# Patient Record
Sex: MALE | Race: WHITE | Employment: OTHER | URBAN - METROPOLITAN AREA
[De-identification: names, ages, dates, MRNs, and addresses within clinical notes are randomized per-mention and may not be internally consistent; named-entity substitution may affect disease eponyms.]

---

## 2017-01-21 NOTE — BRIEF OP NOTE
BRIEF OPERATIVE NOTE    Date of Procedure: 1/26/2017     Preoperative Diagnosis:  PARTIAL THICKNESS ROTATOR CUFF TEAR RIGHT SHOULDER      BICEPS TENDINITIS RIGHT SHOULDER        Postoperative Diagnosis:  SAME      SLAP TEAR RIGHT SHOULDER      LABRAL TEAR RIGHT SHOULDER      CHONDROMALACIA RIGHT SHOULDER    Procedure(s): ARTHROSCOPY RIGHT SHOULDER ARTHROSCOPIC SUBACROMIAL DECOMPRESSION, EXTENSIVE DEBRIDEMENT SLAP TEAR, LABRAL TEAR, GLENOHUMERAL JOINT, SUBACROMIAL SPACE, MINI OPEN ROTATOR CUFF REPAIR, BICEPS TENODESIS    Surgeon(s) and Role:     * Blayne Stephens MD - Primary              Anesthesia: General WITH INTERSCALENE BLOCK    Complications: NONE    Implants:     Implant Name Type Inv.  Item Serial No.  Lot No. LRB No. Used Action   ANCHOR SUT 5.5MM W/NDL PEEK ZP -- - [id number]   ANCHOR SUT 5.5MM W/NDL PEEK ZP -- [id number] [employer] [id number] Right 2 Implanted        Brie Fraser MD

## 2017-01-21 NOTE — H&P
[facility] HISTORY AND PHYSICAL    Subjective:     Patient is a 81 y.o. RHD MALE WITH RIGHT SHOULDER PAIN. SEE OFFICE NOTE.     Patient Active Problem List    Diagnosis Date Noted    AR (allergic rhinitis) 07/27/2016    Chronic sinusitis 07/27/2016    Nasal polyp 07/27/2016    Hypertrophy of nasal turbinates 07/27/2016    Chronic pansinusitis 07/27/2016    Hereditary deficiency of other clotting factors ([address] 03/21/2016    Chronic deep vein thrombosis of lower extremity ([address] 03/21/2016    Saddle embolism of pulmonary artery ([address] 03/21/2016    Bradycardia 02/02/2016    Anaphylactic reaction 02/01/2016    Pulmonary embolism ([address] 12/23/2014    Hypertension 12/23/2014    Factor V Leiden ([address] 12/23/2014    Left leg DVT ([address] 12/23/2014    Leukocytosis 12/23/2014    Hyperkalemia 12/23/2014    Elevated serum creatinine 12/23/2014    Chronic back pain 12/23/2014    ARCE (dyspnea on exertion) 12/23/2014    Spinal stenosis, lumbar region, with neurogenic claudication 03/24/2014    Glaucoma     Hypercholesteremia     Unspecified adverse effect of anesthesia     Osteoarthritis of left hip 12/12/2012   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 12/12/2012    Supraspinatus (muscle) (tendon) sprain 02/17/2012    Superior glenoid labrum lesion 02/17/2012    Bicipital tenosynovitis 02/17/2012    Primary localized osteoarthrosis, shoulder region 02/17/2012    Carpal tunnel syndrome 02/17/2012    Essential hypertension, benign 08/16/2011    Thromboembolus ([address] 09/01/1997     Past Medical History   Diagnosis Date    Acute sinusitis     Anaphylactic reaction 2/1/2016    AR (allergic rhinitis) 7/27/2016    Bicipital tenosynovitis 2/17/2012    Bradycardia 2/2/2016    Carpal tunnel syndrome 2/17/2012    Chronic back pain 12/23/2014    Chronic deep vein thrombosis of lower extremity (HCC) 3/21/2016    Chronic pansinusitis 7/27/2016    Chronic sinusitis 7/27/2016    DNS (deviated nasal septum)     ARCE (dyspnea on exertion) 12/23/2014    DVT (deep venous thrombosis) ([address] 12/2014 1997, 2014, 8/2015    Elevated serum creatinine 12/23/2014    Epistaxis     Essential hypertension, benign 8/16/2011    Factor V Leiden ([address]      managed with medication     Glaucoma      right eye    Hereditary deficiency of other clotting factors ([address] 3/21/2016    Hypercholesteremia      managed with medication     Hyperkalemia 12/23/2014    Hypertension      well controlled - no meds at this time per pt.     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    Spinal stenosis, lumbar region, with neurogenic claudication 3/24/2014    Superior glenoid labrum lesion 2/17/2012    Supraspinatus (muscle) (tendon) sprain 2/17/2012    Thromboembolus ([address] 9/1/1997     left thigh DVT     Unspecified adverse effect of anesthesia      very difficult IV stick per patient d/t Lovenox, has needed anesthesiologist to start several times/comes in a day ahead for PICC line       Past Surgical History   Procedure Laterality Date    Hx colonoscopy  00,05,10    Hx carpal tunnel release  2012     left    Hx carpal tunnel release  1980     right    Hx blepharoplasty Bilateral 2009    Hx vascular access  2008     PICC line right arm placed & then removed     Hx vascular access  2012     PICC- removed    Hx lap cholecystectomy  2001    Hx appendectomy  1980    Hx hernia repair  2010     abdominal hernia repair    Hx hernia repair Left 2007     inguinal hernia repair    Hx other surgical  2000     [place] filter placed    Hx lumbar laminectomy      Hx hip replacement Left     Hx colonoscopy      Hx vitrectomy Left 6/15/15    Hx cholecystectomy      Hx orthopaedic Bilateral      open shoulder AC reconstruction    Hx orthopaedic Right 2006     neuroma fromfoot    Hx orthopaedic Bilateral      right elbow 1979 & left elbow 2007    Hx knee arthroscopy Right 2006      knee meniscus repair    Hx shoulder arthroscopy Left 2/17/2012    Hx orthopaedic Right      index finger    Hx back surgery       spinal stenosis    Hx heent Right 11/08      glaucoma surgery    Hx tonsillectomy  1950    Hx cataract removal Bilateral      left eye 1994 & right eye 2002    Hx corneal transplant Right 2004    Pr sinus surgery proc unlisted       numerous    Hx heent       nasal reconstruction    Hx heent       nasal polypectomy      Prior to Admission medications    Medication Sig Start Date End Date Taking? Authorizing Provider   HYDROcodone-acetaminophen (NORCO) 7.5-325 mg per tablet Take 1 Tab by mouth every six (6) hours as needed for Pain. Max Daily Amount: 4 Tabs. 11/15/16   Agnes Saleh NP   rivaroxaban (XARELTO) 20 mg tab tablet Take 1 Tab by mouth daily (with breakfast). 9/12/16   Anselmo Wallace MD   carboxymethylcellulose sodium (REFRESH LIQUIGEL) 1 % dlgl Apply 1 Drop to eye every four (4) hours. Historical Provider   omega-3 fatty acids-vitamin e (FISH OIL) 1,000 mg cap Take 2 Caps by mouth every morning. Historical Provider   Brimonidine-Timolol (COMBIGAN) 0.2-0.5 % Drop Administer 1 drop to both eyes two (2) times a day. Indications: OPEN ANGLE GLAUCOMA 3/16/10   Historical Provider   PRED FORTE 1 % DrpS Administer 1 drop to right eye two (2) times a day.  Indications: pt states prevents infection 3/16/10   Salomón Suarez MD Allergies   Allergen Reactions    Aspirin Other (comments)     Pt states can't take aspirin with his blood thinning medication.  Epinephrine Unknown (comments)      Social History   Substance Use Topics    Smoking status: Never Smoker    Smokeless tobacco: Never Used    Alcohol use No      Family History   Problem Relation Age of Onset    Cancer Mother      lymphoma    Diabetes Mother      type II    Heart Disease Father     Diabetes Father      type II    Cancer Father      pancreatic ca    Other Father      Coronary Disease    Malignant Hyperthermia Neg Hx     Pseudocholinesterase Deficiency Neg Hx     Delayed Awakening Neg Hx     Post-op Nausea/Vomiting Neg Hx     Emergence Delirium Neg Hx     Post-op Cognitive Dysfunction Neg Hx       Review of Systems  A comprehensive review of systems was negative except for that written in the HPI. Objective:     No data found. There were no vitals taken for this visit. General:  Alert, cooperative, no distress, appears stated age. Head:  Normocephalic, without obvious abnormality, atraumatic. Back:   Symmetric, no curvature. ROM normal. No CVA tenderness. Lungs:   Clear to auscultation bilaterally. Chest wall:  No tenderness or deformity. Heart:  Regular rate and rhythm, S1, S2 normal, no murmur, click, rub or gallop. Extremities: Extremities normal, atraumatic, no cyanosis or edema. Pulses: 2+ and symmetric all extremities. Skin: Skin color, texture, turgor normal. No rashes or lesions   Lymph nodes: Cervical, supraclavicular, and axillary nodes normal.   Neurologic: CNII-XII intact. Normal strength, sensation and reflexes throughout. Assessment:     ROTATOR CUFF TEAR RIGHT SHOULDER  BICEPS TENDINITIS RIGHT SHOULDER    Plan:     The various methods of treatment have been discussed with the patient and family. PATIENT HAS EXHAUSTED NON-OPERATIVE MODALITIES.   After consideration of risks, benefits and other options for treatment, the patient has consented to surgical intervention. SEE OFFICE NOTE.

## 2017-01-24 ENCOUNTER — HOSPITAL ENCOUNTER (OUTPATIENT)
Dept: SURGERY | Age: 72
Discharge: HOME | End: 2017-01-24
Attending: ORTHOPAEDIC SURGERY

## 2017-01-24 VITALS
WEIGHT: 182 LBS | SYSTOLIC BLOOD PRESSURE: 158 MMHG | BODY MASS INDEX: 26.96 KG/M2 | OXYGEN SATURATION: 98 % | HEIGHT: 69 IN | DIASTOLIC BLOOD PRESSURE: 74 MMHG | HEART RATE: 59 BPM | RESPIRATION RATE: 18 BRPM | TEMPERATURE: 98 F

## 2017-01-24 RX ORDER — GLUCOSAMINE/CHONDR SU A SOD 750-600 MG
TABLET ORAL DAILY
COMMUNITY
End: 2018-05-23

## 2017-01-24 RX ORDER — LANOLIN ALCOHOL/MO/W.PET/CERES
1000 CREAM (GRAM) TOPICAL DAILY
COMMUNITY
End: 2019-03-26 | Stop reason: ALTCHOICE

## 2017-01-24 RX ORDER — ATORVASTATIN CALCIUM 80 MG/1
40 TABLET, FILM COATED ORAL DAILY
COMMUNITY
End: 2018-05-23

## 2017-01-24 RX ORDER — MOXIFLOXACIN 5 MG/ML
1 SOLUTION/ DROPS OPHTHALMIC 3 TIMES DAILY
COMMUNITY
End: 2017-03-20 | Stop reason: CLARIF

## 2017-01-24 NOTE — PERIOP NOTES
Patient verified name, , and surgery as listed in [facility]. Type 1B surgery, PAT walk-in assessment complete. Labs per surgeon: Hemoglobin A1c and FBS DOS; orders signed and held in [address] [place] [address]. Labs per anesthesia protocol: None per anesthesia protocol. EKG: None per anesthesia protocol. Hibiclens and instructions given per hospital policy. Patient provided with handouts including Guide to Surgery, Pain Management, Hand Hygiene, Blood Transfusion Education, and [facility] Brochure. Patient answered medical/surgical history questions at their best of ability. All prior to admission medications documented in [facility]. Original medication prescription bottle NOT visualized during patient appointment. Patient instructed to hold all vitamins 7 days prior to surgery and NSAIDS 5 days prior to surgery, patient verbalized understanding. Medications to be held: Probiotic, Fish Oil, Biotin, and B12. Patient does not have to stop Xarelto prior to surgery per Dr. Carlos Newell. Patient states will take Xarelto 17 but NOT the morning of surgery (17). Note from Dr. Genevieve Thorpe states patient \"should hold Xarelto 24 prior,\" placed on chart for anesthesia reference. Patient instructed to continue previous medications as prescribed prior to surgery and to take the following medications the day of surgery according to anesthesia guidelines with a small sip of water: All eye drops, Norco if needed, and Atorvastatin. Patient taught back and verbalized understanding.

## 2017-01-26 ENCOUNTER — SURGERY (OUTPATIENT)
Age: 72
End: 2017-01-26

## 2017-01-26 ENCOUNTER — HOSPITAL ENCOUNTER (OUTPATIENT)
Age: 72
Setting detail: OBSERVATION
Discharge: HOME | End: 2017-01-27
Attending: ORTHOPAEDIC SURGERY | Admitting: ORTHOPAEDIC SURGERY
Payer: MEDICARE

## 2017-01-26 ENCOUNTER — ANESTHESIA (OUTPATIENT)
Dept: SURGERY | Age: 72
End: 2017-01-26
Payer: MEDICARE

## 2017-01-26 ENCOUNTER — APPOINTMENT (OUTPATIENT)
Dept: GENERAL RADIOLOGY | Age: 72
End: 2017-01-26
Attending: ORTHOPAEDIC SURGERY
Payer: MEDICARE

## 2017-01-26 ENCOUNTER — ANESTHESIA EVENT (OUTPATIENT)
Dept: SURGERY | Age: 72
End: 2017-01-26
Payer: MEDICARE

## 2017-01-26 PROBLEM — S46.211A STRAIN OF RIGHT BICEPS: Status: ACTIVE | Noted: 2017-01-26

## 2017-01-26 PROBLEM — S46.011A TRAUMATIC TEAR OF RIGHT ROTATOR CUFF: Status: ACTIVE | Noted: 2017-01-26

## 2017-01-26 PROBLEM — S43.491S: Status: ACTIVE | Noted: 2017-01-26

## 2017-01-26 PROBLEM — M94.211 CHONDROMALACIA, RIGHT SHOULDER: Status: ACTIVE | Noted: 2017-01-26

## 2017-01-26 PROBLEM — S43.431A TYPE 1 SUPERIOR LABRUM EXTENDING FROM ANTERIOR TO POSTERIOR (SLAP) LESION OF RIGHT SHOULDER: Status: ACTIVE | Noted: 2017-01-26

## 2017-01-26 LAB
CREAT SERPL-MCNC: 1.01 MG/DL (ref 0.8–1.5)
EST. AVERAGE GLUCOSE BLD GHB EST-MCNC: 126 MG/DL
GLUCOSE BLD STRIP.AUTO-MCNC: 109 MG/DL (ref 65–100)
HBA1C MFR BLD: 6 % (ref 4.8–6)

## 2017-01-26 PROCEDURE — 77030008477 HC STYL SATN SLP COVD -A: Performed by: ANESTHESIOLOGY

## 2017-01-26 PROCEDURE — 77030004453 HC BUR SHV STRY -B: Performed by: ORTHOPAEDIC SURGERY

## 2017-01-26 PROCEDURE — 36415 COLL VENOUS BLD VENIPUNCTURE: CPT | Performed by: ORTHOPAEDIC SURGERY

## 2017-01-26 PROCEDURE — 74011250636 HC RX REV CODE- 250/636

## 2017-01-26 PROCEDURE — 82962 GLUCOSE BLOOD TEST: CPT

## 2017-01-26 PROCEDURE — 77030006891 HC BLD SHV RESECT STRY -B: Performed by: ORTHOPAEDIC SURGERY

## 2017-01-26 PROCEDURE — 76010010054 HC POST OP PAIN BLOCK: Performed by: ORTHOPAEDIC SURGERY

## 2017-01-26 PROCEDURE — 74011250637 HC RX REV CODE- 250/637: Performed by: ANESTHESIOLOGY

## 2017-01-26 PROCEDURE — 77030020782 HC GWN BAIR PAWS FLX 3M -B: Performed by: ANESTHESIOLOGY

## 2017-01-26 PROCEDURE — 74011250636 HC RX REV CODE- 250/636: Performed by: ORTHOPAEDIC SURGERY

## 2017-01-26 PROCEDURE — 73030 X-RAY EXAM OF SHOULDER: CPT

## 2017-01-26 PROCEDURE — 77030018986 HC SUT ETHBND4 J&J -B: Performed by: ORTHOPAEDIC SURGERY

## 2017-01-26 PROCEDURE — 76210000017 HC OR PH I REC 1.5 TO 2 HR: Performed by: ORTHOPAEDIC SURGERY

## 2017-01-26 PROCEDURE — 74011000250 HC RX REV CODE- 250: Performed by: ORTHOPAEDIC SURGERY

## 2017-01-26 PROCEDURE — 77030002986 HC SUT PROL J&J -A: Performed by: ORTHOPAEDIC SURGERY

## 2017-01-26 PROCEDURE — 74011250636 HC RX REV CODE- 250/636: Performed by: ANESTHESIOLOGY

## 2017-01-26 PROCEDURE — 77030003666 HC NDL SPINAL BD -A: Performed by: ORTHOPAEDIC SURGERY

## 2017-01-26 PROCEDURE — 82565 ASSAY OF CREATININE: CPT | Performed by: ORTHOPAEDIC SURGERY

## 2017-01-26 PROCEDURE — 74011000250 HC RX REV CODE- 250

## 2017-01-26 PROCEDURE — 77030033005 HC TBNG ARTHSC PMP STRY -B: Performed by: ORTHOPAEDIC SURGERY

## 2017-01-26 PROCEDURE — 99218 HC RM OBSERVATION: CPT

## 2017-01-26 PROCEDURE — 77030006668 HC BLD SHV MENSCS STRY -B: Performed by: ORTHOPAEDIC SURGERY

## 2017-01-26 PROCEDURE — 76010000161 HC OR TIME 1 TO 1.5 HR INTENSV-TIER 1: Performed by: ORTHOPAEDIC SURGERY

## 2017-01-26 PROCEDURE — 76060000033 HC ANESTHESIA 1 TO 1.5 HR: Performed by: ORTHOPAEDIC SURGERY

## 2017-01-26 PROCEDURE — C1713 ANCHOR/SCREW BN/BN,TIS/BN: HCPCS | Performed by: ORTHOPAEDIC SURGERY

## 2017-01-26 PROCEDURE — 83036 HEMOGLOBIN GLYCOSYLATED A1C: CPT | Performed by: ORTHOPAEDIC SURGERY

## 2017-01-26 PROCEDURE — 77030002916 HC SUT ETHLN J&J -A: Performed by: ORTHOPAEDIC SURGERY

## 2017-01-26 PROCEDURE — 74011000258 HC RX REV CODE- 258: Performed by: ORTHOPAEDIC SURGERY

## 2017-01-26 PROCEDURE — 77030033073 HC TBNG ARTHSC PMP OUTFLO STRY -B: Performed by: ORTHOPAEDIC SURGERY

## 2017-01-26 PROCEDURE — 77030008703 HC TU ET UNCUF COVD -A: Performed by: ANESTHESIOLOGY

## 2017-01-26 PROCEDURE — 77030032490 HC SLV COMPR SCD KNE COVD -B: Performed by: ORTHOPAEDIC SURGERY

## 2017-01-26 PROCEDURE — 77030003602 HC NDL NRV BLK BBMI -B: Performed by: ANESTHESIOLOGY

## 2017-01-26 PROCEDURE — 77030031139 HC SUT VCRL2 J&J -A: Performed by: ORTHOPAEDIC SURGERY

## 2017-01-26 PROCEDURE — 77030018836 HC SOL IRR NACL ICUM -A: Performed by: ORTHOPAEDIC SURGERY

## 2017-01-26 PROCEDURE — 76942 ECHO GUIDE FOR BIOPSY: CPT | Performed by: ORTHOPAEDIC SURGERY

## 2017-01-26 PROCEDURE — 74011250637 HC RX REV CODE- 250/637: Performed by: ORTHOPAEDIC SURGERY

## 2017-01-26 DEVICE — 5.5MM PEEK ZIP SUTURE ANCHOR WITH ¿ CIRCLE TAPER NEEDLES, #2 FORCE FIBER
Type: IMPLANTABLE DEVICE | Site: SHOULDER | Status: FUNCTIONAL
Brand: PEEK ZIP

## 2017-01-26 RX ORDER — FACIAL-BODY WIPES
10 EACH TOPICAL DAILY PRN
Status: DISCONTINUED | OUTPATIENT
Start: 2017-01-26 | End: 2017-01-27 | Stop reason: HOSPADM

## 2017-01-26 RX ORDER — ROCURONIUM BROMIDE 10 MG/ML
INJECTION, SOLUTION INTRAVENOUS AS NEEDED
Status: DISCONTINUED | OUTPATIENT
Start: 2017-01-26 | End: 2017-01-26 | Stop reason: HOSPADM

## 2017-01-26 RX ORDER — HYDROMORPHONE HYDROCHLORIDE 1 MG/ML
1 INJECTION, SOLUTION INTRAMUSCULAR; INTRAVENOUS; SUBCUTANEOUS
Status: DISCONTINUED | OUTPATIENT
Start: 2017-01-26 | End: 2017-01-27 | Stop reason: HOSPADM

## 2017-01-26 RX ORDER — SODIUM CHLORIDE, SODIUM LACTATE, POTASSIUM CHLORIDE, CALCIUM CHLORIDE 600; 310; 30; 20 MG/100ML; MG/100ML; MG/100ML; MG/100ML
125 INJECTION, SOLUTION INTRAVENOUS CONTINUOUS
Status: DISCONTINUED | OUTPATIENT
Start: 2017-01-26 | End: 2017-01-26 | Stop reason: HOSPADM

## 2017-01-26 RX ORDER — FENTANYL CITRATE 50 UG/ML
INJECTION, SOLUTION INTRAMUSCULAR; INTRAVENOUS AS NEEDED
Status: DISCONTINUED | OUTPATIENT
Start: 2017-01-26 | End: 2017-01-26 | Stop reason: HOSPADM

## 2017-01-26 RX ORDER — LIDOCAINE HYDROCHLORIDE AND EPINEPHRINE 10; 10 MG/ML; UG/ML
INJECTION, SOLUTION INFILTRATION; PERINEURAL AS NEEDED
Status: DISCONTINUED | OUTPATIENT
Start: 2017-01-26 | End: 2017-01-26 | Stop reason: HOSPADM

## 2017-01-26 RX ORDER — TEMAZEPAM 15 MG/1
15 CAPSULE ORAL
Status: DISCONTINUED | OUTPATIENT
Start: 2017-01-26 | End: 2017-01-27 | Stop reason: HOSPADM

## 2017-01-26 RX ORDER — SUCCINYLCHOLINE CHLORIDE 20 MG/ML
INJECTION INTRAMUSCULAR; INTRAVENOUS AS NEEDED
Status: DISCONTINUED | OUTPATIENT
Start: 2017-01-26 | End: 2017-01-26 | Stop reason: HOSPADM

## 2017-01-26 RX ORDER — NALOXONE HYDROCHLORIDE 0.4 MG/ML
0.1 INJECTION, SOLUTION INTRAMUSCULAR; INTRAVENOUS; SUBCUTANEOUS AS NEEDED
Status: DISCONTINUED | OUTPATIENT
Start: 2017-01-26 | End: 2017-01-26 | Stop reason: HOSPADM

## 2017-01-26 RX ORDER — DEXAMETHASONE SODIUM PHOSPHATE 100 MG/10ML
INJECTION INTRAMUSCULAR; INTRAVENOUS AS NEEDED
Status: DISCONTINUED | OUTPATIENT
Start: 2017-01-26 | End: 2017-01-26 | Stop reason: HOSPADM

## 2017-01-26 RX ORDER — FENTANYL CITRATE 50 UG/ML
100 INJECTION, SOLUTION INTRAMUSCULAR; INTRAVENOUS ONCE
Status: COMPLETED | OUTPATIENT
Start: 2017-01-26 | End: 2017-01-26

## 2017-01-26 RX ORDER — BUPIVACAINE HYDROCHLORIDE AND EPINEPHRINE 2.5; 5 MG/ML; UG/ML
INJECTION, SOLUTION EPIDURAL; INFILTRATION; INTRACAUDAL; PERINEURAL AS NEEDED
Status: DISCONTINUED | OUTPATIENT
Start: 2017-01-26 | End: 2017-01-26 | Stop reason: HOSPADM

## 2017-01-26 RX ORDER — OXYCODONE HYDROCHLORIDE 5 MG/1
10 TABLET ORAL
Status: DISCONTINUED | OUTPATIENT
Start: 2017-01-26 | End: 2017-01-26 | Stop reason: HOSPADM

## 2017-01-26 RX ORDER — HYDROMORPHONE HYDROCHLORIDE 4 MG/1
4 TABLET ORAL
Status: DISCONTINUED | OUTPATIENT
Start: 2017-01-26 | End: 2017-01-27 | Stop reason: HOSPADM

## 2017-01-26 RX ORDER — BUPIVACAINE HYDROCHLORIDE AND EPINEPHRINE 5; 5 MG/ML; UG/ML
INJECTION, SOLUTION EPIDURAL; INTRACAUDAL; PERINEURAL AS NEEDED
Status: DISCONTINUED | OUTPATIENT
Start: 2017-01-26 | End: 2017-01-26 | Stop reason: HOSPADM

## 2017-01-26 RX ORDER — DOCUSATE SODIUM 100 MG/1
100 CAPSULE, LIQUID FILLED ORAL 2 TIMES DAILY
Status: DISCONTINUED | OUTPATIENT
Start: 2017-01-27 | End: 2017-01-27 | Stop reason: HOSPADM

## 2017-01-26 RX ORDER — SODIUM CHLORIDE 0.9 % (FLUSH) 0.9 %
5-10 SYRINGE (ML) INJECTION EVERY 8 HOURS
Status: DISCONTINUED | OUTPATIENT
Start: 2017-01-26 | End: 2017-01-26 | Stop reason: HOSPADM

## 2017-01-26 RX ORDER — LIDOCAINE HYDROCHLORIDE 20 MG/ML
INJECTION, SOLUTION EPIDURAL; INFILTRATION; INTRACAUDAL; PERINEURAL AS NEEDED
Status: DISCONTINUED | OUTPATIENT
Start: 2017-01-26 | End: 2017-01-26 | Stop reason: HOSPADM

## 2017-01-26 RX ORDER — SODIUM CHLORIDE 0.9 % (FLUSH) 0.9 %
5-10 SYRINGE (ML) INJECTION AS NEEDED
Status: DISCONTINUED | OUTPATIENT
Start: 2017-01-26 | End: 2017-01-26 | Stop reason: HOSPADM

## 2017-01-26 RX ORDER — GLYCOPYRROLATE 0.2 MG/ML
INJECTION INTRAMUSCULAR; INTRAVENOUS AS NEEDED
Status: DISCONTINUED | OUTPATIENT
Start: 2017-01-26 | End: 2017-01-26 | Stop reason: HOSPADM

## 2017-01-26 RX ORDER — PROPOFOL 10 MG/ML
INJECTION, EMULSION INTRAVENOUS AS NEEDED
Status: DISCONTINUED | OUTPATIENT
Start: 2017-01-26 | End: 2017-01-26 | Stop reason: HOSPADM

## 2017-01-26 RX ORDER — MIDAZOLAM HYDROCHLORIDE 1 MG/ML
2 INJECTION, SOLUTION INTRAMUSCULAR; INTRAVENOUS ONCE
Status: COMPLETED | OUTPATIENT
Start: 2017-01-26 | End: 2017-01-26

## 2017-01-26 RX ORDER — SODIUM CHLORIDE 9 MG/ML
75 INJECTION, SOLUTION INTRAVENOUS CONTINUOUS
Status: DISPENSED | OUTPATIENT
Start: 2017-01-26 | End: 2017-01-27

## 2017-01-26 RX ORDER — SODIUM CHLORIDE 0.9 % (FLUSH) 0.9 %
5-10 SYRINGE (ML) INJECTION EVERY 8 HOURS
Status: DISCONTINUED | OUTPATIENT
Start: 2017-01-26 | End: 2017-01-27 | Stop reason: HOSPADM

## 2017-01-26 RX ORDER — SODIUM CHLORIDE 0.9 % (FLUSH) 0.9 %
5-10 SYRINGE (ML) INJECTION AS NEEDED
Status: DISCONTINUED | OUTPATIENT
Start: 2017-01-26 | End: 2017-01-27 | Stop reason: HOSPADM

## 2017-01-26 RX ORDER — ONDANSETRON 2 MG/ML
INJECTION INTRAMUSCULAR; INTRAVENOUS AS NEEDED
Status: DISCONTINUED | OUTPATIENT
Start: 2017-01-26 | End: 2017-01-26 | Stop reason: HOSPADM

## 2017-01-26 RX ORDER — PROMETHAZINE HYDROCHLORIDE 25 MG/1
25 TABLET ORAL
Status: DISCONTINUED | OUTPATIENT
Start: 2017-01-26 | End: 2017-01-27 | Stop reason: HOSPADM

## 2017-01-26 RX ORDER — HYDROMORPHONE HYDROCHLORIDE 2 MG/1
2 TABLET ORAL
Status: DISCONTINUED | OUTPATIENT
Start: 2017-01-26 | End: 2017-01-27 | Stop reason: HOSPADM

## 2017-01-26 RX ORDER — LIDOCAINE HYDROCHLORIDE 10 MG/ML
0.1 INJECTION INFILTRATION; PERINEURAL AS NEEDED
Status: DISCONTINUED | OUTPATIENT
Start: 2017-01-26 | End: 2017-01-26 | Stop reason: HOSPADM

## 2017-01-26 RX ORDER — GUAIFENESIN 100 MG/5ML
81 LIQUID (ML) ORAL DAILY
Status: DISCONTINUED | OUTPATIENT
Start: 2017-01-26 | End: 2017-01-27 | Stop reason: HOSPADM

## 2017-01-26 RX ORDER — CEFAZOLIN SODIUM IN 0.9 % NACL 2 G/50 ML
2 INTRAVENOUS SOLUTION, PIGGYBACK (ML) INTRAVENOUS ONCE
Status: COMPLETED | OUTPATIENT
Start: 2017-01-26 | End: 2017-01-26

## 2017-01-26 RX ORDER — HYDROMORPHONE HYDROCHLORIDE 2 MG/ML
0.5 INJECTION, SOLUTION INTRAMUSCULAR; INTRAVENOUS; SUBCUTANEOUS
Status: DISCONTINUED | OUTPATIENT
Start: 2017-01-26 | End: 2017-01-26 | Stop reason: HOSPADM

## 2017-01-26 RX ADMIN — LIDOCAINE HYDROCHLORIDE 80 MG: 20 INJECTION, SOLUTION EPIDURAL; INFILTRATION; INTRACAUDAL; PERINEURAL at 11:39

## 2017-01-26 RX ADMIN — ROCURONIUM BROMIDE 5 MG: 10 INJECTION, SOLUTION INTRAVENOUS at 11:39

## 2017-01-26 RX ADMIN — HYDROMORPHONE HYDROCHLORIDE 1 MG: 1 INJECTION, SOLUTION INTRAMUSCULAR; INTRAVENOUS; SUBCUTANEOUS at 23:44

## 2017-01-26 RX ADMIN — CEFAZOLIN 2 G: 1 INJECTION, POWDER, FOR SOLUTION INTRAMUSCULAR; INTRAVENOUS; PARENTERAL at 11:29

## 2017-01-26 RX ADMIN — CEFAZOLIN SODIUM 1 G: 1 INJECTION, POWDER, FOR SOLUTION INTRAMUSCULAR; INTRAVENOUS at 18:14

## 2017-01-26 RX ADMIN — Medication 10 ML: at 23:44

## 2017-01-26 RX ADMIN — BUPIVACAINE HYDROCHLORIDE AND EPINEPHRINE 11 ML: 2.5; 5 INJECTION, SOLUTION EPIDURAL; INFILTRATION; INTRACAUDAL; PERINEURAL at 10:41

## 2017-01-26 RX ADMIN — SODIUM CHLORIDE 75 ML/HR: 900 INJECTION, SOLUTION INTRAVENOUS at 13:00

## 2017-01-26 RX ADMIN — BUPIVACAINE HYDROCHLORIDE AND EPINEPHRINE 11 ML: 5; 5 INJECTION, SOLUTION EPIDURAL; INTRACAUDAL; PERINEURAL at 10:41

## 2017-01-26 RX ADMIN — MIDAZOLAM HYDROCHLORIDE 2 MG: 1 INJECTION, SOLUTION INTRAMUSCULAR; INTRAVENOUS at 10:32

## 2017-01-26 RX ADMIN — FENTANYL CITRATE 100 MCG: 50 INJECTION, SOLUTION INTRAMUSCULAR; INTRAVENOUS at 11:39

## 2017-01-26 RX ADMIN — Medication 10 ML: at 16:54

## 2017-01-26 RX ADMIN — DEXAMETHASONE SODIUM PHOSPHATE 10 MG: 100 INJECTION INTRAMUSCULAR; INTRAVENOUS at 11:57

## 2017-01-26 RX ADMIN — SODIUM CHLORIDE, SODIUM LACTATE, POTASSIUM CHLORIDE, AND CALCIUM CHLORIDE 125 ML/HR: 600; 310; 30; 20 INJECTION, SOLUTION INTRAVENOUS at 08:33

## 2017-01-26 RX ADMIN — FENTANYL CITRATE 50 MCG: 50 INJECTION, SOLUTION INTRAMUSCULAR; INTRAVENOUS at 10:32

## 2017-01-26 RX ADMIN — RIVAROXABAN 20 MG: 20 TABLET, FILM COATED ORAL at 18:14

## 2017-01-26 RX ADMIN — LIDOCAINE HYDROCHLORIDE AND EPINEPHRINE 20 ML: 10; 10 INJECTION, SOLUTION INFILTRATION; PERINEURAL at 12:07

## 2017-01-26 RX ADMIN — PROPOFOL 200 MG: 10 INJECTION, EMULSION INTRAVENOUS at 11:39

## 2017-01-26 RX ADMIN — SUCCINYLCHOLINE CHLORIDE 140 MG: 20 INJECTION INTRAMUSCULAR; INTRAVENOUS at 11:39

## 2017-01-26 RX ADMIN — ASPIRIN 81 MG CHEWABLE TABLET 81 MG: 81 TABLET CHEWABLE at 10:45

## 2017-01-26 RX ADMIN — SODIUM CHLORIDE, SODIUM LACTATE, POTASSIUM CHLORIDE, AND CALCIUM CHLORIDE: 600; 310; 30; 20 INJECTION, SOLUTION INTRAVENOUS at 11:50

## 2017-01-26 RX ADMIN — ONDANSETRON 4 MG: 2 INJECTION INTRAMUSCULAR; INTRAVENOUS at 12:24

## 2017-01-26 RX ADMIN — GLYCOPYRROLATE 0.2 MG: 0.2 INJECTION INTRAMUSCULAR; INTRAVENOUS at 12:05

## 2017-01-26 NOTE — PERIOP NOTES
TRANSFER - OUT REPORT:    Verbal report given to [address] [place] [address]  on Nelida Delaney  being transferred to [facility]  for routine progression of care       Report consisted of patients Situation, Background, Assessment and   Recommendations(SBAR). Information from the following report(s) SBAR, Kardex, OR Summary, Intake/Output and MAR was reviewed with the receiving nurse. Lines:   Peripheral IV 01/26/17 Left Antecubital (Active)   Site Assessment Clean, dry, & intact 1/26/2017  1:55 PM   Phlebitis Assessment 0 1/26/2017  1:55 PM   Infiltration Assessment 0 1/26/2017  1:55 PM   Dressing Status Clean, dry, & intact 1/26/2017  1:55 PM   Dressing Type Tape;Transparent 1/26/2017  1:55 PM   Hub Color/Line Status Infusing 1/26/2017  1:55 PM        Opportunity for questions and clarification was provided.       Patient transported with:   O2 @ 2 liters

## 2017-01-26 NOTE — ANESTHESIA PROCEDURE NOTES
Peripheral Block    Start time: 1/26/2017 10:33 AM  End time: 1/26/2017 10:41 AM  Performed by: Preet Mckeon  Authorized by: Preet Mckeon       Pre-procedure: Indications: at surgeon's request and post-op pain management    Preanesthetic Checklist: patient identified, risks and benefits discussed, site marked, timeout performed, anesthesia consent given and patient being monitored    Timeout Time: 10:32          Block Type:   Block Type:   Interscalene  Laterality:  Right  Monitoring:  Standard ASA monitoring, responsive to questions, oxygen, continuous pulse ox, frequent vital sign checks and heart rate  Injection Technique:  Single shot  Procedures: ultrasound guided and nerve stimulator    Patient Position: supine  Prep: chlorhexidine    Location:  Interscalene  Needle Type:  Stimuplex  Needle Gauge:  22 G  Needle Localization:  Nerve stimulator and ultrasound guidance  Motor Response: minimal motor response >0.4 mA    Medication Injected:  0.375%  bupivacaine  Adds:  Epi 1:200K  Volume (mL):  22    Assessment:  Number of attempts:  1  Injection Assessment:  Incremental injection every 5 mL, negative aspiration for CSF, ultrasound image on chart, local visualized surrounding nerve on ultrasound, negative aspiration for blood, no intravascular symptoms and no paresthesia  Patient tolerance:  Patient tolerated the procedure well with no immediate complications

## 2017-01-26 NOTE — DISCHARGE SUMMARY
[address] [facility] Discharge Summary      Patient ID:  Tam Chase  [id number]  81 y.o.  1945    Admit date: 1/26/2017    Discharge date and time: 1/27/2016    Admitting Physician: Pasha Rodriguez MD     Discharge Physician: Pasha Rodriguez MD      Admission Diagnoses: Rotator cuff strain, right, sequela [S46.011S]  Biceps muscle strain, right, initial encounter [R34.920H]    Discharge Diagnoses: Principal Problem:    Traumatic tear of right rotator cuff (1/26/2017)    Active Problems:    Strain of right biceps (1/26/2017)      Type 1 superior labrum extending from anterior to posterior (SLAP) lesion of right shoulder (1/26/2017)      Other sprain of right shoulder joint, sequela (1/26/2017)      Chondromalacia, right shoulder (1/26/2017)        Surgeon: Pasha Rodriguez MD      Preoperative Medical Clearance: YES                          Perioperative Antibiotics: Ancef  _X__                                                Vancomycin  ___          DVT Prophylaxis:  Sumeet Fisher Op complications: none        Discharged to: Home    Discharge instructions:  -Resume pre hospital diet             -Resume home medications per medical continuation form     CONTINUE PHYSICAL THERAPY  SLING RIGHT SHOULDER  -Follow up in office as scheduled       Signed:  Pasha Rodriguez MD  1/27/2017  9:00 AM

## 2017-01-26 NOTE — IP AVS SNAPSHOT
[address] [place] [address] 
[phone] Patient: Aurelio Caba MRN: [id number] YPJ:[birth year] You are allergic to the following Allergen Reactions Aspirin Other (comments) Pt states can't take aspirin with his blood thinning medication. Epinephrine Unknown (comments) Blindness in right eye Recent Documentation Weight BMI Smoking Status 82.6 kg 26.88 kg/m2 Never Smoker Emergency Contacts Name Discharge Info Relation Home Work Mobile Stack,Bethanie DISCHARGE CAREGIVER [3] Spouse [3] [phone] [id number] [phone] About your hospitalization You were admitted on:  January 26, 2017 You last received care in the:  [facility] You were discharged on:  January 27, 2017 Unit phone number:  [phone] Why you were hospitalized Your primary diagnosis was:  Traumatic Tear Of Right Rotator Cuff Your diagnoses also included:  Strain Of Right Biceps, Type 1 Superior Labrum Extending From Anterior To Posterior (Slap) Lesion Of Right Shoulder, Other Sprain Of Right Shoulder Joint, Sequela, Chondromalacia, Right Shoulder Providers Seen During Your Hospitalizations Provider Role Specialty Primary office phone Concepcion Keita MD Attending Provider Orthopedic Surgery [phone] Your Primary Care Physician (PCP) Primary Care Physician Office Phone Office Fax Chloe Patient [address] Follow-up Information Follow up With Details Comments Contact Info Santiago Fam MD  As needed [facility] [address] [address] 
[phone] Concepcion Keita MD In 2 weeks Call office if not already scheduled [facility] 10 [facility] [address] [place] [address] Simone 16 Your Appointments Thursday March 09, 2017  8:00 AM EST LAB with [address]  
1808 [facility] OUTREACH INSURANCE ([address]) Shaina Godinez 0 [address] [place] [address]  
[phone] Thursday March 09, 2017  8:30 AM EST Follow Up with MD Jeffrey Lai Hematology and Oncology [facility]) RASHMI/ Neftaly Dhillon 33 [facility] [address]  
[phone] Current Discharge Medication List  
  
ASK your doctor about these medications Dose & Instructions Dispensing Information Comments Morning Noon Evening Bedtime  
 atorvastatin 80 mg tablet Commonly known as:  LIPITOR Dose:  80 mg Take 80 mg by mouth daily. 1/2 tablet daily  Take DOS per anesthesia protocol. Refills:  0 Biotin 2,500 mcg Cap Take  by mouth daily. Refills:  0  
     
   
   
   
  
 COMBIGAN 0.2-0.5 % Drop ophthalmic solution Generic drug:  brimonidine-timolol Dose:  1 Drop Administer 1 drop to both eyes two (2) times a day. Indications: OPEN ANGLE GLAUCOMA Refills:  0  
     
   
   
   
  
 FISH OIL 1,000 mg Cap Generic drug:  omega-3 fatty acids-vitamin e Dose:  2 Cap Take 2 Caps by mouth every morning. Refills:  0 HYDROcodone-acetaminophen 7.5-325 mg per tablet Commonly known as:  Maryam Singh Notes to Patient:  DO NOT TAKE WITH DILAUDID! Dose:  1 Tab Take 1 Tab by mouth every six (6) hours as needed for Pain. Max Daily Amount: 4 Tabs. Quantity:  20 Tab Refills:  0 PRED FORTE 1 % ophthalmic suspension Generic drug:  prednisoLONE acetate Dose:  1 Drop Administer 1 drop to right eye two (2) times a day. Indications: pt states prevents infection Refills:  0 PROBIOTIC 4X 10-15 mg Tbec Generic drug:  B.infantis-B.ani-B.long-B.bifi Take  by mouth daily. Refills:  0 REFRESH LIQUIGEL 1 % Dlgl Generic drug:  carboxymethylcellulose sodium Dose:  1 Drop Apply 1 Drop to eye every four (4) hours. Refills:  0  
     
   
   
   
  
 rivaroxaban 20 mg Tab tablet Commonly known as:  Jordan Zhu Your next dose is:  Tomorrow Dose:  20 mg Take 1 Tab by mouth daily (with breakfast). Quantity:  30 Tab Refills:  11 VIGAMOX 0.5 % ophthalmic solution Generic drug:  moxifloxacin Dose:  1 Drop Administer 1 Drop to right eye three (3) times daily. Refills:  0  
     
   
   
   
  
 VITAMIN B-12 1,000 mcg tablet Generic drug:  cyanocobalamin Dose:  1000 mcg Take 1,000 mcg by mouth daily. Refills:  0 Discharge Instructions DISCHARGE SUMMARY from Nurse The following personal items collected during your admission are returned to you:  
Dental Appliance: Dental Appliances: Lowers; Other (comment) (bridge ) Vision: Visual Aid: Glasses Hearing Aid:   na 
Jewelry: Jewelry: None Clothing: Clothing: At bedside Other Valuables: Other Valuables: None Valuables sent to safe:   na 
 
 
 
 
PATIENT INSTRUCTIONS: 
 
New Medications: 
 
Dilaudid 2 mg tabs Take 1-2 tabs every 4-6 hrs as needed for pain. Toradol 10 mg tabs Take 1 tab every 6 hrs x 5 days. Phenergan 25 mg tabs Take 1 tab every 8 hrs as needed for nausea. Restoril 15 mg tabs Take 1 tab at bedtime as needed for sleep. After general anesthesia or intravenous sedation, for 24 hours or while taking prescription Narcotics: · Limit your activities · Do not drive and operate hazardous machinery · Do not make important personal or business decisions · Do  not drink alcoholic beverages · If you have not urinated within 8 hours after discharge, please contact your surgeon on call. Report the following to your surgeon: 
· Excessive pain, swelling, redness or odor of or around the surgical area · Temperature over 101 · Nausea and vomiting lasting longer than 4 hours or if unable to take medications · Any signs of decreased circulation or nerve impairment to extremity: change in color, persistent  numbness, tingling, coldness or increase pain · Any questions, call office @ [phone]. What to do at Home: 
Recommended activity: activity as tolerated, as instructed per Dr. Kumar Payment. Continue with exercises taught by Physical Therapy. Resume per hospital diet. Wear sling to right arm. Use ice and elevate arm to decrease pain and swelling. If you experience any of the following symptoms temp>101, pain unrelieved by meds, or persistent nausea or vomitting, please follow up with Dr. Kumar Payment @ [phone]. *  Please give a list of your current medications to your Primary Care Provider. *  Please update this list whenever your medications are discontinued, doses are 
    changed, or new medications (including over-the-counter products) are added. *  Please carry medication information at all times in case of emergency situations. Shoulder Arthroscopy: What to Expect at Home Your Recovery Your arm may be in a sling. You will feel tired for several days. Your shoulder will be swollen, and you may notice that your skin is a different color near the cuts the doctor made (incisions). Your hand and arm may also be swollen. This is normal and will go away in a few days. Depending on the medicine you had during the surgery, your entire arm may feel numb or like you cannot move it. This goes away in 12 to 24 hours. When you can return to work or your usual routine will depend on your shoulder problem. Most people need 6 weeks or longer to recover. How much time you need depends on the surgery that was done. You may have to limit your activity until your shoulder strength and range of motion return to normal. You may also be in a rehabilitation program (rehab). If you have a desk job, you may be able to return to work a few days after the surgery. If you lift things at work, it may take months before you return to work. This care sheet gives you a general idea about how long it will take for you to recover. But each person recovers at a different pace. Follow the steps below to get better as quickly as possible. How can you care for yourself at home? Activity · Rest when you feel tired. Getting enough sleep will help you recover. You may be more comfortable if you sleep in a reclining chair. To make your arm and shoulder feel better, keep a thin pillow under the back of your arm while you are lying down. · Try to walk each day. Start by walking a little more than you did the day before. Bit by bit, increase the amount you walk. Walking boosts blood flow and helps prevent pneumonia and constipation. · For 2 to 3 weeks, avoid lifting anything heavier than a plate or a glass. You need to give your shoulder time to heal. 
· Your arm may be in a sling. You may need to use the sling for a few days to a few weeks. Your doctor will advise you on how long to wear the sling. · You may take the sling off when you dress or wash. · Do not use your arm for repeated movements. These include painting, vacuuming, or using a computer. Diet · You can eat your normal diet. If your stomach is upset, try bland, low-fat foods like plain rice, broiled chicken, toast, and yogurt. · Drink plenty of fluids, unless your doctor tells you not to. · You may notice that your bowel movements are not regular right after your surgery. This is common. Try to avoid constipation and straining with bowel movements. You may want to take a fiber supplement every day. If you have not had a bowel movement after a couple of days, ask your doctor about taking a mild laxative. Medicines · Take pain medicines exactly as directed.  
¨ If the doctor gave you a prescription medicine for pain, take it as prescribed. ¨ If you are not taking a prescription pain medicine, ask your doctor if you can take an over-the-counter medicine. · If you think your pain medicine is making you sick to your stomach: 
¨ Take your medicine after meals (unless your doctor has told you not to). ¨ Ask your doctor for a different pain medicine. · If your doctor prescribed antibiotics, take them as directed. Do not stop taking them just because you feel better. You need to take the full course of antibiotics. Incision care · If you have a dressing over your incision, keep it clean and dry. You may remove it 2 to 3 days after the surgery. · If your incision is open to the air, keep the area clean and dry. · If you have strips of tape on the incision, leave the tape on for a week or until it falls off. Exercise · You may need rehabilitation. This is a series of exercises you do after your surgery. Rehab helps you get back your shoulder's range of motion and strength. You will work with your doctor and physical therapist to plan this exercise program. To get the best results, you need to do the exercises correctly and as often and as long as your doctor tells you. Ice · To reduce swelling and pain, put ice or a cold pack on your shoulder for 10 to 20 minutes at a time. Do this every 1 to 2 hours. Put a thin cloth between the ice and your skin. Follow-up care is a key part of your treatment and safety. Be sure to make and go to all appointments, and call your doctor if you are having problems. It's also a good idea to know your test results and keep a list of the medicines you take. When should you call for help? Call 911 anytime you think you may need emergency care. For example, call if: 
· You passed out (lost consciousness). · You have severe trouble breathing. · You have sudden chest pain and shortness of breath, or you cough up blood. Call your doctor now or seek immediate medical care if: · Your hand is cool, pale, or numb, or it changes color. · You are unable to move your fingers, wrist, or elbow. · You are sick to your stomach or cannot keep fluids down. · You have pain that does not get better after you take pain medicine. · You have signs of infection, such as: 
¨ Increased pain, swelling, warmth, or redness. ¨ Red streaks leading from the incision. ¨ Pus draining from the incision. ¨ A fever. · You have loose stitches, or your incision comes open. · Your incision bleeds through your first dressing or is still bleeding 3 days after your surgery. Watch closely for changes in your health, and be sure to contact your doctor if: 
· Your sling feels too tight, and you cannot loosen it. · You have new or increased swelling in your arm. · You have new pain that develops in another area of the affected limb. For example, you have pain in your hand or elbow. · You do not have a bowel movement after taking a laxative. · You do not get better as expected. Where can you learn more? Go to [employer] Enter I271 in the search box to learn more about \"Shoulder Arthroscopy: What to Expect at Home. \"  
© 3553-9870 Healthwise, Incorporated. Care instructions adapted under license by [employer]ph [employer] (which disclaims liability or warranty for this information). This care instruction is for use with your licensed healthcare professional. If you have questions about a medical condition or this instruction, always ask your healthcare professional. [place] [address] any warranty or liability for your use of this information. Content Version: 43.5.823497; Current as of: June 4, 2014 These are general instructions for a healthy lifestyle: No smoking/ No tobacco products/ Avoid exposure to second hand smoke Surgeon General's Warning:  Quitting smoking now greatly reduces serious risk to your health. Obesity, smoking, and sedentary lifestyle greatly increases your risk for illness A healthy diet, regular physical exercise & weight monitoring are important for maintaining a healthy lifestyle You may be retaining fluid if you have a history of heart failure or if you experience any of the following symptoms:  Weight gain of 3 pounds or more overnight or 5 pounds in a week, increased swelling in our hands or feet or shortness of breath while lying flat in bed. Please call your doctor as soon as you notice any of these symptoms; do not wait until your next office visit. Recognize signs and symptoms of STROKE: 
 
F-face looks uneven A-arms unable to move or move unevenly S-speech slurred or non-existent T-time-call 911 as soon as signs and symptoms begin-DO NOT go Back to bed or wait to see if you get better-TIME IS BRAIN. The discharge information has been reviewed with the patient. The patient verbalized understanding. Discharge Orders None DocSea Announcement We are excited to announce that we are making your provider's discharge notes available to you in DocSea. You will see these notes when they are completed and signed by the physician that discharged you from your recent hospital stay. If you have any questions or concerns about any information you see in DocSea, please call the Health Information Department where you were seen or reach out to your Primary Care Provider for more information about your plan of care. Introducing [facility]! [employer] introduces DocSea patient portal. Now you can access parts of your medical record, email your doctor's office, and request medication refills online. 1. In your internet browser, go to [email or web address] 2. Click on the First Time User? Click Here link in the Sign In box. You will see the New Member Sign Up page. 3. Enter your drumbi Access Code exactly as it appears below. You will not need to use this code after youve completed the sign-up process. If you do not sign up before the expiration date, you must request a new code. · drumbi Access Code: [email or web address] Expires: 3/14/2017  3:17 PM 
 
4. Enter the last four digits of your Social Security Number (xxxx) and Date of Birth (mm/dd/yyyy) as indicated and click Submit. You will be taken to the next sign-up page. 5. Create a drumbi ID. This will be your drumbi login ID and cannot be changed, so think of one that is secure and easy to remember. 6. Create a drumbi password. You can change your password at any time. 7. Enter your Password Reset Question and Answer. This can be used at a later time if you forget your password. 8. Enter your e-mail address. You will receive e-mail notification when new information is available in [address]. 9. Click Sign Up. You can now view and download portions of your medical record. 10. Click the Download Summary menu link to download a portable copy of your medical information. If you have questions, please visit the Frequently Asked Questions section of the drumbi website. Remember, drumbi is NOT to be used for urgent needs. For medical emergencies, dial 911. Now available from your iPhone and Android! General Information Please provide this summary of care documentation to your next provider. Patient Signature:  ____________________________________________________________ Date:  ____________________________________________________________  
  
Jordy Mark Provider Signature:  ____________________________________________________________ Date:  ____________________________________________________________

## 2017-01-26 NOTE — H&P
Date of Surgery Update:  Dolph Eye was seen and examined. History and physical has been reviewed. The patient has been examined.  There have been no significant clinical changes since the completion of the originally dated History and Physical.    Signed By: Melani Mccrary MD     January 26, 2017 7:48 AM

## 2017-01-26 NOTE — OP NOTES
[address]   OPERATIVE REPORT       Name:  Paola Skiff   MR#:  [id number]   :  1945   Account #:  [de-identified]   Date of Adm:  2017       DATE OF SURGERY: 2017    PREOPERATIVE DIAGNOSES:   1. Partial thickness rotator cuff tear, right shoulder. 2. Bicipital strain, right shoulder. POSTOPERATIVE DIAGNOSES:   1. Partial thickness rotator cuff tear, right shoulder. 2. Bicipital strain, right shoulder. 3. SLAP tear, right shoulder. 4. Labral tear, right shoulder. 5. Chondromalacia, right shoulder. PROCEDURE PERFORMED:   1. Arthroscopy, right shoulder. 2. Arthroscopic subacromial decompression. 3. Extensive debridement of SLAP tear, labral tear, glenohumeral   joint and subacromial space. 4. Mini open rotator cuff repair. 5. Biceps tenodesis. SURGEON: Iain Crook. Salazar Enciso MD    PATHOLOGY:   1. Type 2 acromion. 2. Type 1 SLAP tear. 3. Degenerative labral tear. 4. Bicipital strain. 5. High-grade partial thickness supraspinatus rotator cuff tear. 6. Grade 2 chondromalacia of the glenoid. CPT CODES: 58025, [id number], [id number], [id number]. ICD-10 CODES: A12.298, N92.134, J9288797, S43.491, M94.211. HARDWARE UTILIZED: 2 Linda 5.5 anchors. INDICATIONS: The patient is a 45-year-old gentleman who injured   his right shoulder. Preoperative physical examination and MR   demonstrate high-grade partial thickness cuff tear, bicipital   strain. The patient has exhausted nonoperative modalities and is   electively admitted for operative intervention. The patient has   had a previous operative procedure to the right shoulder   consisting of at least a distal clavicle resection. DESCRIPTION OF PROCEDURE: Following identification of the   patient, patient was taken to the operative suite.  Following   administration of general anesthesia, interscalene block for   postop pain control, 2 g of IV Ancef, measurement of hemoglobin   A1c is 6.0, fasting blood glucose of 109 and preoperative   medical clearance from his hematologist due to his   hypercoagulable state, currently on Xarelto, the patient   positioned on the operating table in the supine fashion. Right   shoulder examined under anesthesia and noted be stable through a   full range of motion. At this point, the patient was carefully   positioned in the lateral decubitus position, left side down. Axillary roll was placed. Bean bag was inflated. Care was taken   to pad both dependent lower extremities. The right arm was then   placed in the NexPlanars traction device in 15 pounds of traction. Right shoulder was then prepped and draped in a sterile fashion. Again, care was taken to protect his left total hip   arthroplasty. Right shoulder was then prepped and draped in   sterile fashion. Subacromial space was then injected with 10 mL   of 1% Xylocaine with epinephrine. Scope was introduced into the   shoulder. Diagnostic arthroscopy was then commenced. Articular   surfaces of the humeral and glenoid were visualized. There was a   small area of grade 2 chondromalacia of glenoid which was left   alone. Anterior, posterior, superior and inferior labrum were   visualized. There was a degenerative labral tear inferiorly and   a type 1 SLAP tear superiorly in conjunction with significant   biceps tendinitis and a high-grade partial thickness cuff tear   with disruption of anatomic footprint. The scope was then flip-  flopped from the posterior to anterior portal. Posterior cuff   and labrum were visualized and intact. The subscapularis was   intact. The scope was introduced into the subacromial space. Lateral portal was then established. Hypertrophic hemorrhagic   bursal tissue was then resected. Bursal side of the cuff was   visualized and intact. At this point, using Oratec wand and   acromionizing bur, an arthroscopic subacromial decompression was   then performed.  This was taken down to the level of the deltoid   fascia anteriorly, AC joint posteriorly, contoured from medial   to lateral. Once this was then complete, our attention was then   turned to the biceps. It was elected to perform a mini open   approach. The lateral portal was extended to 3 cm. Deltoid split   was carried up to acromion. Biceps tendon was identified, it was   dissected free. It was noted to be markedly tendinopathic. It   was tagged, transected and tenodesed utilizing one 5.5 anchor. At this point, an additional 5.5 anchor was placed in the   greater tuberosity. All limbs of all sutures were passed through   the rotator cuff, this showed an excellent cuff repair which was   stable. Scope was then placed back in the glenohumeral joint. Stump of the biceps and SLAP tear were debrided. The scope was   placed back in the glenohumeral joint. Stump of the biceps and   SLAP tear were debrided. Labral tear was debrided as well. Glenoid was debrided as well. Undersurface of the rotator cuff   was visualized. Anatomic footprint was reestablished. The scope   was introduced back in the bursal side. Bursal side of the cuff   repair were stable. At this point, with the procedure complete,   arthroscopic equipment was removed from the shoulder. Portals   closed using 2-0 nylon horizontal mattress sutures. Lateral   wound was closed with 0 Vicryl figure-of-eight sutures and a 2-0   Prolene subcuticular stitch. A sterile dressing was applied,   sling and swathe was applied. The patient was very carefully   placed supine on the operative table and transferred to the   recovery room in stable condition. Care was taken to protect his   left total hip arthroplasty.         MD OMAR Sullivan / Rubén Canseco   D:  01/26/2017   13:10   T:  01/26/2017   14:23   Job #:  [id number]

## 2017-01-26 NOTE — ANESTHESIA POSTPROCEDURE EVALUATION
Post-Anesthesia Evaluation and Assessment    Patient: Aurelio Caba MRN: [id number]  SSN: [id number]    YOB: 1945  Age: 81 y.o. Sex: male       Cardiovascular Function/Vital Signs  Visit Vitals    /82 (BP 1 Location: Right arm, BP Patient Position: At rest)    Pulse (!) 58    Temp 36.7 °C (98 °F)    Resp 16    Wt 82.6 kg (182 lb)    SpO2 100%    BMI 26.88 kg/m2       Patient is status post general anesthesia for Procedure(s):  ARTHROSCOPY RIGHT SHOULDER ARTHROSCOPIC SUBACROMIAL DECOMPRESSION, EXTENSIVE DEBRIDEMENT SLAP TEAR, LABRAL TEAR, GLENOHUMERAL JOINT, SUBACROMIAL SPACE, MINI OPEN ROTATOR CUFF REPAIR, BICEPS TENODESIS  .    Nausea/Vomiting: None    Postoperative hydration reviewed and adequate. Pain:  Pain Scale 1: Visual (01/26/17 1347)  Pain Intensity 1: 0 (01/26/17 1347)   Managed    Neurological Status:   Neuro (WDL): Exceptions to WDL (01/26/17 1347)  Neuro  Neurologic State: Alert (01/26/17 1347)  Orientation Level: Oriented X4 (01/26/17 1347)  LUE Motor Response: Purposeful (01/26/17 1347)  LLE Motor Response: Purposeful (01/26/17 1347)  RUE Motor Response: Numbness; Pharmocologically paralyzed (01/26/17 1347)  RLE Motor Response: Purposeful (01/26/17 1347)   At baseline    Mental Status and Level of Consciousness: Arousable    Pulmonary Status:   O2 Device: Nasal cannula (01/26/17 1402)   Adequate oxygenation and airway patent    Complications related to anesthesia: None    Post-anesthesia assessment completed.  No concerns    Signed By: Jelani Guevara MD     January 26, 2017

## 2017-01-26 NOTE — ANESTHESIA PREPROCEDURE EVALUATION
Anesthetic History               Review of Systems / Medical History  Patient summary reviewed, nursing notes reviewed and pertinent labs reviewed    Pulmonary                   Neuro/Psych              Cardiovascular    Hypertension: well controlled              Exercise tolerance: >4 METS  Comments: Factor V Leiden clotting abnormality. Hx DVT, PE. Leila filter in place.    GI/Hepatic/Renal                Endo/Other        Arthritis     Other Findings   Comments: Chronic lumbalgia secondary to spinal stenosis         Physical Exam    Airway  Mallampati: II  TM Distance: > 6 cm  Neck ROM: decreased range of motion   Mouth opening: Normal     Cardiovascular  Regular rate and rhythm,  S1 and S2 normal,  no murmur, click, rub, or gallop             Dental      Comments: Capped upper incisor   Pulmonary  Breath sounds clear to auscultation               Abdominal  GI exam deferred       Other Findings            Anesthetic Plan    ASA: 3  Anesthesia type: general      Post-op pain plan if not by surgeon: peripheral nerve block single      Anesthetic plan and risks discussed with: Patient

## 2017-01-26 NOTE — CONSULTS
Chief Complaint: right shoulder pain     We are asked to see this patient regarding post op care of medical problems    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    Chronic deep vein thrombosis of lower extremity ([address] 03/21/2016    Saddle embolism of pulmonary artery ([address] 03/21/2016    Bradycardia 02/02/2016    Anaphylactic reaction 02/01/2016    Pulmonary embolism ([address] 12/23/2014    Hypertension 12/23/2014    Factor V Leiden ([address] 12/23/2014    Left leg DVT ([address] 12/23/2014    Leukocytosis 12/23/2014    Hyperkalemia 12/23/2014    Elevated serum creatinine 12/23/2014    Chronic back pain 12/23/2014    ARCE (dyspnea on exertion) 12/23/2014    Spinal stenosis, lumbar region, with neurogenic claudication 03/24/2014    Glaucoma     Hypercholesteremia     Unspecified adverse effect of anesthesia     Osteoarthritis of left hip 12/12/2012   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 12/12/2012    Supraspinatus (muscle) (tendon) sprain 02/17/2012    Superior glenoid labrum lesion 02/17/2012    Bicipital tenosynovitis 02/17/2012    Primary localized osteoarthrosis, shoulder region 02/17/2012    Carpal tunnel syndrome 02/17/2012    Essential hypertension, benign 08/16/2011    Thromboembolus ([address] 09/01/1997       History of Present Illness:    Patient is a 81 y.o.   male who underwent an arthroscopy of the right shoulder with arthroscopic subacromial decompression, extensive debridement of slap tear, labral tear glenohumeral joint, subacromial space, mini open rotator cuff repair and biceps tenodesis per Dr Carlos Newell under general and ISB anesthesia for a partial thickness rotator cuff tear, biceps tendinitis  SLAP tear right shoulder, labral tear right shoulder and chondromalacia right shoulder. He has done well both intraoperatively and in the immediate post op period with stable vitals, good pain control, and no nausea. He is eating and drinking, voiding. He is taking xaralto for chronic DVT and factor V leiden. It was held the am of surgery only, and was re-instated today. He has extensive history as noted below. He has had multiple shoulder injuries with subsequent surgeries. PAST MEDICAL HISTORY:  Past Medical History   Diagnosis Date    Acute sinusitis     Anaphylactic reaction 2/1/2016    AR (allergic rhinitis) 7/27/2016    Bicipital tenosynovitis 2/17/2012    Bradycardia 02/02/2016     Seen by [facility]-no follow-up required.  Carpal tunnel syndrome 2/17/2012    Chronic back pain 12/23/2014    Chronic deep vein thrombosis of lower extremity (HCC) 3/21/2016    Chronic pansinusitis 7/27/2016    Chronic sinusitis 7/27/2016    DNS (deviated nasal septum)     ARCE (dyspnea on exertion) 12/23/2014    Elevated serum creatinine 12/23/2014    Epistaxis     Essential hypertension, benign 8/16/2011    Factor V Leiden ([address]      managed with medication     Glaucoma      right eye    H/O echocardiogram 02/02/2016     EF 70-75%    Hereditary deficiency of other clotting factors ([address] 3/21/2016    Hypercholesteremia      managed with medication     Hyperkalemia 12/23/2014    Hypertension      well controlled-no medication at this time     Hypertrophy of nasal turbinates 7/27/2016    Left leg DVT ([address] 12/23/2014 1997, 2014, 8/2015-Followed by Dr. Genevieve Thorpe. Takes Xarelto daily.      Leukocytosis 12/23/2014    Lumbar spinal stenosis     Nasal obstruction     Nasal polyp 7/27/2016    Osteoarthritis      right knee    Osteoarthritis of left hip 12/12/2012    Primary localized osteoarthrosis, shoulder region 2/17/2012    Pulmonary embolism ([address] 1981     right lung 1981, 1994,right lung 1997, right lung 2000 - Leila filter placed 2000. Followed by Dr. Clarisse Terry.     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    Spinal stenosis, lumbar region, with neurogenic claudication 3/24/2014    Superior glenoid labrum lesion 2/17/2012    Supraspinatus (muscle) (tendon) sprain 2/17/2012    Thromboembolus ([address] 9/1/1997     left thigh DVT     Unspecified adverse effect of anesthesia      very difficult IV stick per patient d/t Lovenox, has needed anesthesiologist to start several times/comes in a day ahead for PICC line       PAST SURGICAL HISTORY:  Past Surgical History   Procedure Laterality Date    Hx colonoscopy  00,05,10    Hx carpal tunnel release  2012     left    Hx carpal tunnel release  1980     right    Hx blepharoplasty Bilateral 2009    Hx vascular access  2008     PICC line right arm placed & then removed     Hx vascular access  2012     PICC- removed    Hx lap cholecystectomy  2001    Hx appendectomy  1980    Hx hernia repair  2010     abdominal hernia repair    Hx hernia repair Left 2007     inguinal hernia repair    Hx other surgical  2000     leila filter placed    Hx lumbar laminectomy      Hx hip replacement Left 12/12/2012    Hx vitrectomy Left 6/15/15    Hx orthopaedic Bilateral      open shoulder AC reconstruction    Hx orthopaedic Right 2006     neuroma fromfoot    Hx orthopaedic Bilateral      right elbow 1979 & left elbow 2007    Hx knee arthroscopy Right 2006      knee meniscus repair    Hx shoulder arthroscopy Left 2/17/2012    Hx orthopaedic Right      index finger    Hx back surgery       spinal stenosis    Hx heent Right 11/08      glaucoma surgery    Hx tonsillectomy  1950    Hx cataract removal Bilateral      left eye 1994 & right eye 2002    Hx corneal transplant Right 2004    Pr sinus surgery proc unlisted       numerous    Hx heent       nasal reconstruction    Hx heent       nasal polypectomy    Hx corneal transplant Right 06/14/2016     sutures still present         PTA MEDICATIONS:  Prior to Admission medications    Medication Sig Start Date End Date Taking? Authorizing Provider   moxifloxacin (VIGAMOX) 0.5 % ophthalmic solution Administer 1 Drop to right eye three (3) times daily. Yes Historical Provider   cyanocobalamin (VITAMIN B-12) 1,000 mcg tablet Take 1,000 mcg by mouth daily. Yes Historical Provider   Biotin 2,500 mcg cap Take  by mouth daily. Yes Historical Provider   B.infantis-B.ani-B.long-B.bifi (PROBIOTIC 4X) 10-15 mg TbEC Take  by mouth daily. Yes Historical Provider   atorvastatin (LIPITOR) 80 mg tablet Take 80 mg by mouth daily. 1/2 tablet daily    Take DOS per anesthesia protocol. Yes Historical Provider   HYDROcodone-acetaminophen (NORCO) 7.5-325 mg per tablet Take 1 Tab by mouth every six (6) hours as needed for Pain. Max Daily Amount: 4 Tabs. 11/15/16  Yes Josue Palomino NP   rivaroxaban (XARELTO) 20 mg tab tablet Take 1 Tab by mouth daily (with breakfast). Patient taking differently: Take 20 mg by mouth daily (with breakfast). Last dose 1/25/17-patient states \"I am not going to take it the DOS. \" 9/12/16  Yes Gurwinder Marsh MD   carboxymethylcellulose sodium (REFRESH LIQUIGEL) 1 % dlgl Apply 1 Drop to eye every four (4) hours. Yes Historical Provider   omega-3 fatty acids-vitamin e (FISH OIL) 1,000 mg cap Take 2 Caps by mouth every morning. Yes Historical Provider   Brimonidine-Timolol (COMBIGAN) 0.2-0.5 % Drop Administer 1 drop to both eyes two (2) times a day.  Indications: OPEN ANGLE GLAUCOMA 3/16/10  Yes Historical Provider PRED FORTE 1 % DrpS Administer 1 drop to right eye two (2) times a day. Indications: pt states prevents infection 3/16/10  Yes Phys Other, MD         CURRENT INPATIENT MEDICATIONS:  Current Facility-Administered Medications   Medication Dose Route Frequency    0.9% sodium chloride infusion  75 mL/hr IntraVENous CONTINUOUS    sodium chloride (NS) flush 5-10 mL  5-10 mL IntraVENous Q8H    sodium chloride (NS) flush 5-10 mL  5-10 mL IntraVENous PRN    bisacodyl (DULCOLAX) suppository 10 mg  10 mg Rectal DAILY PRN    sodium phosphate (FLEET'S) enema 118 mL  1 Enema Rectal PRN    [START ON 1/27/2017] docusate sodium (COLACE) capsule 100 mg  100 mg Oral BID    promethazine (PHENERGAN) tablet 25 mg  25 mg Oral Q4H PRN    HYDROmorphone (PF) (DILAUDID) injection 1 mg  1 mg IntraVENous Q1H PRN    HYDROmorphone (DILAUDID) tablet 4 mg  4 mg Oral Q3H PRN    HYDROmorphone (DILAUDID) tablet 2 mg  2 mg Oral Q3H PRN    temazepam (RESTORIL) capsule 15 mg  15 mg Oral QHS PRN    aspirin chewable tablet 81 mg  81 mg Oral DAILY    ceFAZolin (ANCEF) 1 g in 0.9% sodium chloride (MBP/ADV) 50 mL  1 g IntraVENous Q8H    phenol throat spray (CHLORASEPTIC) 1 Spray  1 Spray Oral PRN       ALLERGIES:  Allergies   Allergen Reactions    Aspirin Other (comments)     Pt states can't take aspirin with his blood thinning medication.       Epinephrine Unknown (comments)     Blindness in right eye       FAMILY HISTORY:   Family History   Problem Relation Age of Onset    Cancer Mother      lymphoma    Diabetes Mother      type II    Heart Disease Father     Diabetes Father      type II    Cancer Father      pancreatic ca    Other Father      Coronary Disease    Malignant Hyperthermia Neg Hx     Pseudocholinesterase Deficiency Neg Hx     Delayed Awakening Neg Hx     Post-op Nausea/Vomiting Neg Hx     Emergence Delirium Neg Hx     Post-op Cognitive Dysfunction Neg Hx      SOCIAL HISTORY:  Social History   Substance Use Topics  Smoking status: Never Smoker    Smokeless tobacco: Never Used    Alcohol use No      History   Drug Use No        SOCIAL HISTORY:  Patient is , still works part time. Review of Systems  ROS were reviewed, and all are negative outside the HPI. Physical Examination:   Patient Vitals for the past 24 hrs:   BP Temp Pulse Resp SpO2 Weight   01/26/17 1458 140/68 96.2 °F (35.7 °C) (!) 53 16 97 % -   01/26/17 1417 167/75 - 65 18 98 % -   01/26/17 1402 145/82 - (!) 58 16 100 % -   01/26/17 1347 145/70 - (!) 57 16 100 % -   01/26/17 1332 132/65 - 63 16 100 % -   01/26/17 1317 130/65 - (!) 59 16 97 % -   01/26/17 1302 143/67 - 64 16 98 % -   01/26/17 1257 154/69 - 61 16 99 % -   01/26/17 1252 - - 60 16 99 % -   01/26/17 1247 147/76 98 °F (36.7 °C) 60 12 97 % -   01/26/17 1111 172/74 - (!) 55 14 99 % -   01/26/17 1056 172/76 - (!) 53 14 99 % -   01/26/17 1051 173/70 - (!) 56 14 99 % -   01/26/17 1046 181/64 - 62 16 100 % -   01/26/17 1041 (!) 203/81 - 68 16 100 % -   01/26/17 1032 166/73 - (!) 54 16 100 % -   01/26/17 0955 181/80 - (!) 52 16 97 % -   01/26/17 0800 156/87 98.7 °F (37.1 °C) 60 18 97 % 82.6 kg (182 lb)        Weight: 82.6 kg (182 lb)     01/26 0701 - 01/26 1900  In: 1300 [I.V.:1300]  Out: 30      General appearance: oriented and alert, cooperative, pain remains well controlled with block. Distal CMS intact. Head: Normocephalic, without obvious abnormality, atraumatic  Neck: supple, symmetrical, trachea midline, no JVD  Lungs: clear to auscultation bilaterally  Heart: regular rate and rhythm, S1, S2 normal, no murmur, click, rub or gallop  Abdomen: soft, non-tender. Bowel sounds normal. No masses,  no organomegaly  Extremities: Right shoulder bulky dressing dry and intact. Cooling device intact. Sling and swath intact. All distal CMS intact. Still numb from midforearm to neck.   All other extremities normal, atraumatic, no cyanosis or edema  Skin: Skin color, texture, turgor normal. No rashes or lesions  Neurologic: Grossly normal    Labs:  Recent Results (from the past 24 hour(s))   HEMOGLOBIN A1C WITH EAG    Collection Time: 01/26/17  8:45 AM   Result Value Ref Range    Hemoglobin A1c 6.0 4.8 - 6.0 %    Est. 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

## 2017-01-27 VITALS
RESPIRATION RATE: 18 BRPM | WEIGHT: 182 LBS | DIASTOLIC BLOOD PRESSURE: 71 MMHG | BODY MASS INDEX: 26.88 KG/M2 | HEART RATE: 54 BPM | OXYGEN SATURATION: 93 % | TEMPERATURE: 97.4 F | SYSTOLIC BLOOD PRESSURE: 143 MMHG

## 2017-01-27 PROCEDURE — 74011250637 HC RX REV CODE- 250/637: Performed by: NURSE PRACTITIONER

## 2017-01-27 PROCEDURE — G8979 MOBILITY GOAL STATUS: HCPCS

## 2017-01-27 PROCEDURE — 97161 PT EVAL LOW COMPLEX 20 MIN: CPT

## 2017-01-27 PROCEDURE — G8978 MOBILITY CURRENT STATUS: HCPCS

## 2017-01-27 PROCEDURE — 74011250637 HC RX REV CODE- 250/637: Performed by: ANESTHESIOLOGY

## 2017-01-27 PROCEDURE — 74011250636 HC RX REV CODE- 250/636: Performed by: ORTHOPAEDIC SURGERY

## 2017-01-27 PROCEDURE — 97116 GAIT TRAINING THERAPY: CPT

## 2017-01-27 PROCEDURE — G8980 MOBILITY D/C STATUS: HCPCS

## 2017-01-27 PROCEDURE — 99218 HC RM OBSERVATION: CPT

## 2017-01-27 PROCEDURE — 97110 THERAPEUTIC EXERCISES: CPT

## 2017-01-27 PROCEDURE — 74011000250 HC RX REV CODE- 250: Performed by: NURSE PRACTITIONER

## 2017-01-27 PROCEDURE — 74011250637 HC RX REV CODE- 250/637: Performed by: ORTHOPAEDIC SURGERY

## 2017-01-27 PROCEDURE — 74011000258 HC RX REV CODE- 258: Performed by: ORTHOPAEDIC SURGERY

## 2017-01-27 RX ORDER — BRIMONIDINE TARTRATE, TIMOLOL MALEATE 2; 5 MG/ML; MG/ML
1 SOLUTION/ DROPS OPHTHALMIC 2 TIMES DAILY
Status: DISCONTINUED | OUTPATIENT
Start: 2017-01-27 | End: 2017-01-27 | Stop reason: HOSPADM

## 2017-01-27 RX ORDER — LANOLIN ALCOHOL/MO/W.PET/CERES
1000 CREAM (GRAM) TOPICAL DAILY
Status: DISCONTINUED | OUTPATIENT
Start: 2017-01-27 | End: 2017-01-27 | Stop reason: HOSPADM

## 2017-01-27 RX ORDER — MOXIFLOXACIN 5 MG/ML
1 SOLUTION/ DROPS OPHTHALMIC 3 TIMES DAILY
Status: DISCONTINUED | OUTPATIENT
Start: 2017-01-27 | End: 2017-01-27

## 2017-01-27 RX ORDER — UREA 10 %
1 LOTION (ML) TOPICAL DAILY
Status: DISCONTINUED | OUTPATIENT
Start: 2017-01-27 | End: 2017-01-27 | Stop reason: HOSPADM

## 2017-01-27 RX ORDER — PREDNISOLONE ACETATE 10 MG/ML
1 SUSPENSION/ DROPS OPHTHALMIC 2 TIMES DAILY
Status: DISCONTINUED | OUTPATIENT
Start: 2017-01-27 | End: 2017-01-27 | Stop reason: HOSPADM

## 2017-01-27 RX ADMIN — CEFAZOLIN SODIUM 1 G: 1 INJECTION, POWDER, FOR SOLUTION INTRAMUSCULAR; INTRAVENOUS at 03:51

## 2017-01-27 RX ADMIN — CYANOCOBALAMIN TAB 1000 MCG 1000 MCG: 1000 TAB at 08:44

## 2017-01-27 RX ADMIN — HYDROMORPHONE HYDROCHLORIDE 4 MG: 4 TABLET ORAL at 05:33

## 2017-01-27 RX ADMIN — CEFAZOLIN SODIUM 1 G: 1 INJECTION, POWDER, FOR SOLUTION INTRAMUSCULAR; INTRAVENOUS at 10:14

## 2017-01-27 RX ADMIN — RIVAROXABAN 20 MG: 20 TABLET, FILM COATED ORAL at 16:40

## 2017-01-27 RX ADMIN — HYDROMORPHONE HYDROCHLORIDE 4 MG: 4 TABLET ORAL at 16:40

## 2017-01-27 RX ADMIN — PREDNISOLONE ACETATE 1 DROP: 10 SUSPENSION/ DROPS OPHTHALMIC at 09:00

## 2017-01-27 RX ADMIN — BRIMONIDINE TARTRATE, TIMOLOL MALEATE 1 DROP: 2; 5 SOLUTION/ DROPS OPHTHALMIC at 09:00

## 2017-01-27 RX ADMIN — DOCUSATE SODIUM 100 MG: 100 CAPSULE, LIQUID FILLED ORAL at 16:40

## 2017-01-27 RX ADMIN — DOCUSATE SODIUM 100 MG: 100 CAPSULE, LIQUID FILLED ORAL at 08:44

## 2017-01-27 RX ADMIN — HYDROMORPHONE HYDROCHLORIDE 1 MG: 1 INJECTION, SOLUTION INTRAMUSCULAR; INTRAVENOUS; SUBCUTANEOUS at 03:56

## 2017-01-27 RX ADMIN — HYDROMORPHONE HYDROCHLORIDE 4 MG: 4 TABLET ORAL at 08:44

## 2017-01-27 RX ADMIN — Medication 10 ML: at 05:33

## 2017-01-27 RX ADMIN — HYDROMORPHONE HYDROCHLORIDE 1 MG: 1 INJECTION, SOLUTION INTRAMUSCULAR; INTRAVENOUS; SUBCUTANEOUS at 14:08

## 2017-01-27 RX ADMIN — ASPIRIN 81 MG CHEWABLE TABLET 81 MG: 81 TABLET CHEWABLE at 08:44

## 2017-01-27 RX ADMIN — LACTOBACILLUS TAB 1 TABLET: TAB at 08:43

## 2017-01-27 NOTE — PROGRESS NOTES
Hospitalist Progress Note    2017  Admit Date: 2017  7:34 AM   NAME: Bon Andrews   :  1945   MRN:  [id number]   Attending: Iván Bravo MD  PCP:  Gigi Montesinos MD  Treatment Team: Attending Provider: Iván Bravo MD; Consulting Provider: Haydee Archer MD    Full Code   SUBJECTIVE:   Mr. Samantha Carmen is a 82 yo male who underwent a right shoulder surgery POD #1. Hospitalist consulted for medical management. Pt denies CP, SOB. Home meds resumed. Past Medical History   Diagnosis Date    Acute sinusitis     Anaphylactic reaction 2016    AR (allergic rhinitis) 2016    Bicipital tenosynovitis 2012    Bradycardia 2016     Se[facility]-no follow-up required.  Carpal tunnel syndrome 2012    Chronic back pain 2014    Chronic deep vein thrombosis of lower extremity (HCC) 3/21/2016    Chronic pansinusitis 2016    Chronic sinusitis 2016    DNS (deviated nasal septum)     ACRE (dyspnea on exertion) 2014    Elevated serum creatinine 2014    Epistaxis     Essential hypertension, benign 2011    Factor V Leiden ([address]      managed with medication     Glaucoma      right eye    H/O echocardiogram 2016     EF 70-75%    Hereditary deficiency of other clotting factors ([address] 3/21/2016    Hypercholesteremia      managed with medication     Hyperkalemia 2014    Hypertension      well controlled-no medication at this time     Hypertrophy of nasal turbinates 2016    Left leg DVT ([address] 2014, , 2015-Followed by Dr. Vika Negron. Takes Xarelto daily.      Leukocytosis 2014    Lumbar spinal stenosis     Nasal obstruction     Nasal polyp 2016    Osteoarthritis      right knee    Osteoarthritis of left hip 2012    Primary localized osteoarthrosis, shoulder region 2012    Pulmonary embolism ([address]      right lung , ,right lung , right lung  - [place] filter placed 2000. Followed by Dr. April Wilson. 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    Spinal stenosis, lumbar region, with neurogenic claudication 3/24/2014    Superior glenoid labrum lesion 2/17/2012    Supraspinatus (muscle) (tendon) sprain 2/17/2012    Thromboembolus ([address] 9/1/1997     left thigh DVT     Unspecified adverse effect of anesthesia      very difficult IV stick per patient d/t Lovenox, has needed anesthesiologist to start several times/comes in a day ahead for PICC line      Recent Results (from the past 24 hour(s))   CREATININE    Collection Time: 01/26/17  4:47 PM   Result Value Ref Range    Creatinine 1.01 0.8 - 1.5 MG/DL     Allergies   Allergen Reactions    Aspirin Other (comments)     Pt states can't take aspirin with his blood thinning medication.       Epinephrine Unknown (comments)     Blindness in right eye      Current Facility-Administered Medications   Medication Dose Route Frequency Provider Last Rate Last Dose    Lactobacillus Acidoph & Bulgar [facility]) tablet 1 Tab  1 Tab Oral DAILY Vega Bahena NP   1 Tab at 01/27/17 0843    prednisoLONE acetate (PRED FORTE) 1 % ophthalmic suspension 1 Drop  1 Drop Right Eye BID Vega Bahena NP   1 Drop at 01/27/17 0900    brimonidine-timolol (COMBIGAN) 0.2-0.5 % ophthalmic solution 1 Drop  1 Drop Both Eyes BID Vega Bahena NP   1 Drop at 01/27/17 0900    cyanocobalamin tablet 1,000 mcg  1,000 mcg Oral DAILY Mounika Johnson NP   1,000 mcg at 01/27/17 0844    rivaroxaban (XARELTO) tablet 20 mg  20 mg Oral DAILY WITH DINNER Mounika Johnson NP        sodium chloride (NS) flush 5-10 mL  5-10 mL IntraVENous Q8H Jair Tiwari MD   10 mL at 01/27/17 0533    sodium chloride (NS) flush 5-10 mL  5-10 mL IntraVENous PRN Jair Tiwari MD        bisacodyl (DULCOLAX) suppository 10 mg  10 mg Rectal DAILY PRN Amanda Hernandez Alondra Tuttel MD        sodium phosphate (FLEET'S) enema 118 mL  1 Enema Rectal PRN Mariano Wheatley MD        docusate sodium (COLACE) capsule 100 mg  100 mg Oral BID Mariano Wheatley MD   100 mg at 17 0844    promethazine (PHENERGAN) tablet 25 mg  25 mg Oral Q4H PRYAMIL Wheatley MD        HYDROmorphone (PF) (DILAUDID) injection 1 mg  1 mg IntraVENous Q1H PRN Mariano Wheatley MD   1 mg at 17 1408    HYDROmorphone (DILAUDID) tablet 4 mg  4 mg Oral Q3H PRYAMIL Wheatley MD   4 mg at 17 0844    HYDROmorphone (DILAUDID) tablet 2 mg  2 mg Oral Q3H PRYAMIL Wheatley MD        temazepam (RESTORIL) capsule 15 mg  15 mg Oral QHS PRN Mariano Wheatley MD        aspirin chewable tablet 81 mg  81 mg Oral DAILY Prachi Birmingham MD   81 mg at 17 0844    phenol throat spray (CHLORASEPTIC) 1 Spray  1 Spray Oral PRN Mariano Wheatley MD           Review of Systems negative with exception of pertinent positives noted above  PHYSICAL EXAM     Visit Vitals    /55    Pulse (!) 48    Temp 96.8 °F (36 °C)    Resp 18    Wt 82.6 kg (182 lb)    SpO2 94%    BMI 26.88 kg/m2      Temp (24hrs), Av.2 °F (36.2 °C), Min:96.8 °F (36 °C), Max:98.3 °F (36.8 °C)    Oxygen Therapy  O2 Sat (%): 94 % (17 1145)  Pulse via Oximetry: 63 beats per minute (17)  O2 Device: Room air (17)  O2 Flow Rate (L/min): 2 l/min (17 1417)    Intake/Output Summary (Last 24 hours) at 17 1458  Last data filed at 17 2048   Gross per 24 hour   Intake              480 ml   Output                0 ml   Net              480 ml      General: No acute distress    Lungs: CTA bilaterally. Resp even and nonlabored  Heart:  S1S2 present without murmurs rubs gallops. RRR. No edema  Abdomen: Soft, non tender, non distended.  BS present  Extremities: Limited ROM RUE  Neurologic:  No focal deficits  Psych: A/O X3    Results summary of Diagnostic Studies/Procedures copied from within [facility]:         ASSESSMENT      Active Hospital Problems    Diagnosis Date Noted    Traumatic tear of right rotator cuff 01/26/2017    Strain of right biceps 01/26/2017    Type 1 superior labrum extending from anterior to posterior (SLAP) lesion of right shoulder 01/26/2017    Other sprain of right shoulder joint, sequela 01/26/2017    Chondromalacia, right shoulder 01/26/2017     Plan:    History of factor V Leiden chronic DVT and prior saddle PE: continue Eliquis      Continue current plan of care  We will sign off, call with questions.    Time spent with pt 20 min  Labs, diagnostic testing, VS, notes reviewed    DVT Prophylaxis: eliquis  Plan of Care Discussed with: Supervising MD Bethanie Gonzalez, NP

## 2017-01-27 NOTE — PROGRESS NOTES
Pt doing well. Ambulating halls without difficulty. RUE +2 pulses,  strength normal. Numbness in thumb and index finger.

## 2017-01-27 NOTE — PROGRESS NOTES
Patient given copy of Observation letter and signature obtained and copy placed on chart.   Radha Mckinnon

## 2017-01-27 NOTE — PROGRESS NOTES
Problem: Mobility Impaired (Adult and Pediatric)  Goal: *Acute Goals and Plan of Care (Insert Text)  GOALS (1-4 days):  (1.) Patient will be independent with shoulder HEP to increase range of motion per MD orders. ________________________________________________________________________________________________        Comments:       PHYSICAL THERAPY: Daily Note, Treatment Day: Day of Assessment and PM 1/27/2017  OBSERVATION: Hospital Day: 2  [place] / [place] / Product Type: Medicare /      NAME/AGE/GENDER: Flor Vicente is a 81 y.o. male      PRIMARY DIAGNOSIS: Rotator cuff strain, right, sequela [S46.011S]  Biceps muscle strain, right, initial encounter [S46.111A] Traumatic tear of right rotator cuff Traumatic tear of right rotator cuff  Procedure(s) (LRB):  ARTHROSCOPY RIGHT SHOULDER ARTHROSCOPIC SUBACROMIAL DECOMPRESSION, EXTENSIVE DEBRIDEMENT SLAP TEAR, LABRAL TEAR, GLENOHUMERAL JOINT, SUBACROMIAL SPACE, MINI OPEN ROTATOR CUFF REPAIR, BICEPS TENODESIS   (Right)  1 Day Post-Op  ICD-10: Treatment Diagnosis:       · Pain in Right Shoulder (M25.511)  · Stiffness of Right Shoulder, Not elsewhere classified (M25.611)  · Other lack of cordination (R27.8)  · Other abnormalities of gait and mobility (R26.89)   Precaution/Allergies:  Aspirin and Epinephrine       ASSESSMENT:      Mr. Kushal Velazquez is in chair on contact with spouse present who was getting ready to leave to go back to work. Worked on his exercises per MD order and issued and reviewed HEP with pt who verbalizes understanding. Nice progress with exercise repetitions. Cryocuff refilled and needs in reach. Pt asking for pain medicine, RN notified. This section established at most recent assessment   PROBLEM LIST (Impairments causing functional limitations):  1. Decreased [place] with Bed Mobility  2. Decreased [place] with Transfers  3. Decreased [place] with Ambulation  4.  Decreased [place] with shoulder HEP    INTERVENTIONS PLANNED: (Benefits and precautions of physical therapy have been discussed with the patient.)  1. Bed Mobility Training  2. Transfer Training  3. Gait Training  4. Therapeutic Exercises per MD orders  5. Modalities for Pain      TREATMENT PLAN: Frequency/Duration: daily for duration of hospital stay  Rehabilitation Potential For Stated Goals: GOOD      RECOMMENDED REHABILITATION/EQUIPMENT: (at time of discharge pending progress): Continue Skilled Therapy and Home Health: Physical Therapy. HISTORY:   History of Present Injury/Illness (Reason for Referral):  S/p R TSA  Past Medical History/Comorbidities:   Mr. Arturo Recinos  has a past medical history of Acute sinusitis; Anaphylactic reaction (2/1/2016); AR (allergic rhinitis) (7/27/2016); Bicipital tenosynovitis (2/17/2012); Bradycardia (02/02/2016); Carpal tunnel syndrome (2/17/2012); Chronic back pain (12/23/2014); Chronic deep vein thrombosis of lower extremity ([address] (3/21/2016); Chronic pansinusitis (7/27/2016); Chronic sinusitis (7/27/2016); DNS (deviated nasal septum); ARCE (dyspnea on exertion) (12/23/2014); Elevated serum creatinine (12/23/2014); Epistaxis; Essential hypertension, benign (8/16/2011); Factor V Leiden ([address] Glaucoma; H/O echocardiogram (02/02/2016); 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); Hypercholesteremia; Hyperkalemia (12/23/2014); Hypertension; Hypertrophy of nasal turbinates (7/27/2016); Left leg DVT ([address] (12/23/2014); Leukocytosis (12/23/2014); Lumbar spinal stenosis; Nasal obstruction; Nasal polyp (7/27/2016); Osteoarthritis; Osteoarthritis of left hip (12/12/2012); Primary localized osteoarthrosis, shoulder region (2/17/2012); Pulmonary embolism ([address] (1981); 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 He also has no past medical history of Difficult intubation; Malignant hyperthermia due to anesthesia; Nausea & vomiting; or Pseudocholinesterase deficiency. Mr. Alisson Schwab  has a past surgical history that includes colonoscopy (00,05,10); carpal tunnel release (2012); carpal tunnel release (1980); blepharoplasty (Bilateral, 2009); vascular access (2008); vascular access (2012); lap cholecystectomy (2001); appendectomy (1980); hernia repair (2010); hernia repair (Left, 2007); other surgical (2000); lumbar laminectomy; hip replacement (Left, 12/12/2012); vitrectomy (Left, 6/15/15); orthopaedic (Bilateral); orthopaedic (Right, 2006); orthopaedic (Bilateral); knee arthroscopy (Right, 2006); shoulder arthroscopy (Left, 2/17/2012); orthopaedic (Right); back surgery; heent (Right, 11/08); tonsillectomy (1950); cataract removal (Bilateral); corneal transplant (Right, 2004); sinus surgery proc unlisted; heent; heent; and corneal transplant (Right, 06/14/2016).   Social History/Living Environment:   Home Environment: Private residence  # Steps to Enter: 0  One/Two Story Residence: One story  Living Alone: No  Support Systems: Spouse/Significant Other/Partner  Patient Expects to be Discharged to[de-identified] Private residence  Current DME Used/Available at Home: None  Prior Level of Function/Work/Activity:  Independent with ambulation and ADLs   Number of Personal Factors/Comorbidities that affect the Plan of Care: 0: LOW COMPLEXITY   EXAMINATION:   Most Recent Physical Functioning:   Gross Assessment:                  Posture:     Balance:  Sitting: Intact  Standing: Intact Bed Mobility:     Wheelchair Mobility:     Transfers:  Sit to Stand: Supervision  Stand to Sit: Supervision  Gait:     Speed/Malina: Delayed  Distance (ft):  (ad janice in room)  Ambulation - Level of Assistance: Independent Body Structures Involved:  1. Bones  2. Joints  3. Muscles Body Functions Affected:  1. Sensory/Pain  2. Movement Related Activities and Participation Affected:  1. General Tasks and Demands  2. Mobility  3. Self Care   Number of elements that affect the Plan of Care: 4+: HIGH COMPLEXITY   CLINICAL PRESENTATION:   Presentation: Stable and uncomplicated: LOW COMPLEXITY   CLINICAL DECISION MAKIN[address] [place] [address] AM-PAC 6 Clicks   Basic Mobility Inpatient Short Form  How much difficulty does the patient currently have. .. Unable A Lot A Little None   1. Turning over in bed (including adjusting bedclothes, sheets and blankets)? [ ] 1   [ ] 2   [X] 3   [ ] 4   2. Sitting down on and standing up from a chair with arms ( e.g., wheelchair, bedside commode, etc.)   [ ] 1   [ ] 2   [ ] 3   [X] 4   3. Moving from lying on back to sitting on the side of the bed? [ ] 1   [ ] 2   [X] 3   [ ] 4   How much help from another person does the patient currently need. .. Total A Lot A Little None   4. Moving to and from a bed to a chair (including a wheelchair)? [ ] 1   [ ] 2   [ ] 3   [X] 4   5. Need to walk in hospital room? [ ] 1   [ ] 2   [ ] 3   [X] 4   6. Climbing 3-5 steps with a railing? [ ] 1   [ ] 2   [ ] 3   [X] 4   © , Trustees of [address] [place] [address], under license to [employer]. All rights reserved    Score:  Initial: 22 Most Recent: 22 (Date: -- )     Interpretation of Tool:  Represents activities that are increasingly more difficult (i.e. Bed mobility, Transfers, Gait).        Score 24 23 22-20 19-15 14-10 9-7 6       Modifier CH CI CJ CK CL CM CN         · Mobility - Walking and Moving Around:               - CURRENT STATUS:    CJ - 20%-39% impaired, limited or restricted               - GOAL STATUS:           CI - 1%-19% impaired, limited or restricted               - D/C STATUS:                       CJ - 20%-39% impaired, limited or restricted  [place] / [place] / Product Type: Medicare /       Medical Necessity:     · Skilled intervention continues to be required due to decreased functional mobility. Reason for Services/Other Comments:  · Patient continues to require skilled intervention due to decreased functional mobility. Use of outcome tool(s) and clinical judgement create a POC that gives a: Clear prediction of patient's progress: LOW COMPLEXITY                 TREATMENT:   (In addition to Assessment/Re-Assessment sessions the following treatments were rendered)   Pre-treatment Symptoms/Complaints:  Reports the numbness he had in his thumb this morning is returning  Pain: Initial:   Pain Intensity 1: 5  Pain Intervention(s) 1: Nurse notified  Post Session:  5/10      Therapeutic Exercise: (15 Minutes):  Exercises per grid below to improve mobility and strength. Date:  1/27/17 Date:    Date:      ACTIVITY/EXERCISE AM PM AM PM AM PM   Gripping 10  15           Wrist Flexion/Extension 10  15           Wrist Ulnar/Radial Deviation 10  15           Pronation/Supination 10  15           Elbow Flexion/Extension 10 aa  15 aa           Shoulder Flexion/Extension               Shoulder AB/ADduction               Shoulder IR/ER               Pulleys               Pendulums 10  15           Shrugs               Isometric:                 Flexion               Extension               ABduction               ADduction               Biceps/Triceps                               B = bilateral; AA = active assistive; A = active; P = passive     Treatment/Session Assessment:    · Response to Treatment:  Pt tolerated treatment well  · Interdisciplinary Collaboration:  · Physical Therapist  · Registered Nurse  · PT student  · After treatment position/precautions:  · Up in chair  · Bed/Chair-wheels locked  · Call light within reach  · RN notified  · Compliance with Program/Exercises: compliant most of the time.   · Recommendations/Intent for next treatment session: \"Next visit will focus on advancements to more challenging activities and reduction in assistance provided\".   Total Treatment Duration:  PT Patient Time In/Time Out  Time In: 1335  Time Out: [address] [address] [place]

## 2017-01-27 NOTE — PROGRESS NOTES
Orthopedic Joint Progress Note    2017  Admit Date: 2017  Admit Diagnosis: Rotator cuff strain, right, sequela [S46.011S]  Biceps muscle strain, right, initial encounter [S46.111A]    1 Day Post-Op    Subjective: some pain wants to stay until tomorrow no rid     Maria Fernanda Ruff     Review of Systems: Pertinent items are noted in HPI. Objective:     PT/OT:     PATIENT MOBILITY                           Vital Signs:    Blood pressure 148/70, pulse (!) 50, temperature 98.3 °F (36.8 °C), resp. rate 18, weight 82.6 kg (182 lb), SpO2 95 %.   Temp (24hrs), Av.5 °F (36.4 °C), Min:96.2 °F (35.7 °C), Max:98.7 °F (37.1 °C)      Pain Control:   Pain Assessment  Pain Scale 1: Numeric (0 - 10)  Pain Intensity 1: 6  Pain Location 1: Shoulder  Pain Orientation 1: Right  Pain Description 1: Aching, Constant  Pain Intervention(s) 1: Medication (see MAR), Cold pack    Meds:  Current Facility-Administered Medications   Medication Dose Route Frequency    Lactobacillus Acidoph & Bulgar (FLORANEX) tablet 1 Tab  1 Tab Oral DAILY    prednisoLONE acetate (PRED FORTE) 1 % ophthalmic suspension 1 Drop  1 Drop Right Eye BID    brimonidine-timolol (COMBIGAN) 0.2-0.5 % ophthalmic solution 1 Drop  1 Drop Both Eyes BID    moxifloxacin (VIGAMOX) 0.5 % ophthalmic solution 1 Drop  1 Drop Right Eye TID    cyanocobalamin tablet 1,000 mcg  1,000 mcg Oral DAILY    hydroxypropyl methylcellulose (GONAK) 2.5 % ophthalmic solution 1 Drop  1 Drop Ophthalmic PRN    rivaroxaban (XARELTO) tablet 20 mg  20 mg Oral DAILY WITH DINNER    0.9% sodium chloride infusion  75 mL/hr IntraVENous CONTINUOUS    sodium chloride (NS) flush 5-10 mL  5-10 mL IntraVENous Q8H    sodium chloride (NS) flush 5-10 mL  5-10 mL IntraVENous PRN    bisacodyl (DULCOLAX) suppository 10 mg  10 mg Rectal DAILY PRN    sodium phosphate (FLEET'S) enema 118 mL  1 Enema Rectal PRN    docusate sodium (COLACE) capsule 100 mg  100 mg Oral BID    promethazine (PHENERGAN) tablet 25 mg  25 mg Oral Q4H PRN    HYDROmorphone (PF) (DILAUDID) injection 1 mg  1 mg IntraVENous Q1H PRN    HYDROmorphone (DILAUDID) tablet 4 mg  4 mg Oral Q3H PRN    HYDROmorphone (DILAUDID) tablet 2 mg  2 mg Oral Q3H PRN    temazepam (RESTORIL) capsule 15 mg  15 mg Oral QHS PRN    aspirin chewable tablet 81 mg  81 mg Oral DAILY    ceFAZolin (ANCEF) 1 g in 0.9% sodium chloride (MBP/ADV) 50 mL  1 g IntraVENous Q8H    phenol throat spray (CHLORASEPTIC) 1 Spray  1 Spray Oral PRN        LAB:    Lab Results   Component Value Date/Time    INR 1.3 03/16/2015 07:40 AM    INR 1.3 12/24/2014 04:35 AM    INR 1.0 12/23/2014 11:25 AM     Lab Results   Component Value Date/Time    HGB 14.5 09/12/2016 10:27 AM    HGB 16.1 02/11/2016 11:17 AM    HGB 15.7 02/01/2016 10:30 AM       Wound Incision Hand Left (Active)   Number of days:1806       Wound Incision Shoulder Left (Active)   Number of days:1806       Wound Incision Arm Left (Active)   Number of days:1806       Wound Incision Shoulder Left (Active)   Number of days:1806       Wound Hip Left (Active)   Number of days:1507       Wound Back Posterior; Lower (Active)   Number of days:1040       Wound Finger Right (Active)   Number of days:815       Wound Eye Left (Active)   Number of days:592       Wound Shoulder Right (Active)   DRESSING STATUS Clean, dry, and intact 1/26/2017  8:10 PM   DRESSING TYPE Dry dressing 1/26/2017  8:10 PM   SPLINT TYPE/MATERIAL Shoulder Immobilizer 1/26/2017  8:10 PM   Number of days:1             Physical Exam:  No significant changes    Assessment:      Principal Problem:    Traumatic tear of right rotator cuff (1/26/2017)    Active Problems:    Strain of right biceps (1/26/2017)      Type 1 superior labrum extending from anterior to posterior (SLAP) lesion of right shoulder (1/26/2017)      Other sprain of right shoulder joint, sequela (1/26/2017)      Chondromalacia, right shoulder (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

## 2017-01-27 NOTE — PROGRESS NOTES
Discharge instructions given to patient along with prescriptions. Patient verbalized understanding. Discharged in stable condition to car via wheelchair.

## 2017-01-27 NOTE — PROGRESS NOTES
Patient is alert and oriented x4. Able to verbalize needs. Resting quietly with no distress noted. Dry dressing to right shoulder is dry and intact. Sling in place. Neurovascular and peripheral vascular checks WNL. Voiding clear yellow urine. Bed low and locked. Call light within reach. Instructed to call for assistance. Patient verbalizes understanding. Will monitor.

## 2017-01-27 NOTE — DISCHARGE INSTRUCTIONS
DISCHARGE SUMMARY from Nurse    The following personal items collected during your admission are returned to you:   Dental Appliance: Dental Appliances: Lowers; Other (comment) (bridge )  Vision: Visual Aid: Glasses  Hearing Aid:   na  Jewelry: Jewelry: None  Clothing: Clothing: At bedside  Other Valuables: Other Valuables: None  Valuables sent to safe:   na          PATIENT INSTRUCTIONS:    New Medications:    Dilaudid 2 mg tabs Take 1-2 tabs every 4-6 hrs as needed for pain. Toradol 10 mg tabs Take 1 tab every 6 hrs x 5 days. Phenergan 25 mg tabs Take 1 tab every 8 hrs as needed for nausea. Restoril 15 mg tabs Take 1 tab at bedtime as needed for sleep. After general anesthesia or intravenous sedation, for 24 hours or while taking prescription Narcotics:  · Limit your activities  · Do not drive and operate hazardous machinery  · Do not make important personal or business decisions  · Do  not drink alcoholic beverages  · If you have not urinated within 8 hours after discharge, please contact your surgeon on call. Report the following to your surgeon:  · Excessive pain, swelling, redness or odor of or around the surgical area  · Temperature over 101  · Nausea and vomiting lasting longer than 4 hours or if unable to take medications  · Any signs of decreased circulation or nerve impairment to extremity: change in color, persistent  numbness, tingling, coldness or increase pain  · Any questions, call office @ [phone]. What to do at Home:  Recommended activity: activity as tolerated, as instructed per Dr. Mitzie Dandy. Continue with exercises taught by Physical Therapy. Resume per hospital diet. Wear sling to right arm. Use ice and elevate arm to decrease pain and swelling. If you experience any of the following symptoms temp>101, pain unrelieved by meds, or persistent nausea or vomitting, please follow up with Dr. Mitzie Dandy @ [phone].       *  Please give a list of your current medications to your Primary Care Provider. *  Please update this list whenever your medications are discontinued, doses are      changed, or new medications (including over-the-counter products) are added. *  Please carry medication information at all times in case of emergency situations. Shoulder Arthroscopy: What to Expect at Home  Your Recovery     Your arm may be in a sling. You will feel tired for several days. Your shoulder will be swollen, and you may notice that your skin is a different color near the cuts the doctor made (incisions). Your hand and arm may also be swollen. This is normal and will go away in a few days. Depending on the medicine you had during the surgery, your entire arm may feel numb or like you cannot move it. This goes away in 12 to 24 hours. When you can return to work or your usual routine will depend on your shoulder problem. Most people need 6 weeks or longer to recover. How much time you need depends on the surgery that was done. You may have to limit your activity until your shoulder strength and range of motion return to normal. You may also be in a rehabilitation program (rehab). If you have a desk job, you may be able to return to work a few days after the surgery. If you lift things at work, it may take months before you return to work. This care sheet gives you a general idea about how long it will take for you to recover. But each person recovers at a different pace. Follow the steps below to get better as quickly as possible. How can you care for yourself at home? Activity  · Rest when you feel tired. Getting enough sleep will help you recover. You may be more comfortable if you sleep in a reclining chair. To make your arm and shoulder feel better, keep a thin pillow under the back of your arm while you are lying down. · Try to walk each day. Start by walking a little more than you did the day before. Bit by bit, increase the amount you walk.  Walking boosts blood flow and helps prevent pneumonia and constipation. · For 2 to 3 weeks, avoid lifting anything heavier than a plate or a glass. You need to give your shoulder time to heal.  · Your arm may be in a sling. You may need to use the sling for a few days to a few weeks. Your doctor will advise you on how long to wear the sling. · You may take the sling off when you dress or wash. · Do not use your arm for repeated movements. These include painting, vacuuming, or using a computer. Diet  · You can eat your normal diet. If your stomach is upset, try bland, low-fat foods like plain rice, broiled chicken, toast, and yogurt. · Drink plenty of fluids, unless your doctor tells you not to. · You may notice that your bowel movements are not regular right after your surgery. This is common. Try to avoid constipation and straining with bowel movements. You may want to take a fiber supplement every day. If you have not had a bowel movement after a couple of days, ask your doctor about taking a mild laxative. Medicines  · Take pain medicines exactly as directed. ¨ If the doctor gave you a prescription medicine for pain, take it as prescribed. ¨ If you are not taking a prescription pain medicine, ask your doctor if you can take an over-the-counter medicine. · If you think your pain medicine is making you sick to your stomach:  ¨ Take your medicine after meals (unless your doctor has told you not to). ¨ Ask your doctor for a different pain medicine. · If your doctor prescribed antibiotics, take them as directed. Do not stop taking them just because you feel better. You need to take the full course of antibiotics. Incision care  · If you have a dressing over your incision, keep it clean and dry. You may remove it 2 to 3 days after the surgery. · If your incision is open to the air, keep the area clean and dry. · If you have strips of tape on the incision, leave the tape on for a week or until it falls off. Exercise  · You may need rehabilitation. This is a series of exercises you do after your surgery. Rehab helps you get back your shoulder's range of motion and strength. You will work with your doctor and physical therapist to plan this exercise program. To get the best results, you need to do the exercises correctly and as often and as long as your doctor tells you. Ice  · To reduce swelling and pain, put ice or a cold pack on your shoulder for 10 to 20 minutes at a time. Do this every 1 to 2 hours. Put a thin cloth between the ice and your skin. Follow-up care is a key part of your treatment and safety. Be sure to make and go to all appointments, and call your doctor if you are having problems. It's also a good idea to know your test results and keep a list of the medicines you take. When should you call for help? Call 911 anytime you think you may need emergency care. For example, call if:  · You passed out (lost consciousness). · You have severe trouble breathing. · You have sudden chest pain and shortness of breath, or you cough up blood. Call your doctor now or seek immediate medical care if:  · Your hand is cool, pale, or numb, or it changes color. · You are unable to move your fingers, wrist, or elbow. · You are sick to your stomach or cannot keep fluids down. · You have pain that does not get better after you take pain medicine. · You have signs of infection, such as:  ¨ Increased pain, swelling, warmth, or redness. ¨ Red streaks leading from the incision. ¨ Pus draining from the incision. ¨ A fever. · You have loose stitches, or your incision comes open. · Your incision bleeds through your first dressing or is still bleeding 3 days after your surgery. Watch closely for changes in your health, and be sure to contact your doctor if:  · Your sling feels too tight, and you cannot loosen it. · You have new or increased swelling in your arm. · You have new pain that develops in another area of the affected limb.  For example, you have pain in your hand or elbow. · You do not have a bowel movement after taking a laxative. · You do not get better as expected. Where can you learn more? Go to "[employer]be  Enter I924 in the search box to learn more about \"Shoulder Arthroscopy: What to Expect at Home. \"   © 6918-7902 [employer]. Care instructions adapted under license by Dorothea Hernandez (which disclaims liability or warranty for this information). This care instruction is for use with your licensed healthcare professional. If you have questions about a medical condition or this instruction, always ask your healthcare professional. [place] [address] any warranty or liability for your use of this information. Content Version: 56.7.913504; Current as of: June 4, 2014                      These are general instructions for a healthy lifestyle:    No smoking/ No tobacco products/ Avoid exposure to second hand smoke    Surgeon General's Warning:  Quitting smoking now greatly reduces serious risk to your health. Obesity, smoking, and sedentary lifestyle greatly increases your risk for illness    A healthy diet, regular physical exercise & weight monitoring are important for maintaining a healthy lifestyle    You may be retaining fluid if you have a history of heart failure or if you experience any of the following symptoms:  Weight gain of 3 pounds or more overnight or 5 pounds in a week, increased swelling in our hands or feet or shortness of breath while lying flat in bed. Please call your doctor as soon as you notice any of these symptoms; do not wait until your next office visit. Recognize signs and symptoms of STROKE:    F-face looks uneven    A-arms unable to move or move unevenly    S-speech slurred or non-existent    T-time-call 911 as soon as signs and symptoms begin-DO NOT go       Back to bed or wait to see if you get better-TIME IS BRAIN.         The discharge information has been reviewed with the patient. The patient verbalized understanding.

## 2017-01-27 NOTE — PROGRESS NOTES
Problem: Mobility Impaired (Adult and Pediatric)  Goal: *Acute Goals and Plan of Care (Insert Text)  GOALS (1-4 days):  (1.) Patient will be independent with shoulder HEP to increase range of motion per MD orders. ________________________________________________________________________________________________        Comments:       PHYSICAL THERAPY: INITIAL ASSESSMENT, TREATMENT DAY: DAY OF ASSESSMENT, AM 1/27/2017  OBSERVATION: Hospital Day: 2  [place] / [place] / Product Type: Medicare /      NAME/AGE/GENDER: Jordan Little is a 81 y.o. male      PRIMARY DIAGNOSIS: Rotator cuff strain, right, sequela [S46.011S]  Biceps muscle strain, right, initial encounter [S46.111A] Traumatic tear of right rotator cuff Traumatic tear of right rotator cuff  Procedure(s) (LRB):  ARTHROSCOPY RIGHT SHOULDER ARTHROSCOPIC SUBACROMIAL DECOMPRESSION, EXTENSIVE DEBRIDEMENT SLAP TEAR, LABRAL TEAR, GLENOHUMERAL JOINT, SUBACROMIAL SPACE, MINI OPEN ROTATOR CUFF REPAIR, BICEPS TENODESIS   (Right)  1 Day Post-Op  ICD-10: Treatment Diagnosis:       · Pain in Right Shoulder (M25.511)  · Stiffness of Right Shoulder, Not elsewhere classified (M25.611)  · Other lack of cordination (R27.8)  · Other abnormalities of gait and mobility (R26.89)   Precaution/Allergies:  Aspirin and Epinephrine       ASSESSMENT:      Mr. Primitivo Paget is in chair with cryocuff applied and agreeable to PT. Prior to session pt is seen safely ambulating in the parker independently. PT readjusts his cryocuff for cold water. He performs all therapeutic exercises and is enthusiastic to go home. PT helps to don his ultra sling. He is left with all needs within reach. This section established at most recent assessment   PROBLEM LIST (Impairments causing functional limitations):  1. Decreased Forest with Bed Mobility  2. Decreased Forest with Transfers  3. Decreased Forest with Ambulation  4.  Decreased Forest with shoulder HEP    INTERVENTIONS PLANNED: (Benefits and precautions of physical therapy have been discussed with the patient.)  1. Bed Mobility Training  2. Transfer Training  3. Gait Training  4. Therapeutic Exercises per MD orders  5. Modalities for Pain      TREATMENT PLAN: Frequency/Duration: daily for duration of hospital stay  Rehabilitation Potential For Stated Goals: GOOD      RECOMMENDED REHABILITATION/EQUIPMENT: (at time of discharge pending progress): Continue Skilled Therapy and Home Health: Physical Therapy. HISTORY:   History of Present Injury/Illness (Reason for Referral):  S/p R TSA  Past Medical History/Comorbidities:   Mr. Alyssa Hodges  has a past medical history of Acute sinusitis; Anaphylactic reaction (2/1/2016); AR (allergic rhinitis) (7/27/2016); Bicipital tenosynovitis (2/17/2012); Bradycardia (02/02/2016); Carpal tunnel syndrome (2/17/2012); Chronic back pain (12/23/2014); Chronic deep vein thrombosis of lower extremity ([address] (3/21/2016); Chronic pansinusitis (7/27/2016); Chronic sinusitis (7/27/2016); DNS (deviated nasal septum); ARCE (dyspnea on exertion) (12/23/2014); Elevated serum creatinine (12/23/2014); Epistaxis; Essential hypertension, benign (8/16/2011); Factor V Leiden ([address] Glaucoma; H/O echocardiogram (02/02/2016); 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); Hypercholesteremia; Hyperkalemia (12/23/2014); Hypertension; Hypertrophy of nasal turbinates (7/27/2016); Left leg DVT ([address] (12/23/2014); Leukocytosis (12/23/2014); Lumbar spinal stenosis; Nasal obstruction; Nasal polyp (7/27/2016); Osteoarthritis; Osteoarthritis of left hip (12/12/2012); Primary localized osteoarthrosis, shoulder region (2/17/2012); Pulmonary embolism ([address] (1981); 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 He also has no past medical history of Difficult intubation; Malignant hyperthermia due to anesthesia; Nausea & vomiting; or Pseudocholinesterase deficiency. Mr. Dave Loyd  has a past surgical history that includes colonoscopy (00,05,10); carpal tunnel release (2012); carpal tunnel release (1980); blepharoplasty (Bilateral, 2009); vascular access (2008); vascular access (2012); lap cholecystectomy (2001); appendectomy (1980); hernia repair (2010); hernia repair (Left, 2007); other surgical (2000); lumbar laminectomy; hip replacement (Left, 12/12/2012); vitrectomy (Left, 6/15/15); orthopaedic (Bilateral); orthopaedic (Right, 2006); orthopaedic (Bilateral); knee arthroscopy (Right, 2006); shoulder arthroscopy (Left, 2/17/2012); orthopaedic (Right); back surgery; heent (Right, 11/08); tonsillectomy (1950); cataract removal (Bilateral); corneal transplant (Right, 2004); sinus surgery proc unlisted; heent; heent; and corneal transplant (Right, 06/14/2016). Social History/Living Environment:   Home Environment: Private residence  # Steps to Enter: 0  One/Two Story Residence: One story  Living Alone: No  Support Systems: Spouse/Significant Other/Partner  Patient Expects to be Discharged to[de-identified] Private residence  Current DME Used/Available at Home: None  Prior Level of Function/Work/Activity:  Independent with ambulation and ADLs   Number of Personal Factors/Comorbidities that affect the Plan of Care: 0: LOW COMPLEXITY   EXAMINATION:   Most Recent Physical Functioning:   Gross Assessment:                  Posture:     Balance:  Sitting: Intact  Standing: Intact Bed Mobility:     Wheelchair Mobility:     Transfers:  Sit to Stand: Supervision  Stand to Sit: Supervision  Gait:             Body Structures Involved:  1. Bones  2. Joints  3.  Muscles Body Functions Affected:  1. Sensory/Pain  2. Movement Related Activities and Participation Affected:  1. General Tasks and Demands  2. Mobility  3. Self Care   Number of elements that affect the Plan of Care: 4+: HIGH COMPLEXITY   CLINICAL PRESENTATION:   Presentation: Stable and uncomplicated: LOW COMPLEXITY   CLINICAL DECISION MAKIN[address] [place] [id number] AM-PAC 6 Clicks   Basic Mobility Inpatient Short Form  How much difficulty does the patient currently have. .. Unable A Lot A Little None   1. Turning over in bed (including adjusting bedclothes, sheets and blankets)? [ ] 1   [ ] 2   [X] 3   [ ] 4   2. Sitting down on and standing up from a chair with arms ( e.g., wheelchair, bedside commode, etc.)   [ ] 1   [ ] 2   [ ] 3   [X] 4   3. Moving from lying on back to sitting on the side of the bed? [ ] 1   [ ] 2   [X] 3   [ ] 4   How much help from another person does the patient currently need. .. Total A Lot A Little None   4. Moving to and from a bed to a chair (including a wheelchair)? [ ] 1   [ ] 2   [ ] 3   [X] 4   5. Need to walk in hospital room? [ ] 1   [ ] 2   [ ] 3   [X] 4   6. Climbing 3-5 steps with a railing? [ ] 1   [ ] 2   [ ] 3   [X] 4   © , Trustees of [address] [place] [id number], under license to [employer]. All rights reserved    Score:  Initial: 22 Most Recent: 22 (Date: -- )     Interpretation of Tool:  Represents activities that are increasingly more difficult (i.e. Bed mobility, Transfers, Gait).        Score 24 23 22-20 19-15 14-10 9-7 6       Modifier CH CI CJ CK CL CM CN         · Mobility - Walking and Moving Around:               - CURRENT STATUS:    CJ - 20%-39% impaired, limited or restricted               - GOAL STATUS:           CI - 1%-19% impaired, limited or restricted               - D/C STATUS:                       ---------------To be determined---------------  [place] / [place] / Product Type: Medicare /       Medical Necessity: · Skilled intervention continues to be required due to decreased functional mobility. Reason for Services/Other Comments:  · Patient continues to require skilled intervention due to decreased functional mobility. Use of outcome tool(s) and clinical judgement create a POC that gives a: Clear prediction of patient's progress: LOW COMPLEXITY                 TREATMENT:   (In addition to Assessment/Re-Assessment sessions the following treatments were rendered)   Pre-treatment Symptoms/Complaints:  none  Pain: Initial:   Pain Intensity 1: 4  Post Session:  4/10      Therapeutic Exercise: (10 Minutes):  Exercises per grid below to improve mobility and strength. Date:  1/27/17 Date:    Date:      ACTIVITY/EXERCISE AM PM AM PM AM PM   Gripping 10             Wrist Flexion/Extension 10             Wrist Ulnar/Radial Deviation 10             Pronation/Supination 10             Elbow Flexion/Extension 10 aa             Shoulder Flexion/Extension               Shoulder AB/ADduction               Shoulder IR/ER               Pulleys               Pendulums 10             Shrugs               Isometric:                 Flexion               Extension               ABduction               ADduction               Biceps/Triceps                               B = bilateral; AA = active assistive; A = active; P = passive     Treatment/Session Assessment:    · Response to Treatment:  Pt tolerated treatment well  · Interdisciplinary Collaboration:  · Physical Therapist  · Registered Nurse  · PT student  · After treatment position/precautions:  · Up in chair  · Bed/Chair-wheels locked  · Call light within reach  · RN notified  · Compliance with Program/Exercises: compliant most of the time. · Recommendations/Intent for next treatment session: \"Next visit will focus on advancements to more challenging activities and reduction in assistance provided\".   Total Treatment Duration:  PT Patient Time In/Time Out  Time In: 0942  Time Out: [address] [facility] Daniel Valdez

## 2017-01-28 NOTE — DISCHARGE SUMMARY
[address] [facility]       Name:  Luzmaria Rehman   MR#:  [id number]   :  1945   Account #:  [de-identified]   Date of Adm:  2017       ADMISSION DIAGNOSES   1. Partial thickness rotator cuff tear, right shoulder. 2. Biceps tendonitis, right shoulder. 3. Superior labral anterior-posterior (SLAP) tear, right   shoulder. 4. Labral tear, right shoulder. 5. Chondromalacia, right shoulder. 6. Hypercoagulable state. HOSPITAL COURSE: Please see HPI, operative notes and consults   for details. The patient is a 70-year-old gentleman who was   admitted on 2017, underwent an uncomplicated arthroscopy   right shoulder, ASD, extensive debridement, SLAP tear, labral   tear, glenohumeral joint, subacromial space, mini open rotator   cuff repair and biceps tenodesis. The patient has history of   hypercoagulable state and was admitted overnight for   observation. The patient resumed his Xarelto. Hospitalist consult   was obtained to manage medical issues including his   anticoagulation. On postop day #1, he was afebrile, vital signs   were stable. Discharged home on postoperative day #1. He will   continue therapy on the outside and follow up in my office in 2   weeks.          MD OMAR Chou   D:  2017   17:28   T:  2017   00:45   Job #:  [id number]

## 2017-01-30 ENCOUNTER — HOSPITAL ENCOUNTER (OUTPATIENT)
Dept: PHYSICAL THERAPY | Age: 72
End: 2017-01-30

## 2017-02-14 ENCOUNTER — HOSPITAL ENCOUNTER (OUTPATIENT)
Dept: PHYSICAL THERAPY | Age: 72
Discharge: HOME | End: 2017-02-14
Payer: MEDICARE

## 2017-02-14 PROCEDURE — G8985 CARRY GOAL STATUS: HCPCS

## 2017-02-14 PROCEDURE — G8984 CARRY CURRENT STATUS: HCPCS

## 2017-02-14 PROCEDURE — 97140 MANUAL THERAPY 1/> REGIONS: CPT

## 2017-02-14 PROCEDURE — 97162 PT EVAL MOD COMPLEX 30 MIN: CPT

## 2017-02-14 NOTE — PROGRESS NOTES
Eduardo Leblanc  : 1945 [facility] FARRU[facility] LUNA  [address] [facility], [address] [place] [address],[address], [facility] [address].  [phone]   [phone]       OUTPATIENT PHYSICAL THERAPY:Initial Assessment 2/15/2017    ICD-10: Treatment Diagnosis: Pain in right shoulder (M25.511)  Precautions/Allergies: post-op PROM / Epinephrine   Fall Risk Score: 1  MD Orders: PT- evaluate and treat, HEP, ROM/PROM MEDICAL/REFERRING DIAGNOSIS:  s/p right shoulder scope asd rcr bt - 17  DATE OF ONSET: 16  REFERRING PHYSICIAN: Britney Franco MD  RETURN PHYSICIAN APPOINTMENT: 17     INITIAL ASSESSMENT:  Mr. Liv Kirby is s/p R shoulder scope with ASD, RCR and BT. Pt states that he tripped while working on his bathroom and that it was a freak accident. He presents with pain, weakness, stiffness normal for post-operative stage. He is now only wearing the sling when out in public for protection and states being compliant with non use. The past history of right shoulder dislocation and surgery will likely slow progress. He needs to be able to assist with home care, perform home repairs and yard work. He will benefit from skilled therapy to address his deficits, guide rehab progression and assist in the return to functional, independent ADL's. PROBLEM LIST (Impacting functional limitations):  1. Decreased Strength  2. Decreased ADL/Functional Activities  3. Increased Pain  4. Decreased Activity Tolerance  5. Decreased Flexibility/Joint Mobility INTERVENTIONS PLANNED: as cleared by MD  1. Cold  2. Cryotherapy  3. Electrical Stimulation  4. Home Exercise Program (HEP)  5. Manual Therapy  6. Neuromuscular Re-education/Strengthening  7. Range of Motion (ROM)  8. Therapeutic Exercise/Strengthening   TREATMENT PLAN:  Effective Dates: 17 TO 17.   Frequency/Duration: 2-3 times a week for 12 weeks  GOALS: (Goals have been discussed and agreed upon with patient.)  Short-Term Functional Goals: Time Frame: 4 weeks 1. Independent with HEP    2. R shoulder AROM FE to at least 110 deg against gravity, ER to at least 75 deg and IR to L4 behind the back to allow independent ADL's     3. R shoulder Strength improved by at least 1/3 muscle grade to allow safe reaching  Discharge Goals: Time Frame: 12 weeks     1. Tolerate 45 minutes of exercise for home care     2. Strength to [facility] to allow return to home maintenance     3. ADL's with pain < 2  Rehabilitation Potential For Stated Goals: Good  Regarding Ashlyn Parisi's therapy, I certify that the treatment plan above will be carried out by a therapist or under their direction. Thank you for this referral,  Eduardo Bell PT     Referring Physician Signature: Hudson Overton MD              Date                    The information in this section was collected on 2/14/17 (except where otherwise noted). HISTORY:   History of Present Injury/Illness (Reason for Referral):  Pt states that he was remodeling his home when he tripped and fell. Felt immediate pain when he reached out with the right hand to catch himself. Past Medical History/Comorbidities:   Mr. 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 Aric Alvarez  has a past surgical history that includes carpal tunnel release (2012); carpal tunnel release (1980); lumbar laminectomy; hip replacement (Left, 12/12/2012); knee arthroscopy (Right, 2006); shoulder arthroscopy (Left, 2/17/2012); corneal transplant (Right, 2004); corneal transplant (Right, 06/14/2016). Social History/Living Environment:   Retired. 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 , Disp: 30 Tab, Rfl: 11    cyanocobalamin (VITAMIN B-12) 1,000 mcg tablet, Take 1,000 mcg by mouth daily. , Disp: , Rfl:     Biotin 2,500 mcg cap, Take  by mouth daily. , Disp: , Rfl:     B.infantis-B.ani-B.long-B.bifi (PROBIOTIC 4X) 10-15 mg TbEC, Take  by mouth daily. , Disp: , Rfl:     atorvastatin (LIPITOR) 80 mg tablet, Take 80 mg by mouth daily. 1/2 tablet daily  Take DOS per anesthesia protocol., Disp: , Rfl:     HYDROcodone-acetaminophen (NORCO) 7.5-325 mg per tablet, Take 1 Tab by mouth every six (6) hours as needed for Pain. Max Daily Amount: 4 Tabs., Disp: 20 Tab, Rfl: 0    carboxymethylcellulose sodium (REFRESH LIQUIGEL) 1 % dlgl, Apply 1 Drop to eye every four (4) hours. , Disp: , Rfl:     omega-3 fatty acids-vitamin e (FISH OIL) 1,000 mg cap, Take 2 Caps by mouth every morning., Disp: , Rfl:     timolol    PREDnisolone  *  Brimonidine tartrate   *  alergy shots   Date Last Reviewed:  2/14/17   Number of Personal Factors/Comorbidities that affect the Plan of Care: 1-2: MODERATE COMPLEXITY   EXAMINATION:   Observation/Orthostatic Postural Assessment:          Pt arrived without the sling. Shoulder held in protraction and elevation.    Palpation:          Tender over the right biceps tendon region, minimal tenderness over the portal sites  PROM:  2/14/17  R shoulder flex: 96 deg  R shoulder Abd: 84 deg  R shoulder horizontal Abd: n/t  R shoulder ER: 43 at 10 deg abd, 41 at 45 deg abd  R shoulder IR: 48 deg scapula stabilized  R elbow extn: -15 deg from full extn  R supination: 78 deg   Strength: 2/14/17   R shoulder flex:n/t  R shoulder abd:n/t  R shoulder horizontal abd:n/t  R shoulder ER:n/t  R shoulder IR:n/t  R elbow flex: at least 3+  R elbow extn: at least 3+   Body Structures Involved:  1. Joints  2. Muscles  3. Ligaments Body Functions Affected:  1. Neuromusculoskeletal  2. Movement Related Activities and Participation Affected:  1. Self Care  2. Domestic Life   Number of elements (examined above) that affect the Plan of Care: 1-2: LOW COMPLEXITY   CLINICAL PRESENTATION:   Presentation: Stable and uncomplicated: LOW COMPLEXITY   CLINICAL DECISION MAKING:   Outcome Measure: Tool Used: Disabilities of the Arm, Shoulder and Hand (DASH) Questionnaire - Quick Version  Score:  Initial: 51/55  Most Recent: X/55 (Date: -- )   Interpretation of Score: The DASH is designed to measure the activities of daily living in person's with upper extremity dysfunction or pain. Each section is scored on a 1-5 scale, 5 representing the greatest disability. The scores of each section are added together for a total score of 55. Score 11 12-19 20-28 29-37 38-45 46-54 55   Modifier CH CI CJ CK CL CM CN     ? Carrying, Moving, and Handling Objects:     - CURRENT STATUS: CM - 80%-99% impaired, limited or restricted    - GOAL STATUS: CJ - 20%-39% impaired, limited or restricted    - D/C STATUS:  ---------------To be determined---------------      Medical Necessity:   · Skilled intervention continues to be required due to need for manual treatment and guided progression. Reason for Services/Other Comments:  · Patient continues to require skilled intervention due to need for functional return of dominant arm.    Use of outcome tool(s) and clinical judgement create a POC that gives a: Questionable prediction of patient's progress: MODERATE COMPLEXITY            TREATMENT:   (In addition to Assessment/Re-Assessment sessions the following treatments were rendered)  Pre-treatment Symptoms/Complaints:   Pt reports having a constant dull ache that becomes sharp with movement. Reports having numbness in the right hand  Pain: Initial:   Pain Intensity 1: 3  Post Session:  2-3 after     THERAPEUTIC EXERCISE: (5 minutes):  Exercises for strengthening and posture. B scapular retraction/depression, ball squeeze, finger extn with rubber band, wrist flex/extn. MANUAL THERAPY: (15 minutes): PROM to grade 2- physiological mobilizations R shoulder ER at neutral and again at 45 deg abd, IR with scapula stabilized, FE and abd. Treatment/Session Assessment:    · Response to Treatment:  Good return demonstration of exercises. Assessment and treatment without pain. · Compliance with Program/Exercises: Will assess as treatment progresses. · Recommendations/Intent for next treatment session: \"Next visit will focus on PROM and posture correction\".   Total Treatment Duration: 60 min  PT Patient Time In/Time Out  Time In: 1300  Time Out: 288 Edwin Jaime, PT

## 2017-02-14 NOTE — PROGRESS NOTES
Ambulatory/Rehab Services H2 Model Falls Risk Assessment    Risk Factor Pts. ·   Confusion/Disorientation/Impulsivity  []    4 ·   Symptomatic Depression  []   2 ·   Altered Elimination  []   1 ·   Dizziness/Vertigo  []   1 ·   Gender (Male)  [x]   1 ·   Any administered antiepileptics (anticonvulsants):  []   2 ·   Any administered benzodiazepines:  []   1 ·   Visual Impairment (specify):  []   1 ·   Portable Oxygen Use  []   1 ·   Orthostatic ? BP  []   1 ·   History of Recent Falls (within 3 mos.)  [x]   5     Ability to Rise from Chair (choose one) Pts. ·   Ability to rise in a single movement  []   0 ·   Pushes up, successful in one attempt  []   1 ·   Multiple attempts, but successful  []   3 ·   Unable to rise without assistance  []   4   Total: (5 or greater = High Risk) 6     Falls Prevention Plan:   []                Physical Limitations to Exercise (specify):   []                Mobility Assistance Device (type):   []                Exercise/Equipment Adaptation (specify):    ©2010 [facility] [employer] [address] [place] [address] Patent #[id number].  Federal Law prohibits the replication, distribution or use without written permission from [facility] [employer]

## 2017-02-17 ENCOUNTER — HOSPITAL ENCOUNTER (OUTPATIENT)
Dept: PHYSICAL THERAPY | Age: 72
Discharge: HOME | End: 2017-02-17
Payer: MEDICARE

## 2017-02-17 PROCEDURE — 97140 MANUAL THERAPY 1/> REGIONS: CPT

## 2017-02-17 NOTE — PROGRESS NOTES
Nelida Delaney  : 1945 [facility] FARRU[facility] LUNA  [address] [facility], [address] [place] [address],[address], [address] [facility]  [phone]   [phone]       OUTPATIENT PHYSICAL THERAPY:Daily Note 2017    ICD-10: Treatment Diagnosis: Pain in right shoulder (M25.511)  Precautions/Allergies: post-op PROM / Epinephrine   Fall Risk Score: 1  MD Orders: PT- evaluate and treat, HEP, ROM/PROM MEDICAL/REFERRING DIAGNOSIS:  s/p right shoulder scope asd rcr bt - 17  DATE OF ONSET: 16  REFERRING PHYSICIAN: Trav Erickson MD  RETURN PHYSICIAN APPOINTMENT: 17     INITIAL ASSESSMENT:  Mr. Coral Murillo is s/p R shoulder scope with ASD, RCR and BT. Pt states that he tripped while working on his bathroom and that it was a freak accident. He presents with pain, weakness, stiffness normal for post-operative stage. He is now only wearing the sling when out in public for protection and states being compliant with non use. The past history of right shoulder dislocation and surgery will likely slow progress. He needs to be able to assist with home care, perform home repairs and yard work. He will benefit from skilled therapy to address his deficits, guide rehab progression and assist in the return to functional, independent ADL's. PROBLEM LIST (Impacting functional limitations):  1. Decreased Strength  2. Decreased ADL/Functional Activities  3. Increased Pain  4. Decreased Activity Tolerance  5. Decreased Flexibility/Joint Mobility INTERVENTIONS PLANNED: as cleared by MD  1. Cold  2. Cryotherapy  3. Electrical Stimulation  4. Home Exercise Program (HEP)  5. Manual Therapy  6. Neuromuscular Re-education/Strengthening  7. Range of Motion (ROM)  8. Therapeutic Exercise/Strengthening   TREATMENT PLAN:  Effective Dates: 17 TO 17. Frequency/Duration: 2-3 times a week for 12 weeks  GOALS: (Goals have been discussed and agreed upon with patient.)  Short-Term Functional Goals: Time Frame: 4 weeks    1. Independent with HEP    2. R shoulder AROM FE to at least 110 deg against gravity, ER to at least 75 deg and IR to L4 behind the back to allow independent ADL's     3. R shoulder Strength improved by at least 1/3 muscle grade to allow safe reaching  Discharge Goals: Time Frame: 12 weeks     1. Tolerate 45 minutes of exercise for home care     2. Strength to [facility] to allow return to home maintenance     3. ADL's with pain < 2  Rehabilitation Potential For Stated Goals: Good  Regarding Evelyne Parisi's therapy, I certify that the treatment plan above will be carried out by a therapist or under their direction. Thank you for this referral,  Jin Brink PT     Referring Physician Signature: Rocio Cr MD              Date                    The information in this section was collected on 2/14/17 (except where otherwise noted). HISTORY:   History of Present Injury/Illness (Reason for Referral):  Pt states that he was remodeling his home when he tripped and fell. Felt immediate pain when he reached out with the right hand to catch himself. Past Medical History/Comorbidities:   Mr. 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 Nai Huber  has a past surgical history that includes carpal tunnel release (2012); carpal tunnel release (1980); lumbar laminectomy; hip replacement (Left, 12/12/2012); knee arthroscopy (Right, 2006); shoulder arthroscopy (Left, 2/17/2012); corneal transplant (Right, 2004); corneal transplant (Right, 06/14/2016). Social History/Living Environment:   Retired. 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 , Disp: 30 Tab, Rfl: 11    cyanocobalamin (VITAMIN B-12) 1,000 mcg tablet, Take 1,000 mcg by mouth daily. , Disp: , Rfl:     Biotin 2,500 mcg cap, Take  by mouth daily. , Disp: , Rfl:     B.infantis-B.ani-B.long-B.bifi (PROBIOTIC 4X) 10-15 mg TbEC, Take  by mouth daily. , Disp: , Rfl:     atorvastatin (LIPITOR) 80 mg tablet, Take 80 mg by mouth daily. 1/2 tablet daily  Take DOS per anesthesia protocol., Disp: , Rfl:     HYDROcodone-acetaminophen (NORCO) 7.5-325 mg per tablet, Take 1 Tab by mouth every six (6) hours as needed for Pain. Max Daily Amount: 4 Tabs., Disp: 20 Tab, Rfl: 0    carboxymethylcellulose sodium (REFRESH LIQUIGEL) 1 % dlgl, Apply 1 Drop to eye every four (4) hours. , Disp: , Rfl:     omega-3 fatty acids-vitamin e (FISH OIL) 1,000 mg cap, Take 2 Caps by mouth every morning., Disp: , Rfl:     timolol    PREDnisolone  *  Brimonidine tartrate   *  alergy shots   Date Last Reviewed:  2/14/17   Number of Personal Factors/Comorbidities that affect the Plan of Care: 1-2: MODERATE COMPLEXITY   EXAMINATION:   Observation/Orthostatic Postural Assessment:          Pt arrived without the sling. Shoulder held in protraction and elevation.    Palpation:          Tender over the right biceps tendon region, minimal tenderness over the portal sites  PROM:    R shoulder flex: 121 deg 2/17/17  R shoulder Abd: 88 deg 2/17/17  R shoulder horizontal Abd: n/t  R shoulder ER: 43 at 10 deg abd, 41 at 45 deg abd  2/15/17  R shoulder IR: 48 deg scapula stabilized 2/15/17  R elbow extn: -15 deg from full extn 2/15/17  R supination: 83 deg 2/17/17   Strength: 2/14/17   R shoulder flex:n/t  R shoulder abd:n/t  R shoulder horizontal abd:n/t  R shoulder ER:n/t  R shoulder IR:n/t  R elbow flex: at least 3+  R elbow extn: at least 3+   Body Structures Involved:  1. Joints  2. Muscles  3. Ligaments Body Functions Affected:  1. Neuromusculoskeletal  2. Movement Related Activities and Participation Affected:  1. Self Care  2. Domestic Life   Number of elements (examined above) that affect the Plan of Care: 1-2: LOW COMPLEXITY   CLINICAL PRESENTATION:   Presentation: Stable and uncomplicated: LOW COMPLEXITY   CLINICAL DECISION MAKING:   Outcome Measure: Tool Used: Disabilities of the Arm, Shoulder and Hand (DASH) Questionnaire - Quick Version  Score:  Initial: 51/55  Most Recent: X/55 (Date: -- )   Interpretation of Score: The DASH is designed to measure the activities of daily living in person's with upper extremity dysfunction or pain. Each section is scored on a 1-5 scale, 5 representing the greatest disability. The scores of each section are added together for a total score of 55. Score 11 12-19 20-28 29-37 38-45 46-54 55   Modifier CH CI CJ CK CL CM CN     ? Carrying, Moving, and Handling Objects:     - CURRENT STATUS: CM - 80%-99% impaired, limited or restricted    - GOAL STATUS: CJ - 20%-39% impaired, limited or restricted    - D/C STATUS:  ---------------To be determined---------------      Medical Necessity:   · Skilled intervention continues to be required due to need for manual treatment and guided progression. Reason for Services/Other Comments:  · Patient continues to require skilled intervention due to need for functional return of dominant arm.    Use of outcome tool(s) and clinical judgement create a POC that gives a: Questionable prediction of patient's progress: MODERATE COMPLEXITY TREATMENT:   (In addition to Assessment/Re-Assessment sessions the following treatments were rendered)  Pre-treatment Symptoms/Complaints:   Pt reports having neck soreness from the arm hanging down. Pain with movement. Reports not having numbness in the right hand at the moment  Pain: Initial:   Pain Intensity 1:  (0 after)  Post Session:  0  after     THERAPEUTIC EXERCISE: ( minutes): MANUAL THERAPY: (      39         minutes): PROM to grade 2- physiological mobilizations R shoulder ER at neutral and again at 45 deg abd, IR with scapula stabilized, FE and abd. Treatment/Session Assessment:    · Response to Treatment:  Good progress with FE motion. Tolerated treatment with no pain. · Compliance with Program/Exercises: yes per pt. .  · Recommendations/Intent for next treatment session: \"Next visit will focus on PROM and posture correction\".   Total Treatment Duration:   39          min  PT Patient Time In/Time Out  Time In: 0900  Time Out: 201 [address] [place] [address]

## 2017-02-21 ENCOUNTER — HOSPITAL ENCOUNTER (OUTPATIENT)
Dept: PHYSICAL THERAPY | Age: 72
Discharge: HOME | End: 2017-02-21
Payer: MEDICARE

## 2017-02-24 ENCOUNTER — HOSPITAL ENCOUNTER (OUTPATIENT)
Dept: PHYSICAL THERAPY | Age: 72
Discharge: HOME | End: 2017-02-24
Payer: MEDICARE

## 2017-02-24 NOTE — PROGRESS NOTES
Desiree Bell  : 1945 [facility] FARRU[facility] LUNA  [address] [facility], [address] [place] [address],[address], [place] [address].  [phone]   [phone]       OUTPATIENT PHYSICAL THERAPY:Daily Note 2017    ICD-10: Treatment Diagnosis: Pain in right shoulder (M25.511)  Precautions/Allergies: post-op PROM / Epinephrine   Fall Risk Score: 1  MD Orders: PT- evaluate and treat, HEP, ROM/PROM MEDICAL/REFERRING DIAGNOSIS:  s/p right shoulder scope asd rcr bt - 17. Biceps rupture 17  DATE OF ONSET: 16  REFERRING PHYSICIAN: Rosalina Fraser MD  RETURN PHYSICIAN APPOINTMENT: 17     INITIAL ASSESSMENT:  Mr. Marina Duncan is s/p R shoulder scope with ASD, RCR and BT. Pt states that his biceps ruptured on 17. Dr Caleb Joseph held therapy until today's visit. He presented today with biceps region and [facility] joint pain, new RUE weakness, new elbow extension stiffness and new complaints of numbness and tingling in the right arm. He is not wearing the sling per MD. There is noted to be elevation of the distal clavicle during attempts to move the right arm. He has a past history of right shoulder dislocation, surgery and right AC joint injury requiring surgical stabilization. These will likely slow progress. He needs to be able to assist with home care, perform home repairs and yard work. He will benefit from skilled therapy to address his deficits, guide rehab progression and assist in the return to functional, independent ADL's. PROBLEM LIST (Impacting functional limitations):  1. Decreased Strength  2. Decreased ADL/Functional Activities  3. Increased Pain  4. Decreased Activity Tolerance  5. Decreased Flexibility/Joint Mobility INTERVENTIONS PLANNED: as cleared by MD  1. Cold  2. Cryotherapy  3. Electrical Stimulation  4. Home Exercise Program (HEP)  5. Manual Therapy  6. Neuromuscular Re-education/Strengthening  7. Range of Motion (ROM)  8.  Therapeutic Exercise/Strengthening   TREATMENT PLAN: will hold therapy today until cleared by Dr Elaine Youngblood. Effective Dates: 2/14/17 TO 5/9/17. Frequency/Duration: 2-3 times a week for 12 weeks  GOALS: (Goals have been discussed and agreed upon with patient.)  Short-Term Functional Goals: Time Frame: 4 weeks    1. Independent with HEP    2. R shoulder AROM FE to at least 110 deg against gravity, ER to at least 75 deg and IR to L4 behind the back to allow independent ADL's     3. R shoulder Strength improved by at least 1/3 muscle grade to allow safe reaching  Discharge Goals: Time Frame: 12 weeks     1. Tolerate 45 minutes of exercise for home care     2. Strength to [facility] to allow return to home maintenance     3. ADL's with pain < 2  Rehabilitation Potential For Stated Goals: Good  Regarding Verna Parisi's therapy, I certify that the treatment plan above will be carried out by a therapist or under their direction. Thank you for this referral,  John Rodriguez PT     Referring Physician Signature: Kwabena Sylvester MD              Date                    The information in this section was collected on 2/14/17 (except where otherwise noted). HISTORY:   History of Present Injury/Illness (Reason for Referral):  Pt states that he was remodeling his home when he tripped and fell. Felt immediate pain when he reached out with the right hand to catch himself. Past Medical History/Comorbidities:   Mr. 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 Amelia Luke  has a past surgical history that includes carpal tunnel release (2012); carpal tunnel release (1980); lumbar laminectomy; hip replacement (Left, 12/12/2012); knee arthroscopy (Right, 2006); shoulder arthroscopy (Left, 2/17/2012); corneal transplant (Right, 2004); corneal transplant (Right, 06/14/2016). ADD(2/21/17):- right AC joint injury with surgical stabilization > 5 year ago. Social History/Living Environment:   Retired. 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 , Disp: 30 Tab, Rfl: 11    cyanocobalamin (VITAMIN B-12) 1,000 mcg tablet, Take 1,000 mcg by mouth daily. , Disp: , Rfl:     Biotin 2,500 mcg cap, Take  by mouth daily. , Disp: , Rfl:     B.infantis-B.ani-B.long-B.bifi (PROBIOTIC 4X) 10-15 mg TbEC, Take  by mouth daily. , Disp: , Rfl:     atorvastatin (LIPITOR) 80 mg tablet, Take 80 mg by mouth daily. 1/2 tablet daily  Take DOS per anesthesia protocol., Disp: , Rfl:     HYDROcodone-acetaminophen (NORCO) 7.5-325 mg per tablet, Take 1 Tab by mouth every six (6) hours as needed for Pain. Max Daily Amount: 4 Tabs., Disp: 20 Tab, Rfl: 0    carboxymethylcellulose sodium (REFRESH LIQUIGEL) 1 % dlgl, Apply 1 Drop to eye every four (4) hours. , Disp: , Rfl:     omega-3 fatty acids-vitamin e (FISH OIL) 1,000 mg cap, Take 2 Caps by mouth every morning., Disp: , Rfl:     timolol    PREDnisolone  *  Brimonidine tartrate   *  alergy shots   Date Last Reviewed:  2/21/17   Number of Personal Factors/Comorbidities that affect the Plan of Care: 1-2: MODERATE COMPLEXITY   EXAMINATION: 2/24/17   Observation/Orthostatic Postural Assessment: Pt arrived without the sling. Shoulder held in protraction and elbow bent. Pt observed to be able to lift the right arm up to approximately 45 deg FE. Palpation:          Tender over the right biceps tendon region and right AC joint  PROM:  n/t  R shoulder flex:  N/t deg   R shoulder Abd: n/t deg   R shoulder horizontal Abd: n/t  R shoulder ER: n/t  R shoulder IR: n/t  R elbow extn:n/t  R supination: n/t   Strength: 2/14/17   R shoulder flex:n/t  R shoulder abd:n/t  R shoulder horizontal abd:n/t  R shoulder ER:n/t  R shoulder IR:n/t  R elbow flex: n/t  R elbow extn: n/t   Body Structures Involved:  1. Joints  2. Muscles  3. Ligaments Body Functions Affected:  1. Neuromusculoskeletal  2. Movement Related Activities and Participation Affected:  1. Self Care  2. Domestic Life   Number of elements (examined above) that affect the Plan of Care: 1-2: LOW COMPLEXITY   CLINICAL PRESENTATION:   Presentation: Evolving clinical presentation with changing clinical characteristics: MODERATE COMPLEXITY   CLINICAL DECISION MAKING:   Outcome Measure: Tool Used: Disabilities of the Arm, Shoulder and Hand (DASH) Questionnaire - Quick Version  Score:  Initial: 51/55  Most Recent: X/55 (Date: -- )   Interpretation of Score: The DASH is designed to measure the activities of daily living in person's with upper extremity dysfunction or pain. Each section is scored on a 1-5 scale, 5 representing the greatest disability. The scores of each section are added together for a total score of 55. Score 11 12-19 20-28 29-37 38-45 46-54 55   Modifier CH CI CJ CK CL CM CN     ?  Carrying, Moving, and Handling Objects:     - CURRENT STATUS: CM - 80%-99% impaired, limited or restricted    - GOAL STATUS: CJ - 20%-39% impaired, limited or restricted    - D/C STATUS:  ---------------To be determined---------------      Medical Necessity:   · Skilled intervention continues to be required due to need for manual treatment and guided progression. Reason for Services/Other Comments:  · Patient continues to require skilled intervention due to need for functional return of dominant arm. Use of outcome tool(s) and clinical judgement create a POC that gives a: Questionable prediction of patient's progress: MODERATE COMPLEXITY            TREATMENT:   (In addition to Assessment/Re-Assessment sessions the following treatments were rendered)  Pre-treatment Symptoms/Complaints:   Pt reports having the biceps muscle pop on 2/21/17 so he went up to see Dr Floyd Pabon. Pt was told that the biceps likely ruptured and was advised to hold therapy for the day and not to wear the sling. Order was given to resume therapy at the next visit with full motion/full strength. He arrived today saying that the biceps popped again and that the joint on top of the shoulder looked funny (pt pointing to the [facility] joint). He now complains of right arm pain and tingling to the fingers as well as not being able to lift the arm at all. Pain: Initial:   Pain Intensity 1: 8  Post Session:  3 in supine with ice. 7 once he put the arm in a dependent position again   Tender to palpate over the right AC joint, biceps tendon and coracoclavicular ligament region. Bruising noted over the biceps region with the muscle retracted into a ball mid upper arm. Pt unable to full extend the elbow. Right distal clavical noted to be elevated at the [facility] joint with deep indention over the coracoclavicular ligament region.(as compared to the left). Pt unable to lift his arm more than 45 deg FE  Spoke with Dr Vera Mcclain nurse who advised to hold therapy until  is notified. Ice to right shoulder and AC joint region in supine x 15 minutes. THERAPEUTIC EXERCISE: (    minutes):  none  MANUAL THERAPY: (   minutes): none     Treatment/Session Assessment:    · Response to Treatment:  Tolerated ice in supine with no pain. · Compliance with Program/Exercises: yes per pt.   · Recommendations/Intent for next treatment session: \"Next visit will focus on treatment as cleared by MD\". Advised pt to keep the arm supported if numbness and tingling return.  Continue with ice  Total Treatment Duration:      15       min  PT Patient Time In/Time Out  Time In: 1230  Time Out: R Fran Shepard 97, PT

## 2017-02-28 ENCOUNTER — HOSPITAL ENCOUNTER (OUTPATIENT)
Dept: PHYSICAL THERAPY | Age: 72
Discharge: HOME | End: 2017-02-28
Payer: MEDICARE

## 2017-02-28 PROCEDURE — 97110 THERAPEUTIC EXERCISES: CPT

## 2017-02-28 NOTE — PROGRESS NOTES
Maria Fernanda Ruff  : 1945 [facility] at [facility] LUNA  [address] [facility], [address] [place] [address],[address] [facility], [place] [address].  [phone]   [phone]       OUTPATIENT PHYSICAL THERAPY:Daily Note and Re-evaluation 2017    ICD-10: Treatment Diagnosis: Pain in right shoulder (M25.511)  Precautions/Allergies: post-op biceps tear and ? AC joint instability / Epinephrine   Fall Risk Score: 1  MD Orders: PT- evaluate and treat, HEP, ROM, strengthening MEDICAL/REFERRING DIAGNOSIS:  s/p right shoulder scope asd rcr bt - 17. Biceps rupture 17  DATE OF ONSET: 16  REFERRING PHYSICIAN: Lin Monsivais MD  RETURN PHYSICIAN APPOINTMENT: 17     INITIAL ASSESSMENT:  Mr. Nyasia Zhang is s/p R shoulder scope with ASD, RCR and BT. Pt states that his biceps ruptured on 17. Dr Sami Pulido was made aware of the [facility] joint and cleared shoulder therapy for today's visit with respect for the right [facility] joint protection. Pt presents today with biceps region and AC joint pain, RUE weakness per observation, complaints of Right hand numbness and tingling. He has wearing the sling and supporting the arm when seated. There is noted to be elevation of the distal clavicle during attempts to move the right arm. He has a past history of right shoulder dislocation, surgery and right AC joint injury requiring surgical stabilization. These will likely slow progress. He needs to be able to assist with home care, perform home repairs and yard work. He will benefit from skilled therapy to address his deficits, guide rehab progression and assist in the return to functional, independent ADL's. PROBLEM LIST (Impacting functional limitations):  1. Decreased Strength  2. Decreased ADL/Functional Activities  3. Increased Pain  4. Decreased Activity Tolerance  5. Decreased Flexibility/Joint Mobility INTERVENTIONS PLANNED: as cleared by MD  1. Cold  2. Cryotherapy  3. Electrical Stimulation  4. Home Exercise Program (HEP)  5.  Manual Therapy  6. Neuromuscular Re-education/Strengthening  7. Range of Motion (ROM)  8. Therapeutic Exercise/Strengthening   TREATMENT PLAN: continue per Dr Radhames Valle  Effective Dates: 2/14/17 TO 5/9/17. Frequency/Duration: 2-3 times a week for 12 weeks  GOALS: (Goals have been discussed and agreed upon with patient.)  Short-Term Functional Goals: Time Frame: 4 weeks    1. Independent with HEP    2. R shoulder AROM FE to at least 110 deg against gravity, ER to at least 75 deg and IR to L4 behind the back to allow independent ADL's     3. R shoulder Strength improved by at least 1/3 muscle grade to allow safe reaching  Discharge Goals: Time Frame: 12 weeks     1. Tolerate 45 minutes of exercise for home care     2. Strength to [facility] to allow return to home maintenance     3. ADL's with pain < 2  Rehabilitation Potential For Stated Goals: Good  Regarding Kim Simon Ailin's therapy, I certify that the treatment plan above will be carried out by a therapist or under their direction. Thank you for this referral,  Nena Ibarra, PT     Referring Physician Signature: Britney Franco MD              Date                    The information in this section was collected on 2/14/17 (except where otherwise noted). HISTORY:   History of Present Injury/Illness (Reason for Referral):  Pt states that he was remodeling his home when he tripped and fell. Felt immediate pain when he reached out with the right hand to catch himself. Past Medical History/Comorbidities:   Mr. 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 Jade Bergman  has a past surgical history that includes carpal tunnel release (2012); carpal tunnel release (1980); lumbar laminectomy; hip replacement (Left, 12/12/2012); knee arthroscopy (Right, 2006); shoulder arthroscopy (Left, 2/17/2012); corneal transplant (Right, 2004); corneal transplant (Right, 06/14/2016). ADD(2/21/17):- right AC joint injury with surgical stabilization > 5 year ago. Social History/Living Environment:   Retired. 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 , Disp: 30 Tab, Rfl: 11    cyanocobalamin (VITAMIN B-12) 1,000 mcg tablet, Take 1,000 mcg by mouth daily. , Disp: , Rfl:     Biotin 2,500 mcg cap, Take  by mouth daily. , Disp: , Rfl:     B.infantis-B.ani-B.long-B.bifi (PROBIOTIC 4X) 10-15 mg TbEC, Take  by mouth daily. , Disp: , Rfl:     atorvastatin (LIPITOR) 80 mg tablet, Take 80 mg by mouth daily. 1/2 tablet daily  Take DOS per anesthesia protocol., Disp: , Rfl:     HYDROcodone-acetaminophen (NORCO) 7.5-325 mg per tablet, Take 1 Tab by mouth every six (6) hours as needed for Pain. Max Daily Amount: 4 Tabs., Disp: 20 Tab, Rfl: 0    carboxymethylcellulose sodium (REFRESH LIQUIGEL) 1 % dlgl, Apply 1 Drop to eye every four (4) hours. , Disp: , Rfl:     omega-3 fatty acids-vitamin e (FISH OIL) 1,000 mg cap, Take 2 Caps by mouth every morning., Disp: , Rfl:     timolol    PREDnisolone  *  Brimonidine tartrate   *  alergy shots   Date Last Reviewed:  2/28/17   Number of Personal Factors/Comorbidities that affect the Plan of Care: 1-2: MODERATE COMPLEXITY   EXAMINATION: 2/28/17   Observation/Orthostatic Postural Assessment:          Pt arrived without the sling. Shoulder held in slight protected position. Pt observed to be able to lift the right arm up to approximately 50 deg FE. Bruising noted over the biceps region with the muscle retracted into a ball mid upper arm. Pt unable to full extend the elbow. Right distal clavical noted to be elevated at the [facility] [place] joint with deep indention over the coracoclavicular ligament region.(as compared to the left). Palpation:          Tender over the right biceps tendon region, coricoclaviular region and right AC joint  PROM:  2/28/17  R shoulder flex:  90 deg   R shoulder Abd: 80 deg   R shoulder horizontal Abd: to neutral  R shoulder ER: 60 deg at 45 deg abd  R shoulder IR: to rib cage in supine  R elbow extn: - 15 from full extn  R supination: 90 deg   Strength: 2/14/17   R shoulder flex:n/t  R shoulder abd:n/t  R shoulder horizontal abd:n/t  R shoulder ER:n/t  R shoulder IR:n/t  R elbow flex: n/t  R elbow extn: n/t   Body Structures Involved:  1. Joints  2. Muscles  3. Ligaments Body Functions Affected:  1. Neuromusculoskeletal  2. Movement Related Activities and Participation Affected:  1. Self Care  2. Domestic Life   Number of elements (examined above) that affect the Plan of Care: 1-2: LOW COMPLEXITY   CLINICAL PRESENTATION:   Presentation: Evolving clinical presentation with changing clinical characteristics: MODERATE COMPLEXITY   CLINICAL DECISION MAKING:   Outcome Measure: Tool Used: Disabilities of the Arm, Shoulder and Hand (DASH) Questionnaire - Quick Version  Score:  Initial: 51/55  Most Recent: 80/55 (Date: 2/28/17-- )   Interpretation of Score: The DASH is designed to measure the activities of daily living in person's with upper extremity dysfunction or pain. Each section is scored on a 1-5 scale, 5 representing the greatest disability.   The scores of each section are added together for a total score of 55. Score 11 12-19 20-28 29-37 38-45 46-54 55   Modifier CH CI CJ CK CL CM CN     ? Carrying, Moving, and Handling Objects:     - CURRENT STATUS: CM - 80%-99% impaired, limited or restricted    - GOAL STATUS: CJ - 20%-39% impaired, limited or restricted    - D/C STATUS:  ---------------To be determined---------------      Medical Necessity:   · Skilled intervention continues to be required due to need for manual treatment and guided progression. Reason for Services/Other Comments:  · Patient continues to require skilled intervention due to need for functional return of dominant arm. Use of outcome tool(s) and clinical judgement create a POC that gives a: Questionable prediction of patient's progress: MODERATE COMPLEXITY            TREATMENT:   (In addition to Assessment/Re-Assessment sessions the following treatments were rendered)  Pre-treatment Symptoms/Complaints:   Pt reports having the biceps muscle rupture on 2/21/17.has been wearing the sling but the arm is still tingling with numbness in the hand. pt says that the biceps popped again and that the [facility] joint is still \"not looking right\". He complains of right arm pain and tingling to the fingers as well as not being able to lift the arm. Pain: Initial:   Pain Intensity 1: 5 (up to 9. 3 after treatment)  Post Session:  3 at rest, with ice. Tender to palpate over the right [facility] joint, biceps tendon and coracoclavicular ligament region. Spoke with Dr Joey Velazquez who cleared therapy with respect for the right [facility] joint  Gave pt Ice to take after treatment for pain control. THERAPEUTIC EXERCISE: ( 29   minutes): supine rhythmic stabilization at 90, 100 and 110 deg flexion with emphasis on holding scapular retraction and depression. Reviewed and issued shoulder isometrics for flexion, extension, abduction, ER and IR with mild pressure and posture corrected.   MANUAL THERAPY: (   minutes): none     Treatment/Session Assessment:    · Response to Treatment:  Tolerated ice in supine with no pain. · Compliance with Program/Exercises: yes per pt. · Recommendations/Intent for next treatment session: \"Next visit will focus on treatment as cleared by MD\". Advised pt to keep the arm supported if numbness and tingling return. Continue with ice.  [facility]  Total Treatment Duration:      29       min  PT Patient Time In/Time Out  Time In: 1100  Time Out: 299 [facility], PT

## 2017-03-07 ENCOUNTER — HOSPITAL ENCOUNTER (OUTPATIENT)
Dept: PHYSICAL THERAPY | Age: 72
Discharge: HOME | End: 2017-03-07
Payer: MEDICARE

## 2017-03-07 PROCEDURE — 97140 MANUAL THERAPY 1/> REGIONS: CPT

## 2017-03-07 PROCEDURE — 97110 THERAPEUTIC EXERCISES: CPT

## 2017-03-07 NOTE — PROGRESS NOTES
Flor Vicente  : 1945 [facility] at [facility] FARRUKH LUNA  [address] [facility], [address] [place] [address],[address], [address] [facility]  [phone]   [phone]       OUTPATIENT PHYSICAL THERAPY:Daily Note 3/7/2017    ICD-10: Treatment Diagnosis: Pain in right shoulder (M25.511)  Precautions/Allergies: post-op biceps tear, ? Post-op AC joint instability / Epinephrine   Fall Risk Score: 1  MD Orders: PT- evaluate and treat, HEP, ROM, strengthening MEDICAL/REFERRING DIAGNOSIS:  s/p right shoulder scope asd rcr bt - 17. Biceps rupture 17  DATE OF ONSET: 16  REFERRING PHYSICIAN: James Márquez MD  RETURN PHYSICIAN APPOINTMENT: 17     INITIAL ASSESSMENT:  Mr. Kushal Velazquez is s/p R shoulder scope with ASD, RCR and BT. Pt states that his biceps ruptured on 17. Dr Clayton Avila was made aware of the [facility] joint and cleared shoulder therapy for today's visit with respect for the right [facility] joint protection. Pt presents today with biceps region and AC joint pain, RUE weakness per observation, complaints of Right hand numbness and tingling. He has wearing the sling and supporting the arm when seated. There is noted to be elevation of the distal clavicle during attempts to move the right arm. He has a past history of right shoulder dislocation, surgery and right AC joint injury requiring surgical stabilization. These will likely slow progress. He needs to be able to assist with home care, perform home repairs and yard work. He will benefit from skilled therapy to address his deficits, guide rehab progression and assist in the return to functional, independent ADL's. PROBLEM LIST (Impacting functional limitations):  1. Decreased Strength  2. Decreased ADL/Functional Activities  3. Increased Pain  4. Decreased Activity Tolerance  5. Decreased Flexibility/Joint Mobility INTERVENTIONS PLANNED: as cleared by MD  1. Cold  2. Cryotherapy  3. Electrical Stimulation  4. Home Exercise Program (HEP)  5.  Manual Therapy  6. Neuromuscular Re-education/Strengthening  7. Range of Motion (ROM)  8. Therapeutic Exercise/Strengthening   TREATMENT PLAN:  Effective Dates: 2/14/17 TO 5/9/17. Frequency/Duration: 2-3 times a week for 12 weeks  GOALS: (Goals have been discussed and agreed upon with patient.)  Short-Term Functional Goals: Time Frame: 4 weeks    1. Independent with HEP    2. R shoulder AROM FE to at least 110 deg against gravity, ER to at least 75 deg and IR to L4 behind the back to allow independent ADL's     3. R shoulder Strength improved by at least 1/3 muscle grade to allow safe reaching  Discharge Goals: Time Frame: 12 weeks     1. Tolerate 45 minutes of exercise for home care     2. Strength to [facility] to allow return to home maintenance     3. ADL's with pain < 2  Rehabilitation Potential For Stated Goals: Good  Regarding Jeanie Parisi's therapy, I certify that the treatment plan above will be carried out by a therapist or under their direction. Thank you for this referral,  Kristal Collier PT     Referring Physician Signature: Ora Bell MD              Date                    The information in this section was collected on 2/14/17 (except where otherwise noted). HISTORY:   History of Present Injury/Illness (Reason for Referral):  Pt states that he was remodeling his home when he tripped and fell. Felt immediate pain when he reached out with the right hand to catch himself. Past Medical History/Comorbidities:   Mr. 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 Dudley Royal  has a past surgical history that includes carpal tunnel release (2012); carpal tunnel release (1980); lumbar laminectomy; hip replacement (Left, 12/12/2012); knee arthroscopy (Right, 2006); shoulder arthroscopy (Left, 2/17/2012); corneal transplant (Right, 2004); corneal transplant (Right, 06/14/2016). ADD(2/21/17):- right AC joint injury with surgical stabilization > 5 year ago. Social History/Living Environment:   Retired. 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 , Disp: 30 Tab, Rfl: 11    cyanocobalamin (VITAMIN B-12) 1,000 mcg tablet, Take 1,000 mcg by mouth daily. , Disp: , Rfl:     Biotin 2,500 mcg cap, Take  by mouth daily. , Disp: , Rfl:     B.infantis-B.ani-B.long-B.bifi (PROBIOTIC 4X) 10-15 mg TbEC, Take  by mouth daily. , Disp: , Rfl:     atorvastatin (LIPITOR) 80 mg tablet, Take 80 mg by mouth daily. 1/2 tablet daily  Take DOS per anesthesia protocol., Disp: , Rfl:     HYDROcodone-acetaminophen (NORCO) 7.5-325 mg per tablet, Take 1 Tab by mouth every six (6) hours as needed for Pain. Max Daily Amount: 4 Tabs., Disp: 20 Tab, Rfl: 0    carboxymethylcellulose sodium (REFRESH LIQUIGEL) 1 % dlgl, Apply 1 Drop to eye every four (4) hours. , Disp: , Rfl:     omega-3 fatty acids-vitamin e (FISH OIL) 1,000 mg cap, Take 2 Caps by mouth every morning., Disp: , Rfl:     timolol    PREDnisolone  *  Brimonidine tartrate   *  alergy shots   Date Last Reviewed:  2/28/17   Number of Personal Factors/Comorbidities that affect the Plan of Care: 1-2: MODERATE COMPLEXITY   EXAMINATION: 2/28/17   Observation/Orthostatic Postural Assessment:          Pt arrived without the sling. Shoulder held in slight protected position. Pt observed to be able to lift the right arm up to approximately 50 deg FE. Bruising noted over the biceps region with the muscle retracted into a ball mid upper arm. Pt unable to full extend the elbow. Right distal clavical noted to be elevated at the [facility] joint with deep indention over the coracoclavicular ligament region.(as compared to the left). Palpation:          Tender over the right biceps tendon region, coricoclaviular region and right AC joint  PROM:  2/28/17  R shoulder flex:  90 deg   R shoulder Abd: 80 deg   R shoulder horizontal Abd: to neutral  R shoulder ER: 60 deg at 45 deg abd  R shoulder IR: to rib cage in supine  R elbow extn: - 15 from full extn  R supination: 90 deg  AROM 3/7/17  R shoulder flex: 87 deg FE in standing after treatment   R shoulder ext:n/t  R shoulder abd:80 deg  R shoulder horizontal abd:n/t  R shoulder ER: 70 deg after treatment,at 50 deg abd  R shoulder IR:n/t     Strength: 2/14/17   R shoulder flex:n/t  R shoulder abd:n/t  R shoulder horizontal abd:n/t  R shoulder ER:n/t  R shoulder IR:n/t  R elbow flex: n/t  R elbow extn: n/t   Body Structures Involved:  1. Joints  2. Muscles  3. Ligaments Body Functions Affected:  1. Neuromusculoskeletal  2. Movement Related Activities and Participation Affected:  1. Self Care  2. Domestic Life   Number of elements (examined above) that affect the Plan of Care: 1-2: LOW COMPLEXITY   CLINICAL PRESENTATION:   Presentation: Evolving clinical presentation with changing clinical characteristics: MODERATE COMPLEXITY   CLINICAL DECISION MAKING:   Outcome Measure: Tool Used: Disabilities of the Arm, Shoulder and Hand (DASH) Questionnaire - Quick Version  Score:  Initial: 51/55  Most Recent: 80/55 (Date: 2/28/17-- )   Interpretation of Score:  The DASH is designed to measure the activities of daily living in person's with upper extremity dysfunction or pain. Each section is scored on a 1-5 scale, 5 representing the greatest disability. The scores of each section are added together for a total score of 55. Score 11 12-19 20-28 29-37 38-45 46-54 55   Modifier CH CI CJ CK CL CM CN     ? Carrying, Moving, and Handling Objects:     - CURRENT STATUS: CM - 80%-99% impaired, limited or restricted    - GOAL STATUS: CJ - 20%-39% impaired, limited or restricted    - D/C STATUS:  ---------------To be determined---------------      Medical Necessity:   · Skilled intervention continues to be required due to need for manual treatment and guided progression. Reason for Services/Other Comments:  · Patient continues to require skilled intervention due to need for functional return of dominant arm. Use of outcome tool(s) and clinical judgement create a POC that gives a: Questionable prediction of patient's progress: MODERATE COMPLEXITY            TREATMENT:   (In addition to Assessment/Re-Assessment sessions the following treatments were rendered)  Pre-treatment Symptoms/Complaints:   Pt reports less pain. did the HEP without any problems. Pain: Initial:   Pain Intensity 1: 7 (2-3 after)  Post Session:  0-1 after    Tender to palpate over the right AC joint, biceps tendon region. THERAPEUTIC EXERCISE: ( 33   minutes): supine rhythmic stabilization at 90, 100 and 115 deg flexion; ER/IR at neutral progressing to 80 deg abd - with emphasis on holding scapular retraction and depression. Reviewed pulley FE with emphasis on elbow extension and scapular depression. Pt hold at end ROM FE, ER and IR after overpressure stretch.    Date:  3/7/17 Date:   Date:     Activity/Exercise Parameters Parameters Parameters   tricep pulls 3# cable x10     Shoulder abd Sidelying x10     ER Sidelying x10     midtrap Sidelying x10     Scapular retraction/protraction Sidelying x10                 Gave pt ice to take after treatment    MANUAL THERAPY: ( 10  minutes): for ROM. Grade 3 to 4-- right shoulder ER at neutral, 50 deg abd and again at 90 colby FE; IR at 60 deg abd and scapula stabilized; FE with scapula stabilized. Treatment/Session Assessment:  Better AROM quality and distance after treatment. · Response to Treatment:  Tolerated treatment with improving pain. · Compliance with Program/Exercises: yes per pt. Recommendations/Intent for next treatment session: \"Next visit will focus on right shoulder ROM and strengthening with respect to biceps tear and AC joint pain\".    Total Treatment Duration:    43   min  PT Patient Time In/Time Out  Time In: 0900  Time Out: [address] [facility],

## 2017-03-09 ENCOUNTER — HOSPITAL ENCOUNTER (OUTPATIENT)
Dept: LAB | Age: 72
Discharge: HOME | End: 2017-03-09
Payer: MEDICARE

## 2017-03-09 DIAGNOSIS — I82.4Y2 ACUTE DEEP VEIN THROMBOSIS (DVT) OF PROXIMAL VEIN OF LEFT LOWER EXTREMITY (HCC): ICD-10-CM

## 2017-03-09 LAB
ALBUMIN SERPL BCP-MCNC: 3.8 G/DL (ref 3.2–4.6)
ALBUMIN/GLOB SERPL: 1.1 {RATIO} (ref 1.2–3.5)
ALP SERPL-CCNC: 66 U/L (ref 50–136)
ALT SERPL-CCNC: 36 U/L (ref 12–65)
ANION GAP BLD CALC-SCNC: 5 MMOL/L (ref 7–16)
AST SERPL W P-5'-P-CCNC: 19 U/L (ref 15–37)
BASOPHILS # BLD AUTO: 0 K/UL (ref 0–0.2)
BASOPHILS # BLD: 1 % (ref 0–2)
BILIRUB SERPL-MCNC: 0.7 MG/DL (ref 0.2–1.1)
BUN SERPL-MCNC: 16 MG/DL (ref 8–23)
CALCIUM SERPL-MCNC: 9.3 MG/DL (ref 8.3–10.4)
CHLORIDE SERPL-SCNC: 106 MMOL/L (ref 98–107)
CO2 SERPL-SCNC: 31 MMOL/L (ref 23–32)
CREAT SERPL-MCNC: 1.07 MG/DL (ref 0.8–1.5)
D DIMER PPP FEU-MCNC: 0.31 UG/ML(FEU)
DIFFERENTIAL METHOD BLD: ABNORMAL
EOSINOPHIL # BLD: 0.2 K/UL (ref 0–0.8)
EOSINOPHIL NFR BLD: 3 % (ref 0.5–7.8)
ERYTHROCYTE [DISTWIDTH] IN BLOOD BY AUTOMATED COUNT: 12.5 % (ref 11.9–14.6)
GLOBULIN SER CALC-MCNC: 3.4 G/DL (ref 2.3–3.5)
GLUCOSE SERPL-MCNC: 151 MG/DL (ref 65–100)
HCT VFR BLD AUTO: 46.5 % (ref 41.1–50.3)
HGB BLD-MCNC: 15.1 G/DL (ref 13.6–17.2)
LYMPHOCYTES # BLD AUTO: 28 % (ref 13–44)
LYMPHOCYTES # BLD: 1.5 K/UL (ref 0.5–4.6)
MCH RBC QN AUTO: 31.3 PG (ref 26.1–32.9)
MCHC RBC AUTO-ENTMCNC: 32.5 G/DL (ref 31.4–35)
MCV RBC AUTO: 96.5 FL (ref 79.6–97.8)
MONOCYTES # BLD: 0.3 K/UL (ref 0.1–1.3)
MONOCYTES NFR BLD AUTO: 5 % (ref 4–12)
NEUTS SEG # BLD: 3.5 K/UL (ref 1.7–8.2)
NEUTS SEG NFR BLD AUTO: 63 % (ref 43–78)
NRBC # BLD: 0 K/UL (ref 0–0.2)
PLATELET # BLD AUTO: 154 K/UL (ref 150–450)
PMV BLD AUTO: 9.5 FL (ref 10.8–14.1)
POTASSIUM SERPL-SCNC: 4 MMOL/L (ref 3.5–5.1)
PROT SERPL-MCNC: 7.2 G/DL (ref 6.3–8.2)
RBC # BLD AUTO: 4.82 M/UL (ref 4.23–5.67)
SODIUM SERPL-SCNC: 142 MMOL/L (ref 136–145)
WBC # BLD AUTO: 5.5 K/UL (ref 4.3–11.1)

## 2017-03-09 PROCEDURE — 80053 COMPREHEN METABOLIC PANEL: CPT | Performed by: NURSE PRACTITIONER

## 2017-03-09 PROCEDURE — 85379 FIBRIN DEGRADATION QUANT: CPT | Performed by: NURSE PRACTITIONER

## 2017-03-09 PROCEDURE — 85025 COMPLETE CBC W/AUTO DIFF WBC: CPT | Performed by: NURSE PRACTITIONER

## 2017-03-09 PROCEDURE — 36415 COLL VENOUS BLD VENIPUNCTURE: CPT | Performed by: NURSE PRACTITIONER

## 2017-03-10 ENCOUNTER — APPOINTMENT (OUTPATIENT)
Dept: PHYSICAL THERAPY | Age: 72
End: 2017-03-10
Payer: MEDICARE

## 2017-03-10 ENCOUNTER — HOSPITAL ENCOUNTER (OUTPATIENT)
Dept: PHYSICAL THERAPY | Age: 72
Discharge: HOME | End: 2017-03-10
Payer: MEDICARE

## 2017-03-10 PROCEDURE — 97140 MANUAL THERAPY 1/> REGIONS: CPT

## 2017-03-10 PROCEDURE — 97110 THERAPEUTIC EXERCISES: CPT

## 2017-03-10 NOTE — PROGRESS NOTES
Ed Lb  : 1945 [facility] at [facility] FARRUKH LUNA  [address] [facility], [address] [place] [address],[address], [place] [address].  [phone]   [phone]       OUTPATIENT PHYSICAL THERAPY:Daily Note 3/10/2017    ICD-10: Treatment Diagnosis: Pain in right shoulder (M25.511)  Precautions/Allergies: post-op biceps tear, ? Post-op AC joint instability / Epinephrine   Fall Risk Score: 1  MD Orders: PT- evaluate and treat, HEP, ROM, strengthening MEDICAL/REFERRING DIAGNOSIS:  s/p right shoulder scope asd rcr bt - 17. Biceps rupture 17  DATE OF ONSET: 16  REFERRING PHYSICIAN: Derek Garcia MD  RETURN PHYSICIAN APPOINTMENT: 17     INITIAL ASSESSMENT:  Mr. Camden Lopez is s/p R shoulder scope with ASD, RCR and BT. Pt states that his biceps ruptured on 17. Dr Odilon Stack was made aware of the [facility] joint and cleared shoulder therapy for today's visit with respect for the right [facility] joint protection. Pt presents today with biceps region and AC joint pain, RUE weakness per observation, complaints of Right hand numbness and tingling. He has wearing the sling and supporting the arm when seated. There is noted to be elevation of the distal clavicle during attempts to move the right arm. He has a past history of right shoulder dislocation, surgery and right AC joint injury requiring surgical stabilization. These will likely slow progress. He needs to be able to assist with home care, perform home repairs and yard work. He will benefit from skilled therapy to address his deficits, guide rehab progression and assist in the return to functional, independent ADL's. PROBLEM LIST (Impacting functional limitations):  1. Decreased Strength  2. Decreased ADL/Functional Activities  3. Increased Pain  4. Decreased Activity Tolerance  5. Decreased Flexibility/Joint Mobility INTERVENTIONS PLANNED: as cleared by MD  1. Cold  2. Cryotherapy  3. Electrical Stimulation  4. Home Exercise Program (HEP)  5.  Manual Therapy  6. Neuromuscular Re-education/Strengthening  7. Range of Motion (ROM)  8. Therapeutic Exercise/Strengthening   TREATMENT PLAN:  Effective Dates: 2/14/17 TO 5/9/17. Frequency/Duration: 2-3 times a week for 12 weeks  GOALS: (Goals have been discussed and agreed upon with patient.)  Short-Term Functional Goals: Time Frame: 4 weeks    1. Independent with HEP    2. R shoulder AROM FE to at least 110 deg against gravity, ER to at least 75 deg and IR to L4 behind the back to allow independent ADL's     3. R shoulder Strength improved by at least 1/3 muscle grade to allow safe reaching  Discharge Goals: Time Frame: 12 weeks     1. Tolerate 45 minutes of exercise for home care     2. Strength to [facility] to allow return to home maintenance     3. ADL's with pain < 2  Rehabilitation Potential For Stated Goals: Good  Regarding Asia Parisi's therapy, I certify that the treatment plan above will be carried out by a therapist or under their direction. Thank you for this referral,  Mckenna Ness PT     Referring Physician Signature: Benedicto Dobbs., MD              Date                    The information in this section was collected on 2/14/17 (except where otherwise noted). HISTORY:   History of Present Injury/Illness (Reason for Referral):  Pt states that he was remodeling his home when he tripped and fell. Felt immediate pain when he reached out with the right hand to catch himself. Past Medical History/Comorbidities:   Mr. 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 Charlie Zarate  has a past surgical history that includes carpal tunnel release (2012); carpal tunnel release (1980); lumbar laminectomy; hip replacement (Left, 12/12/2012); knee arthroscopy (Right, 2006); shoulder arthroscopy (Left, 2/17/2012); corneal transplant (Right, 2004); corneal transplant (Right, 06/14/2016). ADD(2/21/17):- right AC joint injury with surgical stabilization > 5 year ago. Social History/Living Environment:   Retired. 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 , Disp: 30 Tab, Rfl: 11    cyanocobalamin (VITAMIN B-12) 1,000 mcg tablet, Take 1,000 mcg by mouth daily. , Disp: , Rfl:     Biotin 2,500 mcg cap, Take  by mouth daily. , Disp: , Rfl:     B.infantis-B.ani-B.long-B.bifi (PROBIOTIC 4X) 10-15 mg TbEC, Take  by mouth daily. , Disp: , Rfl:     atorvastatin (LIPITOR) 80 mg tablet, Take 80 mg by mouth daily. 1/2 tablet daily  Take DOS per anesthesia protocol., Disp: , Rfl:     HYDROcodone-acetaminophen (NORCO) 7.5-325 mg per tablet, Take 1 Tab by mouth every six (6) hours as needed for Pain. Max Daily Amount: 4 Tabs., Disp: 20 Tab, Rfl: 0    carboxymethylcellulose sodium (REFRESH LIQUIGEL) 1 % dlgl, Apply 1 Drop to eye every four (4) hours. , Disp: , Rfl:     omega-3 fatty acids-vitamin e (FISH OIL) 1,000 mg cap, Take 2 Caps by mouth every morning., Disp: , Rfl:     timolol    PREDnisolone  *  Brimonidine tartrate   *  alergy shots   Date Last Reviewed:  3/10/17   Number of Personal Factors/Comorbidities that affect the Plan of Care: 1-2: MODERATE COMPLEXITY   EXAMINATION:    Observation/Orthostatic Postural Assessment:          Shoulder held in slight protected position. Bruising noted over the biceps region with the muscle retracted into a ball mid upper arm. Right distal clavical noted to be elevated at the [facility] joint  Palpation:          Tender over the right biceps tendon region, coricoclaviular region and right AC joint  PROM:  2/28/17  R shoulder flex:  90 deg   R shoulder Abd: 80 deg   R shoulder horizontal Abd: to neutral  R shoulder ER: 60 deg at 45 deg abd  R shoulder IR: to rib cage in supine  R elbow extn: - 15 from full extn  R supination: 90 deg  AROM 3/10/17  R shoulder flex: 90 deg FE in standing after treatment   R shoulder ext:n/t  R shoulder abd:n/t  R shoulder horizontal abd:n/t  R shoulder ER: 75 deg at neutral and 50 deg abd  R shoulder IR: 53 deg with scapula stabilized     Strength: 2/14/17   R shoulder flex:n/t  R shoulder abd:n/t  R shoulder horizontal abd:n/t  R shoulder ER:n/t  R shoulder IR:n/t  R elbow flex: n/t  R elbow extn: n/t   Body Structures Involved:  1. Joints  2. Muscles  3. Ligaments Body Functions Affected:  1. Neuromusculoskeletal  2. Movement Related Activities and Participation Affected:  1. Self Care  2. Domestic Life   Number of elements (examined above) that affect the Plan of Care: 1-2: LOW COMPLEXITY   CLINICAL PRESENTATION:   Presentation: Evolving clinical presentation with changing clinical characteristics: MODERATE COMPLEXITY   CLINICAL DECISION MAKING:   Outcome Measure: Tool Used: Disabilities of the Arm, Shoulder and Hand (DASH) Questionnaire - Quick Version  Score:  Initial: 51/55  Most Recent: 80/55 (Date: 2/28/17-- )   Interpretation of Score: The DASH is designed to measure the activities of daily living in person's with upper extremity dysfunction or pain. Each section is scored on a 1-5 scale, 5 representing the greatest disability.   The scores of each section are added together for a total score of 55. Score 11 12-19 20-28 29-37 38-45 46-54 55   Modifier CH CI CJ CK CL CM CN     ? Carrying, Moving, and Handling Objects:     - CURRENT STATUS: CM - 80%-99% impaired, limited or restricted    - GOAL STATUS: CJ - 20%-39% impaired, limited or restricted    - D/C STATUS:  ---------------To be determined---------------      Medical Necessity:   · Skilled intervention continues to be required due to need for manual treatment and guided progression. Reason for Services/Other Comments:  · Patient continues to require skilled intervention due to need for functional return of dominant arm. Use of outcome tool(s) and clinical judgement create a POC that gives a: Questionable prediction of patient's progress: MODERATE COMPLEXITY            TREATMENT:   (In addition to Assessment/Re-Assessment sessions the following treatments were rendered)  Pre-treatment Symptoms/Complaints:   Pt reports less pain but feels the [facility] joint sits higher today. did the HEP three time each day. Pain: Initial:   Pain Intensity 1: 5 (1-2 after)  Post Session:   after    Tender to palpate over the right AC joint, biceps tendon region. THERAPEUTIC EXERCISE: ( 30   minutes): supine rhythmic stabilization at 100 deg flex and 115 deg flexion; ER/IR at neutral progressing to 80 deg abd, supine active FE while keeping abduction pressure x 10 - with emphasis on holding scapular retraction and depression. Pt hold at end ROM FE, ER and IR after overpressure stretch. Date:  3/7/17 Date:  3/10/17 Date:     Activity/Exercise Parameters Parameters Parameters   tricep pulls 3# cable x10 3# 2x10    Shoulder abd Sidelying x10 Sidelying 2x10    ER Sidelying x10 Sidelying 2x10    midtrap Sidelying x10 Sidelying 2x10    Scapular retraction/protraction Sidelying x10 Sidelying 2x10                Gave pt ice to take after treatment    MANUAL THERAPY: ( 12  minutes): for ROM.  Grade 3 to 4-- right shoulder ER at neutral, 50 deg abd and again at 90 deg FE; IR at 60 deg abd and scapula stabilized; FE with scapula stabilized. Treatment/Session Assessment: less pain after treatment. · Response to Treatment:  Tolerated treatment with improving AROM  · Compliance with Program/Exercises: yes per pt. Recommendations/Intent for next treatment session: \"Next visit will focus on right shoulder ROM and strengthening with respect to biceps tear and AC joint pain\".    Total Treatment Duration:     42       min  PT Patient Time In/Time Out  Time In: 0945  Time Out: [address], PT

## 2017-03-10 NOTE — PROGRESS NOTES
Tam Chase  : 1945 Therapy Center at [facility] FARRUKH LUNA  [address] [facility], [address] [place] [address],[address], [place] [address].  [phone]   [phone]    Outpatient PHYSICAL THERAPY: PHYSICIAN COMMUNICATION    REFERRING PHYSICIAN: Will Harrison MD  Return Physician Appointment: 3/14/17  MEDICAL/REFERRING DIAGNOSIS:  · s/p right shoulder scope asd rcr bt  ATTENDANCE: Tam Chase has attended 7/ visits with 2 cancellations    ASSESSMENT:  DATE: 3/10/2017    PROGRESS: Tam Chase came to therapy with reports of the right [facility] joint \"popping out\" on 17 visit. There was noted to be excessive motion at the [facility] joint during attempts to reach up and out. He has been able to participate in the beginning phase of AROM/muscle facilitation without increasing pain. He has been performing the home programs but has needed encouragement to comply with recommended frequency and progression.   AROM:  R shoulder Flex: 90 deg forward elevation  R shoulder ER: 75 deg (at 10 deg and 50 deg abd)   R shoulder IR: 53 deg with scapula stabilized    Thank you for this referral,  Goble Mohs, PT

## 2017-03-14 ENCOUNTER — HOSPITAL ENCOUNTER (OUTPATIENT)
Dept: PHYSICAL THERAPY | Age: 72
Discharge: HOME | End: 2017-03-14
Payer: MEDICARE

## 2017-03-14 NOTE — PROGRESS NOTES
Keren Espinoza  : 1945 [facility] FARRU[facility] LUNA  [address] [facility], [place], [address] [place] [address],[address], [phone]  [phone]   [phone]       OUTPATIENT PHYSICAL THERAPY:Daily Note 3/14/2017    ICD-10: Treatment Diagnosis: Pain in right shoulder (M25.511)  Precautions/Allergies: post-op biceps tear, ? Post-op AC joint instability / Epinephrine   Fall Risk Score: 1  MD Orders: PT- evaluate and treat, HEP, ROM, strengthening MEDICAL/REFERRING DIAGNOSIS:  s/p right shoulder scope asd rcr bt - 17. Biceps rupture 17, R AC joint injury 17  DATE OF ONSET: 16  REFERRING PHYSICIAN: Marc Newton MD  RETURN PHYSICIAN APPOINTMENT:      INITIAL ASSESSMENT:  Mr. Joshua Singh is s/p R shoulder scope with ASD, RCR and BT. s/p  R biceps ruptured on 17 and R AC joint injury on 17 with PT cleared by Dr Georgina Tony with respect for the right [facility] joint protection. Pt presents with biceps region and AC joint pain, RUE weakness, complaints of Right hand numbness and tingling. He is no longer wearing the sling. Therapy focus has progressed from PROM to include AROM, posture correction and muscle facilitation in preparation for strengthening. He has a past history of right shoulder dislocation, surgery and right AC joint injury requiring surgical stabilization. These will likely slow progress. He needs to be able to assist with home care, perform home repairs and yard work. He will benefit from skilled therapy to address his deficits, guide rehab progression and assist in the return to functional, independent ADL's. PROBLEM LIST (Impacting functional limitations):  1. Decreased Strength  2. Decreased ADL/Functional Activities  3. Increased Pain  4. Decreased Activity Tolerance  5. Decreased Flexibility/Joint Mobility INTERVENTIONS PLANNED: as cleared by MD  1. Cold  2. Cryotherapy  3. Electrical Stimulation  4. Home Exercise Program (HEP)  5. Manual Therapy  6.  Neuromuscular Re-education/Strengthening  7. Range of Motion (ROM)  8. Therapeutic Exercise/Strengthening   TREATMENT PLAN:  Effective Dates: 2/14/17 TO 5/9/17. Frequency/Duration: 2-3 times a week for 12 weeks  GOALS: (Goals have been discussed and agreed upon with patient.)  Short-Term Functional Goals: Time Frame: 4 weeks    1. Independent with HEP    2. R shoulder AROM FE to at least 110 deg against gravity, ER to at least 75 deg and IR to L4 behind the back to allow independent ADL's     3. R shoulder Strength improved by at least 1/3 muscle grade to allow safe reaching  Discharge Goals: Time Frame: 12 weeks     1. Tolerate 45 minutes of exercise for home care     2. Strength to [facility] to allow return to home maintenance     3. ADL's with pain < 2  Rehabilitation Potential For Stated Goals: Good  Regarding Samantha Parisi's therapy, I certify that the treatment plan above will be carried out by a therapist or under their direction. Thank you for this referral,  Pineda Rodriguez PT                 The information in this section was collected on 2/14/17 (except where otherwise noted). HISTORY:   History of Present Injury/Illness (Reason for Referral):  Pt states that he was remodeling his home when he tripped and fell. Felt immediate pain when he reached out with the right hand to catch himself. Past Medical History/Comorbidities:   Mr. 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 Andreina Osorio  has a past surgical history that includes carpal tunnel release (2012); carpal tunnel release (1980); lumbar laminectomy; hip replacement (Left, 12/12/2012); knee arthroscopy (Right, 2006); shoulder arthroscopy (Left, 2/17/2012); corneal transplant (Right, 2004); corneal transplant (Right, 06/14/2016). ADD(2/21/17):- right AC joint injury with surgical stabilization > 5 year ago. Social History/Living Environment:   Retired. 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 , Disp: 30 Tab, Rfl: 11    cyanocobalamin (VITAMIN B-12) 1,000 mcg tablet, Take 1,000 mcg by mouth daily. , Disp: , Rfl:     Biotin 2,500 mcg cap, Take  by mouth daily. , Disp: , Rfl:     B.infantis-B.ani-B.long-B.bifi (PROBIOTIC 4X) 10-15 mg TbEC, Take  by mouth daily. , Disp: , Rfl:     atorvastatin (LIPITOR) 80 mg tablet, Take 80 mg by mouth daily. 1/2 tablet daily  Take DOS per anesthesia protocol., Disp: , Rfl:     HYDROcodone-acetaminophen (NORCO) 7.5-325 mg per tablet, Take 1 Tab by mouth every six (6) hours as needed for Pain. Max Daily Amount: 4 Tabs., Disp: 20 Tab, Rfl: 0    carboxymethylcellulose sodium (REFRESH LIQUIGEL) 1 % dlgl, Apply 1 Drop to eye every four (4) hours. , Disp: , Rfl:     omega-3 fatty acids-vitamin e (FISH OIL) 1,000 mg cap, Take 2 Caps by mouth every morning., Disp: , Rfl:     timolol    PREDnisolone  *  Brimonidine tartrate   *  alergy shots   Date Last Reviewed:  3/14/17   Number of Personal Factors/Comorbidities that affect the Plan of Care: 1-2: MODERATE COMPLEXITY   EXAMINATION:    Observation/Orthostatic Postural Assessment:          Shoulder held in slight protected position. Bruising noted over the biceps region with the muscle retracted into a ball mid upper arm. Right distal clavical noted to be elevated at the [facility] joint  Palpation:          Tender over the right biceps tendon region, coricoclaviular region and right AC joint  PROM:  2/28/17  R shoulder flex:  90 deg   R shoulder Abd: 80 deg   R shoulder horizontal Abd: to neutral  R shoulder ER: 60 deg at 45 deg abd  R shoulder IR: to rib cage in supine  R elbow extn: - 15 from full extn  R supination: 90 deg  AROM 3/10/17  R shoulder flex: 90 deg FE in standing after treatment   R shoulder ext:n/t  R shoulder abd:n/t  R shoulder horizontal abd:n/t  R shoulder ER: 75 deg at neutral and 50 deg abd  R shoulder IR: 53 deg with scapula stabilized     Strength: 2/14/17   R shoulder flex:n/t  R shoulder abd:n/t  R shoulder horizontal abd:n/t  R shoulder ER:n/t  R shoulder IR:n/t  R elbow flex: n/t  R elbow extn: n/t   Body Structures Involved:  1. Joints  2. Muscles  3. Ligaments Body Functions Affected:  1. Neuromusculoskeletal  2. Movement Related Activities and Participation Affected:  1. Self Care  2. Domestic Life   Number of elements (examined above) that affect the Plan of Care: 1-2: LOW COMPLEXITY   CLINICAL PRESENTATION:   Presentation: Evolving clinical presentation with changing clinical characteristics: MODERATE COMPLEXITY   CLINICAL DECISION MAKING:   Outcome Measure: Tool Used: Disabilities of the Arm, Shoulder and Hand (DASH) Questionnaire - Quick Version  Score:  Initial: 51/55  Most Recent: 80/55 (Date: 2/28/17-- )   Interpretation of Score: The DASH is designed to measure the activities of daily living in person's with upper extremity dysfunction or pain. Each section is scored on a 1-5 scale, 5 representing the greatest disability. The scores of each section are added together for a total score of 55.     Score 11 12-19 20-28 29-37 38-45 46-54 55   Modifier CH CI CJ CK CL CM CN ? Carrying, Moving, and Handling Objects:     - CURRENT STATUS: CM - 80%-99% impaired, limited or restricted    - GOAL STATUS: CJ - 20%-39% impaired, limited or restricted    - D/C STATUS:  ---------------To be determined---------------      Medical Necessity:   · Skilled intervention continues to be required due to need for manual treatment and guided progression. Reason for Services/Other Comments:  · Patient continues to require skilled intervention due to need for functional return of dominant arm. Use of outcome tool(s) and clinical judgement create a POC that gives a: Questionable prediction of patient's progress: MODERATE COMPLEXITY            TREATMENT:   (In addition to Assessment/Re-Assessment sessions the following treatments were rendered)  Pre-treatment Symptoms/Complaints:   Pt reports he did the HEP. Pain: Initial:      Post Session:   after    Tender to palpate over the right AC joint, biceps tendon region. THERAPEUTIC EXERCISE: ( 30   Minutes):for strengthening. Reviewed and issued upgraded HEP. Copy given. supine active FE while keeping abduction pressure x 10 - with emphasis on holding scapular retraction and depression. Pt hold at end ROM FE, ER and IR after overpressure stretch. Date:  3/7/17 Date:  3/10/17 Date:  3/14/17   Activity/Exercise Parameters Parameters Parameters   tricep pulls 3# cable x10 3# 2x10 Green tband   Shoulder abd Sidelying x10 Sidelying 2x10 1# 2x10   ER Sidelying x10 Sidelying 2x10 1# 2x10   midtrap Sidelying x10 Sidelying 2x10 1# 2x10   Scapular retraction/protraction Sidelying x10 Sidelying 2x10 scapular retraction Yellow tband 2x10   Serratus punch   Supine 1# B 2x10         Gave pt ice to take after treatment    MANUAL THERAPY: ( 12  minutes): for ROM. Grade 3 to 4-- right shoulder ER at neutral, 50 deg abd and again at 90 deg FE; IR at 60 deg abd and scapula stabilized; FE with scapula stabilized.  Grade 3- to 4-- humeral inferior glijeanna at 75 deg abd    Treatment/Session Assessment:                     after treatment. · Response to Treatment:  Tolerated treatment with good return demonstration of exercises. improving AROM  · Compliance with Program/Exercises: yes per pt. Recommendations/Intent for next treatment session: \"Next visit will focus on right shoulder ROM and strengthening with respect to biceps tear and AC joint pain\".    Total Treatment Duration:            min       Lex Ortega, PT

## 2017-03-19 NOTE — BRIEF OP NOTE
BRIEF OPERATIVE NOTE    Date of Procedure: 3/23/2017     Preoperative Diagnosis:  TYPE IV AC SEPARATION RIGHT SHOULDER      Posterior dislocation of right acromioclavicular joint, initial encounter [S43.151A]        TORN LONG HEAD BICEPS TENDON RIGHT SHOULDER      Biceps muscle tear, right, sequela [S46.111S]    Postoperative Diagnosis:  SAME     Procedure(s): 1. OPEN REDUCTION TYPE IV AC JOINT SEPARATION RIGHT SHOULDER     2.  REVISION OPEN BICEPS TENODESIS RIGHT SHOULDER    Surgeon(s) and Role:     * Colten Lu MD - Primary              Anesthesia: General WITH INTERSCALENE BLOCK    Complications: NONE    Implants:     Implant Name Type Inv.  Item Serial No.  Lot No. LRB No. Used Action   PLATE CLAV HK 7H 3.5 [id number] L -- STRL - [id number]  PLATE CLAV HK 7H 3.5 15X86 L -- STRL [id number] [employer] [place] [id number] Right 1 Implanted   ANCHOR SUT 5.5MM W/NDL PEEK ZP --  - [id number]  ANCHOR SUT 5.5MM W/NDL PEEK ZP --  [id number] [employer] [id number] Right 1 Implanted   SCR BNE CRTX ST HEX 3.5X20MM -- SS - [id number]  SCR BNE CRTX ST HEX 3.5X20MM -- SS [id number] [employer] [place] [id number] Right 1 Implanted   SCR BNE LCK ST T25 3.5X24MM SS --  - [id number]  SCR BNE LCK ST T25 3.5X24MM SS --  [id number] [employer] [place] [id number] Right 2 Implanted   SCR BNE LCK ST T25 3.5X22MM SS --  - [id number]  SCR BNE LCK ST T25 3.5X22MM SS --  [id number] [employer] [place] [id number] Right 2 Implanted   ANCHOR SUT 5.5MM W/NDL PEEK ZP --  - [id number]   ANCHOR SUT 5.5MM W/NDL PEEK ZP --  [id number] [employer] [id number] Right 1 Implanted        Estefany Mahan MD

## 2017-03-19 NOTE — H&P
[facility] HISTORY AND PHYSICAL    Subjective:     Patient is a 81 y.o. RHD MALE WITH RIGHT SHOULDER PAIN. SEE OFFICE NOTE.     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    Chronic deep vein thrombosis of lower extremity ([address] 03/21/2016    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    Bradycardia 02/02/2016    Anaphylactic reaction 02/01/2016    Pulmonary embolism ([address] 12/23/2014    Hypertension 12/23/2014    Factor V Leiden ([address] 12/23/2014    Left leg DVT ([address] 12/23/2014    Leukocytosis 12/23/2014    Hyperkalemia 12/23/2014    Elevated serum creatinine 12/23/2014    Chronic back pain 12/23/2014    ARCE (dyspnea on exertion) 12/23/2014    Spinal stenosis, lumbar region, with neurogenic claudication 03/24/2014    Glaucoma     Hypercholesteremia     Unspecified adverse effect of anesthesia     Osteoarthritis of left hip 12/12/2012   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-no follow-up required.  Carpal tunnel syndrome 2/17/2012    Chronic back pain 12/23/2014    Chronic deep vein thrombosis of lower extremity (HCC) 3/21/2016    Chronic pansinusitis 7/27/2016    Chronic sinusitis 7/27/2016    DNS (deviated nasal septum)     ARCE (dyspnea on exertion) 12/23/2014    Elevated serum creatinine 12/23/2014    Epistaxis     Essential hypertension, benign 8/16/2011    Factor V Leiden ([address]     managed with medication     Glaucoma     right eye    H/O echocardiogram 02/02/2016    EF 70-75%    Hereditary deficiency of other clotting factors ([address] 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 Preet Murray. Takes Xarelto daily.  Leukocytosis 12/23/2014    Lumbar spinal stenosis     Nasal obstruction     Nasal polyp 7/27/2016    Osteoarthritis     right knee    Osteoarthritis of left hip 12/12/2012    Primary localized osteoarthrosis, shoulder region 2/17/2012    Pulmonary embolism ([address] 1981    right lung 1981, 1994,right lung 1997, right lung 2000 - [place] filter placed 2000. Followed by Dr. Preet Murray.     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    Spinal stenosis, lumbar region, with neurogenic claudication 3/24/2014    Superior glenoid labrum lesion 2/17/2012    Supraspinatus (muscle) (tendon) sprain 2/17/2012    Thromboembolus ([address] 9/1/1997    left thigh DVT     Unspecified adverse effect of anesthesia     very difficult IV stick per patient d/t Lovenox, has needed anesthesiologist to start several times/comes in a day ahead for PICC line Past Surgical History:   Procedure Laterality Date    HX APPENDECTOMY  1980    HX BACK SURGERY      spinal stenosis    HX BLEPHAROPLASTY Bilateral 2009    HX CARPAL TUNNEL RELEASE  2012    left    HX CARPAL TUNNEL RELEASE  1980    right    HX CATARACT REMOVAL Bilateral     left eye 1994 & right eye 2002    HX COLONOSCOPY  00,05,10    HX CORNEAL TRANSPLANT Right 2004    HX CORNEAL TRANSPLANT Right 06/14/2016    sutures still present     HX HEENT Right 11/08     glaucoma surgery    HX HEENT      nasal reconstruction    HX HEENT      nasal polypectomy    HX HERNIA REPAIR  2010    abdominal hernia repair    HX HERNIA REPAIR Left 2007    inguinal hernia repair    HX HIP REPLACEMENT Left 12/12/2012    HX KNEE ARTHROSCOPY Right 2006     knee meniscus repair    HX LAP CHOLECYSTECTOMY  2001    HX LUMBAR LAMINECTOMY      HX ORTHOPAEDIC Bilateral     open shoulder AC reconstruction    HX ORTHOPAEDIC Right 2006    neuroma fromfoot    HX ORTHOPAEDIC Bilateral     right elbow 1979 & left elbow 2007    HX ORTHOPAEDIC Right     index finger    HX OTHER SURGICAL  2000    [place] filter placed    HX SHOULDER ARTHROSCOPY Left 2/17/2012    HX TONSILLECTOMY  1950    HX VASCULAR ACCESS  2008    PICC line right arm placed & then removed     HX VASCULAR ACCESS  2012    PICC- removed    HX VITRECTOMY Left 6/15/15    SINUS SURGERY PROC UNLISTED      numerous      Prior to Admission medications    Medication Sig Start Date End Date Taking? Authorizing Provider   HYDROcodone-acetaminophen (NORCO)  mg tablet Take 1 Tab by mouth every six (6) hours as needed for Pain. Max Daily Amount: 4 Tabs. 3/9/17   Imani Reich MD   rivaroxaban (XARELTO) 20 mg tab tablet Take 1 Tab by mouth daily (with breakfast). 1/30/17   Elisa Izaguirre MD   moxifloxacin (VIGAMOX) 0.5 % ophthalmic solution Administer 1 Drop to right eye three (3) times daily.     Historical Provider   cyanocobalamin (VITAMIN B-12) 1,000 mcg tablet Take 1,000 mcg by mouth daily. Historical Provider   Biotin 2,500 mcg cap Take  by mouth daily. Historical Provider   B.infantis-B.ani-B.long-B.bifi (PROBIOTIC 4X) 10-15 mg TbEC Take  by mouth daily. Historical Provider   atorvastatin (LIPITOR) 80 mg tablet Take 80 mg by mouth daily. 1/2 tablet daily    Take DOS per anesthesia protocol. Historical Provider   HYDROcodone-acetaminophen (NORCO) 7.5-325 mg per tablet Take 1 Tab by mouth every six (6) hours as needed for Pain. Max Daily Amount: 4 Tabs. 11/15/16   Sotero Garcia NP   carboxymethylcellulose sodium (REFRESH LIQUIGEL) 1 % dlgl Apply 1 Drop to eye every four (4) hours. Historical Provider   omega-3 fatty acids-vitamin e (FISH OIL) 1,000 mg cap Take 2 Caps by mouth every morning. Historical Provider   Brimonidine-Timolol (COMBIGAN) 0.2-0.5 % Drop Administer 1 drop to both eyes two (2) times a day. Indications: OPEN ANGLE GLAUCOMA 3/16/10   Historical Provider   PRED FORTE 1 % DrpS Administer 1 drop to right eye two (2) times a day. Indications: pt states prevents infection 3/16/10   Phys Other, MD     Allergies   Allergen Reactions    Aspirin Other (comments)     Pt states can't take aspirin with his blood thinning medication.       Epinephrine Unknown (comments)     Blindness in right eye       Social History   Substance Use Topics    Smoking status: Never Smoker    Smokeless tobacco: Never Used    Alcohol use No      Family History   Problem Relation Age of Onset    Cancer Mother      lymphoma    Diabetes Mother      type II    Heart Disease Father     Diabetes Father      type II    Cancer Father      pancreatic ca    Other Father      Coronary Disease    Malignant Hyperthermia Neg Hx     Pseudocholinesterase Deficiency Neg Hx     Delayed Awakening Neg Hx     Post-op Nausea/Vomiting Neg Hx     Emergence Delirium Neg Hx     Post-op Cognitive Dysfunction Neg Hx       Review of Systems  A comprehensive review of systems was negative except for that written in the HPI. Objective:     No data found. There were no vitals taken for this visit. General:  Alert, cooperative, no distress, appears stated age. Head:  Normocephalic, without obvious abnormality, atraumatic. Back:   Symmetric, no curvature. ROM normal. No CVA tenderness. Lungs:   Clear to auscultation bilaterally. Chest wall:  No tenderness or deformity. Heart:  Regular rate and rhythm, S1, S2 normal, no murmur, click, rub or gallop. Extremities: Extremities normal, atraumatic, no cyanosis or edema. Pulses: 2+ and symmetric all extremities. Skin: Skin color, texture, turgor normal. No rashes or lesions   Lymph nodes: Cervical, supraclavicular, and axillary nodes normal.   Neurologic: CNII-XII intact. Normal strength, sensation and reflexes throughout. Assessment:     TYPE IV AC SEPARATION RIGHT SHOULDER  TORN LONG HEAD BICEPS TENDON RIGHT SHOULDER    Plan:     The various methods of treatment have been discussed with the patient and family. PATIENT HAS EXHAUSTED NON-OPERATIVE MODALITIES. After consideration of risks, benefits and other options for treatment, the patient has consented to surgical intervention. SEE OFFICE NOTE.

## 2017-03-20 ENCOUNTER — HOSPITAL ENCOUNTER (OUTPATIENT)
Dept: SURGERY | Age: 72
Discharge: HOME | End: 2017-03-20
Attending: ORTHOPAEDIC SURGERY
Payer: MEDICARE

## 2017-03-20 VITALS
BODY MASS INDEX: 26.36 KG/M2 | HEIGHT: 69 IN | TEMPERATURE: 97.9 F | DIASTOLIC BLOOD PRESSURE: 73 MMHG | HEART RATE: 53 BPM | WEIGHT: 178 LBS | SYSTOLIC BLOOD PRESSURE: 152 MMHG | RESPIRATION RATE: 18 BRPM | OXYGEN SATURATION: 97 %

## 2017-03-20 LAB — GLUCOSE BLD STRIP.AUTO-MCNC: 123 MG/DL (ref 65–100)

## 2017-03-20 PROCEDURE — 82962 GLUCOSE BLOOD TEST: CPT

## 2017-03-20 PROCEDURE — 93005 ELECTROCARDIOGRAM TRACING: CPT | Performed by: ANESTHESIOLOGY

## 2017-03-20 RX ORDER — DIAZEPAM 10 MG/1
10 TABLET ORAL
COMMUNITY
End: 2018-05-23

## 2017-03-20 NOTE — PERIOP NOTES
Patient verified name, , and surgery as listed in [facility]. Type 2 surgery, PAT assessment complete. Labs per surgeon: none. Labs per anesthesia protocol:   SQBS - 123;poc glucose. Hgb- WNL as noted in EMR dated 3-9-17. EKG: today- within limits. Echo in EMR dated 16-within limits. Hibiclens and instructions given per hospital policy. Patient provided with handouts including Guide to Surgery, Pain Management, Hand Hygiene, Blood Transfusion Education, and [facility] Brochure. Patient answered medical/surgical history questions at their best of ability. All prior to admission medications documented in [facility]. Original medication prescription bottle not visualized during patient appointment. Patient instructed to hold all vitamins 7 days prior to surgery and NSAIDS 5 days prior to surgery, patient verbalized understanding. Medications to be held : none. Patient instructed to continue previous medications as prescribed prior to surgery and to take the following medications the day of surgery according to anesthesia guidelines with a small sip of water: atorvastatin, norco if needed; eye drops. .     Patient taught back and verbalized understanding.

## 2017-03-21 LAB
ATRIAL RATE: 52 BPM
CALCULATED P AXIS, ECG09: 57 DEGREES
CALCULATED R AXIS, ECG10: 61 DEGREES
CALCULATED T AXIS, ECG11: 58 DEGREES
DIAGNOSIS, 93000: NORMAL
DIASTOLIC BP, ECG02: NORMAL MMHG
P-R INTERVAL, ECG05: 166 MS
Q-T INTERVAL, ECG07: 462 MS
QRS DURATION, ECG06: 102 MS
QTC CALCULATION (BEZET), ECG08: 429 MS
SYSTOLIC BP, ECG01: NORMAL MMHG
VENTRICULAR RATE, ECG03: 52 BPM

## 2017-03-22 ENCOUNTER — ANESTHESIA EVENT (OUTPATIENT)
Dept: SURGERY | Age: 72
End: 2017-03-22
Payer: MEDICARE

## 2017-03-22 RX ORDER — MIDAZOLAM HYDROCHLORIDE 1 MG/ML
2 INJECTION, SOLUTION INTRAMUSCULAR; INTRAVENOUS ONCE
Status: CANCELLED | OUTPATIENT
Start: 2017-03-22 | End: 2017-03-22

## 2017-03-23 ENCOUNTER — HOSPITAL ENCOUNTER (OUTPATIENT)
Age: 72
Setting detail: OBSERVATION
Discharge: HOME | End: 2017-03-24
Attending: ORTHOPAEDIC SURGERY | Admitting: ORTHOPAEDIC SURGERY
Payer: MEDICARE

## 2017-03-23 ENCOUNTER — SURGERY (OUTPATIENT)
Age: 72
End: 2017-03-23

## 2017-03-23 ENCOUNTER — APPOINTMENT (OUTPATIENT)
Dept: GENERAL RADIOLOGY | Age: 72
End: 2017-03-23
Attending: ORTHOPAEDIC SURGERY
Payer: MEDICARE

## 2017-03-23 ENCOUNTER — ANESTHESIA (OUTPATIENT)
Dept: SURGERY | Age: 72
End: 2017-03-23
Payer: MEDICARE

## 2017-03-23 PROBLEM — S43.151A: Status: ACTIVE | Noted: 2017-03-23

## 2017-03-23 PROBLEM — S46.211A RUPTURE OF RIGHT BICEPS TENDON: Status: ACTIVE | Noted: 2017-03-23

## 2017-03-23 PROCEDURE — 74011250636 HC RX REV CODE- 250/636: Performed by: ORTHOPAEDIC SURGERY

## 2017-03-23 PROCEDURE — 74011000250 HC RX REV CODE- 250

## 2017-03-23 PROCEDURE — 74011250636 HC RX REV CODE- 250/636

## 2017-03-23 PROCEDURE — 77030013727 HC IRR FAN PULSVC ZIMM -B: Performed by: ORTHOPAEDIC SURGERY

## 2017-03-23 PROCEDURE — 76942 ECHO GUIDE FOR BIOPSY: CPT | Performed by: ORTHOPAEDIC SURGERY

## 2017-03-23 PROCEDURE — 74011250637 HC RX REV CODE- 250/637: Performed by: ORTHOPAEDIC SURGERY

## 2017-03-23 PROCEDURE — 77030004434 HC BUR RND STRY -B: Performed by: ORTHOPAEDIC SURGERY

## 2017-03-23 PROCEDURE — 74011000258 HC RX REV CODE- 258: Performed by: ORTHOPAEDIC SURGERY

## 2017-03-23 PROCEDURE — L3650 SO 8 ABD RESTRAINT PRE OTS: HCPCS | Performed by: ORTHOPAEDIC SURGERY

## 2017-03-23 PROCEDURE — 77030002986 HC SUT PROL J&J -A: Performed by: ORTHOPAEDIC SURGERY

## 2017-03-23 PROCEDURE — 77030006788 HC BLD SAW OSC STRY -B: Performed by: ORTHOPAEDIC SURGERY

## 2017-03-23 PROCEDURE — 74011250636 HC RX REV CODE- 250/636: Performed by: ANESTHESIOLOGY

## 2017-03-23 PROCEDURE — 73000 X-RAY EXAM OF COLLAR BONE: CPT

## 2017-03-23 PROCEDURE — 76010010054 HC POST OP PAIN BLOCK: Performed by: ORTHOPAEDIC SURGERY

## 2017-03-23 PROCEDURE — 99218 HC RM OBSERVATION: CPT

## 2017-03-23 PROCEDURE — 77030008703 HC TU ET UNCUF COVD -A: Performed by: ANESTHESIOLOGY

## 2017-03-23 PROCEDURE — 77030020782 HC GWN BAIR PAWS FLX 3M -B: Performed by: ANESTHESIOLOGY

## 2017-03-23 PROCEDURE — 77030002913 HC SUT ETHBND J&J -B: Performed by: ORTHOPAEDIC SURGERY

## 2017-03-23 PROCEDURE — C1713 ANCHOR/SCREW BN/BN,TIS/BN: HCPCS | Performed by: ORTHOPAEDIC SURGERY

## 2017-03-23 PROCEDURE — 77030011283 HC ELECTRD NDL COVD -A: Performed by: ORTHOPAEDIC SURGERY

## 2017-03-23 PROCEDURE — 76060000035 HC ANESTHESIA 2 TO 2.5 HR: Performed by: ORTHOPAEDIC SURGERY

## 2017-03-23 PROCEDURE — 76010000171 HC OR TIME 2 TO 2.5 HR INTENSV-TIER 1: Performed by: ORTHOPAEDIC SURGERY

## 2017-03-23 PROCEDURE — 77030003602 HC NDL NRV BLK BBMI -B: Performed by: ANESTHESIOLOGY

## 2017-03-23 PROCEDURE — 77030016470 HC BIT DRL QC1 SYNT -C: Performed by: ORTHOPAEDIC SURGERY

## 2017-03-23 PROCEDURE — 77030018836 HC SOL IRR NACL ICUM -A: Performed by: ORTHOPAEDIC SURGERY

## 2017-03-23 PROCEDURE — 76210000017 HC OR PH I REC 1.5 TO 2 HR: Performed by: ORTHOPAEDIC SURGERY

## 2017-03-23 PROCEDURE — 74011000250 HC RX REV CODE- 250: Performed by: ANESTHESIOLOGY

## 2017-03-23 PROCEDURE — 77030008477 HC STYL SATN SLP COVD -A: Performed by: ANESTHESIOLOGY

## 2017-03-23 PROCEDURE — 77030004077: Performed by: ORTHOPAEDIC SURGERY

## 2017-03-23 DEVICE — 5.5MM PEEK ZIP SUTURE ANCHOR WITH ¿ CIRCLE TAPER NEEDLES, #2 FORCE FIBER
Type: IMPLANTABLE DEVICE | Site: SHOULDER | Status: FUNCTIONAL
Brand: PEEK ZIP

## 2017-03-23 DEVICE — SCREW BNE L24MM DIA3.5MM CORT S STL ST LOK FULL THRD: Type: IMPLANTABLE DEVICE | Site: SHOULDER | Status: FUNCTIONAL

## 2017-03-23 DEVICE — SCREW BNE L20MM DIA3.5MM CORT S STL ST NONCANNULATED LOK: Type: IMPLANTABLE DEVICE | Site: SHOULDER | Status: FUNCTIONAL

## 2017-03-23 DEVICE — SCREW BNE L22MM DIA3.5MM CORT S STL ST LOK FULL THRD: Type: IMPLANTABLE DEVICE | Site: SHOULDER | Status: FUNCTIONAL

## 2017-03-23 DEVICE — IMPLANTABLE DEVICE: Type: IMPLANTABLE DEVICE | Site: SHOULDER | Status: FUNCTIONAL

## 2017-03-23 RX ORDER — SODIUM CHLORIDE, SODIUM LACTATE, POTASSIUM CHLORIDE, CALCIUM CHLORIDE 600; 310; 30; 20 MG/100ML; MG/100ML; MG/100ML; MG/100ML
75 INJECTION, SOLUTION INTRAVENOUS CONTINUOUS
Status: DISCONTINUED | OUTPATIENT
Start: 2017-03-23 | End: 2017-03-23 | Stop reason: HOSPADM

## 2017-03-23 RX ORDER — LIDOCAINE HYDROCHLORIDE 20 MG/ML
INJECTION, SOLUTION EPIDURAL; INFILTRATION; INTRACAUDAL; PERINEURAL AS NEEDED
Status: DISCONTINUED | OUTPATIENT
Start: 2017-03-23 | End: 2017-03-23 | Stop reason: HOSPADM

## 2017-03-23 RX ORDER — SODIUM CHLORIDE 9 MG/ML
75 INJECTION, SOLUTION INTRAVENOUS CONTINUOUS
Status: DISCONTINUED | OUTPATIENT
Start: 2017-03-23 | End: 2017-03-24 | Stop reason: HOSPADM

## 2017-03-23 RX ORDER — ROCURONIUM BROMIDE 10 MG/ML
INJECTION, SOLUTION INTRAVENOUS AS NEEDED
Status: DISCONTINUED | OUTPATIENT
Start: 2017-03-23 | End: 2017-03-23 | Stop reason: HOSPADM

## 2017-03-23 RX ORDER — MIDAZOLAM HYDROCHLORIDE 1 MG/ML
2 INJECTION, SOLUTION INTRAMUSCULAR; INTRAVENOUS
Status: DISCONTINUED | OUTPATIENT
Start: 2017-03-23 | End: 2017-03-23 | Stop reason: HOSPADM

## 2017-03-23 RX ORDER — SODIUM CHLORIDE 0.9 % (FLUSH) 0.9 %
5-10 SYRINGE (ML) INJECTION EVERY 8 HOURS
Status: DISCONTINUED | OUTPATIENT
Start: 2017-03-23 | End: 2017-03-24 | Stop reason: HOSPADM

## 2017-03-23 RX ORDER — NEOSTIGMINE METHYLSULFATE 1 MG/ML
INJECTION INTRAVENOUS AS NEEDED
Status: DISCONTINUED | OUTPATIENT
Start: 2017-03-23 | End: 2017-03-23 | Stop reason: HOSPADM

## 2017-03-23 RX ORDER — BUPIVACAINE HYDROCHLORIDE 5 MG/ML
INJECTION, SOLUTION EPIDURAL; INTRACAUDAL AS NEEDED
Status: DISCONTINUED | OUTPATIENT
Start: 2017-03-23 | End: 2017-03-23 | Stop reason: HOSPADM

## 2017-03-23 RX ORDER — TEMAZEPAM 15 MG/1
15 CAPSULE ORAL
Status: DISCONTINUED | OUTPATIENT
Start: 2017-03-23 | End: 2017-03-24 | Stop reason: HOSPADM

## 2017-03-23 RX ORDER — SODIUM CHLORIDE 0.9 % (FLUSH) 0.9 %
5-10 SYRINGE (ML) INJECTION AS NEEDED
Status: DISCONTINUED | OUTPATIENT
Start: 2017-03-23 | End: 2017-03-24 | Stop reason: HOSPADM

## 2017-03-23 RX ORDER — FACIAL-BODY WIPES
10 EACH TOPICAL DAILY PRN
Status: DISCONTINUED | OUTPATIENT
Start: 2017-03-23 | End: 2017-03-24 | Stop reason: HOSPADM

## 2017-03-23 RX ORDER — HYDROMORPHONE HYDROCHLORIDE 2 MG/1
2 TABLET ORAL
Status: DISCONTINUED | OUTPATIENT
Start: 2017-03-23 | End: 2017-03-24 | Stop reason: HOSPADM

## 2017-03-23 RX ORDER — NALOXONE HYDROCHLORIDE 0.4 MG/ML
0.2 INJECTION, SOLUTION INTRAMUSCULAR; INTRAVENOUS; SUBCUTANEOUS AS NEEDED
Status: DISCONTINUED | OUTPATIENT
Start: 2017-03-23 | End: 2017-03-23 | Stop reason: HOSPADM

## 2017-03-23 RX ORDER — SUCCINYLCHOLINE CHLORIDE 20 MG/ML
INJECTION INTRAMUSCULAR; INTRAVENOUS AS NEEDED
Status: DISCONTINUED | OUTPATIENT
Start: 2017-03-23 | End: 2017-03-23 | Stop reason: HOSPADM

## 2017-03-23 RX ORDER — ONDANSETRON 2 MG/ML
INJECTION INTRAMUSCULAR; INTRAVENOUS AS NEEDED
Status: DISCONTINUED | OUTPATIENT
Start: 2017-03-23 | End: 2017-03-23 | Stop reason: HOSPADM

## 2017-03-23 RX ORDER — OXYCODONE HYDROCHLORIDE 5 MG/1
5 TABLET ORAL
Status: DISCONTINUED | OUTPATIENT
Start: 2017-03-23 | End: 2017-03-23 | Stop reason: HOSPADM

## 2017-03-23 RX ORDER — HYDROMORPHONE HYDROCHLORIDE 2 MG/ML
0.5 INJECTION, SOLUTION INTRAMUSCULAR; INTRAVENOUS; SUBCUTANEOUS
Status: COMPLETED | OUTPATIENT
Start: 2017-03-23 | End: 2017-03-23

## 2017-03-23 RX ORDER — DEXAMETHASONE SODIUM PHOSPHATE 4 MG/ML
INJECTION, SOLUTION INTRA-ARTICULAR; INTRALESIONAL; INTRAMUSCULAR; INTRAVENOUS; SOFT TISSUE AS NEEDED
Status: DISCONTINUED | OUTPATIENT
Start: 2017-03-23 | End: 2017-03-23 | Stop reason: HOSPADM

## 2017-03-23 RX ORDER — LIDOCAINE HYDROCHLORIDE 20 MG/ML
INJECTION, SOLUTION EPIDURAL; INFILTRATION; INTRACAUDAL; PERINEURAL AS NEEDED
Status: DISCONTINUED | OUTPATIENT
Start: 2017-03-23 | End: 2017-03-23

## 2017-03-23 RX ORDER — PROMETHAZINE HYDROCHLORIDE 25 MG/1
25 TABLET ORAL
Status: DISCONTINUED | OUTPATIENT
Start: 2017-03-23 | End: 2017-03-24 | Stop reason: HOSPADM

## 2017-03-23 RX ORDER — FENTANYL CITRATE 50 UG/ML
100 INJECTION, SOLUTION INTRAMUSCULAR; INTRAVENOUS ONCE
Status: COMPLETED | OUTPATIENT
Start: 2017-03-23 | End: 2017-03-23

## 2017-03-23 RX ORDER — GLYCOPYRROLATE 0.2 MG/ML
INJECTION INTRAMUSCULAR; INTRAVENOUS AS NEEDED
Status: DISCONTINUED | OUTPATIENT
Start: 2017-03-23 | End: 2017-03-23 | Stop reason: HOSPADM

## 2017-03-23 RX ORDER — DOCUSATE SODIUM 100 MG/1
100 CAPSULE, LIQUID FILLED ORAL 2 TIMES DAILY
Status: DISCONTINUED | OUTPATIENT
Start: 2017-03-24 | End: 2017-03-24 | Stop reason: HOSPADM

## 2017-03-23 RX ORDER — PROPOFOL 10 MG/ML
INJECTION, EMULSION INTRAVENOUS AS NEEDED
Status: DISCONTINUED | OUTPATIENT
Start: 2017-03-23 | End: 2017-03-23 | Stop reason: HOSPADM

## 2017-03-23 RX ORDER — LIDOCAINE HYDROCHLORIDE 10 MG/ML
0.1 INJECTION INFILTRATION; PERINEURAL AS NEEDED
Status: DISCONTINUED | OUTPATIENT
Start: 2017-03-23 | End: 2017-03-23 | Stop reason: HOSPADM

## 2017-03-23 RX ORDER — CEFAZOLIN SODIUM IN 0.9 % NACL 2 G/50 ML
2 INTRAVENOUS SOLUTION, PIGGYBACK (ML) INTRAVENOUS ONCE
Status: COMPLETED | OUTPATIENT
Start: 2017-03-23 | End: 2017-03-23

## 2017-03-23 RX ORDER — HYDROMORPHONE HYDROCHLORIDE 1 MG/ML
1 INJECTION, SOLUTION INTRAMUSCULAR; INTRAVENOUS; SUBCUTANEOUS
Status: DISCONTINUED | OUTPATIENT
Start: 2017-03-23 | End: 2017-03-24 | Stop reason: HOSPADM

## 2017-03-23 RX ORDER — BUPIVACAINE HYDROCHLORIDE AND EPINEPHRINE 2.5; 5 MG/ML; UG/ML
INJECTION, SOLUTION EPIDURAL; INFILTRATION; INTRACAUDAL; PERINEURAL AS NEEDED
Status: DISCONTINUED | OUTPATIENT
Start: 2017-03-23 | End: 2017-03-23 | Stop reason: HOSPADM

## 2017-03-23 RX ORDER — LABETALOL HYDROCHLORIDE 5 MG/ML
10 INJECTION, SOLUTION INTRAVENOUS ONCE
Status: COMPLETED | OUTPATIENT
Start: 2017-03-23 | End: 2017-03-23

## 2017-03-23 RX ORDER — HYDROMORPHONE HYDROCHLORIDE 4 MG/1
4 TABLET ORAL
Status: DISCONTINUED | OUTPATIENT
Start: 2017-03-23 | End: 2017-03-24 | Stop reason: HOSPADM

## 2017-03-23 RX ADMIN — HYDROMORPHONE HYDROCHLORIDE 0.5 MG: 2 INJECTION, SOLUTION INTRAMUSCULAR; INTRAVENOUS; SUBCUTANEOUS at 19:21

## 2017-03-23 RX ADMIN — SODIUM CHLORIDE 75 ML/HR: 900 INJECTION, SOLUTION INTRAVENOUS at 23:56

## 2017-03-23 RX ADMIN — GLYCOPYRROLATE 0.4 MG: 0.2 INJECTION INTRAMUSCULAR; INTRAVENOUS at 18:37

## 2017-03-23 RX ADMIN — DEXAMETHASONE SODIUM PHOSPHATE 4 MG: 4 INJECTION, SOLUTION INTRA-ARTICULAR; INTRALESIONAL; INTRAMUSCULAR; INTRAVENOUS; SOFT TISSUE at 17:26

## 2017-03-23 RX ADMIN — FENTANYL CITRATE 100 MCG: 50 INJECTION, SOLUTION INTRAMUSCULAR; INTRAVENOUS at 15:40

## 2017-03-23 RX ADMIN — PROPOFOL 180 MG: 10 INJECTION, EMULSION INTRAVENOUS at 17:08

## 2017-03-23 RX ADMIN — LIDOCAINE HYDROCHLORIDE 100 MG: 20 INJECTION, SOLUTION EPIDURAL; INFILTRATION; INTRACAUDAL; PERINEURAL at 17:08

## 2017-03-23 RX ADMIN — ROCURONIUM BROMIDE 5 MG: 10 INJECTION, SOLUTION INTRAVENOUS at 17:05

## 2017-03-23 RX ADMIN — BUPIVACAINE HYDROCHLORIDE AND EPINEPHRINE 12 ML: 2.5; 5 INJECTION, SOLUTION EPIDURAL; INFILTRATION; INTRACAUDAL; PERINEURAL at 15:57

## 2017-03-23 RX ADMIN — MIDAZOLAM HYDROCHLORIDE 2 MG: 1 INJECTION, SOLUTION INTRAMUSCULAR; INTRAVENOUS at 15:40

## 2017-03-23 RX ADMIN — CEFAZOLIN SODIUM 1 G: 1 INJECTION, POWDER, FOR SOLUTION INTRAMUSCULAR; INTRAVENOUS at 23:54

## 2017-03-23 RX ADMIN — SODIUM CHLORIDE, SODIUM LACTATE, POTASSIUM CHLORIDE, AND CALCIUM CHLORIDE 75 ML/HR: 600; 310; 30; 20 INJECTION, SOLUTION INTRAVENOUS at 13:10

## 2017-03-23 RX ADMIN — CHLORASEPTIC 1 SPRAY: 1.5 LIQUID ORAL at 23:55

## 2017-03-23 RX ADMIN — ROCURONIUM BROMIDE 15 MG: 10 INJECTION, SOLUTION INTRAVENOUS at 17:26

## 2017-03-23 RX ADMIN — SODIUM CHLORIDE, SODIUM LACTATE, POTASSIUM CHLORIDE, AND CALCIUM CHLORIDE: 600; 310; 30; 20 INJECTION, SOLUTION INTRAVENOUS at 18:00

## 2017-03-23 RX ADMIN — ONDANSETRON 4 MG: 2 INJECTION INTRAMUSCULAR; INTRAVENOUS at 17:27

## 2017-03-23 RX ADMIN — HYDROMORPHONE HYDROCHLORIDE 0.5 MG: 2 INJECTION, SOLUTION INTRAMUSCULAR; INTRAVENOUS; SUBCUTANEOUS at 19:16

## 2017-03-23 RX ADMIN — HYDROMORPHONE HYDROCHLORIDE 0.5 MG: 2 INJECTION, SOLUTION INTRAMUSCULAR; INTRAVENOUS; SUBCUTANEOUS at 19:30

## 2017-03-23 RX ADMIN — NEOSTIGMINE METHYLSULFATE 3 MG: 1 INJECTION INTRAVENOUS at 18:37

## 2017-03-23 RX ADMIN — SUCCINYLCHOLINE CHLORIDE 140 MG: 20 INJECTION INTRAMUSCULAR; INTRAVENOUS at 17:05

## 2017-03-23 RX ADMIN — BUPIVACAINE HYDROCHLORIDE 12 ML: 5 INJECTION, SOLUTION EPIDURAL; INTRACAUDAL at 15:57

## 2017-03-23 RX ADMIN — LABETALOL HYDROCHLORIDE 10 MG: 5 INJECTION, SOLUTION INTRAVENOUS at 20:10

## 2017-03-23 RX ADMIN — HYDROMORPHONE HYDROCHLORIDE 0.5 MG: 2 INJECTION, SOLUTION INTRAMUSCULAR; INTRAVENOUS; SUBCUTANEOUS at 19:36

## 2017-03-23 RX ADMIN — CEFAZOLIN 2 G: 1 INJECTION, POWDER, FOR SOLUTION INTRAMUSCULAR; INTRAVENOUS; PARENTERAL at 17:03

## 2017-03-23 NOTE — DISCHARGE SUMMARY
[facility] Discharge Summary      Patient ID:  Cecil Khanna  [id number]  81 y.o.  1945    Admit date: 3/23/2017    Discharge date and time: 3/24/2017     Admitting Physician: Paul Dalton MD     Discharge Physician: Paul Dalton MD      Admission Diagnoses: Posterior dislocation of right acromioclavicular joint, initial encounter [X74.098U]  Biceps muscle tear, right, sequela [S46.111S]    Discharge Diagnoses: Principal Problem:    Posterior dislocation of right acromioclavicular joint (3/23/2017)    Active Problems:    Rupture of right biceps tendon (3/23/2017)        Surgeon: Paul Dalton MD                                Perioperative Antibiotics: Ancef  _X__                                                Vancomycin  ___          Post Op complications: none        Discharged to: Home    Discharge instructions:  -Resume pre hospital diet             -Resume home medications per medical continuation form     SLING RIGHT SHOULDER  CONTINUE PHYSICAL THERAPY  -Follow up in office as scheduled       Signed:  Paul Dalton MD  3/24/2017  4:56 PM

## 2017-03-23 NOTE — H&P
Date of Surgery Update:  Bon Andrews was seen and examined. History and physical has been reviewed. The patient has been examined.  There have been no significant clinical changes since the completion of the originally dated History and Physical.    Signed By: Iván Bravo MD     March 23, 2017 12:15 PM

## 2017-03-23 NOTE — IP AVS SNAPSHOT
[address] [place] [address] 
[phone] Patient: Magdaleno Thayer MRN: [id number] You are allergic to the following Allergen Reactions Epinephrine Anaphylaxis Blindness in right eye Aspirin Other (comments) Pt states can't take aspirin with his blood thinning medication. Recent Documentation Height Weight BMI Smoking Status 1.753 m 80.7 kg 26.29 kg/m2 Never Smoker Emergency Contacts Name Discharge Info Relation Home Work Mobile Stack,Bethanie DISCHARGE CAREGIVER [3] Spouse [3] [phone] [id number] [phone] About your hospitalization You were admitted on:  March 23, 2017 You last received care in the:  [facility] You were discharged on:  March 24, 2017 Unit phone number:  [phone] Why you were hospitalized Your primary diagnosis was:  Posterior Dislocation Of Right Acromioclavicular Joint Your diagnoses also included:  Rupture Of Right Biceps Tendon Providers Seen During Your Hospitalizations Provider Role Specialty Primary office phone Chela Dupont MD Attending Provider Orthopedic Surgery [phone] Your Primary Care Physician (PCP) Primary Care Physician Office Phone Office Fax Rodrigue Rodriguez 64 Follow-up Information Follow up With Details Comments Contact Info Rory Friday, MD  As needed [facility] [address] [address] [phone] Chela Dupont MD In 2 weeks Call office if not already scheduled [facility] [address] [facility] [address] Current Discharge Medication List  
  
ASK your doctor about these medications Dose & Instructions Dispensing Information Comments Morning Noon Evening Bedtime  
 atorvastatin 80 mg tablet Commonly known as:  LIPITOR Your next dose is:  Tomorrow Dose:  40 mg Take 40 mg by mouth daily. 1/2 tablet daily  Take DOS per anesthesia protocol. Refills:  0 Biotin 2,500 mcg Cap Your next dose is:  Tomorrow Take  by mouth daily. Refills:  0  
     
  
   
   
   
  
 COMBIGAN 0.2-0.5 % Drop ophthalmic solution Generic drug:  brimonidine-timolol Your next dose is: Today Dose:  1 Drop Administer 1 drop to both eyes two (2) times a day. Indications: OPEN ANGLE GLAUCOMA Refills:  0  
     
   
   
  
   
  
 diazePAM 10 mg tablet Commonly known as:  VALIUM Your next dose is: Today Dose:  10 mg Take 10 mg by mouth nightly. As needed   Indications: sleep Refills:  0  
     
   
   
   
  
  
 FISH OIL 1,000 mg Cap Generic drug:  omega-3 fatty acids-vitamin e Your next dose is:  Tomorrow Dose:  2 Cap Take 2 Caps by mouth every morning. Refills:  0 HYDROcodone-acetaminophen  mg tablet Commonly known as:  Hallie Pearson Your next dose is:  Do not take with Dilaudid!!  
   
 Dose:  1 Tab Take 1 Tab by mouth every six (6) hours as needed for Pain. Max Daily Amount: 4 Tabs. Quantity:  120 Tab Refills:  0 PRED FORTE 1 % ophthalmic suspension Generic drug:  prednisoLONE acetate Your next dose is: Today Dose:  1 Drop Administer 1 drop to right eye two (2) times a day. Indications: pt states prevents infection Refills:  0 PROBIOTIC 4X 10-15 mg Tbec Generic drug:  B.infantis-B.ani-B.long-B.bifi Your next dose is:  Tomorrow Take  by mouth daily. Refills:  0 REFRESH LIQUIGEL 1 % Dlgl Generic drug:  carboxymethylcellulose sodium Your next dose is: Today Dose:  1 Drop Apply 1 Drop to eye every four (4) hours. Refills:  0 rivaroxaban 20 mg Tab tablet Commonly known as:  Megan Cornejo Your next dose is:  Tomorrow Dose:  20 mg Take 1 Tab by mouth daily (with breakfast). Quantity:  30 Tab Refills:  11 VITAMIN B-12 1,000 mcg tablet Generic drug:  cyanocobalamin Your next dose is:  Tomorrow Dose:  1000 mcg Take 1,000 mcg by mouth daily. Refills:  0 Discharge Instructions DISCHARGE SUMMARY from Nurse The following personal items collected during your admission are returned to you:  
Dental Appliance: Dental Appliances: Lowers; Other (comment) (bridge ) Vision: Visual Aid: Glasses Hearing Aid:   na 
Jewelry: Jewelry: None Clothing: Clothing: At bedside Other Valuables: Other Valuables: None Valuables sent to safe:   na 
 
 
 
 
PATIENT INSTRUCTIONS: 
 
New Medications: 
 
Dilaudid 2 mg tabs Take 1-2 tabs every 4-6 hrs as needed for pain. Toradol 10 mg tabs Take 1 tab every 6 hrs x 5 days. Phenergan 25 mg tabs Take 1 tab every 8 hrs as needed for nausea. Valium 5 mg tabs Take 1 tab at bedtime as needed for sleep. After general anesthesia or intravenous sedation, for 24 hours or while taking prescription Narcotics: · Limit your activities · Do not drive and operate hazardous machinery · Do not make important personal or business decisions · Do  not drink alcoholic beverages · If you have not urinated within 8 hours after discharge, please contact your surgeon on call. Report the following to your surgeon: 
· Excessive pain, swelling, redness or odor of or around the surgical area · Temperature over 101 · Nausea and vomiting lasting longer than 4 hours or if unable to take medications · Any signs of decreased circulation or nerve impairment to extremity: change in color, persistent  numbness, tingling, coldness or increase pain · Any questions, call office @ [phone]. What to do at Home: Recommended activity: activity as tolerated, as instructed per Dr. Dennis Street. Continue with exercises taught by Physical Therapy. Resume per hospital diet. Wear sling to right arm. Use ice and elevate arm to decrease pain and swelling. If you experience any of the following symptoms temp>101, pain unrelieved by meds, or persistent nausea or vomitting, please follow up with Dr. Dennis Street @ [phone]. *  Please give a list of your current medications to your Primary Care Provider. *  Please update this list whenever your medications are discontinued, doses are 
    changed, or new medications (including over-the-counter products) are added. *  Please carry medication information at all times in case of emergency situations. Shoulder Arthroscopy: What to Expect at Home Your Recovery Your arm may be in a sling. You will feel tired for several days. Your shoulder will be swollen, and you may notice that your skin is a different color near the cuts the doctor made (incisions). Your hand and arm may also be swollen. This is normal and will go away in a few days. Depending on the medicine you had during the surgery, your entire arm may feel numb or like you cannot move it. This goes away in 12 to 24 hours. When you can return to work or your usual routine will depend on your shoulder problem. Most people need 6 weeks or longer to recover. How much time you need depends on the surgery that was done. You may have to limit your activity until your shoulder strength and range of motion return to normal. You may also be in a rehabilitation program (rehab). If you have a desk job, you may be able to return to work a few days after the surgery. If you lift things at work, it may take months before you return to work. This care sheet gives you a general idea about how long it will take for you to recover. But each person recovers at a different pace. Follow the steps below to get better as quickly as possible. How can you care for yourself at home? Activity · Rest when you feel tired. Getting enough sleep will help you recover. You may be more comfortable if you sleep in a reclining chair. To make your arm and shoulder feel better, keep a thin pillow under the back of your arm while you are lying down. · Try to walk each day. Start by walking a little more than you did the day before. Bit by bit, increase the amount you walk. Walking boosts blood flow and helps prevent pneumonia and constipation. · For 2 to 3 weeks, avoid lifting anything heavier than a plate or a glass. You need to give your shoulder time to heal. 
· Your arm may be in a sling. You may need to use the sling for a few days to a few weeks. Your doctor will advise you on how long to wear the sling. · You may take the sling off when you dress or wash. · Do not use your arm for repeated movements. These include painting, vacuuming, or using a computer. Diet · You can eat your normal diet. If your stomach is upset, try bland, low-fat foods like plain rice, broiled chicken, toast, and yogurt. · Drink plenty of fluids, unless your doctor tells you not to. · You may notice that your bowel movements are not regular right after your surgery. This is common. Try to avoid constipation and straining with bowel movements. You may want to take a fiber supplement every day. If you have not had a bowel movement after a couple of days, ask your doctor about taking a mild laxative. Medicines · Take pain medicines exactly as directed. ¨ If the doctor gave you a prescription medicine for pain, take it as prescribed. ¨ If you are not taking a prescription pain medicine, ask your doctor if you can take an over-the-counter medicine. · If you think your pain medicine is making you sick to your stomach: 
¨ Take your medicine after meals (unless your doctor has told you not to). ¨ Ask your doctor for a different pain medicine. · If your doctor prescribed antibiotics, take them as directed. Do not stop taking them just because you feel better. You need to take the full course of antibiotics. Incision care · If you have a dressing over your incision, keep it clean and dry. You may remove it 2 to 3 days after the surgery. · If your incision is open to the air, keep the area clean and dry. · If you have strips of tape on the incision, leave the tape on for a week or until it falls off. Exercise · You may need rehabilitation. This is a series of exercises you do after your surgery. Rehab helps you get back your shoulder's range of motion and strength. You will work with your doctor and physical therapist to plan this exercise program. To get the best results, you need to do the exercises correctly and as often and as long as your doctor tells you. Ice · To reduce swelling and pain, put ice or a cold pack on your shoulder for 10 to 20 minutes at a time. Do this every 1 to 2 hours. Put a thin cloth between the ice and your skin. Follow-up care is a key part of your treatment and safety. Be sure to make and go to all appointments, and call your doctor if you are having problems. It's also a good idea to know your test results and keep a list of the medicines you take. When should you call for help? Call 911 anytime you think you may need emergency care. For example, call if: 
· You passed out (lost consciousness). · You have severe trouble breathing. · You have sudden chest pain and shortness of breath, or you cough up blood. Call your doctor now or seek immediate medical care if: 
· Your hand is cool, pale, or numb, or it changes color. · You are unable to move your fingers, wrist, or elbow. · You are sick to your stomach or cannot keep fluids down. · You have pain that does not get better after you take pain medicine. · You have signs of infection, such as: 
¨ Increased pain, swelling, warmth, or redness. ¨ Red streaks leading from the incision. ¨ Pus draining from the incision. ¨ A fever. · You have loose stitches, or your incision comes open. · Your incision bleeds through your first dressing or is still bleeding 3 days after your surgery. Watch closely for changes in your health, and be sure to contact your doctor if: 
· Your sling feels too tight, and you cannot loosen it. · You have new or increased swelling in your arm. · You have new pain that develops in another area of the affected limb. For example, you have pain in your hand or elbow. · You do not have a bowel movement after taking a laxative. · You do not get better as expected. Where can you learn more? Go to [employer] Enter Y403 in the search box to learn more about \"Shoulder Arthroscopy: What to Expect at Home. \"  
© 2331-6171 [employer]. Care instructions adapted under license by [employer] (which disclaims liability or warranty for this information). This care instruction is for use with your licensed healthcare professional. If you have questions about a medical condition or this instruction, always ask your healthcare professional. [place] [address] any warranty or liability for your use of this information. Content Version: 68.7.478867; Current as of: June 4, 2014 These are general instructions for a healthy lifestyle: No smoking/ No tobacco products/ Avoid exposure to second hand smoke Surgeon General's Warning:  Quitting smoking now greatly reduces serious risk to your health. Obesity, smoking, and sedentary lifestyle greatly increases your risk for illness A healthy diet, regular physical exercise & weight monitoring are important for maintaining a healthy lifestyle You may be retaining fluid if you have a history of heart failure or if you experience any of the following symptoms:  Weight gain of 3 pounds or more overnight or 5 pounds in a week, increased swelling in our hands or feet or shortness of breath while lying flat in bed. Please call your doctor as soon as you notice any of these symptoms; do not wait until your next office visit. Recognize signs and symptoms of STROKE: 
 
F-face looks uneven A-arms unable to move or move unevenly S-speech slurred or non-existent T-time-call 911 as soon as signs and symptoms begin-DO NOT go Back to bed or wait to see if you get better-TIME IS BRAIN. The discharge information has been reviewed with the patient. The patient verbalized understanding. Discharge Orders None [facility] Announcement We are excited to announce that we are making your provider's discharge notes available to you in [facility]. You will see these notes when they are completed and signed by the physician that discharged you from your recent hospital stay. If you have any questions or concerns about any information you see in [facility], please call the Health Information Department where you were seen or reach out to your Primary Care Provider for more information about your plan of care. Introducing [facility]! Steph Cazares introduces [employer] patient portal. Now you can access parts of your medical record, email your doctor's office, and request medication refills online. 1. In your internet browser, go to [email or web address] 2. Click on the First Time User? Click Here link in the Sign In box. You will see the New Member Sign Up page. 3. Enter your [facility] Access Code exactly as it appears below. You will not need to use this code after youve completed the sign-up process. If you do not sign up before the expiration date, you must request a new code. · [employer] Access Code: [email or web address] Expires: 6/12/2017  4:59 PM 
 
4.  Enter the last four digits of your Social Security Number (xxxx) and Date of Birth (mm/dd/yyyy) as indicated and click Submit. You will be taken to the next sign-up page. 5. Create a [employer] ID. This will be your [employer] login ID and cannot be changed, so think of one that is secure and easy to remember. 6. Create a [employer] password. You can change your password at any time. 7. Enter your Password Reset Question and Answer. This can be used at a later time if you forget your password. 8. Enter your e-mail address. You will receive e-mail notification when new information is available in [address] 9. Click Sign Up. You can now view and download portions of your medical record. 10. Click the Download Summary menu link to download a portable copy of your medical information. If you have questions, please visit the Frequently Asked Questions section of the [employer] website. Remember, [employer] is NOT to be used for urgent needs. For medical emergencies, dial 911. Now available from your iPhone and Android! General Information Please provide this summary of care documentation to your next provider. Patient Signature:  ____________________________________________________________ Date:  ____________________________________________________________  
  
Mariya Jen Provider Signature:  ____________________________________________________________ Date:  ____________________________________________________________

## 2017-03-23 NOTE — ANESTHESIA PROCEDURE NOTES
Peripheral Block    Start time: 3/23/2017 3:41 PM  End time: 3/23/2017 3:57 PM  Performed by: Kentrell Arriola  Authorized by: Kentrell Arriola       Pre-procedure: Indications: at surgeon's request and post-op pain management    Preanesthetic Checklist: patient identified, risks and benefits discussed, site marked, timeout performed, anesthesia consent given and patient being monitored    Timeout Time: 15:40          Block Type:   Block Type:   Interscalene  Laterality:  Right  Monitoring:  Standard ASA monitoring, responsive to questions, oxygen, continuous pulse ox, frequent vital sign checks and heart rate  Injection Technique:  Single shot  Procedures: ultrasound guided and nerve stimulator    Patient Position: supine  Prep: chlorhexidine    Location:  Interscalene  Needle Type:  Stimuplex  Needle Gauge:  22 G  Needle Localization:  Nerve stimulator and ultrasound guidance  Motor Response: minimal motor response >0.4 mA    Medication Injected:  0.375%  bupivacaine  Adds:  Epi 1:400K  Volume (mL):  24    Assessment:  Number of attempts:  3  Injection Assessment:  Incremental injection every 5 mL, negative aspiration for CSF, ultrasound image on chart, local visualized surrounding nerve on ultrasound, negative aspiration for blood, no intravascular symptoms and no paresthesia  Patient tolerance:  Patient tolerated the procedure well with no immediate complications

## 2017-03-24 VITALS
HEIGHT: 69 IN | HEART RATE: 52 BPM | OXYGEN SATURATION: 98 % | RESPIRATION RATE: 16 BRPM | SYSTOLIC BLOOD PRESSURE: 148 MMHG | WEIGHT: 178 LBS | BODY MASS INDEX: 26.36 KG/M2 | TEMPERATURE: 97.2 F | DIASTOLIC BLOOD PRESSURE: 69 MMHG

## 2017-03-24 PROCEDURE — 74011000258 HC RX REV CODE- 258: Performed by: ORTHOPAEDIC SURGERY

## 2017-03-24 PROCEDURE — 74011250637 HC RX REV CODE- 250/637: Performed by: ORTHOPAEDIC SURGERY

## 2017-03-24 PROCEDURE — 97161 PT EVAL LOW COMPLEX 20 MIN: CPT

## 2017-03-24 PROCEDURE — G8979 MOBILITY GOAL STATUS: HCPCS

## 2017-03-24 PROCEDURE — 74011250636 HC RX REV CODE- 250/636: Performed by: ORTHOPAEDIC SURGERY

## 2017-03-24 PROCEDURE — G8978 MOBILITY CURRENT STATUS: HCPCS

## 2017-03-24 PROCEDURE — G8980 MOBILITY D/C STATUS: HCPCS

## 2017-03-24 PROCEDURE — 99218 HC RM OBSERVATION: CPT

## 2017-03-24 PROCEDURE — 94760 N-INVAS EAR/PLS OXIMETRY 1: CPT

## 2017-03-24 RX ADMIN — HYDROMORPHONE HYDROCHLORIDE 2 MG: 2 TABLET ORAL at 13:15

## 2017-03-24 RX ADMIN — RIVAROXABAN 20 MG: 20 TABLET, FILM COATED ORAL at 10:54

## 2017-03-24 RX ADMIN — CEFAZOLIN SODIUM 1 G: 1 INJECTION, POWDER, FOR SOLUTION INTRAMUSCULAR; INTRAVENOUS at 09:25

## 2017-03-24 RX ADMIN — HYDROMORPHONE HYDROCHLORIDE 2 MG: 2 TABLET ORAL at 09:32

## 2017-03-24 RX ADMIN — DOCUSATE SODIUM 100 MG: 100 CAPSULE, LIQUID FILLED ORAL at 09:25

## 2017-03-24 NOTE — DISCHARGE INSTRUCTIONS
DISCHARGE SUMMARY from Nurse    The following personal items collected during your admission are returned to you:   Dental Appliance: Dental Appliances: Lowers; Other (comment) (bridge )  Vision: Visual Aid: Glasses  Hearing Aid:   na  Jewelry: Jewelry: None  Clothing: Clothing: At bedside  Other Valuables: Other Valuables: None  Valuables sent to safe:   na          PATIENT INSTRUCTIONS:    New Medications:    Dilaudid 2 mg tabs Take 1-2 tabs every 4-6 hrs as needed for pain. Toradol 10 mg tabs Take 1 tab every 6 hrs x 5 days. Phenergan 25 mg tabs Take 1 tab every 8 hrs as needed for nausea. Valium 5 mg tabs Take 1 tab at bedtime as needed for sleep. After general anesthesia or intravenous sedation, for 24 hours or while taking prescription Narcotics:  · Limit your activities  · Do not drive and operate hazardous machinery  · Do not make important personal or business decisions  · Do  not drink alcoholic beverages  · If you have not urinated within 8 hours after discharge, please contact your surgeon on call. Report the following to your surgeon:  · Excessive pain, swelling, redness or odor of or around the surgical area  · Temperature over 101  · Nausea and vomiting lasting longer than 4 hours or if unable to take medications  · Any signs of decreased circulation or nerve impairment to extremity: change in color, persistent  numbness, tingling, coldness or increase pain  · Any questions, call office @ [phone]. What to do at Home:  Recommended activity: activity as tolerated, as instructed per Dr. Kristel Lake. Continue with exercises taught by Physical Therapy. Resume per hospital diet. Wear sling to right arm. Use ice and elevate arm to decrease pain and swelling. If you experience any of the following symptoms temp>101, pain unrelieved by meds, or persistent nausea or vomitting, please follow up with Dr. Kristel Lake @ [phone].       *  Please give a list of your current medications to your Primary Care Provider. *  Please update this list whenever your medications are discontinued, doses are      changed, or new medications (including over-the-counter products) are added. *  Please carry medication information at all times in case of emergency situations. Shoulder Arthroscopy: What to Expect at Home  Your Recovery     Your arm may be in a sling. You will feel tired for several days. Your shoulder will be swollen, and you may notice that your skin is a different color near the cuts the doctor made (incisions). Your hand and arm may also be swollen. This is normal and will go away in a few days. Depending on the medicine you had during the surgery, your entire arm may feel numb or like you cannot move it. This goes away in 12 to 24 hours. When you can return to work or your usual routine will depend on your shoulder problem. Most people need 6 weeks or longer to recover. How much time you need depends on the surgery that was done. You may have to limit your activity until your shoulder strength and range of motion return to normal. You may also be in a rehabilitation program (rehab). If you have a desk job, you may be able to return to work a few days after the surgery. If you lift things at work, it may take months before you return to work. This care sheet gives you a general idea about how long it will take for you to recover. But each person recovers at a different pace. Follow the steps below to get better as quickly as possible. How can you care for yourself at home? Activity  · Rest when you feel tired. Getting enough sleep will help you recover. You may be more comfortable if you sleep in a reclining chair. To make your arm and shoulder feel better, keep a thin pillow under the back of your arm while you are lying down. · Try to walk each day. Start by walking a little more than you did the day before. Bit by bit, increase the amount you walk.  Walking boosts blood flow and helps prevent pneumonia and constipation. · For 2 to 3 weeks, avoid lifting anything heavier than a plate or a glass. You need to give your shoulder time to heal.  · Your arm may be in a sling. You may need to use the sling for a few days to a few weeks. Your doctor will advise you on how long to wear the sling. · You may take the sling off when you dress or wash. · Do not use your arm for repeated movements. These include painting, vacuuming, or using a computer. Diet  · You can eat your normal diet. If your stomach is upset, try bland, low-fat foods like plain rice, broiled chicken, toast, and yogurt. · Drink plenty of fluids, unless your doctor tells you not to. · You may notice that your bowel movements are not regular right after your surgery. This is common. Try to avoid constipation and straining with bowel movements. You may want to take a fiber supplement every day. If you have not had a bowel movement after a couple of days, ask your doctor about taking a mild laxative. Medicines  · Take pain medicines exactly as directed. ¨ If the doctor gave you a prescription medicine for pain, take it as prescribed. ¨ If you are not taking a prescription pain medicine, ask your doctor if you can take an over-the-counter medicine. · If you think your pain medicine is making you sick to your stomach:  ¨ Take your medicine after meals (unless your doctor has told you not to). ¨ Ask your doctor for a different pain medicine. · If your doctor prescribed antibiotics, take them as directed. Do not stop taking them just because you feel better. You need to take the full course of antibiotics. Incision care  · If you have a dressing over your incision, keep it clean and dry. You may remove it 2 to 3 days after the surgery. · If your incision is open to the air, keep the area clean and dry. · If you have strips of tape on the incision, leave the tape on for a week or until it falls off. Exercise  · You may need rehabilitation. This is a series of exercises you do after your surgery. Rehab helps you get back your shoulder's range of motion and strength. You will work with your doctor and physical therapist to plan this exercise program. To get the best results, you need to do the exercises correctly and as often and as long as your doctor tells you. Ice  · To reduce swelling and pain, put ice or a cold pack on your shoulder for 10 to 20 minutes at a time. Do this every 1 to 2 hours. Put a thin cloth between the ice and your skin. Follow-up care is a key part of your treatment and safety. Be sure to make and go to all appointments, and call your doctor if you are having problems. It's also a good idea to know your test results and keep a list of the medicines you take. When should you call for help? Call 911 anytime you think you may need emergency care. For example, call if:  · You passed out (lost consciousness). · You have severe trouble breathing. · You have sudden chest pain and shortness of breath, or you cough up blood. Call your doctor now or seek immediate medical care if:  · Your hand is cool, pale, or numb, or it changes color. · You are unable to move your fingers, wrist, or elbow. · You are sick to your stomach or cannot keep fluids down. · You have pain that does not get better after you take pain medicine. · You have signs of infection, such as:  ¨ Increased pain, swelling, warmth, or redness. ¨ Red streaks leading from the incision. ¨ Pus draining from the incision. ¨ A fever. · You have loose stitches, or your incision comes open. · Your incision bleeds through your first dressing or is still bleeding 3 days after your surgery. Watch closely for changes in your health, and be sure to contact your doctor if:  · Your sling feels too tight, and you cannot loosen it. · You have new or increased swelling in your arm. · You have new pain that develops in another area of the affected limb.  For example, you have pain in your hand or elbow. · You do not have a bowel movement after taking a laxative. · You do not get better as expected. Where can you learn more? Go to [employer]  Enter J792 in the search box to learn more about \"Shoulder Arthroscopy: What to Expect at Home. \"   © 6154-6026 [employer]. Care instructions adapted under license by [employer] (which disclaims liability or warranty for this information). This care instruction is for use with your licensed healthcare professional. If you have questions about a medical condition or this instruction, always ask your healthcare professional. [place] [address] any warranty or liability for your use of this information. Content Version: 36.7.816069; Current as of: June 4, 2014        These are general instructions for a healthy lifestyle:    No smoking/ No tobacco products/ Avoid exposure to second hand smoke    Surgeon General's Warning:  Quitting smoking now greatly reduces serious risk to your health. Obesity, smoking, and sedentary lifestyle greatly increases your risk for illness    A healthy diet, regular physical exercise & weight monitoring are important for maintaining a healthy lifestyle    You may be retaining fluid if you have a history of heart failure or if you experience any of the following symptoms:  Weight gain of 3 pounds or more overnight or 5 pounds in a week, increased swelling in our hands or feet or shortness of breath while lying flat in bed. Please call your doctor as soon as you notice any of these symptoms; do not wait until your next office visit. Recognize signs and symptoms of STROKE:    F-face looks uneven    A-arms unable to move or move unevenly    S-speech slurred or non-existent    T-time-call 911 as soon as signs and symptoms begin-DO NOT go       Back to bed or wait to see if you get better-TIME IS BRAIN.         The discharge information has been reviewed with the patient. The patient verbalized understanding.

## 2017-03-24 NOTE — PERIOP NOTES
Dr Pepito Weston aware of elevating hypertension again and that the patient has a consult for Hospitalist on the [employer]. Orders received to transfer to floor.

## 2017-03-24 NOTE — PROGRESS NOTES
Pt right shoulder dressing removed and sterile 4x4s and tegaderm applied to incision site. Incision site clear with no obvious s/s of infection noted. Discharge plans in progress for today.

## 2017-03-24 NOTE — PERIOP NOTES
TRANSFER - OUT REPORT:    Verbal report given to [facility] RN(name) on Nahmoi Janet  being transferred to [facility](unit) for routine post - op       Report consisted of patients Situation, Background, Assessment and   Recommendations(SBAR). Information from the following report(s) SBAR, Kardex, OR Summary, Procedure Summary, Intake/Output and MAR was reviewed with the receiving nurse. Opportunity for questions and clarification was provided.       Patient transported with:   O2 @ 3 liters  Tech

## 2017-03-24 NOTE — PROGRESS NOTES
Orthopedic Joint Progress Note    2017  Admit Date: 3/23/2017  Admit Diagnosis: Posterior dislocation of right acromioclavicular joint, initial encounter [S43.151A]  Biceps muscle tear, right, sequela [S46.111S]    1 Day Post-Op    Subjective: no complaints     [employer]     Review of Systems: Pertinent items are noted in HPI. Objective:     PT/OT:     PATIENT MOBILITY                           Vital Signs:    Blood pressure 166/73, pulse (!) 53, temperature 96.9 °F (36.1 °C), resp. rate 18, height 5' 9\" (1.753 m), weight 80.7 kg (178 lb), SpO2 95 %.   Temp (24hrs), Av.6 °F (36.4 °C), Min:96.9 °F (36.1 °C), Max:98.6 °F (37 °C)      Pain Control:   Pain Assessment  Pain Scale 1: Numeric (0 - 10)  Pain Intensity 1: 0  Pain Onset 1: post op  Pain Location 1: Shoulder  Pain Orientation 1: Right  Pain Description 1: Constant, Throbbing, Aching  Pain Intervention(s) 1: Repositioned    Meds:  Current Facility-Administered Medications   Medication Dose Route Frequency    rivaroxaban (XARELTO) tablet 20 mg  20 mg Oral DAILY WITH BREAKFAST    0.9% sodium chloride infusion  75 mL/hr IntraVENous CONTINUOUS    sodium chloride (NS) flush 5-10 mL  5-10 mL IntraVENous Q8H    sodium chloride (NS) flush 5-10 mL  5-10 mL IntraVENous PRN    ceFAZolin (ANCEF) 1 g in 0.9% sodium chloride (MBP/ADV) 50 mL  1 g IntraVENous Q8H    bisacodyl (DULCOLAX) suppository 10 mg  10 mg Rectal DAILY PRN    sodium phosphate (FLEET'S) enema 118 mL  1 Enema Rectal PRN    docusate sodium (COLACE) capsule 100 mg  100 mg Oral BID    promethazine (PHENERGAN) tablet 25 mg  25 mg Oral Q4H PRN    temazepam (RESTORIL) capsule 15 mg  15 mg Oral QHS PRN    HYDROmorphone (PF) (DILAUDID) injection 1 mg  1 mg IntraVENous Q1H PRN    HYDROmorphone (DILAUDID) tablet 4 mg  4 mg Oral Q3H PRN    HYDROmorphone (DILAUDID) tablet 2 mg  2 mg Oral Q3H PRN    phenol throat spray (CHLORASEPTIC) 1 Spray  1 Spray Oral PRN        LAB:    Lab Results   Component Value Date/Time    INR 1.3 03/16/2015 07:40 AM    INR 1.3 12/24/2014 04:35 AM    INR 1.0 12/23/2014 11:25 AM     Lab Results   Component Value Date/Time    HGB 15.1 03/09/2017 07:40 AM    HGB 14.5 09/12/2016 10:27 AM    HGB 16.1 02/11/2016 11:17 AM       Wound Incision Hand Left (Active)   Number of days:1862       Wound Incision Shoulder Left (Active)   Number of days:1862       Wound Incision Arm Left (Active)   Number of days:1862       Wound Incision Shoulder Left (Active)   Number of days:1862       Wound Hip Left (Active)   Number of days:1563       Wound Back Posterior; Lower (Active)   Number of days:1096       Wound Finger Right (Active)   Number of days:871       Wound Eye Left (Active)   Number of days:648       Wound Shoulder Right (Active)   Number of days:57       Wound Shoulder Right (Active)   DRESSING STATUS Clean, dry, and intact 3/23/2017 10:00 PM   DRESSING TYPE Dry dressing 3/23/2017 10:00 PM   SPLINT TYPE/MATERIAL Sling 3/23/2017  7:10 PM   Number of days:1             Physical Exam:  No significant changes    Assessment:      Principal Problem:    Posterior dislocation of right acromioclavicular joint (3/23/2017)    Active Problems:    Rupture of right biceps tendon (3/23/2017)         Plan:     Continue PT/OT/Rehab  Discharge home    Patient Expects to be Discharged to[de-identified] Other (comment)     Signed By: Jair Tiwari MD

## 2017-03-24 NOTE — DISCHARGE SUMMARY
[address]       Name:  Todd Burris   MR#:  [id number]   :  1945   Account #:  [de-identified]   Date of Adm:  2017       ADMISSION DIAGNOSES:   1. Type 4 acromioclavicular separation, right shoulder. 2. Torn head biceps tendon, right shoulder. DISCHARGE DIAGNOSES:   1. Type 4 acromioclavicular separation, right shoulder. 2. Torn head of the biceps tendon, right shoulder. Please see HPI, consults and operative notes for details. HOSPITAL COURSE: The patient is a 73-year-old gentleman who was   admitted on 2017, underwent an uncomplicated ORIF of a   type 4 AC separation, right shoulder and revision open biceps   tenodesis, right shoulder. Given the complexity of his medical   issues, he was kept overnight for observation. Hospitalist   consult was obtained perioperatively. On postop day #1, he was   afebrile, vital signs were stable. He was discharged home on   postoperative day #1. He will continue therapy on the outside   and followup in my office in 2 weeks.         MD OMAR Cooper / Bismark Wallis   D:  2017   09:25   T:  2017   10:08   Job #:  [id number]

## 2017-03-24 NOTE — PROGRESS NOTES
Pt discharge summary and home medication sheet reviewed and signed by pt. Copy given for take home use and pt was allowed time for questions. rx x4 given to pt from Dr Gertrude Luna. Pt assessment as charted. VSS. Pain to right shoulder controlled with po dilaudid. Appetite good. Pt voiding well. No c/os of CP or SOB. Pt awaiting for family for discharge.

## 2017-03-24 NOTE — PERIOP NOTES
Dr Nakul Moe called regarding persistent elevating hypertension. B/Ps running 200's/90's. Orders received. Pain rated 3/10.

## 2017-03-24 NOTE — PROGRESS NOTES
Problem: Mobility Impaired (Adult and Pediatric)  Goal: *Acute Goals and Plan of Care (Insert Text)      PHYSICAL THERAPY: INITIAL ASSESSMENT, DISCHARGE, TREATMENT DAY: DAY OF ASSESSMENT, AM 3/24/2017  OBSERVATION: Hospital Day: 2  [place] / [place] / Product Type: Medicare /      NAME/AGE/GENDER: Eduardo Leblanc is a 81 y.o. male      PRIMARY DIAGNOSIS: Posterior dislocation of right acromioclavicular joint, initial encounter [S43.151A]  Biceps muscle tear, right, sequela [S46.111S] Posterior dislocation of right acromioclavicular joint Posterior dislocation of right acromioclavicular joint  Procedure(s) (LRB):  OPEN REDUCTION TYPE IV AC JOINT SEPARATION RIGHT SHOULDER 2. REVISION OPEN BICEPS TENODESIS RIGHT SHOULDER   (Right)  1 Day Post-Op  ICD-10: Treatment Diagnosis:       · Pain in Right Shoulder (M25.511)  · Stiffness of Right Shoulder, Not elsewhere classified (M25.611)      Precaution/Allergies:  Epinephrine and Aspirin       ASSESSMENT:      Mr. Peck Drain s/p OPEN REDUCTION TYPE IV AC JOINT SEPARATION RIGHT SHOULDER 2. REVISION OPEN BICEPS TENODESIS RIGHT SHOULDER on 3/24/17. He was here 8 weeks ago and had a shoulder surgery on the right. Was in therapy for that at outpatient. At home, his biceps ruptured and clavicle . Now s/p above surgery. Pt met while walking in hallway safely and independently. He already had knowledge of what he was supposed to do with his right arm. Issued him a written HEP with instructions per the MD order. Pt verbalizes understanding. Left him out of sling because RN was on her way to change his dressing. Pt did not have any questions. He is planning on going home this morning. Went back in after pt ate lunch to assist him with his ultrasling.        This section established at most recent assessment     INTERVENTIONS PLANNED: issued a written HEP to patient      TREATMENT PLAN: Frequency/Duration: one time visit for evaluation RECOMMENDED REHABILITATION/EQUIPMENT: (at time of discharge pending progress): pt to follow up with orthopedist for further instructions. HISTORY:   History of Present Injury/Illness (Reason for Referral):  Pt admitted with above diagnosis and s/p OPEN REDUCTION TYPE IV AC JOINT SEPARATION RIGHT SHOULDER 2. REVISION OPEN BICEPS TENODESIS RIGHT SHOULDER on 3/23/17  Past Medical History/Comorbidities:   Mr. Shena Boswell  has a past medical history of Acute sinusitis; Anaphylactic reaction (2/1/2016); AR (allergic rhinitis) (7/27/2016); Bicipital tenosynovitis (2/17/2012); Bradycardia (02/02/2016); Carpal tunnel syndrome (2/17/2012); Chronic back pain (12/23/2014); Chronic deep vein thrombosis of lower extremity ([address] (3/21/2016); Chronic pansinusitis (7/27/2016); Chronic sinusitis (7/27/2016); DNS (deviated nasal septum); ARCE (dyspnea on exertion) (12/23/2014); Elevated serum creatinine (12/23/2014); Epistaxis; Essential hypertension, benign (8/16/2011); Factor V Leiden ([address] Glaucoma; H/O echocardiogram (02/02/2016); 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); Hypercholesteremia; Hyperkalemia (12/23/2014); Hypertension; Hypertrophy of nasal turbinates (7/27/2016); Left leg DVT ([address] (12/23/2014); Leukocytosis (12/23/2014); Lumbar spinal stenosis; Nasal obstruction; Nasal polyp (7/27/2016); Nausea & vomiting; Osteoarthritis; Osteoarthritis of left hip (12/12/2012); Primary localized osteoarthrosis, shoulder region (2/17/2012); Pulmonary embolism ([address] (1981); 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  He also has no past medical history of Difficult intubation; Malignant hyperthermia due to anesthesia; or Pseudocholinesterase deficiency. Mr. Liv Kirby  has a past surgical history that includes colonoscopy (00,05,10); carpal tunnel release (2012); carpal tunnel release (1980); blepharoplasty (Bilateral, 2009); vascular access (2008); vascular access (2012); lap cholecystectomy (2001); appendectomy (1980); hernia repair (2010); hernia repair (Left, 2007); other surgical (2000); lumbar laminectomy; hip replacement (Left, 12/12/2012); vitrectomy (Left, 6/15/15); orthopaedic (Bilateral); orthopaedic (Right, 2006); orthopaedic (Bilateral); knee arthroscopy (Right, 2006); shoulder arthroscopy (Left, 2/17/2012); orthopaedic (Right); back surgery; heent (Right, 11/08); tonsillectomy (1950); cataract removal (Bilateral); corneal transplant (Right, 2004); sinus surgery proc unlisted; heent; heent; and corneal transplant (Right, 06/14/2016). Social History/Living Environment:   Home Environment: Private residence  # Steps to Enter: 0  One/Two Story Residence: One story  Living Alone: No  Support Systems: Spouse/Significant Other/Partner  Patient Expects to be Discharged to[de-identified] Private residence  Current DME Used/Available at Home: None  Prior Level of Function/Work/Activity:  Pt living at home, independent with gait and ADLS, participating in outpatient PT   Number of Personal Factors/Comorbidities that affect the Plan of Care: 0: LOW COMPLEXITY   EXAMINATION:   Most Recent Physical Functioning:   Gross Assessment:  AROM: Within functional limits (except right upper extremity)  Strength:  Within functional limits (except right upper extremity)               Posture:  Posture (WDL): Within defined limits  Balance:  Sitting: Intact  Standing: Intact Bed Mobility:  Supine to Sit: Independent  Sit to Supine: Independent  Wheelchair Mobility:     Transfers:  Sit to Stand: Independent  Stand to Sit: Independent  Gait:     Distance (ft): 650 Feet (ft)  Ambulation - Level of Assistance: Independent       Body Structures Involved:  1. Bones  2. Joints  3. Muscles Body Functions Affected:  1. Movement Related Activities and Participation Affected:  1. Mobility  2. Self Care   Number of elements that affect the Plan of Care: 4+: HIGH COMPLEXITY   CLINICAL PRESENTATION:   Presentation: Stable and uncomplicated: LOW COMPLEXITY   CLINICAL DECISION MAKIN[address] [place] [address] AM-PAC 6 Clicks   Basic Mobility Inpatient Short Form  How much difficulty does the patient currently have. .. Unable A Lot A Little None   1. Turning over in bed (including adjusting bedclothes, sheets and blankets)? [ ] 1   [ ] 2   [ ] 3   [X] 4   2. Sitting down on and standing up from a chair with arms ( e.g., wheelchair, bedside commode, etc.)   [ ] 1   [ ] 2   [ ] 3   [X] 4   3. Moving from lying on back to sitting on the side of the bed? [ ] 1   [ ] 2   [ ] 3   [X] 4   How much help from another person does the patient currently need. .. Total A Lot A Little None   4. Moving to and from a bed to a chair (including a wheelchair)? [ ] 1   [ ] 2   [ ] 3   [X] 4   5. Need to walk in hospital room? [ ] 1   [ ] 2   [ ] 3   [X] 4   6. Climbing 3-5 steps with a railing? [ ] 1   [ ] 2   [ ] 3   [X] 4   © , Trustees of [address] [place] [address], under license to [employer]. All rights reserved    Score:  Initial: 24 Most Recent: X (Date: -- )     Interpretation of Tool:  Represents activities that are increasingly more difficult (i.e. Bed mobility, Transfers, Gait).        Score 24 23 22-20 19-15 14-10 9-7 6       Modifier CH CI CJ CK CL CM CN         · Mobility - Walking and Moving Around:               - CURRENT STATUS:    CH - 0% impaired, limited or restricted               - GOAL STATUS:           CH - 0% impaired, limited or restricted               - D/C STATUS:                       CH - 0% impaired, limited or restricted  [place] / [place] / Product Type: Medicare / Medical Necessity:     · issued HEP and pt with no further questions, will discharge PT at this time. Reason for Services/Other Comments:  · none. Use of outcome tool(s) and clinical judgement create a POC that gives a: Clear prediction of patient's progress: LOW COMPLEXITY                 TREATMENT:   (In addition to Assessment/Re-Assessment sessions the following treatments were rendered)   Pre-treatment Symptoms/Complaints:  none  Pain: Initial: no complaints of pain     Post Session:  No complaints      Assessment/Reassessment only, no treatment provided today       Date:  3/24/17 Date:    Date:      ACTIVITY/EXERCISE AM PM AM PM AM PM   Gripping 10             Wrist Flexion/Extension 10             Wrist Ulnar/Radial Deviation               Pronation/Supination 10             Elbow Flexion/Extension 10 gentle             Shoulder Flexion/Extension               Shoulder AB/ADduction               Shoulder IR/ER               Pulleys               Pendulums instructed on             Shrugs               Isometric:                 Flexion               Extension               ABduction               ADduction               Biceps/Triceps                               B = bilateral; AA = active assistive; A = active; P = passive     Treatment/Session Assessment:    · Response to Treatment:  Tolerated well, no questions  · Interdisciplinary Collaboration:  · Registered Nurse  · After treatment position/precautions:  · Supine in bed  · Bed/Chair-wheels locked  · Bed in low position  · Call light within reach  · RN notified  · Compliance with Program/Exercises: compliant all of the time.   · Recommendations/Intent for next treatment session:  One time visit for evaluation     Total Treatment Duration:  PT Patient Time In/Time Out  Time In: 0945  Time Out: [address] [facility] [address], PT

## 2017-03-24 NOTE — PROGRESS NOTES
Patient is alert and oriented x4. Able to verbalize needs. Resting quietly with no distress noted. Dry dressing to right shoulder is dry and intact. Neurovascular and peripheral vascular checks WNL. Voiding clear yellow urine. No c/o pain at this time. Bed low and locked. Call light within reach. Instructed to call for assistance. Patient verbalizes understanding. Will monitor.

## 2017-03-24 NOTE — ANESTHESIA POSTPROCEDURE EVALUATION
Post-Anesthesia Evaluation and Assessment    Patient: Yaneth Wright MRN: [id number]  SSN: [id number]    YOB: 1945  Age: 81 y.o. Sex: male       Cardiovascular Function/Vital Signs  Visit Vitals    BP (!) 205/86    Pulse (!) 52    Temp 37 °C (98.6 °F)    Resp 16    Ht 5' 9\" (1.753 m)    Wt 80.7 kg (178 lb)    SpO2 98%    BMI 26.29 kg/m2       Patient is status post general anesthesia for Procedure(s):  OPEN REDUCTION TYPE IV AC JOINT SEPARATION RIGHT SHOULDER 2. REVISION OPEN BICEPS TENODESIS RIGHT SHOULDER  .    Nausea/Vomiting: None    Postoperative hydration reviewed and adequate. Pain:  Pain Scale 1: FLACC (03/23/17 2030)  Pain Intensity 1: 0 (03/23/17 2030)   Managed    Neurological Status:   Neuro (WDL): Exceptions to WDL (03/23/17 1910)  Neuro  Neurologic State: Alert (03/23/17 2040)  Cognition: Follows commands (03/23/17 2015)  LUE Motor Response: Purposeful (03/23/17 2015)  LLE Motor Response: Purposeful (03/23/17 2015)  RUE Motor Response: Pharmocologically paralyzed (03/23/17 2015)  RLE Motor Response: Purposeful (03/23/17 2015)   At baseline    Mental Status and Level of Consciousness: Arousable    Pulmonary Status:   O2 Device: Nasal cannula (03/23/17 2015)   Adequate oxygenation and airway patent    Complications related to anesthesia: None    Post-anesthesia assessment completed.  No concerns    Signed By: Ca Burdick MD     March 23, 2017

## 2017-03-24 NOTE — OP NOTES
[address]   OPERATIVE REPORT       Name:  Abhay Oneil   MR#:  [id number]   :  1945   Account #:  [de-identified]   Date of Adm:  2017       DATE OF SURGERY: 2017     PREOPERATIVE DIAGNOSES:   1. Type 4 acromioclavicular separation, right shoulder. 2. Torn long head biceps tendon, right shoulder. POSTOPERATIVE DIAGNOSES:    1. Type 4 acromioclavicular separation, right shoulder. 2. Torn long head biceps tendon, right shoulder. PROCEDURE:    1. Open reduction, internal fixation type 4 AC separation, right   shoulder. 2. Open revision biceps tenodesis, right shoulder. OPERATIVE SURGEON: Ben Menard. Rizwana Costa MD    PATHOLOGY:    1. Type 4 AC separation. 2. Torn long head biceps tendon. CPT CODES: 35326 with a modifier 22 for revision procedure and   15928. ICD-10 CODES: Z97.852, Y80.384. HARDWARE UTILIZED: 2 Linda 5.5 anchors, 1 Synthes 15, 7-hole   right hook plate. INDICATIONS: The patient is a 71-year-old gentleman who has had a   multi-operated right shoulder. He is now over 2 months status post   arthroscopy right shoulder, ASD, extensive debridement SLAP   tear, labral tear, glenohumeral joint subacromial space, mini open   rotator cuff repair, and biceps tenodesis. The patient had   multiple previous right shoulder surgeries. During his   postoperative course, patient has developed recalcitrant right   shoulder pain. It is unclear what exactly transpired, but he   presented to the office with a type 4 AC separation, which is   painful and recurrent torn long head biceps tendon. The patient is   now brought back to the operative suite for operative   intervention. PROCEDURE:  Following identification, the patient was taken to   the operating suite. Following administration of general anesthesia,   interscalene block for postop pain control, 2 g of IV Ancef, the   patient positioned operating table in the beach chair fashion.    The right shoulder was then prepped and draped in sterile   fashion. A transverse incision paralleling the clavicle was performed   from the acromion laterally to the middle third of the clavicle. Skin was incised. Subcutaneous tissue was then dissected down to   the clavicle, medially acromion laterally. The clavicle was then   subperiosteally exposed and mobilized. The patient had a   previous distal clavicle resection. Once the clavicle was   completely mobilized, the undersurface of the acromion was freed   as well. It was noted that a 15 hole 7 right hook plate was the   appropriate sized plate. At this point the clavicle was then   reduced, Forearm clamp was utilized to hold the plate to reduce   the plate to the clavicle. Multiples screws were placed into the   clavicle, securing anatomic reduction of the [facility] joint. One 5.5   Linda anchor was placed in the coracoid process and secured   around the posterior aspect of the clavicle and through the   plate to augment fixation. #5 sutures were placed around the   clavicle as well supplementing fixation. The wound was then   irrigated. Clavipectoral fascia was approximated with #5   Mersilene sutures. Skin was closed with 0 Vicryl figure-of-eight   sutures and a 2-0 Prolene subcuticular stitch. At this point, our attention was then turned to the biceps   tenodesis. A 4 cm incision in the inferior portion deltopectoral   interval was performed. Skin was incised. Subcutaneous tissue   was then dissected down to the biceps tendon. Biceps tendon was   noted to be torn. It was identified. It was completely mobilized. Pec major was released. It was brought out to length and it was   tenodesed utilizing one 5.5 anchor and multiple #5 Mersilene   sutures. This showed an excellent biceps tenodesis, which was   stable. At this point, the wound was then irrigated and closed with 0   Vicryl figure-of-eight sutures and a 2-0 Prolene subcuticular   stitch.  A sterile dressing was applied, sling and swathe was   applied. The patient then transferred to the recovery room in   stable condition.         MD OMAR Dao / Hina Grant   D:  03/23/2017   18:54   T:  03/24/2017   09:03   Job #:  [id number]

## 2017-03-24 NOTE — CONSULTS
HOSPITALIST consult  NAME:  Cecil Barrera   Age:  81 y.o.  :   1945   MRN:   [id number]  PCP: Cheli Sher MD  Treatment Team: Attending Provider: Kaveh Sarmiento MD; Consulting Provider: Lina Hammond MD  Full Code   HPI:   Mr. Jase Levine is a 82 yo male who underwent a right shoulder surgery POD #1. Hospitalist consulted for medical management. Pt has hx of HTN, factor V Leiden, PE, DVT. Pt on Xarelto. Was hypertensive post op however is controlled now. Denies CP, SOB, n/v/d, leg pain. Results summary of Diagnostic Studies/Procedures copied from within [facility] EMR:       Complete ROS done and is as stated in HPI or otherwise negative  Past Medical History:   Diagnosis Date    Acute sinusitis     Anaphylactic reaction 2016    AR (allergic rhinitis) 2016    Bicipital tenosynovitis 2012    Bradycardia 2016    Seen by [facility]-no follow-up required.  Carpal tunnel syndrome 2012    Chronic back pain 2014    Chronic deep vein thrombosis of lower extremity (HCC) 3/21/2016    Chronic pansinusitis 2016    Chronic sinusitis 2016    DNS (deviated nasal septum)     ARCE (dyspnea on exertion) 2014    Elevated serum creatinine 2014    Epistaxis     Essential hypertension, benign 2011    Factor V Leiden ([address]     managed with medication     Glaucoma     right eye    H/O echocardiogram 2016    EF 70-75%    Hereditary deficiency of other clotting factors ([address] 3/21/2016    Hypercholesteremia     managed with medication     Hyperkalemia 2014    Hypertension     well controlled-no medication at this time     Hypertrophy of nasal turbinates 2016    Left leg DVT ([address] 2014, , 2015-Followed by Dr. Marty Saenz. Takes Xarelto daily.      Leukocytosis 2014    Lumbar spinal stenosis     Nasal obstruction     Nasal polyp 2016    Nausea & vomiting     Osteoarthritis     right knee    Osteoarthritis of left hip 12/12/2012    Primary localized osteoarthrosis, shoulder region 2/17/2012    Pulmonary embolism ([address] 1981    right lung 1981, 1994,right lung 1997, right lung 2000 - Mango filter placed 2000. Followed by Dr. Stephanie Torres.     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    Spinal stenosis, lumbar region, with neurogenic claudication 3/24/2014    Superior glenoid labrum lesion 2/17/2012    Supraspinatus (muscle) (tendon) sprain 2/17/2012    Thromboembolus ([address] 9/1/1997    left thigh DVT     Unspecified adverse effect of anesthesia     very difficult IV stick per patient d/t Lovenox, has needed anesthesiologist to start several times/comes in a day ahead for PICC line       Past Surgical History:   Procedure Laterality Date    HX APPENDECTOMY  1980    HX BACK SURGERY      spinal stenosis    HX BLEPHAROPLASTY Bilateral 2009    HX CARPAL TUNNEL RELEASE  2012    left     [address]    right    HX CATARACT REMOVAL Bilateral     left eye 1994 & right eye 2002    HX COLONOSCOPY  00,05,10    HX CORNEAL TRANSPLANT Right 2004    HX CORNEAL TRANSPLANT Right 06/14/2016    sutures still present     HX HEENT Right 11/08     glaucoma surgery    HX HEENT      nasal reconstruction    HX HEENT      nasal polypectomy    HX HERNIA REPAIR  2010    abdominal hernia repair    HX HERNIA REPAIR Left 2007    inguinal hernia repair    HX HIP REPLACEMENT Left 12/12/2012    HX KNEE ARTHROSCOPY Right 2006     knee meniscus repair    HX LAP CHOLECYSTECTOMY  2001    HX LUMBAR LAMINECTOMY      HX ORTHOPAEDIC Bilateral     open shoulder AC reconstruction    HX ORTHOPAEDIC Right 2006    neuroma fromfoot    HX ORTHOPAEDIC Bilateral     right elbow 1979 & left elbow 2007    HX ORTHOPAEDIC Right     index finger    HX OTHER SURGICAL  2000    mango filter placed    HX SHOULDER ARTHROSCOPY Left 2/17/2012    HX TONSILLECTOMY  1950    HX VASCULAR ACCESS  2008    PICC line right arm placed & then removed     HX VASCULAR ACCESS  2012    PICC- removed    HX VITRECTOMY Left 6/15/15    SINUS SURGERY PROC UNLISTED      numerous      Prior to Admission Medications   Prescriptions Last Dose Informant Patient Reported? Taking? B.infantis-B.ani-B.long-B.bifi (PROBIOTIC 4X) 10-15 mg TbEC 3/20/2017  Yes No   Sig: Take  by mouth daily. Biotin 2,500 mcg cap 3/20/2017  Yes No   Sig: Take  by mouth daily. Brimonidine-Timolol (COMBIGAN) 0.2-0.5 % Drop 3/23/2017 at Unknown time  Yes Yes   Sig: Administer 1 drop to both eyes two (2) times a day. Indications: OPEN ANGLE GLAUCOMA   HYDROcodone-acetaminophen (NORCO)  mg tablet 3/22/2017 at Unknown time  No Yes   Sig: Take 1 Tab by mouth every six (6) hours as needed for Pain. Max Daily Amount: 4 Tabs. PRED FORTE 1 % DrpS 3/23/2017 at Unknown time  Yes Yes   Sig: Administer 1 drop to right eye two (2) times a day. Indications: pt states prevents infection   atorvastatin (LIPITOR) 80 mg tablet 3/21/2017 at Unknown time  Yes Yes   Sig: Take 40 mg by mouth daily. 1/2 tablet daily    Take DOS per anesthesia protocol. carboxymethylcellulose sodium (REFRESH LIQUIGEL) 1 % dlgl 3/23/2017 at Unknown time  Yes Yes   Sig: Apply 1 Drop to eye every four (4) hours. cyanocobalamin (VITAMIN B-12) 1,000 mcg tablet 3/21/2017  Yes No   Sig: Take 1,000 mcg by mouth daily. diazePAM (VALIUM) 10 mg tablet 3/22/2017 at Unknown time  Yes Yes   Sig: Take 10 mg by mouth nightly. As needed   Indications: sleep   omega-3 fatty acids-vitamin e (FISH OIL) 1,000 mg cap 3/20/2017  Yes No   Sig: Take 2 Caps by mouth every morning. rivaroxaban (XARELTO) 20 mg tab tablet 3/21/2017  No No   Sig: Take 1 Tab by mouth daily (with breakfast).       Facility-Administered Medications: None     Allergies   Allergen Reactions    Epinephrine Anaphylaxis     Blindness in right eye     Aspirin Other (comments)     Pt states can't take aspirin with his blood thinning medication. Social History   Substance Use Topics    Smoking status: Never Smoker    Smokeless tobacco: Never Used    Alcohol use No      Family History   Problem Relation Age of Onset    Cancer Mother      lymphoma    Diabetes Mother      type II    Heart Disease Father     Diabetes Father      type II    Cancer Father      pancreatic ca    Other Father      Coronary Disease    Malignant Hyperthermia Neg Hx     Pseudocholinesterase Deficiency Neg Hx     Delayed Awakening Neg Hx     Post-op Nausea/Vomiting Neg Hx     Emergence Delirium Neg Hx     Post-op Cognitive Dysfunction Neg Hx           Objective:     Visit Vitals    /69    Pulse (!) 52    Temp 97.2 °F (36.2 °C)    Resp 16    Ht 5' 9\" (1.753 m)    Wt 80.7 kg (178 lb)    SpO2 98%    BMI 26.29 kg/m2      Temp (24hrs), Av.6 °F (36.4 °C), Min:96.9 °F (36.1 °C), Max:98.6 °F (37 °C)    Oxygen Therapy  O2 Sat (%): 98 % (17)  Pulse via Oximetry: 80 beats per minute (17)  O2 Device: Room air (17)  O2 Flow Rate (L/min): 0 l/min (17)  Physical Exam:  General:    Alert, cooperative, no distress, appears stated age. Head:   Normocephalic, without obvious abnormality, atraumatic. Nose:  Nares normal. No drainage or sinus tenderness. Lungs:   CTA, resp even and nonlabored  Heart:  S1S2 present without murmurs rubs gallops. RRR. No edema  Abdomen:   Soft, non-tender. Not distended. Bowel sounds normal. No masses  Extremities: No cyanosis. Limited ROM RUE  Skin:     Texture, turgor normal. No rashes or lesions. Not Jaundiced  Neurologic: A/O X3, no focal deficits      Data Review:   No results found for this or any previous visit (from the past 24 hour(s)). Assessment and Plan:      Active Hospital Problems    Diagnosis Date Noted    Posterior dislocation of right acromioclavicular joint 2017    Rupture of right biceps tendon 03/23/2017     HTN: controlled currently      Hx DVT/PE/Factor V Leiden:  Continue Xarelto, followed by Hematology      Time spent with pt 30 min  Notes, labs, VS, diagnostic testing reviewed  Case discussed with pt, care team, Dr. Caleb Beckford  Thank you for this consult. We will sing off. Call with questions.       Avis Cameron NP

## 2017-03-24 NOTE — PROGRESS NOTES
TRANSFER - IN REPORT:    Verbal report received from Juan Lind RN on Palmira Olson  being received from Postop for routine post - op      Report consisted of patients Situation, Background, Assessment and   Recommendations(SBAR). Information from the following report(s) Intake/Output and MAR was reviewed with the receiving nurse. Opportunity for questions and clarification was provided. Assessment completed upon patients arrival to unit and care assumed.

## 2017-05-01 ENCOUNTER — HOSPITAL ENCOUNTER (OUTPATIENT)
Dept: PHYSICAL THERAPY | Age: 72
Discharge: HOME | End: 2017-05-01
Payer: MEDICARE

## 2017-05-01 PROCEDURE — G8984 CARRY CURRENT STATUS: HCPCS

## 2017-05-01 PROCEDURE — G8985 CARRY GOAL STATUS: HCPCS

## 2017-05-01 PROCEDURE — 97110 THERAPEUTIC EXERCISES: CPT

## 2017-05-01 PROCEDURE — 97162 PT EVAL MOD COMPLEX 30 MIN: CPT

## 2017-05-01 NOTE — PROGRESS NOTES
Ambulatory/Rehab Services H2 Model Falls Risk Assessment    Risk Factor Pts. ·   Confusion/Disorientation/Impulsivity  []    4 ·   Symptomatic Depression  []   2 ·   Altered Elimination  []   1 ·   Dizziness/Vertigo  []   1 ·   Gender (Male)  []   1 ·   Any administered antiepileptics (anticonvulsants):  []   2 ·   Any administered benzodiazepines:  []   1 ·   Visual Impairment (specify):  []   1 ·   Portable Oxygen Use  []   1 ·   Orthostatic ? BP  []   1 ·   History of Recent Falls (within 3 mos.)  []   5     Ability to Rise from Chair (choose one) Pts. ·   Ability to rise in a single movement  []   0 ·   Pushes up, successful in one attempt  []   1 ·   Multiple attempts, but successful  []   3 ·   Unable to rise without assistance  []   4   Total: (5 or greater = High Risk) 1     Falls Prevention Plan:   []                Physical Limitations to Exercise (specify):   []                Mobility Assistance Device (type):   []                Exercise/Equipment Adaptation (specify):    ©2010 [facility] [address] Patent #[id number].  Federal Law prohibits the replication, distribution or use without written permission from [facility] [employer]

## 2017-05-01 NOTE — PROGRESS NOTES
Juan Amaya  : 1945 [facility] at [facility]  [address] [facility], [address] [place] [address],[address], [place] [address].  [phone]   [phone]       OUTPATIENT PHYSICAL THERAPY:Initial Assessment 2017      ICD-10: Treatment Diagnosis: Pain in right shoulder (M25.511),Stiffness of right shoulder, not elsewhere classified (M25.611)  Precautions/Allergies:   Epinephrine and Aspirin   Fall Risk Score: 1 (? 5 = High Risk)  MD Orders: PT- evaluate and treat, HEP, strengthening, ROM/progressive ROM MEDICAL/REFERRING DIAGNOSIS:  s/p right shoulder R clavicle and biceps  DATE OF ONSET:AC joint separation and biceps rupture- 2017 surgery- 3/23/17  REFERRING PHYSICIAN: Pelon Mclaughlin MD  RETURN PHYSICIAN APPOINTMENT: 17     INITIAL ASSESSMENT:  Mr. Okeefe Sers 6 weeks s/p Open reduction internal fixation type 4 AC joint separation and open revision R biceps tenodesis. He presents with Post-op pain, weakness, stiffness and decreased ROM are limiting normalized use and function of R UE. He will benefit from PT for guided shoulder rehab per protocol to promote safe return to normalized use of the UE with ADL/ activities. PROBLEM LIST (Impacting functional limitations):  1. Decreased ADL/Functional Activities  2. Post-op R shoulder and clavicle pain  3. Decreased R shoulder ROM   4. Weakness R shoulder INTERVENTIONS PLANNED:  1. Neuromuscular Re-education/Strengthening  2. Thermal and electric modalities, manual therapies for pain   3. Manual therapies, therapeutic exercises, HEP for ROM    4. Therapeutic exercises and HEP for strength   TREATMENT PLAN:  Effective Dates: 17 TO 17. Frequency/Duration: 3 times a week for 12 weeks  GOALS: (Goals have been discussed and agreed upon with patient.)  Short-Term Functional Goals: Time Frame: 4 weeks  1. Pt will demonstrate posture correction   2.  Report no more than mild intermittent pain to R shoulder with compensatory use during basic functional activities, and score less than 60% on the DASH. 3. R shoulder PROM forward elevation greater than 100 degrees and external rotation greater than 60 degrees to progress into functional ranges. 4. Demonstrate good R shoulder isometric strength with manual testing to progress into strength phase. 5. Independent with initial HEP. Discharge Goals: Time Frame: 12 weeks  1. Pt to maintain corrected posture during exercises and reaching. 2. No more than 3/10 intermittent pain R shoulder with return to normalized household and work activities, and score less than 50% on the DASH. 3. R shoulder AROM forward elevation greater than 110 degrees, external rotation greater than 70 degrees, and strength to shoulder are grossly WNL's for safe use with normalized activities. 4. Demonstrate good functional shoulder strength and endurance for return to normalized household and work activities. 5. Independent with advanced shoulder HEP for continued self-management. Rehabilitation Potential For Stated Goals: Good  Regarding Evelyn Sever therapy, I certify that the treatment plan above will be carried out by a therapist or under their direction. Thank you for this referral,  Corey Oshea PT       Referring Physician Signature: Kate Molina MD              Date                    The information in this section was collected on 5/1/17 (except where otherwise noted). HISTORY:   History of Present Injury/Illness (Reason for Referral):  Pt was attending therapy for post-op R shoulder scope. He arrived to his appointment with complaints of R clavicle pain with reaching and bicep area pain with attempts to lift the arm. He was cleared to continue therapy with respect to the pain. At his follow up appointment it was determined that he had a type 4 separation and he needed to have surgery.    Past Medical History/Comorbidities:   Mr. 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 Salo Gonsalez  has a past surgical history that includes  lumbar laminectomy; hip replacement (Left, 12/12/2012); orthopaedic (Bilateral); knee arthroscopy (Right, 2006); shoulder arthroscopy (Left, 2/17/2012); R shoulder scope 1/27/17  Social History/Living Environment:      Lives with wife in a house. Performs home repairs, yard work and laundry  Prior Level of Function/Work/Activity:  retired  Dominant Side:         RIGHT  Current Medications:       Current Outpatient Prescriptions:     diazePAM (VALIUM) 10 mg tablet, Take 10 mg by mouth nightly. As needed   Indications: sleep, Disp: , Rfl:     HYDROcodone-acetaminophen (NORCO)  mg tablet, Take 1 Tab by mouth every six (6) hours as needed for Pain. Max Daily Amount: 4 Tabs., Disp: 120 Tab, Rfl: 0    rivaroxaban (XARELTO) 20 mg tab tablet, Take 1 Tab by mouth daily (with breakfast). , Disp: 30 Tab, Rfl: 11    cyanocobalamin (VITAMIN B-12) 1,000 mcg tablet, Take 1,000 mcg by mouth daily. , Disp: , Rfl:     Biotin 2,500 mcg cap, Take  by mouth daily. , Disp: , Rfl:     B.infantis-B.ani-B.long-B.bifi (PROBIOTIC 4X) 10-15 mg TbEC, Take  by mouth daily. , Disp: , Rfl:     atorvastatin (LIPITOR) 80 mg tablet, Take 40 mg by mouth daily.  1/2 tablet daily  Take DOS per anesthesia protocol. , Disp: , Rfl:     carboxymethylcellulose sodium (REFRESH LIQUIGEL) 1 % dlgl, Apply 1 Drop to eye every four (4) hours. , Disp: , Rfl:     omega-3 fatty acids-vitamin e (FISH OIL) 1,000 mg cap, Take 2 Caps by mouth every morning., Disp: , Rfl:     Brimonidine-Timolol (COMBIGAN) 0.2-0.5 % Drop, Administer 1 drop to both eyes two (2) times a day. Indications: OPEN ANGLE GLAUCOMA, Disp: , Rfl:     PRED FORTE 1 % DrpS, Administer 1 drop to right eye two (2) times a day. Indications: pt states prevents infection, Disp: , Rfl:    Date Last Reviewed:  5/1/17   Number of Personal Factors/Comorbidities that affect the Plan of Care: 1-2: MODERATE COMPLEXITY   EXAMINATION:     Observation/Orthostatic Postural Assessment: pt with right shoulder protracted and guarded. Minimal movement of the right arm. Generalized right shoulder and biceps region atrophy. Mild bruising over the anterior shoulder/biceps region  Palpation:  Tender over the mid clavicle and biceps . Hardened scar tissue over the anterior shoulder. ROM:                 RUE AROM  R Shoulder Flexion: 20  R Shoulder External Rotation: 33 (at neutral, 50 at 45 deg abd)  RUE PROM  R Shoulder Flexion: 78  R Shoulder ABduction: 84  R Shoulder Internal Rotation: 47 (to R buttock)  R Shoulder External Rotation: 35 (neutral, 51 at 45 deg abd)  R Elbow Extension: -21 (from full extn)  R Forearm Supination: -11 (from neutral)         Strength: forearm and wrist observed to be at least 4-                 Special Tests: None  Neurological Screen: n/t  Functional Mobility: independent   Balance:  No deficits        Body Structures Involved:  1. Bones  2. Joints  3. Muscles  4. Ligaments Body Functions Affected:  1. Sensory/Pain  2. Neuromusculoskeletal  3. Movement Related Activities and Participation Affected:  1. General Tasks and Demands  2. Mobility  3. Self Care  4.  Domestic Life   Number of elements (examined above) that affect the Plan of Care: 3: MODERATE COMPLEXITY   CLINICAL PRESENTATION:   Presentation: Evolving clinical presentation with unstable and unpredictable characteristics: HIGH COMPLEXITY   CLINICAL DECISION MAKING:   Outcome Measure: Tool Used: Disabilities of the Arm, Shoulder and Hand (DASH) Questionnaire - Quick Version  Score:  Initial: 75  Most Recent: X/55 (Date: -- )   Interpretation of Score: The DASH is designed to measure the activities of daily living in person's with upper extremity dysfunction or pain. Each section is scored on a 1-5 scale, 5 representing the greatest disability. The scores of each section are added together for a total score of 55. This number is divided by 11, followed by subtracting 1 and multiplying by 25 to get a percent score of disability. This value represents the percentage disability: 0-20% minimal disability; 20-40% moderate disability; 40-60% severe disability; % dependent for care or exaggerated symptom behavior. Minimal detectable change is 12%. Score 11 12-19 20-28 29-37 38-45 46-54 55   Modifier CH CI CJ CK CL CM CN ? Carrying, Moving, and Handling Objects:     - CURRENT STATUS: CL - 60%-79% impaired, limited or restricted    - GOAL STATUS: CJ - 20%-39% impaired, limited or restricted    - D/C STATUS:  ---------------To be determined---------------    Medical Necessity:   · Skilled intervention continues to be required due to need for manual treatment prior to strengthening. Reason for Services/Other Comments:  · Patient continues to require skilled intervention due to need for guided progression of rehab as cleared by MD.   Use of outcome tool(s) and clinical judgement create a POC that gives a: Questionable prediction of patient's progress: MODERATE COMPLEXITY            TREATMENT:   (In addition to Assessment/Re-Assessment sessions the following treatments were rendered)  Pre-treatment Symptoms/Complaints:  Pt complains of severe stiffness, weakness and pain that is keeping him from sleeping or being able to get comfortable at all.  He denies any numbness or tingling. Pain: Initial:   Pain Intensity 1: 10  Post Session:  6 after     MANUAL THERAPY: (12 minutes): for ROM. PROM right shoulder ER at neutral and 45 deg abd, IR at 50 deg abd and scapula stabilized, forward elevation and abd- all with respect to pain. MODALITIES: (0 inutes):  none given per pt request  Therapeutic Exercise (13 Minutes):  Reviewed and issued home Exercises. Supine wand flexion, side ER, posture correction scapular retraction, wrist flexion/extn, supination /pronation    HEP: Provided written HEP for the above exercises, use of ice for pain and swelling, Pt verbalizes understanding. Treatment/Session Assessment:    · Response to Treatment:  Pain with all movement. Shoulder is very stiff. Good return demonstration of hand exercises. Frequent cueing for shoulder. · Compliance with Program/Exercises: Will assess as treatment progresses. · Recommendations/Intent for next treatment session: \"Next visit will focus on Manual treatment.  Modalities and exercise modification as cleared by MD    Total Treatment Duration: 55 minutes  PT Patient Time In/Time Out  Time In: 4547  Time Out: 6324 Ina Guerin, PT

## 2017-05-02 ENCOUNTER — HOSPITAL ENCOUNTER (OUTPATIENT)
Dept: PHYSICAL THERAPY | Age: 72
Discharge: HOME | End: 2017-05-02
Payer: MEDICARE

## 2017-05-02 PROCEDURE — 97035 APP MDLTY 1+ULTRASOUND EA 15: CPT

## 2017-05-02 PROCEDURE — 97140 MANUAL THERAPY 1/> REGIONS: CPT

## 2017-05-03 NOTE — PROGRESS NOTES
Faisal Zeyad  : 1945 [facility] at [facility]  [address] [place] [address], [address] [place] [address],[address], [facility] [address]  [phone]   [phone]       OUTPATIENT PHYSICAL THERAPY:Daily Note 5/3/2017      ICD-10: Treatment Diagnosis: Pain in right shoulder (M25.511),Stiffness of right shoulder, not elsewhere classified (M25.611)  Precautions/Allergies:   Epinephrine and Aspirin   Fall Risk Score: 1 (? 5 = High Risk)  MD Orders: PT- evaluate and treat, HEP, strengthening, ROM/progressive ROM MEDICAL/REFERRING DIAGNOSIS:  s/p right shoulder R clavicle and biceps  DATE OF ONSET:AC joint separation and biceps rupture- 2017 surgery- 3/23/17  REFERRING PHYSICIAN: Annemarie Crowley MD  RETURN PHYSICIAN APPOINTMENT: 17     INITIAL ASSESSMENT:  Mr. Anuradha Carrington 6 weeks s/p Open reduction internal fixation type 4 AC joint separation and open revision R biceps tenodesis. He presents with Post-op pain, weakness, stiffness and decreased ROM are limiting normalized use and function of R UE. He will benefit from PT for guided shoulder rehab per protocol to promote safe return to normalized use of the UE with ADL/ activities. PROBLEM LIST (Impacting functional limitations):  1. Decreased ADL/Functional Activities  2. Post-op R shoulder and clavicle pain  3. Decreased R shoulder ROM   4. Weakness R shoulder INTERVENTIONS PLANNED:  1. Neuromuscular Re-education/Strengthening  2. Thermal and electric modalities, manual therapies for pain   3. Manual therapies, therapeutic exercises, HEP for ROM    4. Therapeutic exercises and HEP for strength   TREATMENT PLAN:  Effective Dates: 17 TO 17. Frequency/Duration: 3 times a week for 12 weeks  GOALS: (Goals have been discussed and agreed upon with patient.)  Short-Term Functional Goals: Time Frame: 4 weeks  1. Pt will demonstrate posture correction   2.  Report no more than mild intermittent pain to R shoulder with compensatory use during basic functional activities, and score less than 60% on the DASH. 3. R shoulder PROM forward elevation greater than 100 degrees and external rotation greater than 60 degrees to progress into functional ranges. 4. Demonstrate good R shoulder isometric strength with manual testing to progress into strength phase. 5. Independent with initial HEP. Discharge Goals: Time Frame: 12 weeks  1. Pt to maintain corrected posture during exercises and reaching. 2. No more than 3/10 intermittent pain R shoulder with return to normalized household and work activities, and score less than 50% on the DASH. 3. R shoulder AROM forward elevation greater than 110 degrees, external rotation greater than 70 degrees, and strength to shoulder are grossly WNL's for safe use with normalized activities. 4. Demonstrate good functional shoulder strength and endurance for return to normalized household and work activities. 5. Independent with advanced shoulder HEP for continued self-management. Rehabilitation Potential For Stated Goals: Good  Regarding Carlotta Ledbetter therapy, I certify that the treatment plan above will be carried out by a therapist or under their direction. Thank you for this referral,  Angelica Mix PT       Referring Physician Signature: Ronaldo Recinos MD              Date                    The information in this section was collected on 5/1/17 (except where otherwise noted). HISTORY:   History of Present Injury/Illness (Reason for Referral):  Pt was attending therapy for post-op R shoulder scope. He arrived to his appointment with complaints of R clavicle pain with reaching and bicep area pain with attempts to lift the arm. He was cleared to continue therapy with respect to the pain. At his follow up appointment it was determined that he had a type 4 separation and he needed to have surgery.    Past Medical History/Comorbidities:   Mr. 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 Lesley Torres  has a past surgical history that includes  lumbar laminectomy; hip replacement (Left, 12/12/2012); orthopaedic (Bilateral); knee arthroscopy (Right, 2006); shoulder arthroscopy (Left, 2/17/2012); R shoulder scope 1/27/17  Social History/Living Environment:      Lives with wife in a house. Performs home repairs, yard work and laundry  Prior Level of Function/Work/Activity:  retired  Dominant Side:         RIGHT  Current Medications:       Current Outpatient Prescriptions:     diazePAM (VALIUM) 10 mg tablet, Take 10 mg by mouth nightly. As needed   Indications: sleep, Disp: , Rfl:     HYDROcodone-acetaminophen (NORCO)  mg tablet, Take 1 Tab by mouth every six (6) hours as needed for Pain. Max Daily Amount: 4 Tabs., Disp: 120 Tab, Rfl: 0    rivaroxaban (XARELTO) 20 mg tab tablet, Take 1 Tab by mouth daily (with breakfast). , Disp: 30 Tab, Rfl: 11    cyanocobalamin (VITAMIN B-12) 1,000 mcg tablet, Take 1,000 mcg by mouth daily. , Disp: , Rfl:     Biotin 2,500 mcg cap, Take  by mouth daily. , Disp: , Rfl:     B.infantis-B.ani-B.long-B.bifi (PROBIOTIC 4X) 10-15 mg TbEC, Take  by mouth daily. , Disp: , Rfl:     atorvastatin (LIPITOR) 80 mg tablet, Take 40 mg by mouth daily.  1/2 tablet daily  Take DOS per anesthesia protocol. , Disp: , Rfl:     carboxymethylcellulose sodium (REFRESH LIQUIGEL) 1 % dlgl, Apply 1 Drop to eye every four (4) hours. , Disp: , Rfl:     omega-3 fatty acids-vitamin e (FISH OIL) 1,000 mg cap, Take 2 Caps by mouth every morning., Disp: , Rfl:     Brimonidine-Timolol (COMBIGAN) 0.2-0.5 % Drop, Administer 1 drop to both eyes two (2) times a day. Indications: OPEN ANGLE GLAUCOMA, Disp: , Rfl:     PRED FORTE 1 % DrpS, Administer 1 drop to right eye two (2) times a day. Indications: pt states prevents infection, Disp: , Rfl:    Date Last Reviewed:  5/1/17   Number of Personal Factors/Comorbidities that affect the Plan of Care: 1-2: MODERATE COMPLEXITY   EXAMINATION: evaluation     Observation/Orthostatic Postural Assessment: pt with right shoulder protracted and guarded. Minimal movement of the right arm. Generalized right shoulder and biceps region atrophy. Mild bruising over the anterior shoulder/biceps region  Palpation:  Tender over the mid clavicle and biceps . Hardened scar tissue over the anterior shoulder. ROM:                 RUE AROM  R Shoulder Flexion: 34 (forward elevation)  R Shoulder External Rotation: 51 (at 25 deg abd)  RUE PROM  R Shoulder Flexion: 84 (after treatment)  R Shoulder External Rotation: 54         Strength: forearm and wrist observed to be at least 4-                 Special Tests: None  Neurological Screen: n/t  Functional Mobility: independent   Balance:  No deficits        Body Structures Involved:  1. Bones  2. Joints  3. Muscles  4. Ligaments Body Functions Affected:  1. Sensory/Pain  2. Neuromusculoskeletal  3. Movement Related Activities and Participation Affected:  1. General Tasks and Demands  2. Mobility  3. Self Care  4. Domestic Life   Number of elements (examined above) that affect the Plan of Care: 3: MODERATE COMPLEXITY   CLINICAL PRESENTATION:   Presentation: Evolving clinical presentation with unstable and unpredictable characteristics: HIGH COMPLEXITY   CLINICAL DECISION MAKING:   Outcome Measure:    Tool Used: Disabilities of the Arm, Shoulder and Hand (DASH) Questionnaire - Quick Version  Score:  Initial: 75  Most Recent: X/55 (Date: -- )   Interpretation of Score: The DASH is designed to measure the activities of daily living in person's with upper extremity dysfunction or pain. Each section is scored on a 1-5 scale, 5 representing the greatest disability. The scores of each section are added together for a total score of 55. This number is divided by 11, followed by subtracting 1 and multiplying by 25 to get a percent score of disability. This value represents the percentage disability: 0-20% minimal disability; 20-40% moderate disability; 40-60% severe disability; % dependent for care or exaggerated symptom behavior. Minimal detectable change is 12%. Score 11 12-19 20-28 29-37 38-45 46-54 55   Modifier CH CI CJ CK CL CM CN ? Carrying, Moving, and Handling Objects:     - CURRENT STATUS: CL - 60%-79% impaired, limited or restricted    - GOAL STATUS: CJ - 20%-39% impaired, limited or restricted    - D/C STATUS:  ---------------To be determined---------------    Medical Necessity:   · Skilled intervention continues to be required due to need for manual treatment prior to strengthening. Reason for Services/Other Comments:  · Patient continues to require skilled intervention due to need for guided progression of rehab as cleared by MD.   Use of outcome tool(s) and clinical judgement create a POC that gives a: Questionable prediction of patient's progress: MODERATE COMPLEXITY            TREATMENT:   (In addition to Assessment/Re-Assessment sessions the following treatments were rendered)  Pre-treatment Symptoms/Complaints:  Pt reports continued stiffness and weakness. Pain in shoulder blade. Trying to stretch it  Pain: Initial:   Pain Intensity 1: 10 (8 after)  Post Session:  6 after     MANUAL THERAPY: (21 minutes): for ROM.  PROM right shoulder ER at 10 deg and 45 deg abd, IR at 55 deg abd and scapula stabilized and forward elevation. biceps stretching. MODALITIES: (9 minutes): continuous ultrasound at 2W/cm2 x 9 minutes to anterior shoulder/biceps region with no adverse effects  Therapeutic Exercise ( ):     HEP: continue HEP for the above exercises, use of ice for pain and swelling, Pt verbalizes understanding. Treatment/Session Assessment:    · Response to Treatment:  Pain with all movement. Shoulder is very stiff. Improved passive motion  · Compliance with Program/Exercises: yes,per pt  · Recommendations/Intent for next treatment session: \"Next visit will focus on Manual treatment.  Modalities and exercise modification as cleared by MD    Total Treatment Duration: 30  minutes  PT Patient Time In/Time Out  Time In: 1230  Time Out: Erin 26, PT

## 2017-05-08 ENCOUNTER — HOSPITAL ENCOUNTER (OUTPATIENT)
Dept: PHYSICAL THERAPY | Age: 72
Discharge: HOME | End: 2017-05-08
Payer: MEDICARE

## 2017-05-08 PROCEDURE — 97110 THERAPEUTIC EXERCISES: CPT

## 2017-05-08 PROCEDURE — 97035 APP MDLTY 1+ULTRASOUND EA 15: CPT

## 2017-05-08 PROCEDURE — 97140 MANUAL THERAPY 1/> REGIONS: CPT

## 2017-05-08 NOTE — PROGRESS NOTES
Niesha Pagan  : 1945 [facility] at [facility] FARRUKH LUNA  [address] [facility], [address] [place] [address],[address], [address] [facility]  [phone]   [phone]       OUTPATIENT PHYSICAL THERAPY:Daily Note 2017      ICD-10: Treatment Diagnosis: Pain in right shoulder (M25.511),Stiffness of right shoulder, not elsewhere classified (M25.611)  Precautions/Allergies:   Epinephrine and Aspirin   Fall Risk Score: 1 (? 5 = High Risk)  MD Orders: PT- evaluate and treat, HEP, strengthening, ROM/progressive ROM MEDICAL/REFERRING DIAGNOSIS:  s/p right shoulder R clavicle and biceps  DATE OF ONSET:AC joint separation and biceps rupture- 2017 surgery- 3/23/17  REFERRING PHYSICIAN: Geovanna Dennis MD  RETURN PHYSICIAN APPOINTMENT: 17     INITIAL ASSESSMENT:  Mr. Christophe Velásquez 6 weeks s/p Open reduction internal fixation type 4 AC joint separation and open revision R biceps tenodesis. He presents with Post-op pain, weakness, stiffness and decreased ROM are limiting normalized use and function of R UE. He will benefit from PT for guided shoulder rehab per protocol to promote safe return to normalized use of the UE with ADL/ activities. PROBLEM LIST (Impacting functional limitations):  1. Decreased ADL/Functional Activities  2. Post-op R shoulder and clavicle pain  3. Decreased R shoulder ROM   4. Weakness R shoulder INTERVENTIONS PLANNED:  1. Neuromuscular Re-education/Strengthening  2. Thermal and electric modalities, manual therapies for pain   3. Manual therapies, therapeutic exercises, HEP for ROM    4. Therapeutic exercises and HEP for strength   TREATMENT PLAN:  Effective Dates: 17 TO 17. Frequency/Duration: 3 times a week for 12 weeks  GOALS: (Goals have been discussed and agreed upon with patient.)  Short-Term Functional Goals: Time Frame: 4 weeks  1. Pt will demonstrate posture correction   2.  Report no more than mild intermittent pain to R shoulder with compensatory use during basic functional activities, and score less than 60% on the DASH. 3. R shoulder PROM forward elevation greater than 100 degrees and external rotation greater than 60 degrees to progress into functional ranges. 4. Demonstrate good R shoulder isometric strength with manual testing to progress into strength phase. 5. Independent with initial HEP. Discharge Goals: Time Frame: 12 weeks  1. Pt to maintain corrected posture during exercises and reaching. 2. No more than 3/10 intermittent pain R shoulder with return to normalized household and work activities, and score less than 50% on the DASH. 3. R shoulder AROM forward elevation greater than 110 degrees, external rotation greater than 70 degrees, and strength to shoulder are grossly WNL's for safe use with normalized activities. 4. Demonstrate good functional shoulder strength and endurance for return to normalized household and work activities. 5. Independent with advanced shoulder HEP for continued self-management. Rehabilitation Potential For Stated Goals: Good  Regarding Suzanne Callow therapy, I certify that the treatment plan above will be carried out by a therapist or under their direction. Thank you for this referral,  Sultana Landrum PT       Referring Physician Signature: Alla Caro MD              Date                    The information in this section was collected on 5/1/17 (except where otherwise noted). HISTORY:   History of Present Injury/Illness (Reason for Referral):  Pt was attending therapy for post-op R shoulder scope. He arrived to his appointment with complaints of R clavicle pain with reaching and bicep area pain with attempts to lift the arm. He was cleared to continue therapy with respect to the pain. At his follow up appointment it was determined that he had a type 4 separation and he needed to have surgery.    Past Medical History/Comorbidities:   Mr. 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 Junior Leos  has a past surgical history that includes  lumbar laminectomy; hip replacement (Left, 12/12/2012); orthopaedic (Bilateral); knee arthroscopy (Right, 2006); shoulder arthroscopy (Left, 2/17/2012); R shoulder scope 1/27/17  Social History/Living Environment:      Lives with wife in a house. Performs home repairs, yard work and laundry  Prior Level of Function/Work/Activity:  retired  Dominant Side:         RIGHT  Current Medications:       Current Outpatient Prescriptions:     diazePAM (VALIUM) 10 mg tablet, Take 10 mg by mouth nightly. As needed   Indications: sleep, Disp: , Rfl:     HYDROcodone-acetaminophen (NORCO)  mg tablet, Take 1 Tab by mouth every six (6) hours as needed for Pain. Max Daily Amount: 4 Tabs., Disp: 120 Tab, Rfl: 0    rivaroxaban (XARELTO) 20 mg tab tablet, Take 1 Tab by mouth daily (with breakfast). , Disp: 30 Tab, Rfl: 11    cyanocobalamin (VITAMIN B-12) 1,000 mcg tablet, Take 1,000 mcg by mouth daily. , Disp: , Rfl:     Biotin 2,500 mcg cap, Take  by mouth daily. , Disp: , Rfl:     B.infantis-B.ani-B.long-B.bifi (PROBIOTIC 4X) 10-15 mg TbEC, Take  by mouth daily. , Disp: , Rfl:     atorvastatin (LIPITOR) 80 mg tablet, Take 40 mg by mouth daily.  1/2 tablet daily  Take DOS per anesthesia protocol. , Disp: , Rfl:     carboxymethylcellulose sodium (REFRESH LIQUIGEL) 1 % dlgl, Apply 1 Drop to eye every four (4) hours. , Disp: , Rfl:     omega-3 fatty acids-vitamin e (FISH OIL) 1,000 mg cap, Take 2 Caps by mouth every morning., Disp: , Rfl:     Brimonidine-Timolol (COMBIGAN) 0.2-0.5 % Drop, Administer 1 drop to both eyes two (2) times a day. Indications: OPEN ANGLE GLAUCOMA, Disp: , Rfl:     PRED FORTE 1 % DrpS, Administer 1 drop to right eye two (2) times a day. Indications: pt states prevents infection, Disp: , Rfl:    Date Last Reviewed:  5/8/17   Number of Personal Factors/Comorbidities that affect the Plan of Care: 1-2: MODERATE COMPLEXITY   EXAMINATION: evaluation     Observation/Orthostatic Postural Assessment: pt with right shoulder protracted and guarded. Minimal movement of the right arm. Generalized right shoulder and biceps region atrophy. Palpation:  Tender over the mid clavicle and biceps . Hardened scar tissue over the anterior shoulder. ROM: 5/8/17                RUE PROM  R Shoulder Flexion: 101  R Shoulder ABduction: 86  R Shoulder Internal Rotation: 44         Strength:                  Special Tests: None  Neurological Screen: n/t  Functional Mobility: independent   Balance:  No deficits        Body Structures Involved:  1. Bones  2. Joints  3. Muscles  4. Ligaments Body Functions Affected:  1. Sensory/Pain  2. Neuromusculoskeletal  3. Movement Related Activities and Participation Affected:  1. General Tasks and Demands  2. Mobility  3. Self Care  4. Domestic Life   Number of elements (examined above) that affect the Plan of Care: 3: MODERATE COMPLEXITY   CLINICAL PRESENTATION:   Presentation: Evolving clinical presentation with unstable and unpredictable characteristics: HIGH COMPLEXITY   CLINICAL DECISION MAKING:   Outcome Measure: Tool Used: Disabilities of the Arm, Shoulder and Hand (DASH) Questionnaire - Quick Version  Score:  Initial: 75  Most Recent: X/55 (Date: -- )   Interpretation of Score:  The DASH is designed to measure the activities of daily living in person's with upper extremity dysfunction or pain. Each section is scored on a 1-5 scale, 5 representing the greatest disability. The scores of each section are added together for a total score of 55. This number is divided by 11, followed by subtracting 1 and multiplying by 25 to get a percent score of disability. This value represents the percentage disability: 0-20% minimal disability; 20-40% moderate disability; 40-60% severe disability; % dependent for care or exaggerated symptom behavior. Minimal detectable change is 12%. Score 11 12-19 20-28 29-37 38-45 46-54 55   Modifier CH CI CJ CK CL CM CN ? Carrying, Moving, and Handling Objects:     - CURRENT STATUS: CL - 60%-79% impaired, limited or restricted    - GOAL STATUS: CJ - 20%-39% impaired, limited or restricted    - D/C STATUS:  ---------------To be determined---------------    Medical Necessity:   · Skilled intervention continues to be required due to need for manual treatment prior to strengthening. Reason for Services/Other Comments:  · Patient continues to require skilled intervention due to need for guided progression of rehab as cleared by MD.   Use of outcome tool(s) and clinical judgement create a POC that gives a: Questionable prediction of patient's progress: MODERATE COMPLEXITY            TREATMENT:   (In addition to Assessment/Re-Assessment sessions the following treatments were rendered)  Pre-treatment Symptoms/Complaints:  Pt reports the shoulder continues to be stiff. Pain in shoulder from pushing his exercises. Pain: Initial:   Pain Intensity 1: 8  Post Session:  7-8 after     MANUAL THERAPY: (24 minutes): for ROM. Grade 4-- to 4- physiological mobilizations to right shoulder ER at 10 deg and 45 deg abd, IR at 55 deg abd and scapula stabilized and forward elevation. horizonal abduction stretching.    MODALITIES: (9minutes): continuous ultrasound at 2.0 W/cm2 to right anterior shoulder and biceps region for pain and soft tissue release prior to manual treatment. Ice given after treatment for pain relief   Therapeutic Exercise (10 Minutes): for AROM and strengthening. Maximal manual assist for completion of movement. Rhythmic stabilization at 90 deg flexion 3x20 sec  AA= active assisted   Date:  5/8/17 Date:   Date:     Activity/Exercise Parameters Parameters Parameters   ER Side 2# 2x10     Shoulder abd Side 2x10     FE Seated AA x 8  Supine AA x 10                                  HEP: continue HEP for the above exercises, use of ice for pain and swelling, Pt verbalizes understanding. Treatment/Session Assessment:    · Response to Treatment:  Pain with all movement. Shoulder is very stiff. Improved passive motion  · Compliance with Program/Exercises: yes,per pt  · Recommendations/Intent for next treatment session: \"Next visit will focus on Manual treatment.  Modalities and exercise modification as cleared by MD    Total Treatment Duration:     43  minutes  PT Patient Time In/Time Out  Time In: 1350  Time Out: 65 [facility], PT

## 2017-05-09 ENCOUNTER — HOSPITAL ENCOUNTER (OUTPATIENT)
Dept: PHYSICAL THERAPY | Age: 72
Discharge: HOME | End: 2017-05-09
Payer: MEDICARE

## 2017-05-09 PROCEDURE — 97140 MANUAL THERAPY 1/> REGIONS: CPT

## 2017-05-09 PROCEDURE — 97110 THERAPEUTIC EXERCISES: CPT

## 2017-05-09 PROCEDURE — 97035 APP MDLTY 1+ULTRASOUND EA 15: CPT

## 2017-05-09 NOTE — PROGRESS NOTES
Shiela Piña  : 1945 [facility] at [facility] FARRUKH LUNA  [address] [place] [address], [address] [place] [address],[address], [facility].  [phone]   [phone]       OUTPATIENT PHYSICAL THERAPY:Daily Note 2017      ICD-10: Treatment Diagnosis: Pain in right shoulder (M25.511),Stiffness of right shoulder, not elsewhere classified (M25.611)  Precautions/Allergies:   Epinephrine and Aspirin   Fall Risk Score: 1 (? 5 = High Risk)  MD Orders: PT- evaluate and treat, HEP, strengthening, ROM/progressive ROM MEDICAL/REFERRING DIAGNOSIS:  s/p right shoulder R clavicle and biceps  DATE OF ONSET:AC joint separation and biceps rupture- 2017 surgery- 3/23/17  REFERRING PHYSICIAN: Alla Caro MD  RETURN PHYSICIAN APPOINTMENT: 17     INITIAL ASSESSMENT:  Mr. Beatrice Hansen 6 weeks s/p Open reduction internal fixation type 4 AC joint separation and open revision R biceps tenodesis. He presents with Post-op pain, weakness, stiffness and decreased ROM are limiting normalized use and function of R UE. He will benefit from PT for guided shoulder rehab per protocol to promote safe return to normalized use of the UE with ADL/ activities. PROBLEM LIST (Impacting functional limitations):  1. Decreased ADL/Functional Activities  2. Post-op R shoulder and clavicle pain  3. Decreased R shoulder ROM   4. Weakness R shoulder INTERVENTIONS PLANNED:  1. Neuromuscular Re-education/Strengthening  2. Thermal and electric modalities, manual therapies for pain   3. Manual therapies, therapeutic exercises, HEP for ROM    4. Therapeutic exercises and HEP for strength   TREATMENT PLAN:  Effective Dates: 17 TO 17. Frequency/Duration: 3 times a week for 12 weeks  GOALS: (Goals have been discussed and agreed upon with patient.)  Short-Term Functional Goals: Time Frame: 4 weeks  1. Pt will demonstrate posture correction   2.  Report no more than mild intermittent pain to R shoulder with compensatory use during basic functional activities, and score less than 60% on the DASH. 3. R shoulder PROM forward elevation greater than 100 degrees and external rotation greater than 60 degrees to progress into functional ranges. 4. Demonstrate good R shoulder isometric strength with manual testing to progress into strength phase. 5. Independent with initial HEP. Discharge Goals: Time Frame: 12 weeks  1. Pt to maintain corrected posture during exercises and reaching. 2. No more than 3/10 intermittent pain R shoulder with return to normalized household and work activities, and score less than 50% on the DASH. 3. R shoulder AROM forward elevation greater than 110 degrees, external rotation greater than 70 degrees, and strength to shoulder are grossly WNL's for safe use with normalized activities. 4. Demonstrate good functional shoulder strength and endurance for return to normalized household and work activities. 5. Independent with advanced shoulder HEP for continued self-management. Rehabilitation Potential For Stated Goals: Good  Regarding Sotero Nolasco therapy, I certify that the treatment plan above will be carried out by a therapist or under their direction. Thank you for this referral,  Raul Bagley PT       Referring Physician Signature: Jenny Peng MD              Date                    The information in this section was collected on 5/1/17 (except where otherwise noted). HISTORY:   History of Present Injury/Illness (Reason for Referral):  Pt was attending therapy for post-op R shoulder scope. He arrived to his appointment with complaints of R clavicle pain with reaching and bicep area pain with attempts to lift the arm. He was cleared to continue therapy with respect to the pain. At his follow up appointment it was determined that he had a type 4 separation and he needed to have surgery.    Past Medical History/Comorbidities:   Mr. 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 Kaykay Lawson  has a past surgical history that includes  lumbar laminectomy; hip replacement (Left, 12/12/2012); orthopaedic (Bilateral); knee arthroscopy (Right, 2006); shoulder arthroscopy (Left, 2/17/2012); R shoulder scope 1/27/17  Social History/Living Environment:      Lives with wife in a house. Performs home repairs, yard work and laundry  Prior Level of Function/Work/Activity:  retired  Dominant Side:         RIGHT  Current Medications:       Current Outpatient Prescriptions:     diazePAM (VALIUM) 10 mg tablet, Take 10 mg by mouth nightly. As needed   Indications: sleep, Disp: , Rfl:     HYDROcodone-acetaminophen (NORCO)  mg tablet, Take 1 Tab by mouth every six (6) hours as needed for Pain. Max Daily Amount: 4 Tabs., Disp: 120 Tab, Rfl: 0    rivaroxaban (XARELTO) 20 mg tab tablet, Take 1 Tab by mouth daily (with breakfast). , Disp: 30 Tab, Rfl: 11    cyanocobalamin (VITAMIN B-12) 1,000 mcg tablet, Take 1,000 mcg by mouth daily. , Disp: , Rfl:     Biotin 2,500 mcg cap, Take  by mouth daily. , Disp: , Rfl:     B.infantis-B.ani-B.long-B.bifi (PROBIOTIC 4X) 10-15 mg TbEC, Take  by mouth daily. , Disp: , Rfl:     atorvastatin (LIPITOR) 80 mg tablet, Take 40 mg by mouth daily.  1/2 tablet daily  Take DOS per anesthesia protocol. , Disp: , Rfl:     carboxymethylcellulose sodium (REFRESH LIQUIGEL) 1 % dlgl, Apply 1 Drop to eye every four (4) hours. , Disp: , Rfl:     omega-3 fatty acids-vitamin e (FISH OIL) 1,000 mg cap, Take 2 Caps by mouth every morning., Disp: , Rfl:     Brimonidine-Timolol (COMBIGAN) 0.2-0.5 % Drop, Administer 1 drop to both eyes two (2) times a day. Indications: OPEN ANGLE GLAUCOMA, Disp: , Rfl:     PRED FORTE 1 % DrpS, Administer 1 drop to right eye two (2) times a day. Indications: pt states prevents infection, Disp: , Rfl:    Date Last Reviewed:  5/8/17   Number of Personal Factors/Comorbidities that affect the Plan of Care: 1-2: MODERATE COMPLEXITY   EXAMINATION: evaluation     Observation/Orthostatic Postural Assessment: pt with right shoulder protracted and guarded. Minimal movement of the right arm. Generalized right shoulder and biceps region atrophy. Palpation:  Tender over the mid clavicle and biceps . Hardened scar tissue over the anterior shoulder. ROM: 5/8/17                          Strength:                  Special Tests: None  Neurological Screen: n/t  Functional Mobility: independent   Balance:  No deficits        Body Structures Involved:  1. Bones  2. Joints  3. Muscles  4. Ligaments Body Functions Affected:  1. Sensory/Pain  2. Neuromusculoskeletal  3. Movement Related Activities and Participation Affected:  1. General Tasks and Demands  2. Mobility  3. Self Care  4. Domestic Life   Number of elements (examined above) that affect the Plan of Care: 3: MODERATE COMPLEXITY   CLINICAL PRESENTATION:   Presentation: Evolving clinical presentation with unstable and unpredictable characteristics: HIGH COMPLEXITY   CLINICAL DECISION MAKING:   Outcome Measure: Tool Used: Disabilities of the Arm, Shoulder and Hand (DASH) Questionnaire - Quick Version  Score:  Initial: 75  Most Recent: X/55 (Date: -- )   Interpretation of Score: The DASH is designed to measure the activities of daily living in person's with upper extremity dysfunction or pain.   Each section is scored on a 1-5 scale, 5 representing the greatest disability. The scores of each section are added together for a total score of 55. This number is divided by 11, followed by subtracting 1 and multiplying by 25 to get a percent score of disability. This value represents the percentage disability: 0-20% minimal disability; 20-40% moderate disability; 40-60% severe disability; % dependent for care or exaggerated symptom behavior. Minimal detectable change is 12%. Score 11 12-19 20-28 29-37 38-45 46-54 55   Modifier CH CI CJ CK CL CM CN ? Carrying, Moving, and Handling Objects:     - CURRENT STATUS: CL - 60%-79% impaired, limited or restricted    - GOAL STATUS: CJ - 20%-39% impaired, limited or restricted    - D/C STATUS:  ---------------To be determined---------------    Medical Necessity:   · Skilled intervention continues to be required due to need for manual treatment prior to strengthening. Reason for Services/Other Comments:  · Patient continues to require skilled intervention due to need for guided progression of rehab as cleared by MD.   Use of outcome tool(s) and clinical judgement create a POC that gives a: Questionable prediction of patient's progress: MODERATE COMPLEXITY            TREATMENT:   (In addition to Assessment/Re-Assessment sessions the following treatments were rendered)  Pre-treatment Symptoms/Complaints:  Pt reports the shoulder was sore from yesterday but he didn't have to take pain medication to sleep. Did the HEP. Pain: Initial:   Pain Intensity 1: 7 (0-1 after)  Post Session: 0-1  after     MANUAL THERAPY: (15 minutes): for ROM. Grade 4-- to 4- physiological mobilizations to right shoulder ER at 25 deg and 50 deg abd, IR at 55 deg abd and scapula stabilized and forward elevation. horizonal abduction stretching. MODALITIES: (9  minutes): continuous ultrasound at 2.0 W/cm2 to right anterior shoulder and biceps region for soft tissue release prior to manual treatment.  Ice given after treatment for pain relief   Therapeutic Exercise (20 Minutes): for AROM and strengthening. Maximal manual assist for completion of movement. Rhythmic stabilization at 90 deg flexion 3x20 sec  AA= active assisted   Date:  5/8/17 Date:  5/9/17 Date:     Activity/Exercise Parameters Parameters Parameters   ER Side 2# 2x10 Side 2# 2x10    Shoulder abd Side 2x10 Side 2x8 active to AA when fatigued    FE Seated AA x 8  Supine AA x 10 Seated AA lift wit pt hold and controlled descent 2x5  Supine AA 2x8    midtrap  Side self supported AA 3x5    IR   Supine AIS with pt hold at end ROM 2x5                     HEP: continue HEP and use of ice for pain and swelling, Pt verbalizes understanding. Treatment/Session Assessment:    · Response to Treatment:  Shoulder is very stiff, especially during horizontal adduction and flexion. More muscle activation today   · Compliance with Program/Exercises: yes,per pt  Recommendations/Intent for next treatment session: \"Next visit will focus on Manual treatment. Modalities and exercise modification as needed.  Measure   Total Treatment Duration:   44    minutes  PT Patient Time In/Time Out  Time In: 0900  Time Out: [address] Zhao, PT

## 2017-05-12 ENCOUNTER — HOSPITAL ENCOUNTER (OUTPATIENT)
Dept: PHYSICAL THERAPY | Age: 72
Discharge: HOME | End: 2017-05-12
Payer: MEDICARE

## 2017-05-12 PROCEDURE — 97110 THERAPEUTIC EXERCISES: CPT

## 2017-05-12 PROCEDURE — 97140 MANUAL THERAPY 1/> REGIONS: CPT

## 2017-05-12 NOTE — PROGRESS NOTES
Sloane Martinez  : 1945 [facility]  [address] [facility], [address] [place] [address],[address], [address] [facility]  [phone]   [phone]       OUTPATIENT PHYSICAL THERAPY:Daily Note 2017      ICD-10: Treatment Diagnosis: Pain in right shoulder (M25.511),Stiffness of right shoulder, not elsewhere classified (M25.611)  Precautions/Allergies:   Epinephrine and Aspirin   Fall Risk Score: 1 (? 5 = High Risk)  MD Orders: PT- evaluate and treat, HEP, strengthening, ROM/progressive ROM MEDICAL/REFERRING DIAGNOSIS:  s/p right shoulder R clavicle and biceps  DATE OF ONSET:AC joint separation and biceps rupture- 2017 surgery- 3/23/17  REFERRING PHYSICIAN: Lavena Leyden., MD  RETURN PHYSICIAN APPOINTMENT: 17     INITIAL ASSESSMENT:  Mr. Salo Gonsalez 6 weeks s/p Open reduction internal fixation type 4 AC joint separation and open revision R biceps tenodesis. He presents with Post-op pain, weakness, stiffness and decreased ROM are limiting normalized use and function of R UE. He will benefit from PT for guided shoulder rehab per protocol to promote safe return to normalized use of the UE with ADL/ activities. PROBLEM LIST (Impacting functional limitations):  1. Decreased ADL/Functional Activities  2. Post-op R shoulder and clavicle pain  3. Decreased R shoulder ROM   4. Weakness R shoulder INTERVENTIONS PLANNED:  1. Neuromuscular Re-education/Strengthening  2. Thermal and electric modalities, manual therapies for pain   3. Manual therapies, therapeutic exercises, HEP for ROM    4. Therapeutic exercises and HEP for strength   TREATMENT PLAN:  Effective Dates: 17 TO 17. Frequency/Duration: 3 times a week for 12 weeks  GOALS: (Goals have been discussed and agreed upon with patient.)  Short-Term Functional Goals: Time Frame: 4 weeks  1. Pt will demonstrate posture correction   2.  Report no more than mild intermittent pain to R shoulder with compensatory use during basic functional activities, and score less than 60% on the DASH. 3. R shoulder PROM forward elevation greater than 100 degrees and external rotation greater than 60 degrees to progress into functional ranges. 4. Demonstrate good R shoulder isometric strength with manual testing to progress into strength phase. 5. Independent with initial HEP. Discharge Goals: Time Frame: 12 weeks  1. Pt to maintain corrected posture during exercises and reaching. 2. No more than 3/10 intermittent pain R shoulder with return to normalized household and work activities, and score less than 50% on the DASH. 3. R shoulder AROM forward elevation greater than 110 degrees, external rotation greater than 70 degrees, and strength to shoulder are grossly WNL's for safe use with normalized activities. 4. Demonstrate good functional shoulder strength and endurance for return to normalized household and work activities. 5. Independent with advanced shoulder HEP for continued self-management. Rehabilitation Potential For Stated Goals: Good  Regarding Suzanne Callow therapy, I certify that the treatment plan above will be carried out by a therapist or under their direction. Thank you for this referral,  Sultana Landrum PT       Referring Physician Signature: Alla Caro MD              Date                    The information in this section was collected on 5/1/17 (except where otherwise noted). HISTORY:   History of Present Injury/Illness (Reason for Referral):  Pt was attending therapy for post-op R shoulder scope. He arrived to his appointment with complaints of R clavicle pain with reaching and bicep area pain with attempts to lift the arm. He was cleared to continue therapy with respect to the pain. At his follow up appointment it was determined that he had a type 4 separation and he needed to have surgery.    Past Medical History/Comorbidities:   Mr. 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 Melo Trejo  has a past surgical history that includes  lumbar laminectomy; hip replacement (Left, 12/12/2012); orthopaedic (Bilateral); knee arthroscopy (Right, 2006); shoulder arthroscopy (Left, 2/17/2012); R shoulder scope 1/27/17  Social History/Living Environment:      Lives with wife in a house. Performs home repairs, yard work and laundry  Prior Level of Function/Work/Activity:  retired  Dominant Side:         RIGHT  Current Medications:       Current Outpatient Prescriptions:     diazePAM (VALIUM) 10 mg tablet, Take 10 mg by mouth nightly. As needed   Indications: sleep, Disp: , Rfl:     HYDROcodone-acetaminophen (NORCO)  mg tablet, Take 1 Tab by mouth every six (6) hours as needed for Pain. Max Daily Amount: 4 Tabs., Disp: 120 Tab, Rfl: 0    rivaroxaban (XARELTO) 20 mg tab tablet, Take 1 Tab by mouth daily (with breakfast). , Disp: 30 Tab, Rfl: 11    cyanocobalamin (VITAMIN B-12) 1,000 mcg tablet, Take 1,000 mcg by mouth daily. , Disp: , Rfl:     Biotin 2,500 mcg cap, Take  by mouth daily. , Disp: , Rfl:     B.infantis-B.ani-B.long-B.bifi (PROBIOTIC 4X) 10-15 mg TbEC, Take  by mouth daily. , Disp: , Rfl:     atorvastatin (LIPITOR) 80 mg tablet, Take 40 mg by mouth daily.  1/2 tablet daily  Take DOS per anesthesia protocol. , Disp: , Rfl:     carboxymethylcellulose sodium (REFRESH LIQUIGEL) 1 % dlgl, Apply 1 Drop to eye every four (4) hours. , Disp: , Rfl:     omega-3 fatty acids-vitamin e (FISH OIL) 1,000 mg cap, Take 2 Caps by mouth every morning., Disp: , Rfl:     Brimonidine-Timolol (COMBIGAN) 0.2-0.5 % Drop, Administer 1 drop to both eyes two (2) times a day. Indications: OPEN ANGLE GLAUCOMA, Disp: , Rfl:     PRED FORTE 1 % DrpS, Administer 1 drop to right eye two (2) times a day. Indications: pt states prevents infection, Disp: , Rfl:    Date Last Reviewed:  5/8/17   Number of Personal Factors/Comorbidities that affect the Plan of Care: 1-2: MODERATE COMPLEXITY   EXAMINATION: evaluation     Observation/Orthostatic Postural Assessment: pt with right shoulder protracted and guarded. Minimal movement of the right arm. Generalized right shoulder and biceps region atrophy. Palpation:  Tender over the mid clavicle and biceps . Hardened scar tissue over the anterior shoulder. ROM: 5/8/17                RUE AROM  R Shoulder Flexion: 26  RUE PROM  R Shoulder Flexion: 100         Strength:                  Special Tests: None  Neurological Screen: n/t  Functional Mobility: independent   Balance:  No deficits        Body Structures Involved:  1. Bones  2. Joints  3. Muscles  4. Ligaments Body Functions Affected:  1. Sensory/Pain  2. Neuromusculoskeletal  3. Movement Related Activities and Participation Affected:  1. General Tasks and Demands  2. Mobility  3. Self Care  4. Domestic Life   Number of elements (examined above) that affect the Plan of Care: 3: MODERATE COMPLEXITY   CLINICAL PRESENTATION:   Presentation: Evolving clinical presentation with unstable and unpredictable characteristics: HIGH COMPLEXITY   CLINICAL DECISION MAKING:   Outcome Measure: Tool Used: Disabilities of the Arm, Shoulder and Hand (DASH) Questionnaire - Quick Version  Score:  Initial: 75  Most Recent: X/55 (Date: -- )   Interpretation of Score:  The DASH is designed to measure the activities of daily living in person's with upper extremity dysfunction or pain. Each section is scored on a 1-5 scale, 5 representing the greatest disability. The scores of each section are added together for a total score of 55. This number is divided by 11, followed by subtracting 1 and multiplying by 25 to get a percent score of disability. This value represents the percentage disability: 0-20% minimal disability; 20-40% moderate disability; 40-60% severe disability; % dependent for care or exaggerated symptom behavior. Minimal detectable change is 12%. Score 11 12-19 20-28 29-37 38-45 46-54 55   Modifier CH CI CJ CK CL CM CN ? Carrying, Moving, and Handling Objects:     - CURRENT STATUS: CL - 60%-79% impaired, limited or restricted    - GOAL STATUS: CJ - 20%-39% impaired, limited or restricted    - D/C STATUS:  ---------------To be determined---------------    Medical Necessity:   · Skilled intervention continues to be required due to need for manual treatment prior to strengthening. Reason for Services/Other Comments:  · Patient continues to require skilled intervention due to need for guided progression of rehab as cleared by MD.   Use of outcome tool(s) and clinical judgement create a POC that gives a: Questionable prediction of patient's progress: MODERATE COMPLEXITY            TREATMENT:   (In addition to Assessment/Re-Assessment sessions the following treatments were rendered)  Pre-treatment Symptoms/Complaints:  Pt reports the shoulder was sore. Did the HEP. Pain: Initial:   Pain Intensity 1: 7  Post Session: 1, mostly just stiff feeling after     MANUAL THERAPY: (15 minutes): for ROM. Grade 4-- to 4- physiological mobilizations to right shoulder ER at 50 deg abd, IR at 55 deg abd and scapula stabilized and forward elevation. MODALITIES: ( minutes): none  Therapeutic Exercise (27 Minutes): for AROM and strengthening. Moderate manual assist for completion of movement when fatigued.    AA= active assisted   Date:  5/8/17 Date:  5/9/17 Date:  5/12/17   Activity/Exercise Parameters Parameters Parameters   ER Side 2# 2x10 Side 2# 2x10 Side ER 2# 2x10   Shoulder abd Side 2x10 Side 2x8 active to AA when fatigued Side AA 2x8   FE Seated AA x 8  Supine AA x 10 Seated AA lift wit pt hold and controlled descent 2x5  Supine AA 2x8 Si[ine active /AA when fatigued  Seated AA lift with pt hold and controlling descent 2x8   midtrap  Side self supported AA 3x5 Side AA 3x8   IR   Supine AIS with pt hold at end ROM 2x5 Supine AIS pt hold at end ROM 3x8   Bicep curls   2# 2x8              HEP: continue HEP. Can perform bicep curls and controlled descent with pulleys at home. Pt verbalizes understanding. Treatment/Session Assessment:    · Response to Treatment:  More muscle activation today, especially with seated FE   · Compliance with Program/Exercises: yes,per pt  Recommendations/Intent for next treatment session: \"Next visit will focus on Manual treatment. Modalities and exercise modification as needed.    Total Treatment Duration:   45    minutes  PT Patient Time In/Time Out  Time In: 1035  Time Out: 187 [facility], PT

## 2017-05-15 ENCOUNTER — HOSPITAL ENCOUNTER (OUTPATIENT)
Dept: PHYSICAL THERAPY | Age: 72
Discharge: HOME | End: 2017-05-15
Payer: MEDICARE

## 2017-05-15 PROCEDURE — 97140 MANUAL THERAPY 1/> REGIONS: CPT

## 2017-05-15 PROCEDURE — 97110 THERAPEUTIC EXERCISES: CPT

## 2017-05-15 NOTE — PROGRESS NOTES
Edin oTrres  : 1945 [facility]  1101 [facility], [address] [place] [address] [address], [address] [facility]  [phone]   [phone]       OUTPATIENT PHYSICAL THERAPY:Daily Note 5/15/2017      ICD-10: Treatment Diagnosis: Pain in right shoulder (M25.511),Stiffness of right shoulder, not elsewhere classified (M25.611)  Precautions/Allergies:   Epinephrine and Aspirin   Fall Risk Score: 1 (? 5 = High Risk)  MD Orders: PT- evaluate and treat, HEP, strengthening, ROM/progressive ROM MEDICAL/REFERRING DIAGNOSIS:  s/p right shoulder R clavicle and biceps  DATE OF ONSET:AC joint separation and biceps rupture- 2017 surgery- 3/23/17  REFERRING PHYSICIAN: Mimi Venegas MD  RETURN PHYSICIAN APPOINTMENT: 17     INITIAL ASSESSMENT:  Mr. Lesley Torres 6 weeks s/p Open reduction internal fixation type 4 AC joint separation and open revision R biceps tenodesis. He presents with Post-op pain, weakness, stiffness and decreased ROM are limiting normalized use and function of R UE. He will benefit from PT for guided shoulder rehab per protocol to promote safe return to normalized use of the UE with ADL/ activities. PROBLEM LIST (Impacting functional limitations):  1. Decreased ADL/Functional Activities  2. Post-op R shoulder and clavicle pain  3. Decreased R shoulder ROM   4. Weakness R shoulder INTERVENTIONS PLANNED:  1. Neuromuscular Re-education/Strengthening  2. Thermal and electric modalities, manual therapies for pain   3. Manual therapies, therapeutic exercises, HEP for ROM    4. Therapeutic exercises and HEP for strength   TREATMENT PLAN:  Effective Dates: 17 TO 17. Frequency/Duration: 3 times a week for 12 weeks  GOALS: (Goals have been discussed and agreed upon with patient.)  Short-Term Functional Goals: Time Frame: 4 weeks  1. Pt will demonstrate posture correction   2.  Report no more than mild intermittent pain to R shoulder with compensatory use during basic functional activities, and score less than 60% on the DASH. 3. R shoulder PROM forward elevation greater than 100 degrees and external rotation greater than 60 degrees to progress into functional ranges. 4. Demonstrate good R shoulder isometric strength with manual testing to progress into strength phase. 5. Independent with initial HEP. Discharge Goals: Time Frame: 12 weeks  1. Pt to maintain corrected posture during exercises and reaching. 2. No more than 3/10 intermittent pain R shoulder with return to normalized household and work activities, and score less than 50% on the DASH. 3. R shoulder AROM forward elevation greater than 110 degrees, external rotation greater than 70 degrees, and strength to shoulder are grossly WNL's for safe use with normalized activities. 4. Demonstrate good functional shoulder strength and endurance for return to normalized household and work activities. 5. Independent with advanced shoulder HEP for continued self-management. Rehabilitation Potential For Stated Goals: Good  Regarding Rachel Kam therapy, I certify that the treatment plan above will be carried out by a therapist or under their direction. Thank you for this referral,  Nathaniel Aguero PT       Referring Physician Signature: Geovanna Dennis MD              Date                    The information in this section was collected on 5/1/17 (except where otherwise noted). HISTORY:   History of Present Injury/Illness (Reason for Referral):  Pt was attending therapy for post-op R shoulder scope. He arrived to his appointment with complaints of R clavicle pain with reaching and bicep area pain with attempts to lift the arm. He was cleared to continue therapy with respect to the pain. At his follow up appointment it was determined that he had a type 4 separation and he needed to have surgery.    Past Medical History/Comorbidities:   Mr. 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 Samantha Green  has a past surgical history that includes  lumbar laminectomy; hip replacement (Left, 12/12/2012); orthopaedic (Bilateral); knee arthroscopy (Right, 2006); shoulder arthroscopy (Left, 2/17/2012); R shoulder scope 1/27/17  Social History/Living Environment:      Lives with wife in a house. Performs home repairs, yard work and laundry  Prior Level of Function/Work/Activity:  retired  Dominant Side:         RIGHT  Current Medications:       Current Outpatient Prescriptions:     diazePAM (VALIUM) 10 mg tablet, Take 10 mg by mouth nightly. As needed   Indications: sleep, Disp: , Rfl:     HYDROcodone-acetaminophen (NORCO)  mg tablet, Take 1 Tab by mouth every six (6) hours as needed for Pain. Max Daily Amount: 4 Tabs., Disp: 120 Tab, Rfl: 0    rivaroxaban (XARELTO) 20 mg tab tablet, Take 1 Tab by mouth daily (with breakfast). , Disp: 30 Tab, Rfl: 11    cyanocobalamin (VITAMIN B-12) 1,000 mcg tablet, Take 1,000 mcg by mouth daily. , Disp: , Rfl:     Biotin 2,500 mcg cap, Take  by mouth daily. , Disp: , Rfl:     B.infantis-B.ani-B.long-B.bifi (PROBIOTIC 4X) 10-15 mg TbEC, Take  by mouth daily. , Disp: , Rfl:     atorvastatin (LIPITOR) 80 mg tablet, Take 40 mg by mouth daily.  1/2 tablet daily  Take DOS per anesthesia protocol. , Disp: , Rfl:     carboxymethylcellulose sodium (REFRESH LIQUIGEL) 1 % dlgl, Apply 1 Drop to eye every four (4) hours. , Disp: , Rfl:     omega-3 fatty acids-vitamin e (FISH OIL) 1,000 mg cap, Take 2 Caps by mouth every morning., Disp: , Rfl:     Brimonidine-Timolol (COMBIGAN) 0.2-0.5 % Drop, Administer 1 drop to both eyes two (2) times a day. Indications: OPEN ANGLE GLAUCOMA, Disp: , Rfl:     PRED FORTE 1 % DrpS, Administer 1 drop to right eye two (2) times a day. Indications: pt states prevents infection, Disp: , Rfl:    Date Last Reviewed:  5/15/17   Number of Personal Factors/Comorbidities that affect the Plan of Care: 1-2: MODERATE COMPLEXITY   EXAMINATION: evaluation     Observation/Orthostatic Postural Assessment: pt with right shoulder protracted and guarded. Minimal movement of the right arm. Generalized right shoulder and biceps region atrophy. Palpation:  Tender over the mid clavicle and biceps . Hardened scar tissue over the anterior shoulder. ROM: 5/8/17                RUE AROM  R Shoulder Flexion: (P) 47         Strength:                  Special Tests: None  Neurological Screen: n/t  Functional Mobility: independent   Balance:  No deficits        Body Structures Involved:  1. Bones  2. Joints  3. Muscles  4. Ligaments Body Functions Affected:  1. Sensory/Pain  2. Neuromusculoskeletal  3. Movement Related Activities and Participation Affected:  1. General Tasks and Demands  2. Mobility  3. Self Care  4. Domestic Life   Number of elements (examined above) that affect the Plan of Care: 3: MODERATE COMPLEXITY   CLINICAL PRESENTATION:   Presentation: Evolving clinical presentation with unstable and unpredictable characteristics: HIGH COMPLEXITY   CLINICAL DECISION MAKING:   Outcome Measure: Tool Used: Disabilities of the Arm, Shoulder and Hand (DASH) Questionnaire - Quick Version  Score:  Initial: 75  Most Recent: X/55 (Date: -- )   Interpretation of Score: The DASH is designed to measure the activities of daily living in person's with upper extremity dysfunction or pain.   Each section is scored on a 1-5 scale, 5 representing the greatest disability. The scores of each section are added together for a total score of 55. This number is divided by 11, followed by subtracting 1 and multiplying by 25 to get a percent score of disability. This value represents the percentage disability: 0-20% minimal disability; 20-40% moderate disability; 40-60% severe disability; % dependent for care or exaggerated symptom behavior. Minimal detectable change is 12%. Score 11 12-19 20-28 29-37 38-45 46-54 55   Modifier CH CI CJ CK CL CM CN ? Carrying, Moving, and Handling Objects:     - CURRENT STATUS: CL - 60%-79% impaired, limited or restricted    - GOAL STATUS: CJ - 20%-39% impaired, limited or restricted    - D/C STATUS:  ---------------To be determined---------------    Medical Necessity:   · Skilled intervention continues to be required due to need for manual treatment prior to strengthening. Reason for Services/Other Comments:  · Patient continues to require skilled intervention due to need for guided progression of rehab as cleared by MD.   Use of outcome tool(s) and clinical judgement create a POC that gives a: Questionable prediction of patient's progress: MODERATE COMPLEXITY            TREATMENT:   (In addition to Assessment/Re-Assessment sessions the following treatments were rendered)  Pre-treatment Symptoms/Complaints:  Pt reports the shoulder was sore. Did the HEP. Pain: Initial:   Pain Intensity 1: 7 (5-6 after)  Post Session: 1, mostly just stiff feeling after     MANUAL THERAPY: (15 minutes): for ROM. Grade 4-- to 4- physiological mobilizations to right shoulder ER at 50 deg abd, IR at 55 deg abd and scapula stabilized and forward elevation with scapula stabilized. Manual stability of scapula during ER/IR and midtrap exercises. MODALITIES: ( minutes): gave pt ice to take after treatment  Therapeutic Exercise (30 Minutes): for AROM and strengthening.  Moderate manual assist for completion of movement when fatigued. AA= active assisted  date Date:  5/8/17 Date:  5/9/17 Date:  5/12/17 5/15/17    Activity/Exercise Parameters Parameters Parameters     ER Side 2# 2x10 Side 2# 2x10 Side ER 2# 2x10 Side ER 3# x6, 2# x10    Shoulder abd Side 2x10 Side 2x8 active to AA when fatigued Side AA 2x8 Side active/AA  2x10    FE Seated AA x 8  Supine AA x 10 Seated AA lift wit pt hold and controlled descent 2x5  Supine AA 2x8 Supine active /AA when fatigued  Seated AA lift with pt hold and controlling descent 2x8 Supine active/AA 3x8  Seated AA with pt hold and controlling descent x8    midtrap  Side self supported AA 3x5 Side AA 3x8 Side active/AA 3x8    IR   Supine AIS with pt hold at end ROM 2x5 Supine AIS pt hold at end ROM 3x8 AIS supine 2x8  Standing Red tband x10    Bicep curls   2# 2x8 3# 2x8    Scapular protraction/retraction    Side 2x8 active/ gentle resist         HEP: continue HEP. Continue HEP with pulleys. Pt verbalizes understanding. Treatment/Session Assessment:    · Response to Treatment:  Stiff shoulder flexion with movement coming mostly from the scapula  · Compliance with Program/Exercises: yes,per pt  Recommendations/Intent for next treatment session: \"Next visit will focus on Manual treatment. Modalities and exercise modification as needed.  To MD  Total Treatment Duration:   42   minutes  PT Patient Time In/Time Out  Time In: 0950  Time Out: [address] [place], [place]

## 2017-05-15 NOTE — PROGRESS NOTES
Crystal Ardon  : 1945 [facility]AN[facility]  [address] [facility], [address]  [phone]   [phone]    Outpatient PHYSICAL THERAPY: PHYSICIAN COMMUNICATION    REFERRING PHYSICIAN: Yesi Gaston MD  Return Physician Appointment: 17  MEDICAL/REFERRING DIAGNOSIS:  · s/p right shoulder  ATTENDANCE: Crystal Ardon has attended all scheduled visits     ASSESSMENT:  DATE: 5/15/2017    PROGRESS: Crystal Ardon continues to have shoulder stiffness, with movement coming primarily from the scapula. Strength is slow to improve in all motions because of pain. Popping is noted with attempts at forward elevation in supine. Scapula tends to elevate and tilt during exercises requiring manual assist for stability. Pt is questioning a torn pectoralis muscle and desires to have a manipulation of the right shoulder \"to get it moving again\". Therapy focus has been on stretching, posture correction, transitioning into AROM and attempts to facilitate the cuff for UE movements against gravity. Taping used to inhibit the upper trap and facilitate scapular stabilizers.     Thank you for this referral,  Syed Nicholson, PT ,MSPT

## 2017-05-16 ENCOUNTER — HOSPITAL ENCOUNTER (OUTPATIENT)
Dept: PHYSICAL THERAPY | Age: 72
Discharge: HOME | End: 2017-05-16
Payer: MEDICARE

## 2017-05-16 PROCEDURE — 97140 MANUAL THERAPY 1/> REGIONS: CPT

## 2017-05-16 PROCEDURE — 97110 THERAPEUTIC EXERCISES: CPT

## 2017-05-16 PROCEDURE — 97016 VASOPNEUMATIC DEVICE THERAPY: CPT

## 2017-05-16 NOTE — PROGRESS NOTES
Tameka Hollins  : 1945 [facility] FARRUKH LUNA  [address] [facility], [address] [place] [address],[address], [facility] [address]  [phone]   [phone]       OUTPATIENT PHYSICAL THERAPY:Daily Note 2017      ICD-10: Treatment Diagnosis: Pain in right shoulder (M25.511),Stiffness of right shoulder, not elsewhere classified (M25.611)  Precautions/Allergies:   Epinephrine and Aspirin   Fall Risk Score: 1 (? 5 = High Risk)  MD Orders: PT- evaluate and treat, HEP, strengthening, ROM/progressive ROM MEDICAL/REFERRING DIAGNOSIS:  s/p right shoulder R clavicle and biceps  DATE OF ONSET:AC joint separation and biceps rupture- 2017 surgery- 3/23/17  REFERRING PHYSICIAN: Pepito Berman MD  RETURN PHYSICIAN APPOINTMENT: 17     INITIAL ASSESSMENT:  Mr. Maya Nichols 6 weeks s/p Open reduction internal fixation type 4 AC joint separation and open revision R biceps tenodesis. He presents with Post-op pain, weakness, stiffness and decreased ROM are limiting normalized use and function of R UE. He will benefit from PT for guided shoulder rehab per protocol to promote safe return to normalized use of the UE with ADL/ activities. PROBLEM LIST (Impacting functional limitations):  1. Decreased ADL/Functional Activities  2. Post-op R shoulder and clavicle pain  3. Decreased R shoulder ROM   4. Weakness R shoulder INTERVENTIONS PLANNED:  1. Neuromuscular Re-education/Strengthening  2. Thermal and electric modalities, manual therapies for pain   3. Manual therapies, therapeutic exercises, HEP for ROM    4. Therapeutic exercises and HEP for strength   TREATMENT PLAN:  Effective Dates: 17 TO 17. Frequency/Duration: 3 times a week for 12 weeks  GOALS: (Goals have been discussed and agreed upon with patient.)  Short-Term Functional Goals: Time Frame: 4 weeks  1. Pt will demonstrate posture correction   2.  Report no more than mild intermittent pain to R shoulder with compensatory use during basic functional activities, and score less than 60% on the DASH. 3. R shoulder PROM forward elevation greater than 100 degrees and external rotation greater than 60 degrees to progress into functional ranges. 4. Demonstrate good R shoulder isometric strength with manual testing to progress into strength phase. 5. Independent with initial HEP. Discharge Goals: Time Frame: 12 weeks  1. Pt to maintain corrected posture during exercises and reaching. 2. No more than 3/10 intermittent pain R shoulder with return to normalized household and work activities, and score less than 50% on the DASH. 3. R shoulder AROM forward elevation greater than 110 degrees, external rotation greater than 70 degrees, and strength to shoulder are grossly WNL's for safe use with normalized activities. 4. Demonstrate good functional shoulder strength and endurance for return to normalized household and work activities. 5. Independent with advanced shoulder HEP for continued self-management. Rehabilitation Potential For Stated Goals: Good  Regarding Krysta Downey therapy, I certify that the treatment plan above will be carried out by a therapist or under their direction. Thank you for this referral,  Mitch Palomino PT       Referring Physician Signature: Pepito Berman MD              Date                    The information in this section was collected on 5/1/17 (except where otherwise noted). HISTORY:   History of Present Injury/Illness (Reason for Referral):  Pt was attending therapy for post-op R shoulder scope. He arrived to his appointment with complaints of R clavicle pain with reaching and bicep area pain with attempts to lift the arm. He was cleared to continue therapy with respect to the pain. At his follow up appointment it was determined that he had a type 4 separation and he needed to have surgery.    Past Medical History/Comorbidities:   Mr. 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 Desean Ayala  has a past surgical history that includes  lumbar laminectomy; hip replacement (Left, 12/12/2012); orthopaedic (Bilateral); knee arthroscopy (Right, 2006); shoulder arthroscopy (Left, 2/17/2012); R shoulder scope 1/27/17  Social History/Living Environment:      Lives with wife in a house. Performs home repairs, yard work and laundry  Prior Level of Function/Work/Activity:  retired  Dominant Side:         RIGHT  Current Medications:       Current Outpatient Prescriptions:     diazePAM (VALIUM) 10 mg tablet, Take 10 mg by mouth nightly. As needed   Indications: sleep, Disp: , Rfl:     HYDROcodone-acetaminophen (NORCO)  mg tablet, Take 1 Tab by mouth every six (6) hours as needed for Pain. Max Daily Amount: 4 Tabs., Disp: 120 Tab, Rfl: 0    rivaroxaban (XARELTO) 20 mg tab tablet, Take 1 Tab by mouth daily (with breakfast). , Disp: 30 Tab, Rfl: 11    cyanocobalamin (VITAMIN B-12) 1,000 mcg tablet, Take 1,000 mcg by mouth daily. , Disp: , Rfl:     Biotin 2,500 mcg cap, Take  by mouth daily. , Disp: , Rfl:     B.infantis-B.ani-B.long-B.bifi (PROBIOTIC 4X) 10-15 mg TbEC, Take  by mouth daily. , Disp: , Rfl:     atorvastatin (LIPITOR) 80 mg tablet, Take 40 mg by mouth daily.  1/2 tablet daily  Take DOS per anesthesia protocol. , Disp: , Rfl:     carboxymethylcellulose sodium (REFRESH LIQUIGEL) 1 % dlgl, Apply 1 Drop to eye every four (4) hours. , Disp: , Rfl:     omega-3 fatty acids-vitamin e (FISH OIL) 1,000 mg cap, Take 2 Caps by mouth every morning., Disp: , Rfl:     Brimonidine-Timolol (COMBIGAN) 0.2-0.5 % Drop, Administer 1 drop to both eyes two (2) times a day. Indications: OPEN ANGLE GLAUCOMA, Disp: , Rfl:     PRED FORTE 1 % DrpS, Administer 1 drop to right eye two (2) times a day. Indications: pt states prevents infection, Disp: , Rfl:    Date Last Reviewed:  5/15/17   Number of Personal Factors/Comorbidities that affect the Plan of Care: 1-2: MODERATE COMPLEXITY   EXAMINATION: evaluation     Observation/Orthostatic Postural Assessment: pt with right shoulder protracted and guarded. Minimal movement of the right arm. Generalized right shoulder and biceps region atrophy. Palpation:  Tender over the mid clavicle and biceps . Hardened scar tissue over the anterior shoulder. ROM: 5/8/17                RUE AROM  R Shoulder External Rotation: 62 (neutral, 57 at 50 deg abd)  R Elbow Extension: -6 (from full extn)  R Forearm Supination: 90  RUE PROM  R Shoulder Flexion: 111 (forward elevation)  R Shoulder Internal Rotation: 37 (scapula stabilized)         Strength:                  Special Tests: None  Neurological Screen: n/t  Functional Mobility: independent   Balance:  No deficits        Body Structures Involved:  1. Bones  2. Joints  3. Muscles  4. Ligaments Body Functions Affected:  1. Sensory/Pain  2. Neuromusculoskeletal  3. Movement Related Activities and Participation Affected:  1. General Tasks and Demands  2. Mobility  3. Self Care  4. Domestic Life   Number of elements (examined above) that affect the Plan of Care: 3: MODERATE COMPLEXITY   CLINICAL PRESENTATION:   Presentation: Evolving clinical presentation with unstable and unpredictable characteristics: HIGH COMPLEXITY   CLINICAL DECISION MAKING:   Outcome Measure:    Tool Used: Disabilities of the Arm, Shoulder and Hand (DASH) Questionnaire - Quick Version  Score:  Initial: 75  Most Recent: X/55 (Date: -- )   Interpretation of Score: The DASH is designed to measure the activities of daily living in person's with upper extremity dysfunction or pain. Each section is scored on a 1-5 scale, 5 representing the greatest disability. The scores of each section are added together for a total score of 55. This number is divided by 11, followed by subtracting 1 and multiplying by 25 to get a percent score of disability. This value represents the percentage disability: 0-20% minimal disability; 20-40% moderate disability; 40-60% severe disability; % dependent for care or exaggerated symptom behavior. Minimal detectable change is 12%. Score 11 12-19 20-28 29-37 38-45 46-54 55   Modifier CH CI CJ CK CL CM CN ? Carrying, Moving, and Handling Objects:     - CURRENT STATUS: CL - 60%-79% impaired, limited or restricted    - GOAL STATUS: CJ - 20%-39% impaired, limited or restricted    - D/C STATUS:  ---------------To be determined---------------    Medical Necessity:   · Skilled intervention continues to be required due to need for manual treatment prior to strengthening. Reason for Services/Other Comments:  · Patient continues to require skilled intervention due to need for guided progression of rehab as cleared by MD.   Use of outcome tool(s) and clinical judgement create a POC that gives a: Questionable prediction of patient's progress: MODERATE COMPLEXITY            TREATMENT:   (In addition to Assessment/Re-Assessment sessions the following treatments were rendered)  Pre-treatment Symptoms/Complaints:  Pt reports the shoulder was sore from exercises. [facility] in and out, questioning if the pect is torn and asking about having the shoulder manipulated. Helped his aunt with her broken garage door this weekend. Did the HEP yesterday but not yet this morning.  Dr office changed appointment time  Pain: Initial:   Pain Intensity 1: 10  Post Session: 8   Palpation: tender over the right pect region, AC joint, subacromial region, biceps tendon. Popping with forward elevation  MANUAL THERAPY: (10 minutes): for ROM. Grade 3 to 4-- physiological mobilizations to right shoulder ER at 50 deg abd, IR at 55 deg abd and scapula stabilized and forward elevation  MODALITIES: ( 15 minutes): gave pt ice to right shoulder via game ready for pain   Therapeutic Exercise (20 Minutes): for AROM and strengthening. Moderate manual assist for completion of movement when fatigued. AA= active assisted  date Date:  5/8/17 Date:  5/9/17 Date:  5/12/17 5/15/17 5/16/17   Activity/Exercise Parameters Parameters Parameters     ER Side 2# 2x10 Side 2# 2x10 Side ER 2# 2x10 Side ER 3# x6, 2# x10 Side no weight 2x10   Shoulder abd Side 2x10 Side 2x8 active to AA when fatigued Side AA 2x8 Side active/AA  2x10    FE Seated AA x 8  Supine AA x 10 Seated AA lift wit pt hold and controlled descent 2x5  Supine AA 2x8 Supine active /AA when fatigued  Seated AA lift with pt hold and controlling descent 2x8 Supine active/AA 3x8  Seated AA with pt hold and controlling descent x8 Supine with wand 2x8, no pillow   midtrap  Side self supported AA 3x5 Side AA 3x8 Side active/AA 3x8 Side with self support x 8   IR   Supine AIS with pt hold at end ROM 2x5 Supine AIS pt hold at end ROM 3x8 AIS supine 2x8  Standing Red tband x10    Bicep curls   2# 2x8 3# 2x8    Scapular protraction/retraction    Side 2x8 active/ gentle resist Standing x10   tricep pulls     Green tband x15                HEP: begin AROM HEP and pulleys. Pt verbalizes understanding. Treatment/Session Assessment:    · Response to Treatment:  Stiff shoulder and pain with movement. motion coming mostly from the scapula  · Compliance with Program/Exercises: yes,per pt  Recommendations/Intent for next treatment session: \"Next visit will focus on Manual treatment. Modalities and exercise modification as needed.  To MD garg afternoon  Total Treatment Duration:   45  minutes  PT Patient Time In/Time Out  Time In: 0900    Mitch Palomino PT

## 2017-05-17 ENCOUNTER — APPOINTMENT (OUTPATIENT)
Dept: PHYSICAL THERAPY | Age: 72
End: 2017-05-17
Payer: MEDICARE

## 2017-05-22 ENCOUNTER — APPOINTMENT (OUTPATIENT)
Dept: PHYSICAL THERAPY | Age: 72
End: 2017-05-22
Payer: MEDICARE

## 2017-05-23 ENCOUNTER — APPOINTMENT (OUTPATIENT)
Dept: PHYSICAL THERAPY | Age: 72
End: 2017-05-23
Payer: MEDICARE

## 2017-05-26 ENCOUNTER — HOSPITAL ENCOUNTER (OUTPATIENT)
Dept: SURGERY | Age: 72
Discharge: HOME | End: 2017-05-26
Attending: ORTHOPAEDIC SURGERY
Payer: MEDICARE

## 2017-05-26 ENCOUNTER — APPOINTMENT (OUTPATIENT)
Dept: PHYSICAL THERAPY | Age: 72
End: 2017-05-26
Payer: MEDICARE

## 2017-05-26 VITALS
RESPIRATION RATE: 16 BRPM | BODY MASS INDEX: 26.07 KG/M2 | HEART RATE: 51 BPM | SYSTOLIC BLOOD PRESSURE: 155 MMHG | HEIGHT: 69 IN | WEIGHT: 176 LBS | TEMPERATURE: 97.3 F | DIASTOLIC BLOOD PRESSURE: 81 MMHG | OXYGEN SATURATION: 98 %

## 2017-05-26 PROBLEM — M75.101 ROTATOR CUFF TEAR ARTHROPATHY OF RIGHT SHOULDER: Status: ACTIVE | Noted: 2017-05-26

## 2017-05-26 PROBLEM — M12.811 ROTATOR CUFF TEAR ARTHROPATHY OF RIGHT SHOULDER: Status: ACTIVE | Noted: 2017-05-26

## 2017-05-26 PROBLEM — M75.21 BICIPITAL TENDINITIS OF RIGHT SHOULDER: Status: ACTIVE | Noted: 2017-05-26

## 2017-05-26 LAB
ALBUMIN SERPL BCP-MCNC: 3.6 G/DL (ref 3.2–4.6)
ALBUMIN/GLOB SERPL: 1.1 {RATIO} (ref 1.2–3.5)
ALP SERPL-CCNC: 63 U/L (ref 50–136)
ALT SERPL-CCNC: 26 U/L (ref 12–65)
ANION GAP BLD CALC-SCNC: 6 MMOL/L (ref 7–16)
APTT PPP: 33.3 SEC (ref 23.5–31.7)
AST SERPL W P-5'-P-CCNC: 19 U/L (ref 15–37)
BACTERIA SPEC CULT: NORMAL
BILIRUB SERPL-MCNC: 0.5 MG/DL (ref 0.2–1.1)
BUN SERPL-MCNC: 19 MG/DL (ref 8–23)
CALCIUM SERPL-MCNC: 8.8 MG/DL (ref 8.3–10.4)
CHLORIDE SERPL-SCNC: 107 MMOL/L (ref 98–107)
CO2 SERPL-SCNC: 31 MMOL/L (ref 21–32)
CREAT SERPL-MCNC: 0.99 MG/DL (ref 0.8–1.5)
ERYTHROCYTE [DISTWIDTH] IN BLOOD BY AUTOMATED COUNT: 13 % (ref 11.9–14.6)
GLOBULIN SER CALC-MCNC: 3.3 G/DL (ref 2.3–3.5)
GLUCOSE SERPL-MCNC: 112 MG/DL (ref 65–100)
HCT VFR BLD AUTO: 41.9 % (ref 41.1–50.3)
HGB BLD-MCNC: 13.9 G/DL (ref 13.6–17.2)
INR PPP: 1.3 (ref 0.9–1.2)
MAGNESIUM SERPL-MCNC: 2 MG/DL (ref 1.8–2.4)
MCH RBC QN AUTO: 32 PG (ref 26.1–32.9)
MCHC RBC AUTO-ENTMCNC: 33.2 G/DL (ref 31.4–35)
MCV RBC AUTO: 96.3 FL (ref 79.6–97.8)
PLATELET # BLD AUTO: 154 K/UL (ref 150–450)
PMV BLD AUTO: 10.3 FL (ref 10.8–14.1)
POTASSIUM SERPL-SCNC: 4 MMOL/L (ref 3.5–5.1)
PROT SERPL-MCNC: 6.9 G/DL (ref 6.3–8.2)
PROTHROMBIN TIME: 13.5 SEC (ref 9.6–12)
RBC # BLD AUTO: 4.35 M/UL (ref 4.23–5.67)
SERVICE CMNT-IMP: NORMAL
SODIUM SERPL-SCNC: 144 MMOL/L (ref 136–145)
WBC # BLD AUTO: 5.5 K/UL (ref 4.3–11.1)

## 2017-05-26 PROCEDURE — 85730 THROMBOPLASTIN TIME PARTIAL: CPT | Performed by: ORTHOPAEDIC SURGERY

## 2017-05-26 PROCEDURE — 85610 PROTHROMBIN TIME: CPT | Performed by: ORTHOPAEDIC SURGERY

## 2017-05-26 PROCEDURE — 80053 COMPREHEN METABOLIC PANEL: CPT | Performed by: ORTHOPAEDIC SURGERY

## 2017-05-26 PROCEDURE — 83735 ASSAY OF MAGNESIUM: CPT | Performed by: ORTHOPAEDIC SURGERY

## 2017-05-26 PROCEDURE — 87641 MR-STAPH DNA AMP PROBE: CPT | Performed by: ORTHOPAEDIC SURGERY

## 2017-05-26 PROCEDURE — 85027 COMPLETE CBC AUTOMATED: CPT | Performed by: ORTHOPAEDIC SURGERY

## 2017-05-26 RX ORDER — SODIUM CHLORIDE 9 MG/ML
250 INJECTION, SOLUTION INTRAVENOUS AS NEEDED
Status: CANCELLED | OUTPATIENT
Start: 2017-05-26

## 2017-05-26 NOTE — BRIEF OP NOTE
BRIEF OPERATIVE NOTE    Date of Procedure: 6/1/2017     Preoperative Diagnosis:  Rotator cuff TEAR arthropathy, right [M12.811]      Biceps tendinitis on right [M75.21]      PAINFUL HARDWARE RIGHT SHOULDER    Postoperative Diagnosis:  SAME    Procedure(s): 1. HARDWARE REMOVAL RIGHT SHOULDER      2.  REVERSE RIGHT TOTAL SHOULDER ARTHROPLASTY WITH DELTA EXTEND PROSTHESIS, BICEPS TENODESIS, LATISSIMUS DORSI AND TERES MAJOR TENDON TRANSFERS RIGHT SHOULDER    Surgeon(s) and Role:     * Zakiya Banuelos MD - Primary           Anesthesia: General WITH INTERSCALENE BLOCK    Complications: NONE    Implants:     Implant Name Type Inv.  Item Serial No.  Lot No. LRB No. Used Action   COMPNT AYDEE METAGLENE -- DELTA EXTEND - [id number]  [address] [place] [address] [id number] [address] [place] [address] [id number] Right 1 Implanted   SCR BNE LCK GLENOID 4.5X48MM -- DELTA EXTEND - [id number]  SCR BNE LCK GLENOID 4.5X48MM -- DELTA EXTEND [id number] [facility] [employer] [id number] Right 1 Implanted   SCR BNE LCK GLENOID 4.5X48MM -- DELTA EXTEND - [id number]  SCR BNE LCK GLENOID 4.5X48MM -- DELTA EXTEND [id number] [facility] [id number] Right 1 Implanted   SCR GLENOID THRD 4.5X24MM -- DELTA EXTEND - [id number]  SCR GLENOID THRD 4.5X24MM -- DELTA EXTEND [id number] [facility] [id number] Right 1 Implanted   SCR GLENOID THRD 4.5X18MM -- DELTA EXTEND - [id number]  SCR GLENOID THRD 4.5X18MM -- DELTA EXTEND [id number] [facility] [id number] Right 1 Implanted   COMPNT GLENOSPHERE ECC 42MM -- DELTA EXTEND - [id number]  COMPNT GLENOSPHERE ECC 42MM -- DELTA EXTEND [id number] [facility] [id number] Right 1 Implanted   STEM [facility] EPI STD SZ2 10M -- DELTA XTEND - [id number]  STEM HUM [facility] EPI STD SZ2 10M -- DELTA XTEND [id number] [facility] [id number] Right 1 Implanted   CEMENT BNE SIMPLEX TOBRA 4 --  - [id number]  CEMENT BNE SIMPLEX TOBRA 4 --  [id number] [employer] HOWM [id number] Right 1 Implanted   (D)INSERT HUM PLOYTHYL 42X9MM - [id number]   Catheryn Fulda PLOYTHYL 42X9MM [id number] [facility] [id number] Right 1 Implanted        Hans Eason MD

## 2017-05-26 NOTE — PERIOP NOTES
Recent Results (from the past 8 hour(s))   CBC W/O DIFF    Collection Time: 05/26/17  2:40 PM   Result Value Ref Range    WBC 5.5 4.3 - 11.1 K/uL    RBC 4.35 4.23 - 5.67 M/uL    HGB 13.9 13.6 - 17.2 g/dL    HCT 41.9 41.1 - 50.3 %    MCV 96.3 79.6 - 97.8 FL    MCH 32.0 26.1 - 32.9 PG    MCHC 33.2 31.4 - 35.0 g/dL    RDW 13.0 11.9 - 14.6 %    PLATELET 239 480 - 450 K/uL    MPV 10.3 (L) 10.8 - 30.2 FL   METABOLIC PANEL, COMPREHENSIVE    Collection Time: 05/26/17  2:40 PM   Result Value Ref Range    Sodium 144 136 - 145 mmol/L    Potassium 4.0 3.5 - 5.1 mmol/L    Chloride 107 98 - 107 mmol/L    CO2 31 21 - 32 mmol/L    Anion gap 6 (L) 7 - 16 mmol/L    Glucose 112 (H) 65 - 100 mg/dL    BUN 19 8 - 23 MG/DL    Creatinine 0.99 0.8 - 1.5 MG/DL    GFR est AA >60 >60 ml/min/1.73m2    GFR est non-AA >60 >60 ml/min/1.73m2    Calcium 8.8 8.3 - 10.4 MG/DL    Bilirubin, total 0.5 0.2 - 1.1 MG/DL    ALT (SGPT) 26 12 - 65 U/L    AST (SGOT) 19 15 - 37 U/L    Alk.  phosphatase 63 50 - 136 U/L    Protein, total 6.9 6.3 - 8.2 g/dL    Albumin 3.6 3.2 - 4.6 g/dL    Globulin 3.3 2.3 - 3.5 g/dL    A-G Ratio 1.1 (L) 1.2 - 3.5     MAGNESIUM    Collection Time: 05/26/17  2:40 PM   Result Value Ref Range    Magnesium 2.0 1.8 - 2.4 mg/dL   PROTHROMBIN TIME + INR    Collection Time: 05/26/17  2:40 PM   Result Value Ref Range    Prothrombin time 13.5 (H) 9.6 - 12.0 sec    INR 1.3 (H) 0.9 - 1.2     PTT    Collection Time: 05/26/17  2:40 PM   Result Value Ref Range    aPTT 33.3 (H) 23.5 - 31.7 SEC

## 2017-05-26 NOTE — PERIOP NOTES
Patient verified name, , and surgery as listed in [facility]. Type 3 surgery, walk in assessment complete. Labs per surgeon: cbc, cmp, pt, ptt, ua, magnesium ; results within anesthesia guidelines; placed on chart~routed to PCP, Dr. Scotty MACIAS [facility] and to surgeon, Dr. Dulce Kirby for further review. ; pt unable to urinate today, is planning on returning to hospital 17 for type and cross labs states will get urinalysis done then~order in as signed and held. Labs per anesthesia protocol: none  EKG:done 3/20/17; ECHO 16; office note from [facility] placed on chart for anesthesia review. Hibiclens and instructions given per hospital policy. Nasal Swab collected per MD order and instructions for Mupirocin nasal ointment if required. Patient provided with handouts including Guide to Surgery, Pain Management, Hand Hygiene, Blood Transfusion Education, and [facility] [facility] Brochure. Patient answered medical/surgical history questions at their best of ability. All prior to admission medications documented in [facility]. Original medication prescription bottle NOT visualized during patient appointment. Patient instructed to hold all vitamins 7 days prior to surgery and NSAIDS 5 days prior to surgery. Medications to be held prior to surgery none    Patient instructed to continue previous medications as prescribed prior to surgery and to take the following medications the day of surgery according to anesthesia guidelines with a small sip of water: use/bring Pred forte, Combigan, REfresh eye drops; take Norco.  Patient taught back and verbalized understanding.

## 2017-05-26 NOTE — H&P
[facility] HISTORY AND PHYSICAL    Subjective:     Patient is a 81 y.o. RHD MALE WITH RIGHT SHOULDER PAIN. SEE OFFICE NOTE.     Patient Active Problem List    Diagnosis Date Noted    Pain from implanted hardware 05/30/2017    Rotator cuff tear arthropathy of right shoulder 05/26/2017    Bicipital tendinitis of right shoulder 05/26/2017    Posterior dislocation of right acromioclavicular joint 03/23/2017    Rupture of right biceps tendon 03/23/2017    Traumatic tear of right rotator cuff 01/26/2017    Strain of right biceps 01/26/2017    Type 1 superior labrum extending from anterior to posterior (SLAP) lesion of right shoulder 01/26/2017    Other sprain of right shoulder joint, sequela 01/26/2017    Chondromalacia, right shoulder 01/26/2017    AR (allergic rhinitis) 07/27/2016    Chronic sinusitis 07/27/2016    Nasal polyp 07/27/2016    Hypertrophy of nasal turbinates 07/27/2016    Chronic pansinusitis 07/27/2016    Hereditary deficiency of other clotting factors ([address] 03/21/2016    Chronic deep vein thrombosis of lower extremity ([address] 03/21/2016    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    Bradycardia 02/02/2016    Anaphylactic reaction 02/01/2016    Pulmonary embolism ([address] 12/23/2014    Hypertension 12/23/2014    Factor V Leiden ([address] 12/23/2014    Left leg DVT ([address] 12/23/2014    Leukocytosis 12/23/2014    Hyperkalemia 12/23/2014    Elevated serum creatinine 12/23/2014    Chronic back pain 12/23/2014    ARCE (dyspnea on exertion) 12/23/2014    Spinal stenosis, lumbar region, with neurogenic claudication 03/24/2014    Glaucoma     Hypercholesteremia     Unspecified adverse effect of anesthesia     Osteoarthritis of left hip 12/12/2012   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 12/12/2012    Supraspinatus (muscle) (tendon) sprain 02/17/2012    Superior glenoid labrum lesion 02/17/2012    Bicipital tenosynovitis 02/17/2012    Primary localized osteoarthrosis, shoulder region 02/17/2012    Carpal tunnel syndrome 02/17/2012    Essential hypertension, benign 08/16/2011    Thromboembolus ([address] 09/01/1997     Past Medical History:   Diagnosis Date    Acute sinusitis     Anaphylactic reaction 2/1/2016    AR (allergic rhinitis) 7/27/2016    Arrhythmia     bradycardia    Bicipital tenosynovitis 2/17/2012    Bradycardia 02/02/2016    Seen by [facility]-no follow-up required.  Carpal tunnel syndrome 2/17/2012    Chronic back pain 12/23/2014    Chronic deep vein thrombosis of lower extremity (HCC) 3/21/2016    Chronic pain     right shoulder    Chronic pansinusitis 7/27/2016    Chronic sinusitis 7/27/2016    DNS (deviated nasal septum)     ARCE (dyspnea on exertion) 12/23/2014    Elevated serum creatinine 12/23/2014    Epistaxis     Essential hypertension, benign 8/16/2011    Factor V Leiden ([address]     managed with medication     Glaucoma     right eye    H/O echocardiogram 02/02/2016    EF 70-75%    Hereditary deficiency of other clotting factors ([address] 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 Anila Srivastava. Takes Xarelto daily.  Leukocytosis 12/23/2014    Lumbar spinal stenosis     Nasal obstruction     Nasal polyp 7/27/2016    Nausea & vomiting     Osteoarthritis     right knee    Osteoarthritis of left hip 12/12/2012    Primary localized osteoarthrosis, shoulder region 2/17/2012    Pulmonary embolism ([address] 1981    right lung 1981, 1994,right lung 1997, right lung 2000 - Leila filter placed 2000. Followed by Dr. Anila Srivastava.     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    Spinal stenosis, lumbar region, with neurogenic claudication 3/24/2014    Superior glenoid labrum lesion 2/17/2012    Supraspinatus (muscle) (tendon) sprain 2/17/2012    Thromboembolus ([address] 9/1/1997    left thigh DVT     Unspecified adverse effect of anesthesia     very difficult IV stick per patient d/t Lovenox, has needed anesthesiologist to start several times/comes in a day ahead for PICC line       Past Surgical History:   Procedure Laterality Date    HX APPENDECTOMY  1980    HX BACK SURGERY      spinal stenosis    HX BLEPHAROPLASTY Bilateral 2009    HX CARPAL TUNNEL RELEASE  2012    left     [address]    right    HX CATARACT REMOVAL Bilateral     left eye 1994 & right eye 2002    HX COLONOSCOPY  00,05,10    HX CORNEAL TRANSPLANT Right 2004    HX CORNEAL TRANSPLANT Right 06/14/2016    sutures still present     HX HEENT Right 11/08     glaucoma surgery    HX HEENT      nasal reconstruction    HX HEENT      nasal polypectomy    HX HERNIA REPAIR  2010    abdominal hernia repair    HX HERNIA REPAIR Left 2007    inguinal hernia repair    HX HIP REPLACEMENT Left 12/12/2012    HX KNEE ARTHROSCOPY Right 2006     knee meniscus repair    HX LAP CHOLECYSTECTOMY  2001    HX LUMBAR LAMINECTOMY      HX ORTHOPAEDIC Bilateral     open shoulder AC reconstruction    HX ORTHOPAEDIC Right 2006    neuroma fromfoot    HX ORTHOPAEDIC Bilateral     right elbow 1979 & left elbow 2007    HX ORTHOPAEDIC Right     index finger    HX OTHER SURGICAL  2000    mango filter placed    HX SHOULDER ARTHROSCOPY Left 2/17/2012    HX TONSILLECTOMY  1950    HX VASCULAR ACCESS  2008    PICC line right arm placed & then removed     HX VASCULAR ACCESS  2012    PICC- removed    HX VITRECTOMY Left 6/15/15    SINUS SURGERY PROC UNLISTED      numerous      Prior to Admission medications    Medication Sig Start Date End Date Taking? Authorizing Provider   diazePAM (VALIUM) 10 mg tablet Take 10 mg by mouth nightly.  As needed Indications: SEDATION, sleep    Historical Provider   HYDROcodone-acetaminophen (NORCO)  mg tablet Take 1 Tab by mouth every six (6) hours as needed for Pain. Max Daily Amount: 4 Tabs. Patient taking differently: Take 1 Tab by mouth every six (6) hours as needed for Pain. Take / use AM day of surgery  per anesthesia protocols. Indications: Pain 3/9/17   Bel Oneal MD   rivaroxaban (XARELTO) 20 mg tab tablet Take 1 Tab by mouth daily (with breakfast). 1/30/17   Taz Black MD   cyanocobalamin (VITAMIN B-12) 1,000 mcg tablet Take 1,000 mcg by mouth daily. Historical Provider   Biotin 2,500 mcg cap Take  by mouth daily. Historical Provider   B.infantis-B.ani-B.long-B.bifi (PROBIOTIC 4X) 10-15 mg TbEC Take  by mouth daily. Historical Provider   atorvastatin (LIPITOR) 80 mg tablet Take 40 mg by mouth daily. 1/2 tablet daily    Take DOS per anesthesia protocol. Indications: hyperlipidemia    Historical Provider   carboxymethylcellulose sodium (REFRESH LIQUIGEL) 1 % dlgl Apply 1 Drop to eye every four (4) hours. Take / use AM day of surgery  per anesthesia protocols. Indications: DRY EYE    Historical Provider   omega-3 fatty acids-vitamin e (FISH OIL) 1,000 mg cap Take 2 Caps by mouth every morning. Historical Provider   Brimonidine-Timolol (COMBIGAN) 0.2-0.5 % Drop Administer 1 Drop to both eyes two (2) times a day. Take / use AM day of surgery  per anesthesia protocols. Indications: Open Angle Glaucoma 3/16/10   Historical Provider   PRED FORTE 1 % DrpS Administer 1 Drop to right eye two (2) times a day. Take / use AM day of surgery  per anesthesia protocols. Indications: pt states prevents infection 3/16/10   Salomón Suarez MD     Allergies   Allergen Reactions    Epinephrine Anaphylaxis     Blindness in right eye     Aspirin Other (comments)     Pt states can't take aspirin with his blood thinning medication.         Social History   Substance Use Topics    Smoking status: Never Smoker  Smokeless tobacco: Never Used    Alcohol use No      Family History   Problem Relation Age of Onset    Cancer Mother      lymphoma    Diabetes Mother      type II    Heart Disease Father     Diabetes Father      type II    Cancer Father      pancreatic ca    Other Father      Coronary Disease    Malignant Hyperthermia Neg Hx     Pseudocholinesterase Deficiency Neg Hx     Delayed Awakening Neg Hx     Post-op Nausea/Vomiting Neg Hx     Emergence Delirium Neg Hx     Post-op Cognitive Dysfunction Neg Hx       Review of Systems  A comprehensive review of systems was negative except for that written in the HPI. Objective:     No data found. There were no vitals taken for this visit. General:  Alert, cooperative, no distress, appears stated age. Head:  Normocephalic, without obvious abnormality, atraumatic. Back:   Symmetric, no curvature. ROM normal. No CVA tenderness. Lungs:   Clear to auscultation bilaterally. Chest wall:  No tenderness or deformity. Heart:  Regular rate and rhythm, S1, S2 normal, no murmur, click, rub or gallop. Extremities: Extremities normal, atraumatic, no cyanosis or edema. Pulses: 2+ and symmetric all extremities. Skin: Skin color, texture, turgor normal. No rashes or lesions   Lymph nodes: Cervical, supraclavicular, and axillary nodes normal.   Neurologic: CNII-XII intact. Normal strength, sensation and reflexes throughout. Assessment:     Principal Problem:    Rotator cuff tear arthropathy of right shoulder (5/26/2017)    Active Problems:    Bicipital tendinitis of right shoulder (5/26/2017)      Pain from implanted hardware (5/30/2017)    PAINFUL HARDWARE RIGHT SHOULDER    Plan:     The various methods of treatment have been discussed with the patient and family. PATIENT HAS EXHAUSTED NON-OPERATIVE MODALITIES.   After consideration of risks, benefits and other options for treatment, the patient has consented to surgical intervention. SEE OFFICE NOTE.

## 2017-05-30 ENCOUNTER — HOSPITAL ENCOUNTER (OUTPATIENT)
Dept: LAB | Age: 72
Discharge: HOME | DRG: 483 | End: 2017-05-30
Attending: ORTHOPAEDIC SURGERY
Payer: MEDICARE

## 2017-05-30 ENCOUNTER — APPOINTMENT (OUTPATIENT)
Dept: PHYSICAL THERAPY | Age: 72
End: 2017-05-30
Payer: MEDICARE

## 2017-05-30 PROBLEM — T85.848A PAIN FROM IMPLANTED HARDWARE: Status: ACTIVE | Noted: 2017-05-30

## 2017-05-30 LAB
APPEARANCE UR: CLEAR
BILIRUB UR QL: NEGATIVE
COLOR UR: YELLOW
GLUCOSE UR STRIP.AUTO-MCNC: NEGATIVE MG/DL
HGB UR QL STRIP: NEGATIVE
KETONES UR QL STRIP.AUTO: NEGATIVE MG/DL
LEUKOCYTE ESTERASE UR QL STRIP.AUTO: NEGATIVE
NITRITE UR QL STRIP.AUTO: NEGATIVE
PH UR STRIP: 5 [PH] (ref 5–9)
PROT UR STRIP-MCNC: NEGATIVE MG/DL
SP GR UR REFRACTOMETRY: 1.02 (ref 1–1.02)
UROBILINOGEN UR QL STRIP.AUTO: 0.2 EU/DL (ref 0.2–1)

## 2017-05-30 NOTE — PERIOP NOTES
Pt showed up today to Out Pt lab for pre op lab (type & cross and UA) even though he was instructed to come in on 5/31/17. Lab orders released for Out Pt lab.

## 2017-05-31 ENCOUNTER — APPOINTMENT (OUTPATIENT)
Dept: PHYSICAL THERAPY | Age: 72
End: 2017-05-31
Payer: MEDICARE

## 2017-05-31 ENCOUNTER — ANESTHESIA EVENT (OUTPATIENT)
Dept: SURGERY | Age: 72
DRG: 483 | End: 2017-05-31
Payer: MEDICARE

## 2017-06-01 ENCOUNTER — ANESTHESIA (OUTPATIENT)
Dept: SURGERY | Age: 72
DRG: 483 | End: 2017-06-01
Payer: MEDICARE

## 2017-06-01 ENCOUNTER — HOSPITAL ENCOUNTER (INPATIENT)
Age: 72
LOS: 4 days | Discharge: HOME HEALTH | DRG: 483 | End: 2017-06-05
Attending: ORTHOPAEDIC SURGERY | Admitting: ORTHOPAEDIC SURGERY
Payer: MEDICARE

## 2017-06-01 ENCOUNTER — APPOINTMENT (OUTPATIENT)
Dept: GENERAL RADIOLOGY | Age: 72
DRG: 483 | End: 2017-06-01
Attending: ORTHOPAEDIC SURGERY
Payer: MEDICARE

## 2017-06-01 PROBLEM — M12.819 ROTATOR CUFF TEAR ARTHROPATHY: Status: ACTIVE | Noted: 2017-06-01

## 2017-06-01 PROBLEM — M75.100 ROTATOR CUFF TEAR ARTHROPATHY: Status: ACTIVE | Noted: 2017-06-01

## 2017-06-01 LAB
EST. AVERAGE GLUCOSE BLD GHB EST-MCNC: 117 MG/DL
GLUCOSE BLD STRIP.AUTO-MCNC: 113 MG/DL (ref 65–100)
HBA1C MFR BLD: 5.7 % (ref 4.8–6)

## 2017-06-01 PROCEDURE — 82962 GLUCOSE BLOOD TEST: CPT

## 2017-06-01 PROCEDURE — 74011250636 HC RX REV CODE- 250/636: Performed by: ORTHOPAEDIC SURGERY

## 2017-06-01 PROCEDURE — 83036 HEMOGLOBIN GLYCOSYLATED A1C: CPT | Performed by: ORTHOPAEDIC SURGERY

## 2017-06-01 PROCEDURE — 77030018836 HC SOL IRR NACL ICUM -A: Performed by: ORTHOPAEDIC SURGERY

## 2017-06-01 PROCEDURE — 73030 X-RAY EXAM OF SHOULDER: CPT

## 2017-06-01 PROCEDURE — 77030011283 HC ELECTRD NDL COVD -A: Performed by: ORTHOPAEDIC SURGERY

## 2017-06-01 PROCEDURE — C1713 ANCHOR/SCREW BN/BN,TIS/BN: HCPCS | Performed by: ORTHOPAEDIC SURGERY

## 2017-06-01 PROCEDURE — 77030011640 HC PAD GRND REM COVD -A: Performed by: ORTHOPAEDIC SURGERY

## 2017-06-01 PROCEDURE — 74011000250 HC RX REV CODE- 250

## 2017-06-01 PROCEDURE — 77030031139 HC SUT VCRL2 J&J -A: Performed by: ORTHOPAEDIC SURGERY

## 2017-06-01 PROCEDURE — 77030018986 HC SUT ETHBND4 J&J -B: Performed by: ORTHOPAEDIC SURGERY

## 2017-06-01 PROCEDURE — 74011000258 HC RX REV CODE- 258: Performed by: ORTHOPAEDIC SURGERY

## 2017-06-01 PROCEDURE — 74011250636 HC RX REV CODE- 250/636: Performed by: ANESTHESIOLOGY

## 2017-06-01 PROCEDURE — 77030002937 HC SUT MERS J&J -B: Performed by: ORTHOPAEDIC SURGERY

## 2017-06-01 PROCEDURE — 76010010054 HC POST OP PAIN BLOCK: Performed by: ORTHOPAEDIC SURGERY

## 2017-06-01 PROCEDURE — 0LS30ZZ REPOSITION RIGHT UPPER ARM TENDON, OPEN APPROACH: ICD-10-PCS | Performed by: ORTHOPAEDIC SURGERY

## 2017-06-01 PROCEDURE — 74011250637 HC RX REV CODE- 250/637: Performed by: ANESTHESIOLOGY

## 2017-06-01 PROCEDURE — 0RRJ00Z REPLACEMENT OF RIGHT SHOULDER JOINT WITH REVERSE BALL AND SOCKET SYNTHETIC SUBSTITUTE, OPEN APPROACH: ICD-10-PCS | Performed by: ORTHOPAEDIC SURGERY

## 2017-06-01 PROCEDURE — C1776 JOINT DEVICE (IMPLANTABLE): HCPCS | Performed by: ORTHOPAEDIC SURGERY

## 2017-06-01 PROCEDURE — 0LX10ZZ TRANSFER RIGHT SHOULDER TENDON, OPEN APPROACH: ICD-10-PCS | Performed by: ORTHOPAEDIC SURGERY

## 2017-06-01 PROCEDURE — 77030013727 HC IRR FAN PULSVC ZIMM -B: Performed by: ORTHOPAEDIC SURGERY

## 2017-06-01 PROCEDURE — 77030002913 HC SUT ETHBND J&J -B: Performed by: ORTHOPAEDIC SURGERY

## 2017-06-01 PROCEDURE — 76942 ECHO GUIDE FOR BIOPSY: CPT | Performed by: ORTHOPAEDIC SURGERY

## 2017-06-01 PROCEDURE — 76060000037 HC ANESTHESIA 3 TO 3.5 HR: Performed by: ORTHOPAEDIC SURGERY

## 2017-06-01 PROCEDURE — 77030008703 HC TU ET UNCUF COVD -A: Performed by: ANESTHESIOLOGY

## 2017-06-01 PROCEDURE — 77030004434 HC BUR RND STRY -B: Performed by: ORTHOPAEDIC SURGERY

## 2017-06-01 PROCEDURE — 77030018846 HC SOL IRR STRL H20 ICUM -A: Performed by: ORTHOPAEDIC SURGERY

## 2017-06-01 PROCEDURE — 76210000016 HC OR PH I REC 1 TO 1.5 HR: Performed by: ORTHOPAEDIC SURGERY

## 2017-06-01 PROCEDURE — 74011250637 HC RX REV CODE- 250/637: Performed by: ORTHOPAEDIC SURGERY

## 2017-06-01 PROCEDURE — 77030020782 HC GWN BAIR PAWS FLX 3M -B: Performed by: ANESTHESIOLOGY

## 2017-06-01 PROCEDURE — 77030008477 HC STYL SATN SLP COVD -A: Performed by: ANESTHESIOLOGY

## 2017-06-01 PROCEDURE — 77030003602 HC NDL NRV BLK BBMI -B: Performed by: ANESTHESIOLOGY

## 2017-06-01 PROCEDURE — 74011000250 HC RX REV CODE- 250: Performed by: ANESTHESIOLOGY

## 2017-06-01 PROCEDURE — 76010000172 HC OR TIME 2.5 TO 3 HR INTENSV-TIER 1: Performed by: ORTHOPAEDIC SURGERY

## 2017-06-01 PROCEDURE — 74011250636 HC RX REV CODE- 250/636

## 2017-06-01 PROCEDURE — 0RPJ04Z REMOVAL OF INTERNAL FIXATION DEVICE FROM RIGHT SHOULDER JOINT, OPEN APPROACH: ICD-10-PCS | Performed by: ORTHOPAEDIC SURGERY

## 2017-06-01 PROCEDURE — 77030035643 HC BLD SAW OSC PRECIS STRY -C: Performed by: ORTHOPAEDIC SURGERY

## 2017-06-01 PROCEDURE — 65270000029 HC RM PRIVATE

## 2017-06-01 PROCEDURE — 77030002986 HC SUT PROL J&J -A: Performed by: ORTHOPAEDIC SURGERY

## 2017-06-01 DEVICE — SCREW BNE L18MM DIA4.5MM SHLDR TI NONLOCKING FULL THRD FOR: Type: IMPLANTABLE DEVICE | Site: SHOULDER | Status: FUNCTIONAL

## 2017-06-01 DEVICE — IMPLANTABLE DEVICE: Type: IMPLANTABLE DEVICE | Site: SHOULDER | Status: FUNCTIONAL

## 2017-06-01 DEVICE — STEM HUM SZ 2 L137MM DIA10MM 155DEG STD SHLDR CO CHROME HA: Type: IMPLANTABLE DEVICE | Site: SHOULDER | Status: FUNCTIONAL

## 2017-06-01 DEVICE — CEMENT BNE 20ML 41GM FULL DOSE PMMA W/ TOBRA M VISC RADPQ: Type: IMPLANTABLE DEVICE | Site: SHOULDER | Status: FUNCTIONAL

## 2017-06-01 DEVICE — SCREW BNE L48MM DIA4.5MM GLEN SHLDR METAGLENE LOK FOR DELT: Type: IMPLANTABLE DEVICE | Site: SHOULDER | Status: FUNCTIONAL

## 2017-06-01 DEVICE — SPHERE GLEN DIA42MM SHLDR CO CHROM ECC FOR DELT XTEND REV: Type: IMPLANTABLE DEVICE | Site: SHOULDER | Status: FUNCTIONAL

## 2017-06-01 DEVICE — SCREW BNE L24MM DIA4.5MM METAGLENE NONLOCKING FOR PLATFRM: Type: IMPLANTABLE DEVICE | Site: SHOULDER | Status: FUNCTIONAL

## 2017-06-01 RX ORDER — ONDANSETRON 2 MG/ML
INJECTION INTRAMUSCULAR; INTRAVENOUS AS NEEDED
Status: DISCONTINUED | OUTPATIENT
Start: 2017-06-01 | End: 2017-06-01 | Stop reason: HOSPADM

## 2017-06-01 RX ORDER — SODIUM CHLORIDE 9 MG/ML
250 INJECTION, SOLUTION INTRAVENOUS AS NEEDED
Status: DISCONTINUED | OUTPATIENT
Start: 2017-06-01 | End: 2017-06-01 | Stop reason: HOSPADM

## 2017-06-01 RX ORDER — LIDOCAINE HYDROCHLORIDE 20 MG/ML
INJECTION, SOLUTION EPIDURAL; INFILTRATION; INTRACAUDAL; PERINEURAL AS NEEDED
Status: DISCONTINUED | OUTPATIENT
Start: 2017-06-01 | End: 2017-06-01 | Stop reason: HOSPADM

## 2017-06-01 RX ORDER — NEOSTIGMINE METHYLSULFATE 1 MG/ML
INJECTION INTRAVENOUS AS NEEDED
Status: DISCONTINUED | OUTPATIENT
Start: 2017-06-01 | End: 2017-06-01 | Stop reason: HOSPADM

## 2017-06-01 RX ORDER — ATROPINE SULFATE 0.4 MG/ML
INJECTION, SOLUTION ENDOTRACHEAL; INTRAMEDULLARY; INTRAMUSCULAR; INTRAVENOUS; SUBCUTANEOUS AS NEEDED
Status: DISCONTINUED | OUTPATIENT
Start: 2017-06-01 | End: 2017-06-01 | Stop reason: HOSPADM

## 2017-06-01 RX ORDER — SODIUM CHLORIDE 9 MG/ML
75 INJECTION, SOLUTION INTRAVENOUS CONTINUOUS
Status: DISPENSED | OUTPATIENT
Start: 2017-06-01 | End: 2017-06-02

## 2017-06-01 RX ORDER — MIDAZOLAM HYDROCHLORIDE 1 MG/ML
2 INJECTION, SOLUTION INTRAMUSCULAR; INTRAVENOUS ONCE
Status: COMPLETED | OUTPATIENT
Start: 2017-06-01 | End: 2017-06-01

## 2017-06-01 RX ORDER — HYDROMORPHONE HYDROCHLORIDE 2 MG/ML
0.5 INJECTION, SOLUTION INTRAMUSCULAR; INTRAVENOUS; SUBCUTANEOUS
Status: DISCONTINUED | OUTPATIENT
Start: 2017-06-01 | End: 2017-06-01 | Stop reason: HOSPADM

## 2017-06-01 RX ORDER — DOCUSATE SODIUM 100 MG/1
100 CAPSULE, LIQUID FILLED ORAL 2 TIMES DAILY
Status: DISCONTINUED | OUTPATIENT
Start: 2017-06-02 | End: 2017-06-05 | Stop reason: HOSPADM

## 2017-06-01 RX ORDER — GLYCOPYRROLATE 0.2 MG/ML
INJECTION INTRAMUSCULAR; INTRAVENOUS AS NEEDED
Status: DISCONTINUED | OUTPATIENT
Start: 2017-06-01 | End: 2017-06-01 | Stop reason: HOSPADM

## 2017-06-01 RX ORDER — SODIUM CHLORIDE 9 MG/ML
50 INJECTION, SOLUTION INTRAVENOUS CONTINUOUS
Status: DISCONTINUED | OUTPATIENT
Start: 2017-06-01 | End: 2017-06-01 | Stop reason: HOSPADM

## 2017-06-01 RX ORDER — DEXTROSE 50 % IN WATER (D50W) INTRAVENOUS SYRINGE
25-50 AS NEEDED
Status: DISCONTINUED | OUTPATIENT
Start: 2017-06-01 | End: 2017-06-05 | Stop reason: HOSPADM

## 2017-06-01 RX ORDER — LANOLIN ALCOHOL/MO/W.PET/CERES
1 CREAM (GRAM) TOPICAL 2 TIMES DAILY WITH MEALS
Status: DISCONTINUED | OUTPATIENT
Start: 2017-06-02 | End: 2017-06-05 | Stop reason: HOSPADM

## 2017-06-01 RX ORDER — FENTANYL CITRATE 50 UG/ML
25 INJECTION, SOLUTION INTRAMUSCULAR; INTRAVENOUS ONCE
Status: COMPLETED | OUTPATIENT
Start: 2017-06-01 | End: 2017-06-01

## 2017-06-01 RX ORDER — PROMETHAZINE HYDROCHLORIDE 25 MG/1
25 TABLET ORAL
Status: DISCONTINUED | OUTPATIENT
Start: 2017-06-01 | End: 2017-06-05 | Stop reason: HOSPADM

## 2017-06-01 RX ORDER — DEXAMETHASONE SODIUM PHOSPHATE 4 MG/ML
INJECTION, SOLUTION INTRA-ARTICULAR; INTRALESIONAL; INTRAMUSCULAR; INTRAVENOUS; SOFT TISSUE AS NEEDED
Status: DISCONTINUED | OUTPATIENT
Start: 2017-06-01 | End: 2017-06-01 | Stop reason: HOSPADM

## 2017-06-01 RX ORDER — SODIUM CHLORIDE 0.9 % (FLUSH) 0.9 %
5-10 SYRINGE (ML) INJECTION AS NEEDED
Status: DISCONTINUED | OUTPATIENT
Start: 2017-06-01 | End: 2017-06-05 | Stop reason: HOSPADM

## 2017-06-01 RX ORDER — CEFAZOLIN SODIUM IN 0.9 % NACL 2 G/50 ML
2 INTRAVENOUS SOLUTION, PIGGYBACK (ML) INTRAVENOUS ONCE
Status: COMPLETED | OUTPATIENT
Start: 2017-06-01 | End: 2017-06-01

## 2017-06-01 RX ORDER — PROPOFOL 10 MG/ML
INJECTION, EMULSION INTRAVENOUS AS NEEDED
Status: DISCONTINUED | OUTPATIENT
Start: 2017-06-01 | End: 2017-06-01 | Stop reason: HOSPADM

## 2017-06-01 RX ORDER — DEXTROSE 40 %
15 GEL (GRAM) ORAL AS NEEDED
Status: DISCONTINUED | OUTPATIENT
Start: 2017-06-01 | End: 2017-06-05 | Stop reason: HOSPADM

## 2017-06-01 RX ORDER — HYDRALAZINE HYDROCHLORIDE 20 MG/ML
20 INJECTION INTRAMUSCULAR; INTRAVENOUS
Status: DISCONTINUED | OUTPATIENT
Start: 2017-06-01 | End: 2017-06-05 | Stop reason: HOSPADM

## 2017-06-01 RX ORDER — LIDOCAINE HYDROCHLORIDE 10 MG/ML
0.1 INJECTION INFILTRATION; PERINEURAL AS NEEDED
Status: DISCONTINUED | OUTPATIENT
Start: 2017-06-01 | End: 2017-06-01 | Stop reason: HOSPADM

## 2017-06-01 RX ORDER — EPINEPHRINE 1 MG/ML
INJECTION, SOLUTION, CONCENTRATE INTRAVENOUS AS NEEDED
Status: DISCONTINUED | OUTPATIENT
Start: 2017-06-01 | End: 2017-06-01 | Stop reason: HOSPADM

## 2017-06-01 RX ORDER — MIDAZOLAM HYDROCHLORIDE 1 MG/ML
2 INJECTION, SOLUTION INTRAMUSCULAR; INTRAVENOUS
Status: DISCONTINUED | OUTPATIENT
Start: 2017-06-01 | End: 2017-06-01 | Stop reason: HOSPADM

## 2017-06-01 RX ORDER — DIPHENHYDRAMINE HYDROCHLORIDE 50 MG/ML
12.5 INJECTION, SOLUTION INTRAMUSCULAR; INTRAVENOUS ONCE
Status: DISCONTINUED | OUTPATIENT
Start: 2017-06-01 | End: 2017-06-01 | Stop reason: HOSPADM

## 2017-06-01 RX ORDER — SODIUM CHLORIDE 0.9 % (FLUSH) 0.9 %
5-10 SYRINGE (ML) INJECTION AS NEEDED
Status: DISCONTINUED | OUTPATIENT
Start: 2017-06-01 | End: 2017-06-01 | Stop reason: HOSPADM

## 2017-06-01 RX ORDER — FACIAL-BODY WIPES
10 EACH TOPICAL DAILY PRN
Status: DISCONTINUED | OUTPATIENT
Start: 2017-06-01 | End: 2017-06-05 | Stop reason: HOSPADM

## 2017-06-01 RX ORDER — SODIUM CHLORIDE 0.9 % (FLUSH) 0.9 %
5-10 SYRINGE (ML) INJECTION EVERY 8 HOURS
Status: DISCONTINUED | OUTPATIENT
Start: 2017-06-01 | End: 2017-06-01 | Stop reason: HOSPADM

## 2017-06-01 RX ORDER — HYDROGEN PEROXIDE 3 %
SOLUTION, NON-ORAL MISCELLANEOUS AS NEEDED
Status: DISCONTINUED | OUTPATIENT
Start: 2017-06-01 | End: 2017-06-01 | Stop reason: HOSPADM

## 2017-06-01 RX ORDER — TEMAZEPAM 15 MG/1
15 CAPSULE ORAL
Status: DISCONTINUED | OUTPATIENT
Start: 2017-06-01 | End: 2017-06-05 | Stop reason: HOSPADM

## 2017-06-01 RX ORDER — HYDROMORPHONE HYDROCHLORIDE 1 MG/ML
1 INJECTION, SOLUTION INTRAMUSCULAR; INTRAVENOUS; SUBCUTANEOUS
Status: DISCONTINUED | OUTPATIENT
Start: 2017-06-01 | End: 2017-06-05

## 2017-06-01 RX ORDER — SODIUM CHLORIDE, SODIUM LACTATE, POTASSIUM CHLORIDE, CALCIUM CHLORIDE 600; 310; 30; 20 MG/100ML; MG/100ML; MG/100ML; MG/100ML
100 INJECTION, SOLUTION INTRAVENOUS CONTINUOUS
Status: DISCONTINUED | OUTPATIENT
Start: 2017-06-01 | End: 2017-06-01 | Stop reason: HOSPADM

## 2017-06-01 RX ORDER — HYDROMORPHONE HYDROCHLORIDE 2 MG/1
2 TABLET ORAL
Status: DISCONTINUED | OUTPATIENT
Start: 2017-06-01 | End: 2017-06-03

## 2017-06-01 RX ORDER — OXYCODONE AND ACETAMINOPHEN 5; 325 MG/1; MG/1
1 TABLET ORAL AS NEEDED
Status: DISCONTINUED | OUTPATIENT
Start: 2017-06-01 | End: 2017-06-01 | Stop reason: HOSPADM

## 2017-06-01 RX ORDER — FAMOTIDINE 20 MG/1
20 TABLET, FILM COATED ORAL ONCE
Status: COMPLETED | OUTPATIENT
Start: 2017-06-01 | End: 2017-06-01

## 2017-06-01 RX ORDER — FENTANYL CITRATE 50 UG/ML
INJECTION, SOLUTION INTRAMUSCULAR; INTRAVENOUS AS NEEDED
Status: DISCONTINUED | OUTPATIENT
Start: 2017-06-01 | End: 2017-06-01 | Stop reason: HOSPADM

## 2017-06-01 RX ORDER — ROCURONIUM BROMIDE 10 MG/ML
INJECTION, SOLUTION INTRAVENOUS AS NEEDED
Status: DISCONTINUED | OUTPATIENT
Start: 2017-06-01 | End: 2017-06-01 | Stop reason: HOSPADM

## 2017-06-01 RX ORDER — OXYCODONE HYDROCHLORIDE 5 MG/1
5 TABLET ORAL
Status: DISCONTINUED | OUTPATIENT
Start: 2017-06-01 | End: 2017-06-01 | Stop reason: HOSPADM

## 2017-06-01 RX ORDER — ROPIVACAINE HYDROCHLORIDE 5 MG/ML
INJECTION, SOLUTION EPIDURAL; INFILTRATION; PERINEURAL AS NEEDED
Status: DISCONTINUED | OUTPATIENT
Start: 2017-06-01 | End: 2017-06-01 | Stop reason: HOSPADM

## 2017-06-01 RX ORDER — SODIUM CHLORIDE 0.9 % (FLUSH) 0.9 %
5-10 SYRINGE (ML) INJECTION EVERY 8 HOURS
Status: DISCONTINUED | OUTPATIENT
Start: 2017-06-01 | End: 2017-06-05 | Stop reason: HOSPADM

## 2017-06-01 RX ORDER — HYDROMORPHONE HYDROCHLORIDE 4 MG/1
4 TABLET ORAL
Status: DISCONTINUED | OUTPATIENT
Start: 2017-06-01 | End: 2017-06-03

## 2017-06-01 RX ADMIN — ONDANSETRON 4 MG: 2 INJECTION INTRAMUSCULAR; INTRAVENOUS at 16:50

## 2017-06-01 RX ADMIN — DEXAMETHASONE SODIUM PHOSPHATE 10 MG: 4 INJECTION, SOLUTION INTRA-ARTICULAR; INTRALESIONAL; INTRAMUSCULAR; INTRAVENOUS; SOFT TISSUE at 15:50

## 2017-06-01 RX ADMIN — LIDOCAINE HYDROCHLORIDE 0.1 ML: 10 INJECTION, SOLUTION INFILTRATION; PERINEURAL at 11:37

## 2017-06-01 RX ADMIN — PROPOFOL 80 MG: 10 INJECTION, EMULSION INTRAVENOUS at 15:04

## 2017-06-01 RX ADMIN — ROCURONIUM BROMIDE 50 MG: 10 INJECTION, SOLUTION INTRAVENOUS at 15:02

## 2017-06-01 RX ADMIN — FENTANYL CITRATE 100 MCG: 50 INJECTION, SOLUTION INTRAMUSCULAR; INTRAVENOUS at 14:55

## 2017-06-01 RX ADMIN — GLYCOPYRROLATE 0.2 MG: 0.2 INJECTION INTRAMUSCULAR; INTRAVENOUS at 15:25

## 2017-06-01 RX ADMIN — NEOSTIGMINE METHYLSULFATE 3 MG: 1 INJECTION INTRAVENOUS at 16:55

## 2017-06-01 RX ADMIN — EPINEPHRINE 1 MG: 1 INJECTION, SOLUTION, CONCENTRATE INTRAVENOUS at 14:29

## 2017-06-01 RX ADMIN — FENTANYL CITRATE 100 MCG: 50 INJECTION, SOLUTION INTRAMUSCULAR; INTRAVENOUS at 14:26

## 2017-06-01 RX ADMIN — PROPOFOL 110 MG: 10 INJECTION, EMULSION INTRAVENOUS at 15:02

## 2017-06-01 RX ADMIN — MIDAZOLAM HYDROCHLORIDE 2 MG: 1 INJECTION, SOLUTION INTRAMUSCULAR; INTRAVENOUS at 14:26

## 2017-06-01 RX ADMIN — LIDOCAINE HYDROCHLORIDE 100 MG: 20 INJECTION, SOLUTION EPIDURAL; INFILTRATION; INTRACAUDAL; PERINEURAL at 15:02

## 2017-06-01 RX ADMIN — HYDROMORPHONE HYDROCHLORIDE 0.5 MG: 2 INJECTION, SOLUTION INTRAMUSCULAR; INTRAVENOUS; SUBCUTANEOUS at 18:38

## 2017-06-01 RX ADMIN — SODIUM CHLORIDE, SODIUM LACTATE, POTASSIUM CHLORIDE, AND CALCIUM CHLORIDE 100 ML/HR: 600; 310; 30; 20 INJECTION, SOLUTION INTRAVENOUS at 11:08

## 2017-06-01 RX ADMIN — FAMOTIDINE 20 MG: 20 TABLET ORAL at 11:08

## 2017-06-01 RX ADMIN — ROPIVACAINE HYDROCHLORIDE 22.5 ML: 5 INJECTION, SOLUTION EPIDURAL; INFILTRATION; PERINEURAL at 14:29

## 2017-06-01 RX ADMIN — SODIUM CHLORIDE, SODIUM LACTATE, POTASSIUM CHLORIDE, AND CALCIUM CHLORIDE: 600; 310; 30; 20 INJECTION, SOLUTION INTRAVENOUS at 15:35

## 2017-06-01 RX ADMIN — CEFAZOLIN SODIUM 1 G: 1 INJECTION, POWDER, FOR SOLUTION INTRAMUSCULAR; INTRAVENOUS at 21:49

## 2017-06-01 RX ADMIN — ATROPINE SULFATE 0.2 MG: 0.4 INJECTION, SOLUTION ENDOTRACHEAL; INTRAMEDULLARY; INTRAMUSCULAR; INTRAVENOUS; SUBCUTANEOUS at 15:35

## 2017-06-01 RX ADMIN — GLYCOPYRROLATE 0.4 MG: 0.2 INJECTION INTRAMUSCULAR; INTRAVENOUS at 16:55

## 2017-06-01 RX ADMIN — RIVAROXABAN 20 MG: 20 TABLET, FILM COATED ORAL at 21:49

## 2017-06-01 RX ADMIN — HYDROMORPHONE HYDROCHLORIDE 4 MG: 4 TABLET ORAL at 23:25

## 2017-06-01 RX ADMIN — CEFAZOLIN 2 G: 1 INJECTION, POWDER, FOR SOLUTION INTRAMUSCULAR; INTRAVENOUS; PARENTERAL at 14:53

## 2017-06-01 NOTE — DISCHARGE SUMMARY
[address] [facility] Discharge Summary      Patient ID:  Alexa Umaña  [id number]  81 y.o.  1945    Admit date: 6/1/2017    Discharge date and time: 6/5/2017     Admitting Physician: Renay Maloney MD     Discharge Physician: Renay Maloney MD      Admission Diagnoses: Rotator cuff arthropathy, right [M12.811]  Biceps tendinitis on right [M75.21]    Discharge Diagnoses: Principal Problem:    Rotator cuff tear arthropathy of right shoulder (5/26/2017)    Active Problems:    Bicipital tendinitis of right shoulder (5/26/2017)      Pain from implanted hardware (5/30/2017)        Surgeon: Renay Maloney MD                                Perioperative Antibiotics: Ancef  __X_                                                Vancomycin  ___          Post Op complications: none        Discharged to: Home    Discharge instructions:  -Resume pre hospital diet             -Resume home medications per medical continuation form     SLING RIGHT SHOULDER  CONTINUE PHYSICAL THERAPY  -Follow up in office as scheduled       Signed:  Renay Maloney MD  6/5/2017  2:44 PM

## 2017-06-01 NOTE — H&P
Date of Surgery Update:  Shiela Piña was seen and examined. History and physical has been reviewed. The patient has been examined.  There have been no significant clinical changes since the completion of the originally dated History and Physical.    Signed By: Leydi Viramontes MD     June 1, 2017 11:14 AM

## 2017-06-01 NOTE — PERIOP NOTES
TRANSFER - OUT REPORT:    Verbal report given to [address] [place] on [facility]  being transferred to joint [place] room 331 for routine progression of care       Report consisted of patients Situation, Background, Assessment and   Recommendations(SBAR). Information from the following report(s) SBAR, Intake/Output and MAR was reviewed with the receiving nurse. Opportunity for questions and clarification was provided.       Patient transported with:   O2 @ 2 liters  Tech

## 2017-06-01 NOTE — OP NOTES
[address]   OPERATIVE REPORT       Name:  Pedrito Garcia   MR#:  [id number]   :  1945   Account #:  [de-identified]   Date of Adm:  2017       DATE OF SURGERY: 2017    PREOPERATIVE DIAGNOSES   1. Rotator cuff tear arthropathy, right shoulder. 2. Biceps tendonitis, right shoulder. 3. Painful hardware, right shoulder. POSTOPERATIVE DIAGNOSES   1. Rotator cuff tear arthropathy, right shoulder. 2. Biceps tendonitis, right shoulder. 3. Painful hardware, right shoulder. PROCEDURE   1. Hardware removal, right shoulder. 2. Reverse right total shoulder arthroplasty with Delta Xtend   Prosthesis, biceps tenodesis, latissimus dorsi and teres major tendon transfer. OPERATING SURGEON: Noa Anderson. Camille Ramirez MD    PATHOLOGY:   1. Rotator cuff tear arthropathy, right shoulder. 2. Biceps tendonitis. CPT CODES: 66689, [id number], [id number] and [id number]. ICD-10 CODES: M12.811, M75.21 and T85.848. HARDWARE UTILIZED: DePuy Metaglene 42 eccentric glenosphere, 42   +9 cup, 10/2 stem, 10 mm Biostop G, 48 mm superior and inferior   locking screw, 24 mm anterior and 18 mm posterior cortical   screws. INDICATIONS: The patient is a 70-year-old gentleman with a   complex right shoulder history. He has undergone multiple shoulder   procedures including previous rotator cuff repairs, more   recently an ORIF of a type 5 AC separation after a fall. The   patient has developed postoperative hardware pain, as well as   inability to elevate the right arm. Preoperative physical exam,   radiographic studies including EMG nerve conduction studies are   consistent with rotator cuff tear arthropathy, right shoulder,   with pseudoparesis and a marked deformity. The patient has   exhausted nonoperative modalities and is electively admitted for   operative intervention. PROCEDURE: Following identification, the patient was taken to   the operating suite.  Following administration of general   anesthesia and interscalene block for postop pain control and 2   g of IV Ancef, the patient was positioned on the operating table   in the beach chair fashion. The right shoulder was then prepped   and draped in a sterile fashion. His previous transverse   incision, which paralleled the clavicle, was identified and   utilized. Skin was incised. Subcutaneous tissue was then   dissected down to the clavicle itself. His hardware was   identified. The 4 screws and the hook plate were removed. Previous suture material was removed as well. This was then   irrigated. The wound was closed with 2-0 Mersilene horizontal   mattress sutures, 0 Vicryl figure-of-8 sutures and an 2'0 Prolene   subcuticular stitch. At this point, our attention was then turned to the reverse   shoulder arthroplasty. His previous deltopectoral incision was   identified, incised and extended proximally and distally. Subcutaneous tissue was then dissected down to the cephalic   vein. This was retracted laterally with the deltoid and scar   tissue was mobilized. The deltoid was elevated off of the   underlying humerus. Axillary nerve was protected. Strap muscles   and pectoralis major were identified and retracted medially. Biceps tendon was identified. Subscapularis was elevated sharply   off the lesser tuberosity and tagged. Humerus was very carefully   dislocated from the wound. There was marked degenerative changes   on both the humeral head and glenoid. There was cuff   insufficiency involving the supraspinatus and infraspinatus as   well. Previous suture anchors were removed. Proximal cutting   guide was assembled. Proximal cut was then made. Circumferential   osteophytes were resected. Glenoid retractors were then   inserted. Glenoid was meticulously exposed. Starter wire was   then drilled.  Glenoid was then drilled and reamed, and Metaglene   was inserted and secured with a 48 mm superior and inferior   locking screw, 24 mm anterior and 18 mm posterior cortical   screws. Metaglene was stable. A 42 eccentric glenosphere was   inserted and secured in standard fashion. Metaglene and   glenosphere were stable. Humerus was dislocated back from the   wound and reamed to accommodate a 10/2 stem. Again, previous   suture anchors were removed. The 10/2 proximal cutting guide was   assembled. Proximal cut was then made. It was noted that a 10/2   stem with a +9 cup gave excellent stability and excellent   mobility. At this point, the trial components were then removed. The humeral shaft was identified. The latissimus dorsi and teres   major tendons were then released off the humeral shaft. They   were tagged and mobilized posteriorly for later transfer. Radial   and axillary nerves were identified and protected. At this   point, the humeral canal was irrigated and dried. The 10 mm   Biostop G was then placed distally. At this point, antibiotic   cement was then mixed. Cement was inserted in the humeral canal,   and a 10/2 stem whose ends had been preloaded with #5 sutures   was cemented in appropriate version. Excessive cement was   removed with a curet. Once the cement was allowed to cure, a   true 42 +9 cup was inserted. Shoulder was reduced. There was   excellent stability with excellent mobility. At this point, the   latissimus dorsi and teres major tendons were then transferred   posteriorly and secured with #5 and #2 Mersilene sutures. Biceps was tenodesed using #5 Mersilene sutures. Subscapularis   was repaired back to the prosthesis using #5 Mersilene sutures. The arm was put through a range of motion and stable. The   axillary and radial nerves were protected. At this point, the   wound was then irrigated. Deltopectoral interval was   approximated with #2  Mersilene sutures. Skin was closed with 0   Vicryl figure-of-8 sutures and an 0 Prolene subcuticular stitch. A sterile dressing was applied. Sling and swathe was applied.  The   patient was then transferred to the recovery room in stable   condition.         Waynard Phalen, MD AGP / Joni Yousif   D:  06/01/2017   17:06   T:  06/01/2017   19:00   Job #:  [id number]

## 2017-06-01 NOTE — ANESTHESIA PREPROCEDURE EVALUATION
Anesthetic History               Review of Systems / Medical History  Patient summary reviewed, nursing notes reviewed and pertinent labs reviewed    Pulmonary                Comments: Mult PEs   Neuro/Psych              Cardiovascular    Hypertension: well controlled              Exercise tolerance: >4 METS  Comments: Factor V Leiden clotting abnormality. Hx DVT, PE. Leila filter in place.    GI/Hepatic/Renal                Endo/Other        Arthritis     Other Findings   Comments: Chronic lumbalgia secondary to spinal stenosis         Physical Exam    Airway  Mallampati: II  TM Distance: > 6 cm  Neck ROM: decreased range of motion   Mouth opening: Normal     Cardiovascular  Regular rate and rhythm,  S1 and S2 normal,  no murmur, click, rub, or gallop             Dental      Comments: Capped upper incisor   Pulmonary  Breath sounds clear to auscultation               Abdominal  GI exam deferred       Other Findings            Anesthetic Plan    ASA: 3  Anesthesia type: general      Post-op pain plan if not by surgeon: peripheral nerve block single    Induction: Intravenous  Anesthetic plan and risks discussed with: Patient and Spouse

## 2017-06-01 NOTE — IP AVS SNAPSHOT
[address] 
[phone] Patient: Edin Torres MRN: [id number] OYL:[id number] You are allergic to the following Allergen Reactions Epinephrine Anaphylaxis Blindness in right eye Aspirin Other (comments) Pt states can't take aspirin with his blood thinning medication. Immunizations Administered for This Admission Name Date  
 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 
[phone] Yuridia Pink MD In 10 days Call office if not already scheduled [facility] 10 [address] [place] [address] [address]  Will contact you within 48 hrs [address] [facility] [address] [facility] [address] 
[phone] Your Appointments Tuesday July 11, 2017  8:00 AM EDT Office Visit with Antonieta Wellsdoron Guanaco Ruiz  ENT [address] [facility]) [address] [place] [address]  
[phone] Current Discharge Medication List  
  
ASK your doctor about these medications Dose & Instructions Dispensing Information Comments Morning Noon Evening Bedtime  
 atorvastatin 80 mg tablet Commonly known as:  LIPITOR Your next dose is:  Tomorrow Dose:  40 mg Take 40 mg by mouth daily. 1/2 tablet daily  Take DOS per anesthesia protocol. Indications: hyperlipidemia Refills:  0 Biotin 2,500 mcg Cap Your next dose is:  Tomorrow Take  by mouth daily. Refills:  0  
     
  
   
   
   
  
 COMBIGAN 0.2-0.5 % Drop ophthalmic solution Generic drug:  brimonidine-timolol Your next dose is: Today Dose:  1 Drop Administer 1 Drop to both eyes two (2) times a day. Take / use AM day of surgery  per anesthesia protocols. Indications: Open Angle Glaucoma Refills:  0  
     
   
   
  
   
  
 diazePAM 10 mg tablet Commonly known as:  VALIUM Your next dose is: Today Dose:  10 mg Take 10 mg by mouth nightly. As needed   Indications: SEDATION, sleep Refills:  0  
     
   
   
   
  
  
 FISH OIL 1,000 mg Cap Generic drug:  omega-3 fatty acids-vitamin e Your next dose is:  Tomorrow Dose:  2 Cap Take 2 Caps by mouth every morning. Refills:  0 HYDROcodone-acetaminophen  mg tablet Commonly known as:  Roselyn Mandril Your next dose is:  DO NOT TAKE WITH DILAUDID!!! Dose:  1 Tab Take 1 Tab by mouth every six (6) hours as needed for Pain. Max Daily Amount: 4 Tabs. Quantity:  120 Tab Refills:  0 PRED FORTE 1 % ophthalmic suspension Generic drug:  prednisoLONE acetate Your next dose is: Today Dose:  1 Drop Administer 1 Drop to right eye two (2) times a day. Take / use AM day of surgery  per anesthesia protocols. Indications: pt states prevents infection Refills:  0 PROBIOTIC 4X 10-15 mg Tbec Generic drug:  B.infantis-B.ani-B.long-B.bifi Your next dose is:  Tomorrow Take  by mouth daily. Refills:  0 REFRESH LIQUIGEL 1 % Dlgl Generic drug:  carboxymethylcellulose sodium Your next dose is: Today Dose:  1 Drop Apply 1 Drop to eye every four (4) hours. Take / use AM day of surgery  per anesthesia protocols. Indications: DRY EYE Refills:  0  
     
   
   
  
   
  
 rivaroxaban 20 mg Tab tablet Commonly known as:  Vero Archbold Your next dose is:  Tomorrow Dose:  20 mg Take 1 Tab by mouth daily (with breakfast). Quantity:  30 Tab Refills:  11 VITAMIN B-12 1,000 mcg tablet Generic drug:  cyanocobalamin Your next dose is:  Tomorrow Dose:  1000 mcg Take 1,000 mcg by mouth daily. Refills:  0 Discharge Instructions DISCHARGE SUMMARY from Nurse The following personal items collected during your admission are returned to you:  
Dental Appliance: Dental Appliances: Lowers; Other (comment) (bridge ) Vision: Visual Aid: Glasses Hearing Aid:   na 
Jewelry: Jewelry: None Clothing: Clothing: At bedside Other Valuables: Other Valuables: None Valuables sent to safe:   na 
 
 
 
 
PATIENT INSTRUCTIONS: 
 
New Medications: 
 
Dilaudid 4 mg tabs Take 1-2 tabs every 4-6 hrs as needed for pain. Phenergan 25 mg tabs Take 1 tab every 8 hrs as needed for nausea. Restoril 15 mg tabs Take 1 tab at bedtime as needed for sleep.  
 
After general anesthesia or intravenous sedation, for 24 hours or while taking prescription Narcotics: · Limit your activities · Do not drive and operate hazardous machinery · Do not make important personal or business decisions · Do  not drink alcoholic beverages · If you have not urinated within 8 hours after discharge, please contact your surgeon on call. Report the following to your surgeon: 
· Excessive pain, swelling, redness or odor of or around the surgical area · Temperature over 101 · Nausea and vomiting lasting longer than 4 hours or if unable to take medications · Any signs of decreased circulation or nerve impairment to extremity: change in color, persistent  numbness, tingling, coldness or increase pain · Any questions, call office @ [phone]. What to do at Home: 
Recommended activity: activity as tolerated, as instructed per Dr. Raul Tatum. Continue with exercises taught by Physical Therapy. Resume per hospital diet. Wear sling to right arm. Use ice and elevate arm to decrease pain and swelling. If you experience any of the following symptoms temp>101, pain unrelieved by meds, or persistent nausea or vomitting, please follow up with Dr. Raul Tatum @ [phone]. *  Please give a list of your current medications to your Primary Care Provider. *  Please update this list whenever your medications are discontinued, doses are 
    changed, or new medications (including over-the-counter products) are added. *  Please carry medication information at all times in case of emergency situations. Shoulder Replacement Surgery: What to Expect at Home Your Recovery Shoulder replacement surgery replaces the worn parts of your shoulder joint. When you leave the hospital, your arm will be in a sling. It will be helpful if there is someone to help you at home for the next few weeks or until you have more energy and can move around better. You will go home with a bandage and stitches or staples.  You can remove the bandage when your doctor tells you to. If the stitches are not the type that dissolve, your doctor will remove them in 10 to 14 days. You may still have some mild pain, and the area may be swollen for several months after surgery. Your doctor will give you medicine for the pain. You will continue the rehabilitation program (rehab) you started in the hospital. The better you do with your rehab exercises, the sooner you will get your strength and movement back. Depending on your job, you may be able to return to work as early as 2 to 3 weeks after surgery, as long as you avoid certain arm movements, such as lifting. This care sheet gives you a general idea about how long it will take for you to recover. But each person recovers at a different pace. Follow the steps below to get better as quickly as possible. How can you care for yourself at home? Activity · Rest when you feel tired. You may take a nap, but don't stay in bed all day. · Work with your physical therapist to learn the best way to exercise. · You will have a sling to wear at night. And it's a good idea to also put a small stack of folded sheets or towels under your upper arm while you are in bed to keep your arm from dropping too far back. · Your arm should stay next to your body or in front of it for several weeks, both while you are up and during sleep. · Don't lift anything with the affected arm for 6 weeks. · You may need to take sponge baths until your stitches or staples have been removed. You will probably be able to shower 24 hours after they are removed. · Ask your doctor when you can drive again. · Ask your doctor when it is okay for you to have sex. · Your doctor may advise you to give up activities that put stress on that shoulder. This includes sports such as weight lifting or tennis, unless your tennis arm was not the one affected. Diet · By the time you leave the hospital, you will probably be eating your normal diet. If your stomach is upset, try bland, low-fat foods like plain rice, broiled chicken, toast, and yogurt. Your doctor may recommend that you take iron and vitamin supplements. · Drink plenty of fluids (unless your doctor tells you not to). · You may notice that your bowel movements are not regular right after your surgery. This is common. Try to avoid constipation and straining with bowel movements. You may want to take a fiber supplement every day. If you have not had a bowel movement after a couple of days, ask your doctor about taking a mild laxative. Medicines · Be safe with medicines. Take pain medicines exactly as directed. ¨ If the doctor gave you a prescription medicine for pain, take it as prescribed. ¨ If you are not taking a prescription pain medicine, ask your doctor if you can take an over-the-counter medicine. · If you think your pain medicine is making you sick to your stomach: 
¨ Take your medicine after meals (unless your doctor has told you not to). ¨ Ask your doctor for a different pain medicine. · If your doctor prescribed antibiotics, take them as directed. Don't stop taking them just because you feel better. You need to take the full course of antibiotics. · If you take a blood thinner, be sure you get instructions about how to take your medicine safely. Blood thinners can cause serious bleeding problems. Incision care · You will have a dressing over the cut (incision). A dressing helps the incision heal and protects it. Your doctor will tell you how to take care of this. · Keep the area clean and dry. Exercise · Shoulder rehabilitation is a series of exercises you do after your surgery. This helps you get back your shoulder's range of motion and strength. You will work with your doctor and physical therapist to plan this exercise program. To get the best results, you need to do the exercises correctly and as often and as long as your doctor tells you.  
Ice 
 · For pain, put ice or a cold pack on the area for 10 to 20 minutes at a time. Put a thin cloth between the ice and your skin. Other instructions · Wear medical alert jewelry that says you may need antibiotics before any procedure, including dental work. You can buy this at most drugstores. Follow-up care is a key part of your treatment and safety. Be sure to make and go to all appointments, and call your doctor if you are having problems. It's also a good idea to know your test results and keep a list of the medicines you take. When should you call for help? Call 911 anytime you think you may need emergency care. For example, call if: 
· You have severe trouble breathing. · You have symptoms of a blood clot in your lung (called a pulmonary embolism). These may include: 
¨ Sudden chest pain. ¨ Trouble breathing. ¨ Coughing up blood. Call your doctor now or seek immediate medical care if: 
· You have severe or increasing pain. · You have symptoms of infection, such as: 
¨ Increased pain, swelling, warmth, or redness. ¨ Red streaks or pus. ¨ A fever. · You have tingling, weakness, or numbness in your arm. · Your arm turns cold or changes color. · You have symptoms of a blood clot in your leg (called a deep vein thrombosis). These may include: 
¨ Pain in the calf, back of the knee, thigh, or groin. ¨ Redness and swelling in the leg or groin. Watch closely for changes in your health, and be sure to contact your doctor if: 
· You do not get better as expected. Where can you learn more? Go to [employer] Enter V177 in the search box to learn more about \"Shoulder Replacement Surgery: What to Expect at Home. \"  
© 4241-4505 Healthwise, [employer]orporated. Care instructions adapted under license by Josue Greenfield (which disclaims liability or warranty for this information).  This care instruction is for use with your licensed healthcare professional. If you have questions about a medical condition or this instruction, always ask your healthcare professional. [address] any warranty or liability for your use of this information. Content Version: 8.9.865520; Last Revised: August 6, 2013 These are general instructions for a healthy lifestyle: No smoking/ No tobacco products/ Avoid exposure to second hand smoke Surgeon General's Warning:  Quitting smoking now greatly reduces serious risk to your health. Obesity, smoking, and sedentary lifestyle greatly increases your risk for illness A healthy diet, regular physical exercise & weight monitoring are important for maintaining a healthy lifestyle You may be retaining fluid if you have a history of heart failure or if you experience any of the following symptoms:  Weight gain of 3 pounds or more overnight or 5 pounds in a week, increased swelling in our hands or feet or shortness of breath while lying flat in bed. Please call your doctor as soon as you notice any of these symptoms; do not wait until your next office visit. Recognize signs and symptoms of STROKE: 
 
F-face looks uneven A-arms unable to move or move unevenly S-speech slurred or non-existent T-time-call 911 as soon as signs and symptoms begin-DO NOT go Back to bed or wait to see if you get better-TIME IS BRAIN. The discharge information has been reviewed with the patient. The patient verbalized understanding. Discharge Orders None [facility] Announcement We are excited to announce that we are making your provider's discharge notes available to you in [employer]. You will see these notes when they are completed and signed by the physician that discharged you from your recent hospital stay.   If you have any questions or concerns about any information you see in [employer], please call the [email or web address] Information Department where you were seen or reach out to your Primary Care Provider for more information about your plan of care. Introducing \[facility]\"" & HEALTH SERVICES! Andra Keep introduces [employer] patient portal. Now you can access parts of your medical record, email your doctor's office, and request medication refills online. 1. In your internet browser, go to [email or web address] 2. Click on the First Time User? Click Here link in the Sign In box. You will see the New Member Sign Up page. 3. Enter your [employer] Access Code exactly as it appears below. You will not need to use this code after youve completed the sign-up process. If you do not sign up before the expiration date, you must request a new code. · [employer] Access Code: [email or web address] Expires: 6/12/2017  4:59 PM 
 
4. Enter the last four digits of your Social Security Number (xxxx) and Date of Birth (mm/dd/yyyy) as indicated and click Submit. You will be taken to the next sign-up page. 5. Create a [employer] ID. This will be your [employer] login ID and cannot be changed, so think of one that is secure and easy to remember. 6. Create a [employer] password. You can change your password at any time. 7. Enter your Password Reset Question and Answer. This can be used at a later time if you forget your password. 8. Enter your e-mail address. You will receive e-mail notification when new information is available in [address] 9. Click Sign Up. You can now view and download portions of your medical record. 10. Click the Download Summary menu link to download a portable copy of your medical information. If you have questions, please visit the Frequently Asked Questions section of the [employer] website. Remember, [employer] is NOT to be used for urgent needs. For medical emergencies, dial 911. Now available from your iPhone and Android! General Information Please provide this summary of care documentation to your next provider. Patient Signature:  ____________________________________________________________ Date:  ____________________________________________________________  
  
Jacy Eriberto Provider Signature:  ____________________________________________________________ Date:  ____________________________________________________________

## 2017-06-01 NOTE — ANESTHESIA PROCEDURE NOTES
ISB with ultrasound    Start time: 6/1/2017 2:26 PM  End time: 6/1/2017 2:29 PM  Performed by: Florentin Kerr  Authorized by: Florentin Kerr       Pre-procedure: Indications: at surgeon's request and post-op pain management    Preanesthetic Checklist: patient identified, risks and benefits discussed, site marked, timeout performed, anesthesia consent given and patient being monitored    Timeout Time: 14:26          Block Type:   Block Type:   Interscalene  Laterality:  Right  Monitoring:  Continuous pulse ox, frequent vital sign checks, heart rate, responsive to questions and oxygen  Injection Technique:  Single shot  Procedures: ultrasound guided    Patient Position: supine (head elevated 45 degrees)  Prep: chlorhexidine    Location:  Interscalene  Needle Type:  Stimuplex  Needle Gauge:  20 G  Needle Localization:  Ultrasound guidance  Medication Injected:  0.375%  ropivacaine  Adds:  Epi 1:200K  Volume (mL):  30    Assessment:  Number of attempts:  1  Injection Assessment:  Incremental injection every 5 mL, local visualized surrounding nerve on ultrasound, negative aspiration for blood, no paresthesia, ultrasound image on chart and no intravascular symptoms  Patient tolerance:  Patient tolerated the procedure well with no immediate complications  Of note the patient's reaction to epinephrine is not an allergic reaction, but instead an overdose treating an allergic reaction while having allergy testing

## 2017-06-01 NOTE — ANESTHESIA POSTPROCEDURE EVALUATION
Post-Anesthesia Evaluation and Assessment    Patient: Juan Amaya MRN: [id number]  SSN: [id number]    YOB: 1945  Age: 81 y.o. Sex: male       Cardiovascular Function/Vital Signs  Visit Vitals    /62    Pulse (!) 52    Temp 36.6 °C (97.8 °F)    Resp 16    SpO2 99%       Patient is status post general anesthesia for Procedure(s):  RIGHT SHOULDER HARDWARE REMOVAL ( HOOK PLATE)  RIGHT SHOULDER ARTHROPLASTY TOTAL REVERSE / BICEPS TENODESIS AND  LATISSIMUS DORSI AND TERES MAJOR TRANSFER / DELTA. Nausea/Vomiting: None    Postoperative hydration reviewed and adequate. Pain:  Pain Scale 1: Numeric (0 - 10) (06/01/17 1851)  Pain Intensity 1: 4 (06/01/17 1851)   Managed    Neurological Status:   Neuro (WDL): Within Defined Limits (06/01/17 1840)  Neuro  Neurologic State: Alert (06/01/17 1840)  Orientation Level: Oriented X4 (06/01/17 1840)  Cognition: Follows commands (06/01/17 1840)   Right arm numb. Mental Status and Level of Consciousness: Awake. Pulmonary Status:   O2 Device: Nasal cannula (06/01/17 1825)   Adequate oxygenation and airway patent    Complications related to anesthesia: None    Post-anesthesia assessment completed.  No concerns    Signed By: Dillon Landry MD     June 1, 2017

## 2017-06-02 ENCOUNTER — APPOINTMENT (OUTPATIENT)
Dept: PHYSICAL THERAPY | Age: 72
End: 2017-06-02

## 2017-06-02 ENCOUNTER — HOME HEALTH ADMISSION (OUTPATIENT)
Dept: HOME HEALTH SERVICES | Facility: HOME HEALTH | Age: 72
End: 2017-06-02
Payer: MEDICARE

## 2017-06-02 LAB
ANION GAP BLD CALC-SCNC: 9 MMOL/L (ref 7–16)
BUN SERPL-MCNC: 14 MG/DL (ref 8–23)
CALCIUM SERPL-MCNC: 8.4 MG/DL (ref 8.3–10.4)
CHLORIDE SERPL-SCNC: 106 MMOL/L (ref 98–107)
CO2 SERPL-SCNC: 26 MMOL/L (ref 21–32)
CREAT SERPL-MCNC: 1 MG/DL (ref 0.8–1.5)
ERYTHROCYTE [DISTWIDTH] IN BLOOD BY AUTOMATED COUNT: 12.9 % (ref 11.9–14.6)
GLUCOSE SERPL-MCNC: 153 MG/DL (ref 65–100)
HCT VFR BLD AUTO: 37.1 % (ref 41.1–50.3)
HGB BLD-MCNC: 12.2 G/DL (ref 13.6–17.2)
MAGNESIUM SERPL-MCNC: 1.8 MG/DL (ref 1.8–2.4)
MCH RBC QN AUTO: 31.4 PG (ref 26.1–32.9)
MCHC RBC AUTO-ENTMCNC: 32.9 G/DL (ref 31.4–35)
MCV RBC AUTO: 95.6 FL (ref 79.6–97.8)
PLATELET # BLD AUTO: 171 K/UL (ref 150–450)
PMV BLD AUTO: 10.6 FL (ref 10.8–14.1)
POTASSIUM SERPL-SCNC: 4.2 MMOL/L (ref 3.5–5.1)
RBC # BLD AUTO: 3.88 M/UL (ref 4.23–5.67)
SODIUM SERPL-SCNC: 141 MMOL/L (ref 136–145)
WBC # BLD AUTO: 8.7 K/UL (ref 4.3–11.1)

## 2017-06-02 PROCEDURE — 86580 TB INTRADERMAL TEST: CPT | Performed by: ORTHOPAEDIC SURGERY

## 2017-06-02 PROCEDURE — 85027 COMPLETE CBC AUTOMATED: CPT | Performed by: ORTHOPAEDIC SURGERY

## 2017-06-02 PROCEDURE — 74011250637 HC RX REV CODE- 250/637: Performed by: ORTHOPAEDIC SURGERY

## 2017-06-02 PROCEDURE — 97116 GAIT TRAINING THERAPY: CPT

## 2017-06-02 PROCEDURE — 97110 THERAPEUTIC EXERCISES: CPT

## 2017-06-02 PROCEDURE — 94760 N-INVAS EAR/PLS OXIMETRY 1: CPT

## 2017-06-02 PROCEDURE — 80048 BASIC METABOLIC PNL TOTAL CA: CPT | Performed by: ORTHOPAEDIC SURGERY

## 2017-06-02 PROCEDURE — 74011000302 HC RX REV CODE- 302: Performed by: ORTHOPAEDIC SURGERY

## 2017-06-02 PROCEDURE — 65270000029 HC RM PRIVATE

## 2017-06-02 PROCEDURE — 74011250636 HC RX REV CODE- 250/636: Performed by: ORTHOPAEDIC SURGERY

## 2017-06-02 PROCEDURE — 36415 COLL VENOUS BLD VENIPUNCTURE: CPT | Performed by: ORTHOPAEDIC SURGERY

## 2017-06-02 PROCEDURE — 83735 ASSAY OF MAGNESIUM: CPT | Performed by: ORTHOPAEDIC SURGERY

## 2017-06-02 PROCEDURE — 97161 PT EVAL LOW COMPLEX 20 MIN: CPT

## 2017-06-02 PROCEDURE — 74011000258 HC RX REV CODE- 258: Performed by: ORTHOPAEDIC SURGERY

## 2017-06-02 RX ADMIN — DOCUSATE SODIUM 100 MG: 100 CAPSULE, LIQUID FILLED ORAL at 16:24

## 2017-06-02 RX ADMIN — DOCUSATE SODIUM 100 MG: 100 CAPSULE, LIQUID FILLED ORAL at 09:37

## 2017-06-02 RX ADMIN — HYDROMORPHONE HYDROCHLORIDE 1 MG: 1 INJECTION, SOLUTION INTRAMUSCULAR; INTRAVENOUS; SUBCUTANEOUS at 13:23

## 2017-06-02 RX ADMIN — HYDROMORPHONE HYDROCHLORIDE 1 MG: 1 INJECTION, SOLUTION INTRAMUSCULAR; INTRAVENOUS; SUBCUTANEOUS at 18:44

## 2017-06-02 RX ADMIN — HYDROMORPHONE HYDROCHLORIDE 4 MG: 4 TABLET ORAL at 16:24

## 2017-06-02 RX ADMIN — SODIUM CHLORIDE 75 ML/HR: 900 INJECTION, SOLUTION INTRAVENOUS at 05:33

## 2017-06-02 RX ADMIN — CEFAZOLIN SODIUM 1 G: 1 INJECTION, POWDER, FOR SOLUTION INTRAMUSCULAR; INTRAVENOUS at 13:24

## 2017-06-02 RX ADMIN — HYDROMORPHONE HYDROCHLORIDE 1 MG: 1 INJECTION, SOLUTION INTRAMUSCULAR; INTRAVENOUS; SUBCUTANEOUS at 09:37

## 2017-06-02 RX ADMIN — CEFAZOLIN SODIUM 1 G: 1 INJECTION, POWDER, FOR SOLUTION INTRAMUSCULAR; INTRAVENOUS at 05:33

## 2017-06-02 RX ADMIN — RIVAROXABAN 20 MG: 20 TABLET, FILM COATED ORAL at 09:37

## 2017-06-02 RX ADMIN — HYDROMORPHONE HYDROCHLORIDE 4 MG: 4 TABLET ORAL at 10:44

## 2017-06-02 RX ADMIN — TUBERCULIN PURIFIED PROTEIN DERIVATIVE 5 UNITS: 5 INJECTION, SOLUTION INTRADERMAL at 00:47

## 2017-06-02 RX ADMIN — HYDROMORPHONE HYDROCHLORIDE 1 MG: 1 INJECTION, SOLUTION INTRAMUSCULAR; INTRAVENOUS; SUBCUTANEOUS at 04:09

## 2017-06-02 RX ADMIN — HYDROMORPHONE HYDROCHLORIDE 1 MG: 1 INJECTION, SOLUTION INTRAMUSCULAR; INTRAVENOUS; SUBCUTANEOUS at 06:55

## 2017-06-02 RX ADMIN — HYDROMORPHONE HYDROCHLORIDE 1 MG: 1 INJECTION, SOLUTION INTRAMUSCULAR; INTRAVENOUS; SUBCUTANEOUS at 21:51

## 2017-06-02 RX ADMIN — FERROUS SULFATE TAB 325 MG (65 MG ELEMENTAL FE) 325 MG: 325 (65 FE) TAB at 09:37

## 2017-06-02 RX ADMIN — HYDROMORPHONE HYDROCHLORIDE 1 MG: 1 INJECTION, SOLUTION INTRAMUSCULAR; INTRAVENOUS; SUBCUTANEOUS at 00:44

## 2017-06-02 RX ADMIN — FERROUS SULFATE TAB 325 MG (65 MG ELEMENTAL FE) 325 MG: 325 (65 FE) TAB at 16:24

## 2017-06-02 NOTE — CONSULTS
MD Apple   Medical Director  [address] [place] [address], [address] [facility]  Tel: [phone]     Physical Medicine & Rehabilitation Note-consult    Patient: Niesha Pagan MRN: [id number]  SSN: [id number]    YOB: 1945  Age: 81 y.o. Sex: male      Admit Date: 6/1/2017  Admitting Physician: Karine Morales MD    Medical Decision Making/Plan/Recommend: s/p Reverse right total shoulder arthroplasty. Mobility impairment and functional deficits. He  Is making excellent recovery, functional gains. Still limited due to pain, weakness RUE. Continue PT, OT for right shoulder rehab per protocol. Therapies limited to active and passive range of motion right elbow, hand. Active assisted and passive range of motion right shoulder to tolerance. Pulleys and pendulums. Do not push motion. NWB right shoulder. Continue mobility, transfers, adaptive techniques. Will follow progress. Chief Complaint : Gait dysfunction secondary to below. Admit Diagnosis: Rotator cuff arthropathy, right [M12.811]; Biceps tendinitis*  Rotator cuff tear arthropathy, right shoulder. Biceps tendonitis, right shoulder. Painful hardware, right shoulder. S/P shoulder surgery   Hardware removal, right shoulder/ reverse right total shoulder arthroplasty with Delta Xtend Prosthesis, biceps tenodesis, latissimus dorsi and teres major tendon transfer 6/1/2017  Pain  DVT risk/ Factor V Leiden   Post op hemorrhagic anemia  Essential hypertension, benign  Osteoarthritis   Acute Rehab Dx: Mobility and ambulation deficits  Self Care/ADL deficits    Medical Dx:  Past Medical History:   Diagnosis Date    Acute sinusitis     Anaphylactic reaction 2/1/2016    AR (allergic rhinitis) 7/27/2016    Arrhythmia     bradycardia    Bicipital tenosynovitis 2/17/2012    Bradycardia 02/02/2016    Se[facility] by [place] [facility]-no follow-up required.      Carpal tunnel syndrome 2/17/2012    Chronic back pain 12/23/2014    Chronic deep vein thrombosis of lower extremity (HCC) 3/21/2016    Chronic pain     right shoulder    Chronic pansinusitis 7/27/2016    Chronic sinusitis 7/27/2016    DNS (deviated nasal septum)     ARCE (dyspnea on exertion) 12/23/2014    Elevated serum creatinine 12/23/2014    Epistaxis     Essential hypertension, benign 8/16/2011    Factor V Leiden ([address]     managed with medication     Glaucoma     right eye    H/O echocardiogram 02/02/2016    EF 70-75%    Hereditary deficiency of other clotting factors ([address] 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 Kamila Suresh. Takes Xarelto daily.  Leukocytosis 12/23/2014    Lumbar spinal stenosis     Nasal obstruction     Nasal polyp 7/27/2016    Nausea & vomiting     Osteoarthritis     right knee    Osteoarthritis of left hip 12/12/2012    Primary localized osteoarthrosis, shoulder region 2/17/2012    Pulmonary embolism ([address] 1981    right lung 1981, 1994,right lung 1997, right lung 2000 - Leila filter placed 2000. Followed by Dr. Kamila Suresh.     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 male patient at [address] [place] [address] who was admitted on 6/1/2017  by Scar Ling MD with below mentioned medical history, is being seen for Physical Medicine and Rehabilitation consult. Gabino Ugalde who has had long standing right shoulder pain and problems, with prior surgical procedures presented with still painful right shoulder. He was noted to have rotator cuff tear arthropathy with pseudoparesis and a marked deformity, He underwent a reverse right total shoulder arthroplasty with a Delta stem prosthesis and bicepsper Dr. Scar Ling MD on 6/1/2017. The patient's post operative course has been uncomplicated. Pain has been relatively well tolerated. We are consulted to assist with rehab needs and placement. Patient is to be NWB RUE. Patient is starting to mobilize with acute PT and OT. He is making  Excellent gains with ambulation, mobility, and ADLs. He is primarily limited due to shoulder pain, decreased strength and NWB status. Gabino Ugalde is seen and examined today. Medical Records reviewed. Pt  denies any other major debilities. He has been independent with all activities prir to recent shoulder issues. Current Level of Function: bed mobility - SBA, transfers - SBA, decreased balance , ambulation 100' with SBA.      Prior Level of Function/Work/Activity:  Pt was independent without an assistive device prior to this admission      Family History   Problem Relation Age of Onset    Cancer Mother      lymphoma    Diabetes Mother      type II    Heart Disease Father     Diabetes Father      type II    Cancer Father      pancreatic ca    Other Father      Coronary Disease    Malignant Hyperthermia Neg Hx     Pseudocholinesterase Deficiency Neg Hx     Delayed Awakening Neg Hx     Post-op Nausea/Vomiting Neg Hx     Emergence Delirium Neg Hx     Post-op Cognitive Dysfunction Neg Hx       Social History   Substance Use Topics    Smoking status: Never Smoker    Smokeless tobacco: Never Used    Alcohol use No Past Surgical History:   Procedure Laterality Date    HX APPENDECTOMY  1980    HX BACK SURGERY      spinal stenosis    HX BLEPHAROPLASTY Bilateral 2009    HX CARPAL TUNNEL RELEASE  2012    left    HX CARPAL TUNNEL RELEASE  1980    right    HX CATARACT REMOVAL Bilateral     left eye 1994 & right eye 2002    HX COLONOSCOPY  00,05,10    HX CORNEAL TRANSPLANT Right 2004    HX CORNEAL TRANSPLANT Right 06/14/2016    sutures still present     HX HEENT Right 11/08     glaucoma surgery    HX HEENT      nasal reconstruction    HX HEENT      nasal polypectomy    HX HERNIA REPAIR  2010    abdominal hernia repair    HX HERNIA REPAIR Left 2007    inguinal hernia repair    HX HIP REPLACEMENT Left 12/12/2012    HX KNEE ARTHROSCOPY Right 2006     knee meniscus repair    HX LAP CHOLECYSTECTOMY  2001    HX LUMBAR LAMINECTOMY      HX ORTHOPAEDIC Bilateral     open shoulder AC reconstruction    HX ORTHOPAEDIC Right 2006    neuroma fromfoot    HX ORTHOPAEDIC Bilateral     right elbow 1979 & left elbow 2007    HX ORTHOPAEDIC Right     index finger    HX OTHER SURGICAL  2000    mango filter placed    HX SHOULDER ARTHROSCOPY Left 2/17/2012    HX TONSILLECTOMY  1950    HX VASCULAR ACCESS  2008    PICC line right arm placed & then removed     HX VASCULAR ACCESS  2012    PICC- removed    HX VITRECTOMY Left 6/15/15    SINUS SURGERY PROC UNLISTED      numerous      Prior to Admission medications    Medication Sig Start Date End Date Taking? Authorizing Provider   diazePAM (VALIUM) 10 mg tablet Take 10 mg by mouth nightly. As needed   Indications: SEDATION, sleep   Yes Historical Provider   HYDROcodone-acetaminophen (NORCO)  mg tablet Take 1 Tab by mouth every six (6) hours as needed for Pain. Max Daily Amount: 4 Tabs. Patient taking differently: Take 1 Tab by mouth every six (6) hours as needed for Pain. Take / use AM day of surgery  per anesthesia protocols.   Indications: Pain 3/9/17  Yes Ernestine Cline Jin North MD   rivaroxaban (XARELTO) 20 mg tab tablet Take 1 Tab by mouth daily (with breakfast). 17  Yes Andrei Brown MD   cyanocobalamin (VITAMIN B-12) 1,000 mcg tablet Take 1,000 mcg by mouth daily. Yes Historical Provider   Biotin 2,500 mcg cap Take  by mouth daily. Yes Historical Provider   B.infantis-B.ani-B.long-B.bifi (PROBIOTIC 4X) 10-15 mg TbEC Take  by mouth daily. Yes Historical Provider   atorvastatin (LIPITOR) 80 mg tablet Take 40 mg by mouth daily. 1/2 tablet daily    Take DOS per anesthesia protocol. Indications: hyperlipidemia   Yes Historical Provider   carboxymethylcellulose sodium (REFRESH LIQUIGEL) 1 % dlgl Apply 1 Drop to eye every four (4) hours. Take / use AM day of surgery  per anesthesia protocols. Indications: DRY EYE   Yes Historical Provider   omega-3 fatty acids-vitamin e (FISH OIL) 1,000 mg cap Take 2 Caps by mouth every morning. Yes Historical Provider   Brimonidine-Timolol (COMBIGAN) 0.2-0.5 % Drop Administer 1 Drop to both eyes two (2) times a day. Take / use AM day of surgery  per anesthesia protocols. Indications: Open Angle Glaucoma 3/16/10  Yes Historical Provider   PRED FORTE 1 % DrpS Administer 1 Drop to right eye two (2) times a day. Take / use AM day of surgery  per anesthesia protocols. Indications: pt states prevents infection 3/16/10  Yes Salomón Suarez MD     Allergies   Allergen Reactions    Epinephrine Anaphylaxis     Blindness in right eye     Aspirin Other (comments)     Pt states can't take aspirin with his blood thinning medication. Review of Systems: +right shoulder pain. Denies chest pain, shortness of breath, cough, headache, visual problems, abdominal pain, dysurea, calf pain. Pertinent positives are as noted in the medical records and unremarkable otherwise. Objective:     Vitals:  Blood pressure 154/65, pulse (!) 50, temperature 96.5 °F (35.8 °C), resp. rate 16, SpO2 96 %.   Temp (24hrs), Av.6 °F (35.9 °C), Min:95.1 °F (35.1 °C), Max:97.8 °F (36.6 °C)        Intake and Output:  05/31 1901 - 06/02 0700  In: 2170 [P.O.:120; I.V.:2050]  Out: 450 [Urine:300]    Physical Exam:   General: Alert and age appropriately oriented. No acute cardio respiratory distress. HEENT: Normocephalic, no conjunctival pallor. Oral mucosa moist without cyanosis. No JVD. Lungs: Clear to auscultation anteriorly  Respiration even and unlabored   Heart: Regular rate and rhythm, S1, S2   No  Murmurs. Abdomen: Soft, non-tender, non-distended. Genitourinary: defered   Neuromuscular:      Grossly no focal motor deficits. Moves left fingers, hand well. R wrist extension 5/5   R wrist flexion 5/5   R ADMQ 5/5   No sensory deficits distally BUE to soft touch. Skin/extremity: Non tender calves BLE. No rashes, no marginal erythema.                                                                                          Labs/Studies:  Recent Results (from the past 72 hour(s))   GLUCOSE, POC    Collection Time: 06/01/17 11:24 AM   Result Value Ref Range    Glucose (POC) 113 (H) 65 - 100 mg/dL   HEMOGLOBIN A1C WITH EAG    Collection Time: 06/01/17 11:30 AM   Result Value Ref Range    Hemoglobin A1c 5.7 4.8 - 6.0 %    Est. average glucose 340 mg/dL   METABOLIC PANEL, BASIC    Collection Time: 06/02/17  6:21 AM   Result Value Ref Range    Sodium 141 136 - 145 mmol/L    Potassium 4.2 3.5 - 5.1 mmol/L    Chloride 106 98 - 107 mmol/L    CO2 26 21 - 32 mmol/L    Anion gap 9 7 - 16 mmol/L    Glucose 153 (H) 65 - 100 mg/dL    BUN 14 8 - 23 MG/DL    Creatinine 1.00 0.8 - 1.5 MG/DL    GFR est AA >60 >60 ml/min/1.73m2    GFR est non-AA >60 >60 ml/min/1.73m2    Calcium 8.4 8.3 - 10.4 MG/DL   MAGNESIUM    Collection Time: 06/02/17  6:21 AM   Result Value Ref Range    Magnesium 1.8 1.8 - 2.4 mg/dL   CBC W/O DIFF    Collection Time: 06/02/17  6:21 AM   Result Value Ref Range    WBC 8.7 4.3 - 11.1 K/uL    RBC 3.88 (L) 4.23 - 5.67 M/uL    HGB 12.2 (L) 13.6 - 17.2 g/dL    HCT 37.1 (L) 41.1 - 50.3 %    MCV 95.6 79.6 - 97.8 FL    MCH 31.4 26.1 - 32.9 PG    MCHC 32.9 31.4 - 35.0 g/dL    RDW 12.9 11.9 - 14.6 %    PLATELET 604 909 - 336 K/uL    MPV 10.6 (L) 10.8 - 14.1 FL       Functional Assessment:  Reviewed participation and progress in therapies  Gross Assessment  AROM: Within functional limits (left UE & both LE's) (06/02/17 0900)  Strength: Within functional limits (left UE & both LE's) (06/02/17 0900)  Coordination: Within functional limits (left UE & both LE's) (06/02/17 0900)   Bed Mobility  Supine to Sit: Supervision (06/02/17 0900)  Sit to Supine:  (NT) (06/02/17 0900)  Scooting: Supervision (06/02/17 0900)   Balance  Sitting: Intact; Without support (06/02/17 0900)  Standing: Impaired; Without support (06/02/17 0900)               Bed/Mat Mobility  Supine to Sit: Supervision (06/02/17 0900)  Sit to Supine:  (NT) (06/02/17 0900)  Sit to Stand: Stand-by asssistance (06/02/17 0900)  Bed to Chair: Stand-by asssistance (06/02/17 0900)  Scooting: Supervision (06/02/17 0900)     Ambulation:  Gait  Speed/Malina: Delayed (06/02/17 0900)  Gait Abnormalities: Decreased step clearance (mild unsteadiness) (06/02/17 0900)  Ambulation - Level of Assistance: Stand-by asssistance (06/02/17 0900)  Distance (ft): 100 Feet (ft) (06/02/17 0900)  Assistive Device:  (none) (06/02/17 0900)    Impression/Plan:     Principal Problem:    Rotator cuff tear arthropathy of right shoulder (5/26/2017)    Active Problems:    Bicipital tendinitis of right shoulder (5/26/2017)      Pain from implanted hardware (5/30/2017)      Rotator cuff tear arthropathy (6/1/2017)        Current Facility-Administered Medications   Medication Dose Route Frequency Provider Last Rate Last Dose    0.9% sodium chloride infusion  75 mL/hr IntraVENous CONTINUOUS Lancediana Varma MD 75 mL/hr at 06/02/17 0533 75 mL/hr at 06/02/17 0533    sodium chloride (NS) flush 5-10 mL  5-10 mL IntraVENous Memory Rubinstein., MD        sodium chloride (NS) flush 5-10 mL  5-10 mL IntraVENous PRN Karlee Barker MD        ceFAZolin (ANCEF) 1 g in 0.9% sodium chloride (MBP/ADV) 50 mL  1 g IntraVENous Q8H Karlee Barker  mL/hr at 06/02/17 0533 1 g at 06/02/17 0533    bisacodyl (DULCOLAX) suppository 10 mg  10 mg Rectal DAILY PRN Karlee Barker MD        sodium phosphate (FLEET'S) enema 118 mL  1 Enema Rectal PRN Karlee Barker MD        ferrous sulfate tablet 325 mg  1 Tab Oral BID WITH MEALS Karlee Barker MD   325 mg at 06/02/17 3858    docusate sodium (COLACE) capsule 100 mg  100 mg Oral BID Karlee Barker MD   100 mg at 06/02/17 4362    promethazine (PHENERGAN) tablet 25 mg  25 mg Oral Q4H PRN Karlee Barker MD        HYDROmorphone (PF) (DILAUDID) injection 1 mg  1 mg IntraVENous Q1H PRN Karlee Barker MD   1 mg at 06/02/17 2714    HYDROmorphone (DILAUDID) tablet 4 mg  4 mg Oral Q3H PRN Karlee Barker MD   4 mg at 06/02/17 1044    HYDROmorphone (DILAUDID) tablet 2 mg  2 mg Oral Q3H PRN Karlee Barker MD        temazepam (RESTORIL) capsule 15 mg  15 mg Oral QHS PRN Karlee Barker MD        rivaroxaban [facility]ney) tablet 20 mg  20 mg Oral DAILY WITH BREAKFAST Karlee Barker MD   20 mg at 06/02/17 5820    dextrose 40% (GLUTOSE) oral gel 1 Tube  15 g Oral PRN Michelle Ladd MD        glucagon [facility]) injection 1 mg  1 mg IntraMUSCular PRN Michelle Ladd MD        dextrose (D50W) injection syrg 12.5-25 g  25-50 mL IntraVENous PRN Michelle Ladd MD        hydrALAZINE (APRESOLINE) 20 mg/mL injection 20 mg  20 mg IntraVENous Q6H PRN Michelle Ladd MD            Recommendations: Plan for home discharge, [facility]   Coordination of rehab/medical care. Counseling of Physical Medicine & Rehab care issues management. Monitoring and management of rehab conditions per the plan of care/orders. Continue PT, OT for right shoulder rehab per protocol.  Therapies limited to active and passive range of motion right elbow, hand. Active assisted and passive range of motion right shoulder to tolerance. Pulleys and pendulums. Do not push motion. NWB right shoulder. Continue mobility, transfers, gait training. Will follow progress. Rehabilitation Management/ Medical Management: 1. Devices:Walkers, Type: Rolling Walker  2. Consult:Rehab team including PT, OT, and . 3. Disposition Rehab-discussed with patient. 4. Thigh-high or knee-high YOHANA's when out of bed. 5. DVT Prophylaxis - per primary  6. Incentive spirometer Q1H while awake  7. Post op hemorrhagic anemia-monitor. Asymptomatic, continue fe supplements. 8. Activity: NWB RUE, WBAT BLE  9. Planned Labs: CBC,BMP  10. Pain Control: Fair control. Continue scheduled tylenol, and PRN meds. 11. Wound Care: Keep right shoulder wound clean and dry and reinforce dressing PRN. 12. DVT risk. Factor V Leiden - resume xarelto. Follow up with Dr Vincent Crystal  2 weeks after discharge from rehab. Follow up with [facility], Dr. Otilia Gilliam MD per instructions. Thank you for the opportunity to participate in the care of this patient.     Signed By: Gonzalo Davalos MD     June 2, 2017

## 2017-06-02 NOTE — PROGRESS NOTES
Problem: Mobility Impaired (Adult and Pediatric)  Goal: *Acute Goals and Plan of Care (Insert Text)  GOALS (1-4 days):  (1.) Patient will move from supine to sit and sit to supine in bed with MODIFIED INDEPENDENCE. (2.) Patient will transfer from bed to chair and chair to bed with SUPERVISION using the least restrictive device. (3.) Patient will ambulate with SUPERVISION for 300 feet with the least restrictive device. (4.) Patient will be safe (with spouses help)with shoulder HEP to increase range of motion per MD orders. ________________________________________________________________________________________________      PHYSICAL THERAPY: INITIAL ASSESSMENT, TREATMENT DAY: DAY OF ASSESSMENT, AM 6/2/2017  INPATIENT: Hospital Day: 2  [place] / [place] / Product Type: Medicare /      NAME/AGE/GENDER: Lucy He is a 81 y.o. male      PRIMARY DIAGNOSIS: Rotator cuff arthropathy, right [M12.811]  Biceps tendinitis on right [M75.21] Rotator cuff tear arthropathy of right shoulder Rotator cuff tear arthropathy of right shoulder  Procedure(s) (LRB):  RIGHT SHOULDER HARDWARE REMOVAL ( HOOK PLATE) (Right)  RIGHT SHOULDER ARTHROPLASTY TOTAL REVERSE / BICEPS TENODESIS AND  LATISSIMUS DORSI AND TERES MAJOR TRANSFER / DELTA (Right)  1 Day Post-Op  ICD-10: Treatment Diagnosis:       · Pain in Right Shoulder (M25.511)  · Stiffness of Right Shoulder, Not elsewhere classified (M25.611)  · Difficulty in walking, Not elsewhere classified (R26.2)   Precaution/Allergies:  Epinephrine and Aspirin       ASSESSMENT:      Mr. Kathy Brooks presents stiff & painful right shoulder along with mildly unsteady gait. This pt will benefit from follow up therapy to help restore safe function & establish HEP per Dr Shanika Vaz. This section established at most recent assessment   PROBLEM LIST (Impairments causing functional limitations):  1. Decreased Luna with Bed Mobility  2.  Decreased Luna with Transfers  3. Decreased Parke with Ambulation  4. Decreased Parke with shoulder HEP    INTERVENTIONS PLANNED: (Benefits and precautions of physical therapy have been discussed with the patient.)  1. Bed Mobility Training  2. Transfer Training  3. Gait Training  4. Therapeutic Exercises per MD orders  5. Modalities for Pain      TREATMENT PLAN: Frequency/Duration: twice daily for duration of hospital stay  Rehabilitation Potential For Stated Goals: GOOD      RECOMMENDED REHABILITATION/EQUIPMENT: (at time of discharge pending progress): Continue Skilled Therapy and Outpatient: Physical Therapy per MD's request.                   HISTORY:   History of Present Injury/Illness (Reason for Referral):  Painful right shoulder  Past Medical History/Comorbidities:   Mr. Leanna Laurent  has a past medical history of Acute sinusitis; Anaphylactic reaction (2/1/2016); AR (allergic rhinitis) (7/27/2016); Arrhythmia; Bicipital tenosynovitis (2/17/2012); Bradycardia (02/02/2016); Carpal tunnel syndrome (2/17/2012); Chronic back pain (12/23/2014); Chronic deep vein thrombosis of lower extremity ([address] (3/21/2016); Chronic pain; Chronic pansinusitis (7/27/2016); Chronic sinusitis (7/27/2016); DNS (deviated nasal septum); ARCE (dyspnea on exertion) (12/23/2014); Elevated serum creatinine (12/23/2014); Epistaxis; Essential hypertension, benign (8/16/2011); Factor V Leiden ([address] Glaucoma; H/O echocardiogram (02/02/2016); 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); Hypercholesteremia; Hyperkalemia (12/23/2014); Hypertension; Hypertrophy of nasal turbinates (7/27/2016); Left leg DVT ([address] (12/23/2014); Leukocytosis (12/23/2014); Lumbar spinal stenosis; Nasal obstruction; Nasal polyp (7/27/2016); Nausea & vomiting; Osteoarthritis; Osteoarthritis of left hip (12/12/2012); Primary localized osteoarthrosis, shoulder region (2/17/2012); Pulmonary embolism ([address] (1981);  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 He also has no past medical history of Aneurysm ([address] Asthma; Autoimmune disease ([address] CAD (coronary artery disease); Cancer ([address] Chronic kidney disease; Chronic obstructive pulmonary disease ([address] Diabetes ([address] Difficult intubation; Endocarditis; GERD (gastroesophageal reflux disease); Heart failure ([address] Liver disease; Malignant hyperthermia due to anesthesia; Morbid obesity ([address] Nicotine vapor product user; Non-nicotine vapor product user; Pseudocholinesterase deficiency; Psychiatric disorder; PUD (peptic ulcer disease); Rheumatic fever; Seizures ([address] Sleep apnea; Stroke [facility]); or Thyroid disease. Mr. Don Guillaume  has a past surgical history that includes colonoscopy (00,05,10); carpal tunnel release (2012); carpal tunnel release (1980); blepharoplasty (Bilateral, 2009); vascular access (2008); vascular access (2012); lap cholecystectomy (2001); appendectomy (1980); hernia repair (2010); hernia repair (Left, 2007); other surgical (2000); lumbar laminectomy; hip replacement (Left, 12/12/2012); vitrectomy (Left, 6/15/15); orthopaedic (Bilateral); orthopaedic (Right, 2006); orthopaedic (Bilateral); knee arthroscopy (Right, 2006); shoulder arthroscopy (Left, 2/17/2012); orthopaedic (Right); back surgery; heent (Right, 11/08); tonsillectomy (1950); cataract removal (Bilateral); corneal transplant (Right, 2004); sinus surgery proc unlisted; heent; heent; and corneal transplant (Right, 06/14/2016).   Social History/Living Environment:   Home Environment: Private residence  # Steps to Enter: 0  One/Two Story Residence: One story  Living Alone: Yes  Support Systems: Spouse/Significant Other/Partner  Patient Expects to be Discharged to[de-identified] Private residence  Current DME Used/Available at Home: None  Prior Level of Function/Work/Activity:  Pt was independent without an assistive device prior to this admission   Number of Personal Factors/Comorbidities that affect the Plan of Care: 1-2: MODERATE COMPLEXITY   EXAMINATION:   Most Recent Physical Functioning:   Gross Assessment:  AROM: Within functional limits (left UE & both LE's)  Strength: Within functional limits (left UE & both LE's)  Coordination: Within functional limits (left UE & both LE's)               Posture:     Balance:  Sitting: Intact; Without support  Standing: Impaired; Without support Bed Mobility:  Supine to Sit: Supervision  Sit to Supine:  (NT)  Scooting: Supervision  Wheelchair Mobility:     Transfers:  Sit to Stand: Stand-by asssistance  Stand to Sit: Stand-by asssistance  Bed to Chair: Stand-by asssistance  Gait:     Speed/Malina: Delayed  Gait Abnormalities: Decreased step clearance (mild unsteadiness)  Distance (ft): 100 Feet (ft)  Assistive Device:  (none)  Ambulation - Level of Assistance: Stand-by asssistance   Functional Mobility:         Gait/Ambulation:  SBA        Transfers:  SBA        Bed Mobility:  SUP   Body Structures Involved:  1. Bones  2. Joints  3. Muscles Body Functions Affected:  1. Sensory/Pain  2. Movement Related Activities and Participation Affected:  1. Learning and Applying Knowledge  2. Mobility   Number of elements that affect the Plan of Care: 4+: HIGH COMPLEXITY   CLINICAL PRESENTATION:   Presentation: Stable and uncomplicated: LOW COMPLEXITY   CLINICAL DECISION MAKIN [facility] [address] [id number] AM-PAC 6 Clicks   Basic Mobility Inpatient Short Form  How much difficulty does the patient currently have. .. Unable A Lot A Little None   1. Turning over in bed (including adjusting bedclothes, sheets and blankets)? [ ] 1   [ ] 2   [X] 3   [ ] 4   2.   Sitting down on and standing up from a chair with arms ( e.g., wheelchair, bedside commode, etc.)   [ ] 1   [ ] 2   [X] 3   [ ] 4   3. Moving from lying on back to sitting on the side of the bed? [ ] 1   [ ] 2   [X] 3   [ ] 4   How much help from another person does the patient currently need. .. Total A Lot A Little None   4. Moving to and from a bed to a chair (including a wheelchair)? [ ] 1   [ ] 2   [X] 3   [ ] 4   5. Need to walk in hospital room? [ ] 1   [ ] 2   [X] 3   [ ] 4   6. Climbing 3-5 steps with a railing? [ ] 1   [ ] 2   [ ] 3   [ ] 4   © 2007, Trustees of [address] [place] [address] [address] [id number], under license to [employer]. All rights reserved    Score:  Initial: 18 Most Recent: X (Date: -- )     Interpretation of Tool:  Represents activities that are increasingly more difficult (i.e. Bed mobility, Transfers, Gait). Score 24 23 22-20 19-15 14-10 9-7 6       Modifier CH CI CJ CK CL CM CN         · Mobility - Walking and Moving Around:               - CURRENT STATUS:    CK - 40%-59% impaired, limited or restricted               - GOAL STATUS:           CJ - 20%-39% impaired, limited or restricted               - D/C STATUS:                       ---------------To be determined---------------  [place] / [place] / Product Type: Medicare /       Medical Necessity:     · Patient is expected to demonstrate progress in strength, range of motion, balance, coordination and functional technique to decrease assistance required with functioanl mobility & HEP. Reason for Services/Other Comments:  · Patient continues to require skilled intervention due to pt not safe with functional mobility & HEP.    Use of outcome tool(s) and clinical judgement create a POC that gives a: Clear prediction of patient's progress: LOW COMPLEXITY                 TREATMENT:   (In addition to Assessment/Re-Assessment sessions the following treatments were rendered)   Pre-treatment Symptoms/Complaints:  soreness  Pain: Initial: visual scale  Pain Intensity 1: 4  Pain Location 1: Arm  Pain Orientation 1: Proximal  Pain Intervention(s) 1: Exercise  Post Session:  4./10      Therapeutic Exercise: (15 Minutes):  Exercises per grid below to improve mobility and dynamic movement of arm - right to improve functional endurance. Required minimal verbal cues to promote proper body alignment and promote proper body mechanics. Progressed repetitions as indicated. Assessment/15 min           Date:  6/2 Date:    Date:      ACTIVITY/EXERCISE AM PM AM PM AM PM   Gripping 15             Wrist Flexion/Extension 15             Wrist Ulnar/Radial Deviation               Pronation/Supination 15             Elbow Flexion/Extension 15aa             Shoulder Flexion/Extension 15aa flex to tolerance             Shoulder AB/ADduction               Shoulder IR/ER               Pulleys               Pendulums 15aa clock & counter clockwise             Shrugs               Isometric:                 Flexion               Extension               ABduction               ADduction               Biceps/Triceps                               B = bilateral; AA = active assistive; A = active; P = passive  Education:  [X]  Home Exercises      [X]  Sling Application       [X]  Movement Precautions        [ ]  Pulleys          [ ]  Use of Ice    [ ]  Other:   Treatment/Session Assessment:    · Response to Treatment:  tolerated well  · Interdisciplinary Collaboration:  · Registered Nurse  · After treatment position/precautions:  · Up in chair  · Bed/Chair-wheels locked  · Call light within reach  · RN notified  · Compliance with Program/Exercises: Will assess as treatment progresses. · Recommendations/Intent for next treatment session: \"Next visit will focus on reduction in assistance provided\".   Total Treatment Duration:  PT Patient Time In/Time Out  Time In: 0845  Time Out: [address], PT

## 2017-06-02 NOTE — PROGRESS NOTES
Met with pt who reports he will dc to home. He will return home with spouse. Pt agreeable to [facility]. [facility] PT and OT ordered.  Anjana Mace

## 2017-06-02 NOTE — PROGRESS NOTES
06/02/17 0821   Oxygen Therapy   O2 Sat (%) 97 %   Pulse via Oximetry 50 beats per minute   O2 Device Room air   Incentive Spirometry Treatment   Actual Volume (ml) 2200 ml   Number of Attempts 1   Good npc. Pt working on IS. Pt encouraged to do 10 breaths per hour while awake on IS. B/S clear. No respiratory distress noted at this time.

## 2017-06-02 NOTE — PROGRESS NOTES
[address]  Face to Face Encounter    Patients Name: Pantera Stroud    YOB: 1945    Ordering Physician: Carli Snell    Primary Diagnosis: Rotator cuff arthropathy, right [M12.811]  Biceps tendinitis on right [M75.21]    Date of Face to Face:   6/2/2017                                  Face to Face Encounter findings are related to primary reason for home care:   yes. 1. I certify that the patient needs intermittent care as follows: physical therapy: strengthening, stretching/ROM and transfer training  occupational therapy:  ADL safety (ie. cooking, bathing, dressing) and ROM    2. I certify that this patient is homebound, that is: 1) patient requires the use of a cane device, special transportation, or assistance of another to leave the home; or 2) patient's condition makes leaving the home medically contraindicated; and 3) patient has a normal inability to leave the home and leaving the home requires considerable and taxing effort. Patient may leave the home for infrequent and short duration for medical reasons, and occasional absences for non-medical reasons. Homebound status is due to the following functional limitations: Patient with strength deficits limiting the performance of all ADL's without caregiver assistance or the use of an assistive device. Patient with poor safety awareness and is at risk for falls without assistance of another person and the use of an assistive device. Patient with poor ambulation endurance limiting their safe ability to ascend/descend the required number of steps to leave the home. 3. I certify that this patient is under my care and that I, or a nurse practitioner or [address] [place] [address], or clinical nurse specialist, or certified nurse midwife, working with me, had a Face-to-Face Encounter that meets the physician Face-to-Face Encounter requirements.   The following are the clinical findings from the [address] [place] [address] encounter that support the need for skilled services and is a summary of the encounter: see progress notes/discharge summary    See attached progess note, discharge summary and summary of the patient's illness      Andrei Dodd RN  6/2/2017      THE FOLLOWING TO BE COMPLETED BY THE COMMUNITY PHYSICIAN:    I concur with the findings described above from the F2F encounter that this patient is homebound and in need of a skilled service.     Certifying Physician: _____________________________________      Printed Certifying Physician Name: _____________________________________    Date: _________________

## 2017-06-02 NOTE — CONSULTS
Consult request received for DM and HTN management. BP well controlled and he does not take any home meds. Added hydralazine PRN in case he needs it in future. His a1c is 5.7% so he does not have DM. Consult cancelled. Please call if we can be of further help. Thank you.

## 2017-06-02 NOTE — PROGRESS NOTES
Pt assisted up to bathroom again and then pain was rated 10/10,  1 mg dilaudid given SIVP  VSS. No noted distress.

## 2017-06-02 NOTE — PROGRESS NOTES
Was ambulating in park[place]. Back in bed. C/o pain not relieved since last IV pain med, medicated with dilaudid 4 mg po.

## 2017-06-02 NOTE — PROGRESS NOTES
Bandage removed from R shoulder. Incision intact to R shoulder/upper arm with steri strips, redressed with gauze and tegaderm. Medicated for pain with dilaudid 4 mg po. In recliner with sling to R UE. Voiding without difficulty.

## 2017-06-02 NOTE — PROGRESS NOTES
Problem: Mobility Impaired (Adult and Pediatric)  Goal: *Acute Goals and Plan of Care (Insert Text)  GOALS (1-4 days):  (1.) Patient will move from supine to sit and sit to supine in bed with MODIFIED INDEPENDENCE. (2.) Patient will transfer from bed to chair and chair to bed with SUPERVISION using the least restrictive device. (3.) Patient will ambulate with SUPERVISION for 300 feet with the least restrictive device. (4.) Patient will be safe (with spouses help)with shoulder HEP to increase range of motion per MD orders. ________________________________________________________________________________________________      PHYSICAL THERAPY: Initial Assessment, Treatment Day: Day of Assessment and PM 6/2/2017  INPATIENT: Hospital Day: 2  [place] / [place] / Product Type: Medicare /      NAME/AGE/GENDER: Erendira Franklin is a 81 y.o. male      PRIMARY DIAGNOSIS: Rotator cuff arthropathy, right [M12.811]  Biceps tendinitis on right [M75.21] Rotator cuff tear arthropathy of right shoulder Rotator cuff tear arthropathy of right shoulder  Procedure(s) (LRB):  RIGHT SHOULDER HARDWARE REMOVAL ( HOOK PLATE) (Right)  RIGHT SHOULDER ARTHROPLASTY TOTAL REVERSE / BICEPS TENODESIS AND  LATISSIMUS DORSI AND TERES MAJOR TRANSFER / DELTA (Right)  1 Day Post-Op  ICD-10: Treatment Diagnosis:       · Pain in Right Shoulder (M25.511)  · Stiffness of Right Shoulder, Not elsewhere classified (M25.611)  · Difficulty in walking, Not elsewhere classified (R26.2)   Precaution/Allergies:  Epinephrine and Aspirin       ASSESSMENT:      Mr. González Miner presents stiff & painful right shoulder along with mildly unsteady gait. This pt will benefit from follow up therapy to help restore safe function & establish HEP per Dr Nora Palencia. He is walking around his room. He performs HEP with some increase in pain. He will remain in the chair.       This section established at most recent assessment   PROBLEM LIST (Impairments causing functional limitations):  1. Decreased [place] with Bed Mobility  2. Decreased [place] with Transfers  3. Decreased [place] with Ambulation  4. Decreased [place] with shoulder HEP    INTERVENTIONS PLANNED: (Benefits and precautions of physical therapy have been discussed with the patient.)  1. Bed Mobility Training  2. Transfer Training  3. Gait Training  4. Therapeutic Exercises per MD orders  5. Modalities for Pain      TREATMENT PLAN: Frequency/Duration: twice daily for duration of hospital stay  Rehabilitation Potential For Stated Goals: GOOD      RECOMMENDED REHABILITATION/EQUIPMENT: (at time of discharge pending progress): Continue Skilled Therapy and Outpatient: Physical Therapy per MD's request.                   HISTORY:   History of Present Injury/Illness (Reason for Referral):  Painful right shoulder  Past Medical History/Comorbidities:   Mr. Joshua Singh  has a past medical history of Acute sinusitis; Anaphylactic reaction (2/1/2016); AR (allergic rhinitis) (7/27/2016); Arrhythmia; Bicipital tenosynovitis (2/17/2012); Bradycardia (02/02/2016); Carpal tunnel syndrome (2/17/2012); Chronic back pain (12/23/2014); Chronic deep vein thrombosis of lower extremity ([address] (3/21/2016); Chronic pain; Chronic pansinusitis (7/27/2016); Chronic sinusitis (7/27/2016); DNS (deviated nasal septum); ARCE (dyspnea on exertion) (12/23/2014); Elevated serum creatinine (12/23/2014); Epistaxis; Essential hypertension, benign (8/16/2011); Factor V Leiden ([address] Glaucoma; H/O echocardiogram (02/02/2016); 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); Hypercholesteremia; Hyperkalemia (12/23/2014); Hypertension; Hypertrophy of nasal turbinates (7/27/2016); Left leg DVT ([address] (12/23/2014); Leukocytosis (12/23/2014); Lumbar spinal stenosis; Nasal obstruction; Nasal polyp (7/27/2016); Nausea & vomiting; Osteoarthritis; Osteoarthritis of left hip (12/12/2012);  Primary localized osteoarthrosis, shoulder region (2/17/2012); Pulmonary embolism ([address] (1981); 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 He also has no past medical history of Aneurysm ([address] Asthma; Autoimmune disease ([address] CAD (coronary artery disease); Cancer ([address] Chronic kidney disease; Chronic obstructive pulmonary disease ([address] Diabetes ([address] Difficult intubation; Endocarditis; GERD (gastroesophageal reflux disease); Heart failure ([address] Liver disease; Malignant hyperthermia due to anesthesia; Morbid obesity ([address] Nicotine vapor product user; Non-nicotine vapor product user; Pseudocholinesterase deficiency; Psychiatric disorder; PUD (peptic ulcer disease); Rheumatic fever; Seizures ([address] Sleep apnea; Stroke [facility]); or Thyroid disease. Mr. Vasyl Norris  has a past surgical history that includes colonoscopy (00,05,10); carpal tunnel release (2012); carpal tunnel release (1980); blepharoplasty (Bilateral, 2009); vascular access (2008); vascular access (2012); lap cholecystectomy (2001); appendectomy (1980); hernia repair (2010); hernia repair (Left, 2007); other surgical (2000); lumbar laminectomy; hip replacement (Left, 12/12/2012); vitrectomy (Left, 6/15/15); orthopaedic (Bilateral); orthopaedic (Right, 2006); orthopaedic (Bilateral); knee arthroscopy (Right, 2006); shoulder arthroscopy (Left, 2/17/2012); orthopaedic (Right); back surgery; heent (Right, 11/08); tonsillectomy (1950); cataract removal (Bilateral); corneal transplant (Right, 2004); sinus surgery proc unlisted; heent; heent; and corneal transplant (Right, 06/14/2016).   Social History/Living Environment:   Home Environment: Private residence  # Steps to Enter: 0  One/Two Story Residence: One story  Living Alone: Yes  Support Systems: Spouse/Significant Other/Partner  Patient Expects to be Discharged to[de-identified] Private residence  Current DME Used/Available at Home: None  Prior Level of Function/Work/Activity:  Pt was independent without an assistive device prior to this admission   Number of Personal Factors/Comorbidities that affect the Plan of Care: 1-2: MODERATE COMPLEXITY   EXAMINATION:   Most Recent Physical Functioning:   Gross Assessment:                  Posture:     Balance:    Bed Mobility:     Wheelchair Mobility:     Transfers:  Sit to Stand: Stand-by asssistance  Stand to Sit: Stand-by asssistance  Bed to Chair: Stand-by asssistance  Gait:     Speed/Malina: Delayed  Gait Abnormalities: Decreased step clearance  Distance (ft): 50 Feet (ft) (he is up walking in room)  Assistive Device: Other (comment) (no AD)  Ambulation - Level of Assistance: Stand-by asssistance  Duration: 15 Minutes   Functional Mobility:         Gait/Ambulation:  SBA        Transfers:  SBA        Bed Mobility:  SUP   Body Structures Involved:  1. Bones  2. Joints  3. Muscles Body Functions Affected:  1. Sensory/Pain  2. Movement Related Activities and Participation Affected:  1. Learning and Applying Knowledge  2. Mobility   Number of elements that affect the Plan of Care: 4+: HIGH COMPLEXITY   CLINICAL PRESENTATION:   Presentation: Stable and uncomplicated: LOW COMPLEXITY   CLINICAL DECISION MAKIN [facility] [address] [id number] AM-PAC 6 Clicks   Basic Mobility Inpatient Short Form  How much difficulty does the patient currently have. .. Unable A Lot A Little None   1. Turning over in bed (including adjusting bedclothes, sheets and blankets)? [ ] 1   [ ] 2   [X] 3   [ ] 4   2. Sitting down on and standing up from a chair with arms ( e.g., wheelchair, bedside commode, etc.)   [ ] 1   [ ] 2   [X] 3   [ ] 4   3. Moving from lying on back to sitting on the side of the bed?    [ ] 1   [ ] 2   [X] 3   [ ] 4   How much help from another person does the patient currently need. .. Total A Lot A Little None   4. Moving to and from a bed to a chair (including a wheelchair)? [ ] 1   [ ] 2   [X] 3   [ ] 4   5. Need to walk in hospital room? [ ] 1   [ ] 2   [X] 3   [ ] 4   6. Climbing 3-5 steps with a railing? [ ] 1   [ ] 2   [ ] 3   [ ] 4   © 2007, Trustees of [address] [place] [address] [address] [address], under license to [employer]. All rights reserved    Score:  Initial: 18 Most Recent: X (Date: -- )     Interpretation of Tool:  Represents activities that are increasingly more difficult (i.e. Bed mobility, Transfers, Gait). Score 24 23 22-20 19-15 14-10 9-7 6       Modifier CH CI CJ CK CL CM CN         · Mobility - Walking and Moving Around:               - CURRENT STATUS:    CK - 40%-59% impaired, limited or restricted               - GOAL STATUS:           CJ - 20%-39% impaired, limited or restricted               - D/C STATUS:                       ---------------To be determined---------------  [place] / [place] / Product Type: Medicare /       Medical Necessity:     · Patient is expected to demonstrate progress in strength, range of motion, balance, coordination and functional technique to decrease assistance required with functioanl mobility & HEP. Reason for Services/Other Comments:  · Patient continues to require skilled intervention due to pt not safe with functional mobility & HEP.    Use of outcome tool(s) and clinical judgement create a POC that gives a: Clear prediction of patient's progress: LOW COMPLEXITY                 TREATMENT:   (In addition to Assessment/Re-Assessment sessions the following treatments were rendered)   Pre-treatment Symptoms/Complaints:  soreness  Pain: Initial: visual scale  Pain Intensity 1: 8 (7/10 after therapy)  Post Session:        Therapeutic Exercise: (15 Minutes):  Exercises per grid below to improve mobility and dynamic movement of arm - right to improve functional endurance. Required minimal verbal cues to promote proper body alignment and promote proper body mechanics. Progressed repetitions as indicated. Gait Training (15 Minutes):  Gait training to improve and/or restore physical functioning as related to mobility. Ambulated 50 Feet (ft) (he is up walking in room) with Stand-by asssistance using a Other (comment) (no AD)    Assessment/15 min           Date:  6/2 Date:    Date:      ACTIVITY/EXERCISE AM PM AM PM AM PM   Gripping 15  15           Wrist Flexion/Extension 15  15           Wrist Ulnar/Radial Deviation               Pronation/Supination 15  15           Elbow Flexion/Extension 15aa  15  aa           Shoulder Flexion/Extension 15aa flex to tolerance  15 aa flex to tolerance           Shoulder AB/ADduction               Shoulder IR/ER               Pulleys               Pendulums 15aa clock & counter clockwise  15 cw/ccw           Shrugs               Isometric:                 Flexion               Extension               ABduction               ADduction               Biceps/Triceps                               B = bilateral; AA = active assistive; A = active; P = passive  Education:  [X]  Home Exercises      [X]  Sling Application       [X]  Movement Precautions        [ ]  Pulleys          [ ]  Use of Ice    [ ]  Other:   Treatment/Session Assessment:    · Response to Treatment:  tolerated well  · Interdisciplinary Collaboration:  · Registered Nurse  · After treatment position/precautions:  · Up in chair  · Bed/Chair-wheels locked  · Call light within reach  · RN notified  · Compliance with Program/Exercises: Will assess as treatment progresses. · Recommendations/Intent for next treatment session: \"Next visit will focus on reduction in assistance provided\".   Total Treatment Duration:  PT Patient Time In/Time Out  Time In: 1300  Time Out: [address] [place] PTA

## 2017-06-02 NOTE — PROGRESS NOTES
Pt admitted to room 331 from PACU via bed. O2 at 3L. VSS  Bulky dressing to right shoulder clean dry and intact  Pt moving his fingers some and radial pulse 2+ with fingers warm and with have brisk cap refill. Oriented pt to he room, bed controls, dining on call. SCD's on ,  Pt denies pain at this time. Encouraged pt to call when he needs pain medicine. Bed i n low locked position and call light within reach.

## 2017-06-03 LAB
ANION GAP BLD CALC-SCNC: 8 MMOL/L (ref 7–16)
BUN SERPL-MCNC: 16 MG/DL (ref 8–23)
CALCIUM SERPL-MCNC: 8.6 MG/DL (ref 8.3–10.4)
CHLORIDE SERPL-SCNC: 105 MMOL/L (ref 98–107)
CO2 SERPL-SCNC: 31 MMOL/L (ref 21–32)
CREAT SERPL-MCNC: 0.94 MG/DL (ref 0.8–1.5)
ERYTHROCYTE [DISTWIDTH] IN BLOOD BY AUTOMATED COUNT: 12.7 % (ref 11.9–14.6)
GLUCOSE SERPL-MCNC: 120 MG/DL (ref 65–100)
HCT VFR BLD AUTO: 34.8 % (ref 41.1–50.3)
HGB BLD-MCNC: 11.2 G/DL (ref 13.6–17.2)
MAGNESIUM SERPL-MCNC: 2.1 MG/DL (ref 1.8–2.4)
MCH RBC QN AUTO: 31.3 PG (ref 26.1–32.9)
MCHC RBC AUTO-ENTMCNC: 32.2 G/DL (ref 31.4–35)
MCV RBC AUTO: 97.2 FL (ref 79.6–97.8)
PLATELET # BLD AUTO: 167 K/UL (ref 150–450)
PMV BLD AUTO: 10.6 FL (ref 10.8–14.1)
POTASSIUM SERPL-SCNC: 3.6 MMOL/L (ref 3.5–5.1)
RBC # BLD AUTO: 3.58 M/UL (ref 4.23–5.67)
SODIUM SERPL-SCNC: 144 MMOL/L (ref 136–145)
WBC # BLD AUTO: 10.4 K/UL (ref 4.3–11.1)

## 2017-06-03 PROCEDURE — 80048 BASIC METABOLIC PNL TOTAL CA: CPT | Performed by: ORTHOPAEDIC SURGERY

## 2017-06-03 PROCEDURE — 36415 COLL VENOUS BLD VENIPUNCTURE: CPT | Performed by: ORTHOPAEDIC SURGERY

## 2017-06-03 PROCEDURE — 97110 THERAPEUTIC EXERCISES: CPT

## 2017-06-03 PROCEDURE — 74011250636 HC RX REV CODE- 250/636: Performed by: ORTHOPAEDIC SURGERY

## 2017-06-03 PROCEDURE — 97116 GAIT TRAINING THERAPY: CPT

## 2017-06-03 PROCEDURE — 65270000029 HC RM PRIVATE

## 2017-06-03 PROCEDURE — 83735 ASSAY OF MAGNESIUM: CPT | Performed by: ORTHOPAEDIC SURGERY

## 2017-06-03 PROCEDURE — 74011250637 HC RX REV CODE- 250/637: Performed by: ORTHOPAEDIC SURGERY

## 2017-06-03 PROCEDURE — 85027 COMPLETE CBC AUTOMATED: CPT | Performed by: ORTHOPAEDIC SURGERY

## 2017-06-03 RX ORDER — HYDROMORPHONE HYDROCHLORIDE 2 MG/1
2 TABLET ORAL
Status: DISCONTINUED | OUTPATIENT
Start: 2017-06-03 | End: 2017-06-05 | Stop reason: HOSPADM

## 2017-06-03 RX ORDER — OXYCODONE HCL 20 MG/1
20 TABLET, FILM COATED, EXTENDED RELEASE ORAL EVERY 12 HOURS
Status: DISCONTINUED | OUTPATIENT
Start: 2017-06-03 | End: 2017-06-05 | Stop reason: HOSPADM

## 2017-06-03 RX ORDER — HYDROMORPHONE HYDROCHLORIDE 4 MG/1
4 TABLET ORAL
Status: DISCONTINUED | OUTPATIENT
Start: 2017-06-03 | End: 2017-06-05 | Stop reason: HOSPADM

## 2017-06-03 RX ADMIN — FERROUS SULFATE TAB 325 MG (65 MG ELEMENTAL FE) 325 MG: 325 (65 FE) TAB at 17:36

## 2017-06-03 RX ADMIN — OXYCODONE HYDROCHLORIDE 20 MG: 20 TABLET, FILM COATED, EXTENDED RELEASE ORAL at 20:45

## 2017-06-03 RX ADMIN — RIVAROXABAN 20 MG: 20 TABLET, FILM COATED ORAL at 08:08

## 2017-06-03 RX ADMIN — HYDROMORPHONE HYDROCHLORIDE 1 MG: 1 INJECTION, SOLUTION INTRAMUSCULAR; INTRAVENOUS; SUBCUTANEOUS at 19:32

## 2017-06-03 RX ADMIN — HYDROMORPHONE HYDROCHLORIDE 1 MG: 1 INJECTION, SOLUTION INTRAMUSCULAR; INTRAVENOUS; SUBCUTANEOUS at 06:57

## 2017-06-03 RX ADMIN — HYDROMORPHONE HYDROCHLORIDE 1 MG: 1 INJECTION, SOLUTION INTRAMUSCULAR; INTRAVENOUS; SUBCUTANEOUS at 09:21

## 2017-06-03 RX ADMIN — DOCUSATE SODIUM 100 MG: 100 CAPSULE, LIQUID FILLED ORAL at 08:08

## 2017-06-03 RX ADMIN — HYDROMORPHONE HYDROCHLORIDE 4 MG: 4 TABLET ORAL at 08:07

## 2017-06-03 RX ADMIN — HYDROMORPHONE HYDROCHLORIDE 1 MG: 1 INJECTION, SOLUTION INTRAMUSCULAR; INTRAVENOUS; SUBCUTANEOUS at 13:45

## 2017-06-03 RX ADMIN — HYDROMORPHONE HYDROCHLORIDE 4 MG: 4 TABLET ORAL at 23:20

## 2017-06-03 RX ADMIN — HYDROMORPHONE HYDROCHLORIDE 4 MG: 4 TABLET ORAL at 11:09

## 2017-06-03 RX ADMIN — HYDROMORPHONE HYDROCHLORIDE 4 MG: 4 TABLET ORAL at 15:57

## 2017-06-03 RX ADMIN — OXYCODONE HYDROCHLORIDE 20 MG: 20 TABLET, FILM COATED, EXTENDED RELEASE ORAL at 09:21

## 2017-06-03 RX ADMIN — HYDROMORPHONE HYDROCHLORIDE 1 MG: 1 INJECTION, SOLUTION INTRAMUSCULAR; INTRAVENOUS; SUBCUTANEOUS at 17:36

## 2017-06-03 RX ADMIN — FERROUS SULFATE TAB 325 MG (65 MG ELEMENTAL FE) 325 MG: 325 (65 FE) TAB at 08:08

## 2017-06-03 RX ADMIN — DOCUSATE SODIUM 100 MG: 100 CAPSULE, LIQUID FILLED ORAL at 17:36

## 2017-06-03 RX ADMIN — HYDROMORPHONE HYDROCHLORIDE 1 MG: 1 INJECTION, SOLUTION INTRAMUSCULAR; INTRAVENOUS; SUBCUTANEOUS at 04:45

## 2017-06-03 RX ADMIN — HYDROMORPHONE HYDROCHLORIDE 1 MG: 1 INJECTION, SOLUTION INTRAMUSCULAR; INTRAVENOUS; SUBCUTANEOUS at 02:18

## 2017-06-03 RX ADMIN — HYDROMORPHONE HYDROCHLORIDE 1 MG: 1 INJECTION, SOLUTION INTRAMUSCULAR; INTRAVENOUS; SUBCUTANEOUS at 22:09

## 2017-06-03 NOTE — PROGRESS NOTES
Shift assessment complete. Pt is post op day 1 from a RTSA. A&O x 4. Purposeful movement in all 4 extremities. Good push/pulls. Dressing clean dry and intact. PIV capped. Pt voiding straw, yellow urine. Call light with in reach. Bed low and locked.  Side rails x 3

## 2017-06-03 NOTE — PROGRESS NOTES
[place] 3, 2017         Post Op day: 2 Days Post-Op     Admit Date: 2017  Admit Diagnosis: Rotator cuff arthropathy, right [M12.811]  Biceps tendinitis on right [M75.21]        Subjective: Patient is status-post Procedure(s) (LRB):  RIGHT SHOULDER HARDWARE REMOVAL ( HOOK PLATE) (Right)  RIGHT SHOULDER ARTHROPLASTY TOTAL REVERSE / BICEPS TENODESIS AND  LATISSIMUS DORSI AND TERES MAJOR TRANSFER / DELTA (Right). Patient doing well although having pain. Already taking max oral and IV dose of dilaudid. No shortness of breath, chest pain or nausea/vomiting. Objective:   Visit Vitals    /54 (BP 1 Location: Left arm, BP Patient Position: At rest)    Pulse (!) 52    Temp 98.5 °F (36.9 °C)    Resp 16    SpO2 90%    Temp (24hrs), Av.3 °F (36.3 °C), Min:96.5 °F (35.8 °C), Max:98.5 °F (36.9 °C)    Lab Results   Component Value Date/Time    HGB 11.2 2017 05:32 AM     Extremity Exam  Dressing Clean, dry, intact.   Neuro intact right upper extremity  Sensation intact to light touch  Extremity perfused  No sign of DVT     Assessment / Plan :  S/p Procedure(s) (LRB):  RIGHT SHOULDER HARDWARE REMOVAL ( HOOK PLATE) (Right)  RIGHT SHOULDER ARTHROPLASTY TOTAL REVERSE / BICEPS TENODESIS AND  LATISSIMUS DORSI AND TERES MAJOR TRANSFER / DELTA (Right)  Continue current postoperative plan  Patient having pain but already max dose of oral and IV dilaudid  PT/OT-Continue current weightbearing status and restrictions of involved extremities  Continue current VTE prophylaxis  DIspo-Home  Patient Active Problem List   Diagnosis Code    Essential hypertension, benign I10    Supraspinatus (muscle) (tendon) sprain S46.819A    Superior glenoid labrum lesion S43.439A    Bicipital tenosynovitis M75.20    Primary localized osteoarthrosis, shoulder region M19.019    Carpal tunnel syndrome G56.00    Osteoarthritis of left hip M16.12   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 Z96.649    Glaucoma H40.9    Pulmonary embolism ([facility]) I26.99    Hypertension I10    Factor V Leiden ([facility] D68.51    Hypercholesteremia E78.00    Thromboembolus ([facility] I74.9    Unspecified adverse effect of anesthesia T41.45XA    Spinal stenosis, lumbar region, with neurogenic claudication M48.06    Left leg DVT ([facility]) I82.402    Leukocytosis D72.829    Hyperkalemia E87.5    Elevated serum creatinine R79.89    Chronic back pain M54.9, G89.29    ARCE (dyspnea on exertion) R06.09    Anaphylactic reaction T78. 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[facility]) D68.2    Chronic deep vein thrombosis of lower extremity ([facility]) I82.509    Saddle embolism of pulmonary artery ([facility]) I26.92    AR (allergic rhinitis) J30.9    Chronic sinusitis J32.9    Nasal polyp J33.9    Hypertrophy of nasal turbinates J34.3    Chronic pansinusitis J32.4    Traumatic tear of right rotator cuff S46.011A    Strain of right biceps S46.211A    Type 1 superior labrum extending from anterior to posterior (SLAP) lesion of right shoulder S43.431A    Other sprain of right shoulder joint, sequela S43.491S    Chondromalacia, right shoulder M94.211    Posterior dislocation of right acromioclavicular joint S43.151A    Rupture of right biceps tendon S46.211A    Rotator cuff tear arthropathy of right shoulder M12.811    Bicipital tendinitis of right shoulder M75.21    Pain from implanted hardware T85.848A    Rotator cuff tear arthropathy M12.819          Signed By: Cleveland Dodd MD

## 2017-06-03 NOTE — PROGRESS NOTES
Problem: Mobility Impaired (Adult and Pediatric)  Goal: *Acute Goals and Plan of Care (Insert Text)  GOALS (1-4 days):  (1.) Patient will move from supine to sit and sit to supine in bed with MODIFIED INDEPENDENCE. (2.) Patient will transfer from bed to chair and chair to bed with SUPERVISION using the least restrictive device. Met 6/3/17  (3.) Patient will ambulate with SUPERVISION for 300 feet with the least restrictive device. (4.) Patient will be safe (with spouses help)with shoulder HEP to increase range of motion per MD orders. ________________________________________________________________________________________________      PHYSICAL THERAPY: Treatment Day: 2nd and AM 6/3/2017  INPATIENT: Hospital Day: 3  [place] / [place] / Product Type: Medicare /      NAME/AGE/GENDER: Yasmeen Ferrera is a 81 y.o. male      PRIMARY DIAGNOSIS: Rotator cuff arthropathy, right [M12.811]  Biceps tendinitis on right [M75.21] Rotator cuff tear arthropathy of right shoulder Rotator cuff tear arthropathy of right shoulder  Procedure(s) (LRB):  RIGHT SHOULDER HARDWARE REMOVAL ( HOOK PLATE) (Right)  RIGHT SHOULDER ARTHROPLASTY TOTAL REVERSE / BICEPS TENODESIS AND  LATISSIMUS DORSI AND TERES MAJOR TRANSFER / DELTA (Right)  2 Days Post-Op  ICD-10: Treatment Diagnosis:       · Pain in Right Shoulder (M25.511)  · Stiffness of Right Shoulder, Not elsewhere classified (M25.611)  · Difficulty in walking, Not elsewhere classified (R26.2)   Precaution/Allergies:  Epinephrine and Aspirin       ASSESSMENT:      Mr. Lynda Bucio had high pain earlier in the morning, and declined at the time. Upon the second check up, he agreed to therapy though pain level was 10/10. He had just received pain medication again. He reported that walking increases pain, but still agreeable to walking. He also said he wasn't leaving the hospital until his pain level decreased and told the doctor so.      This section established at most recent assessment   PROBLEM LIST (Impairments causing functional limitations):  1. Decreased [place] with Bed Mobility  2. Decreased [place] with Transfers  3. Decreased [place] with Ambulation  4. Decreased [place] with shoulder HEP    INTERVENTIONS PLANNED: (Benefits and precautions of physical therapy have been discussed with the patient.)  1. Bed Mobility Training  2. Transfer Training  3. Gait Training  4. Therapeutic Exercises per MD orders  5. Modalities for Pain      TREATMENT PLAN: Frequency/Duration: twice daily for duration of hospital stay  Rehabilitation Potential For Stated Goals: GOOD      RECOMMENDED REHABILITATION/EQUIPMENT: (at time of discharge pending progress): Continue Skilled Therapy and Outpatient: Physical Therapy per MD's request.                   HISTORY:   History of Present Injury/Illness (Reason for Referral):  Painful right shoulder  Past Medical History/Comorbidities:   Mr. Manuel Campos  has a past medical history of Acute sinusitis; Anaphylactic reaction (2/1/2016); AR (allergic rhinitis) (7/27/2016); Arrhythmia; Bicipital tenosynovitis (2/17/2012); Bradycardia (02/02/2016); Carpal tunnel syndrome (2/17/2012); Chronic back pain (12/23/2014); Chronic deep vein thrombosis of lower extremity ([address] (3/21/2016); Chronic pain; Chronic pansinusitis (7/27/2016); Chronic sinusitis (7/27/2016); DNS (deviated nasal septum); ARCE (dyspnea on exertion) (12/23/2014); Elevated serum creatinine (12/23/2014); Epistaxis; Essential hypertension, benign (8/16/2011); Factor V Leiden ([address] Glaucoma; H/O echocardiogram (02/02/2016); 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); Hypercholesteremia; Hyperkalemia (12/23/2014); Hypertension; Hypertrophy of nasal turbinates (7/27/2016); Left leg DVT ([address] (12/23/2014); Leukocytosis (12/23/2014); Lumbar spinal stenosis; Nasal obstruction; Nasal polyp (7/27/2016);  Nausea & vomiting; Osteoarthritis; Osteoarthritis of left hip (12/12/2012); Primary localized osteoarthrosis, shoulder region (2/17/2012); Pulmonary embolism ([address] (1981); 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 He also has no past medical history of Aneurysm ([address] Asthma; Autoimmune disease ([address] CAD (coronary artery disease); Cancer ([address] Chronic kidney disease; Chronic obstructive pulmonary disease ([address] Diabetes ([address] Difficult intubation; Endocarditis; GERD (gastroesophageal reflux disease); Heart failure ([address] Liver disease; Malignant hyperthermia due to anesthesia; Morbid obesity ([address] Nicotine vapor product user; Non-nicotine vapor product user; Pseudocholinesterase deficiency; Psychiatric disorder; PUD (peptic ulcer disease); Rheumatic fever; Seizures ([address] Sleep apnea; Stroke [facility]); or Thyroid disease. Mr. Nadya Villalpando  has a past surgical history that includes colonoscopy (00,05,10); carpal tunnel release (2012); carpal tunnel release (1980); blepharoplasty (Bilateral, 2009); vascular access (2008); vascular access (2012); lap cholecystectomy (2001); appendectomy (1980); hernia repair (2010); hernia repair (Left, 2007); other surgical (2000); lumbar laminectomy; hip replacement (Left, 12/12/2012); vitrectomy (Left, 6/15/15); orthopaedic (Bilateral); orthopaedic (Right, 2006); orthopaedic (Bilateral); knee arthroscopy (Right, 2006); shoulder arthroscopy (Left, 2/17/2012); orthopaedic (Right); back surgery; heent (Right, 11/08); tonsillectomy (1950); cataract removal (Bilateral); corneal transplant (Right, 2004); sinus surgery proc unlisted; heent; heent; and corneal transplant (Right, 06/14/2016).   Social History/Living Environment:   Home Environment: Private residence  # Steps to Enter: 0  One/Two Story Residence: One story  Living Alone: Yes  Support Systems: Spouse/Significant Other/Partner  Patient Expects to be Discharged to[de-identified] Private residence  Current DME Used/Available at Home: None  Prior Level of Function/Work/Activity:  Pt was independent without an assistive device prior to this admission   Number of Personal Factors/Comorbidities that affect the Plan of Care: 1-2: MODERATE COMPLEXITY   EXAMINATION:   Most Recent Physical Functioning:   Gross Assessment:                  Posture:     Balance:  Sitting: Intact  Standing: Intact Bed Mobility:  Supine to Sit: Supervision  Sit to Supine: Supervision  Wheelchair Mobility:     Transfers:  Sit to Stand: Supervision;Stand-by asssistance  Stand to Sit: Supervision;Stand-by asssistance  Gait:     Speed/Malina: Delayed  Gait Abnormalities: Decreased step clearance  Distance (ft): 600 Feet (ft)  Assistive Device:  (no AD)  Ambulation - Level of Assistance: Stand-by asssistance  Duration: 10 Minutes   Functional Mobility:         Gait/Ambulation:  SBA        Transfers:  SBA        Bed Mobility:  SUP   Body Structures Involved:  1. Bones  2. Joints  3. Muscles Body Functions Affected:  1. Sensory/Pain  2. Movement Related Activities and Participation Affected:  1. Learning and Applying Knowledge  2. Mobility   Number of elements that affect the Plan of Care: 4+: HIGH COMPLEXITY   CLINICAL PRESENTATION:   Presentation: Stable and uncomplicated: LOW COMPLEXITY   CLINICAL DECISION MAKIN [facility] [id number] AM-PAC 6 Clicks   Basic Mobility Inpatient Short Form  How much difficulty does the patient currently have. .. Unable A Lot A Little None   1. Turning over in bed (including adjusting bedclothes, sheets and blankets)? [ ] 1   [ ] 2   [X] 3   [ ] 4   2. Sitting down on and standing up from a chair with arms ( e.g., wheelchair, bedside commode, etc.)   [ ] 1   [ ] 2   [X] 3   [ ] 4   3.   Moving from lying on back to sitting on the side of the bed? [ ] 1   [ ] 2   [X] 3   [ ] 4   How much help from another person does the patient currently need. .. Total A Lot A Little None   4. Moving to and from a bed to a chair (including a wheelchair)? [ ] 1   [ ] 2   [X] 3   [ ] 4   5. Need to walk in hospital room? [ ] 1   [ ] 2   [X] 3   [ ] 4   6. Climbing 3-5 steps with a railing? [ ] 1   [ ] 2   [ ] 3   [ ] 4   © 2007, Trustees of [address] [place] [address] [address] [id number], under license to [employer]. All rights reserved    Score:  Initial: 18 Most Recent: X (Date: -- )     Interpretation of Tool:  Represents activities that are increasingly more difficult (i.e. Bed mobility, Transfers, Gait). Score 24 23 22-20 19-15 14-10 9-7 6       Modifier CH CI CJ CK CL CM CN         · Mobility - Walking and Moving Around:               - CURRENT STATUS:    CK - 40%-59% impaired, limited or restricted               - GOAL STATUS:           CJ - 20%-39% impaired, limited or restricted               - D/C STATUS:                       ---------------To be determined---------------  [place] / [place] / Product Type: Medicare /       Medical Necessity:     · Patient is expected to demonstrate progress in strength, range of motion, balance, coordination and functional technique to decrease assistance required with functioanl mobility & HEP. Reason for Services/Other Comments:  · Patient continues to require skilled intervention due to pt not safe with functional mobility & HEP.    Use of outcome tool(s) and clinical judgement create a POC that gives a: Clear prediction of patient's progress: LOW COMPLEXITY                 TREATMENT:   (In addition to Assessment/Re-Assessment sessions the following treatments were rendered)   Pre-treatment Symptoms/Complaints:  soreness  Pain: Initial: visual scale  Pain Intensity 1: 10  Pain Location 1: Shoulder  Pain Orientation 1: Right  Pain Intervention(s) 1: Cold pack  Post Session:        Therapeutic Exercise: (15 Minutes):  Exercises per grid below to improve mobility and dynamic movement of arm - right to improve functional endurance. Required minimal verbal cues to promote proper body alignment and promote proper body mechanics. Progressed repetitions as indicated. Gait Training (10 Minutes):  Gait training to improve and/or restore physical functioning as related to mobility. Ambulated 600 Feet (ft) with Stand-by asssistance using a  (no AD)    Gait Training (10 Minutes):  Gait training to improve and/or restore physical functioning as related to mobility, strength and balance. Ambulated 600 Feet (ft) with Stand-by asssistance using a  (no AD) and minimal   related to their pacing to promote safety awareness.             Date:  6/2 6/3 Date:      ACTIVITY/EXERCISE AM PM AM PM AM PM   Gripping 15  15  20         Wrist Flexion/Extension 15  15  20         Wrist Ulnar/Radial Deviation               Pronation/Supination 15  15  20         Elbow Flexion/Extension 15aa  15  aa  15aa         Shoulder Flexion/Extension 15aa flex to tolerance  15 aa flex to tolerance  12aa to tolerance         Shoulder AB/ADduction               Shoulder IR/ER               Pulleys               Pendulums 15aa clock & counter clockwise  15 cw/ccw 20 cw/ccw          Shrugs               Isometric:                 Flexion               Extension               ABduction               ADduction               Biceps/Triceps                               B = bilateral; AA = active assistive; A = active; P = passive  Education:  [X]  Home Exercises      [X]  Sling Application       [X]  Movement Precautions        [ ]  Pulleys          [ ]  Use of Ice    [ ]  Other:   Treatment/Session Assessment:    · Response to Treatment:  tolerated well  · Interdisciplinary Collaboration:  · Physical Therapist and Registered Nurse  · After treatment position/precautions:  · Supine in bed, Bed/Chair-wheels locked, Call light within reach and Family at bedside  · Compliance with Program/Exercises: Will assess as treatment progresses. · Recommendations/Intent for next treatment session: \"Next visit will focus on reduction in assistance provided\".   Total Treatment Duration:  PT Patient Time In/Time Out  Time In: 1135  Time Out: [address]

## 2017-06-03 NOTE — PROGRESS NOTES
Pt rates pain 10/10. Alternating between 4mg po dilaudid and 1mg IV dilaudid. Repositioned for comfort and ice placed on surgical shoulder.

## 2017-06-04 LAB
ABO + RH BLD: NORMAL
ANION GAP BLD CALC-SCNC: 8 MMOL/L (ref 7–16)
BLD PROD TYP BPU: NORMAL
BLOOD GROUP ANTIBODIES SERPL: NORMAL
BPU ID: NORMAL
BUN SERPL-MCNC: 12 MG/DL (ref 8–23)
CALCIUM SERPL-MCNC: 9 MG/DL (ref 8.3–10.4)
CHLORIDE SERPL-SCNC: 99 MMOL/L (ref 98–107)
CO2 SERPL-SCNC: 31 MMOL/L (ref 21–32)
CREAT SERPL-MCNC: 1.06 MG/DL (ref 0.8–1.5)
CROSSMATCH RESULT,%XM: NORMAL
ERYTHROCYTE [DISTWIDTH] IN BLOOD BY AUTOMATED COUNT: 12.8 % (ref 11.9–14.6)
GLUCOSE SERPL-MCNC: 116 MG/DL (ref 65–100)
HCT VFR BLD AUTO: 38.9 % (ref 41.1–50.3)
HGB BLD-MCNC: 12.5 G/DL (ref 13.6–17.2)
MAGNESIUM SERPL-MCNC: 1.9 MG/DL (ref 1.8–2.4)
MCH RBC QN AUTO: 31.3 PG (ref 26.1–32.9)
MCHC RBC AUTO-ENTMCNC: 32.1 G/DL (ref 31.4–35)
MCV RBC AUTO: 97.5 FL (ref 79.6–97.8)
PLATELET # BLD AUTO: 197 K/UL (ref 150–450)
PMV BLD AUTO: 10.4 FL (ref 10.8–14.1)
POTASSIUM SERPL-SCNC: 3.5 MMOL/L (ref 3.5–5.1)
RBC # BLD AUTO: 3.99 M/UL (ref 4.23–5.67)
SODIUM SERPL-SCNC: 138 MMOL/L (ref 136–145)
SPECIMEN EXP DATE BLD: NORMAL
STATUS OF UNIT,%ST: NORMAL
UNIT DIVISION, %UDIV: 0
WBC # BLD AUTO: 10.7 K/UL (ref 4.3–11.1)

## 2017-06-04 PROCEDURE — 36415 COLL VENOUS BLD VENIPUNCTURE: CPT | Performed by: ORTHOPAEDIC SURGERY

## 2017-06-04 PROCEDURE — 65270000029 HC RM PRIVATE

## 2017-06-04 PROCEDURE — 85027 COMPLETE CBC AUTOMATED: CPT | Performed by: ORTHOPAEDIC SURGERY

## 2017-06-04 PROCEDURE — 74011250636 HC RX REV CODE- 250/636: Performed by: ORTHOPAEDIC SURGERY

## 2017-06-04 PROCEDURE — 97110 THERAPEUTIC EXERCISES: CPT

## 2017-06-04 PROCEDURE — 83735 ASSAY OF MAGNESIUM: CPT | Performed by: ORTHOPAEDIC SURGERY

## 2017-06-04 PROCEDURE — 74011250637 HC RX REV CODE- 250/637: Performed by: ORTHOPAEDIC SURGERY

## 2017-06-04 PROCEDURE — 80048 BASIC METABOLIC PNL TOTAL CA: CPT | Performed by: ORTHOPAEDIC SURGERY

## 2017-06-04 RX ADMIN — OXYCODONE HYDROCHLORIDE 20 MG: 20 TABLET, FILM COATED, EXTENDED RELEASE ORAL at 07:48

## 2017-06-04 RX ADMIN — HYDROMORPHONE HYDROCHLORIDE 1 MG: 1 INJECTION, SOLUTION INTRAMUSCULAR; INTRAVENOUS; SUBCUTANEOUS at 04:37

## 2017-06-04 RX ADMIN — HYDROMORPHONE HYDROCHLORIDE 1 MG: 1 INJECTION, SOLUTION INTRAMUSCULAR; INTRAVENOUS; SUBCUTANEOUS at 11:34

## 2017-06-04 RX ADMIN — HYDROMORPHONE HYDROCHLORIDE 1 MG: 1 INJECTION, SOLUTION INTRAMUSCULAR; INTRAVENOUS; SUBCUTANEOUS at 07:47

## 2017-06-04 RX ADMIN — FERROUS SULFATE TAB 325 MG (65 MG ELEMENTAL FE) 325 MG: 325 (65 FE) TAB at 17:30

## 2017-06-04 RX ADMIN — HYDROMORPHONE HYDROCHLORIDE 4 MG: 4 TABLET ORAL at 20:32

## 2017-06-04 RX ADMIN — OXYCODONE HYDROCHLORIDE 20 MG: 20 TABLET, FILM COATED, EXTENDED RELEASE ORAL at 20:32

## 2017-06-04 RX ADMIN — RIVAROXABAN 20 MG: 20 TABLET, FILM COATED ORAL at 07:48

## 2017-06-04 RX ADMIN — HYDROMORPHONE HYDROCHLORIDE 1 MG: 1 INJECTION, SOLUTION INTRAMUSCULAR; INTRAVENOUS; SUBCUTANEOUS at 00:35

## 2017-06-04 RX ADMIN — DOCUSATE SODIUM 100 MG: 100 CAPSULE, LIQUID FILLED ORAL at 17:30

## 2017-06-04 RX ADMIN — HYDROMORPHONE HYDROCHLORIDE 2 MG: 2 TABLET ORAL at 13:12

## 2017-06-04 RX ADMIN — HYDROMORPHONE HYDROCHLORIDE 4 MG: 4 TABLET ORAL at 23:43

## 2017-06-04 RX ADMIN — DOCUSATE SODIUM 100 MG: 100 CAPSULE, LIQUID FILLED ORAL at 07:48

## 2017-06-04 RX ADMIN — HYDROMORPHONE HYDROCHLORIDE 4 MG: 4 TABLET ORAL at 17:30

## 2017-06-04 RX ADMIN — FERROUS SULFATE TAB 325 MG (65 MG ELEMENTAL FE) 325 MG: 325 (65 FE) TAB at 07:48

## 2017-06-04 NOTE — PROGRESS NOTES
Problem: Mobility Impaired (Adult and Pediatric)  Goal: *Acute Goals and Plan of Care (Insert Text)  GOALS (1-4 days):  (1.) Patient will move from supine to sit and sit to supine in bed with MODIFIED INDEPENDENCE. (2.) Patient will transfer from bed to chair and chair to bed with SUPERVISION using the least restrictive device. Met 6/3/17  (3.) Patient will ambulate with SUPERVISION for 300 feet with the least restrictive device. Met 6/4/17  (4.) Patient will be safe (with spouses help)with shoulder HEP to increase range of motion per MD orders. ________________________________________________________________________________________________      PHYSICAL THERAPY: Treatment Day: 3rd and PM 6/4/2017  INPATIENT: Hospital Day: 4  [place] / Product Type: Medicare /      NAME/AGE/GENDER: Simon Hodge is a 81 y.o. male      PRIMARY DIAGNOSIS: Rotator cuff arthropathy, right [M12.811]  Biceps tendinitis on right [M75.21] Rotator cuff tear arthropathy of right shoulder Rotator cuff tear arthropathy of right shoulder  Procedure(s) (LRB):  RIGHT SHOULDER HARDWARE REMOVAL ( HOOK PLATE) (Right)  RIGHT SHOULDER ARTHROPLASTY TOTAL REVERSE / BICEPS TENODESIS AND  LATISSIMUS DORSI AND TERES MAJOR TRANSFER / DELTA (Right)  3 Days Post-Op  ICD-10: Treatment Diagnosis:       · Pain in Right Shoulder (M25.511)  · Stiffness of Right Shoulder, Not elsewhere classified (M25.611)  · Difficulty in walking, Not elsewhere classified (R26.2)   Precaution/Allergies:  Epinephrine and Aspirin       ASSESSMENT:      Mr. Gilberto Mcrae  Was agreeable to therapy but continues to be in a lot of pain, most of it felt around the middle of the arm. He wasn't able to do as much exercise this afternoon as this morning, and he said he cannot have any more pain medication intervenously because his IV line was taken out. He is safe to go home, however he personally wants to see the pain go down.    This section established at most recent assessment   PROBLEM LIST (Impairments causing functional limitations):  1. Decreased [place] with Bed Mobility  2. Decreased [place] with Transfers  3. Decreased [place] with Ambulation  4. Decreased [place] with shoulder HEP    INTERVENTIONS PLANNED: (Benefits and precautions of physical therapy have been discussed with the patient.)  1. Bed Mobility Training  2. Transfer Training  3. Gait Training  4. Therapeutic Exercises per MD orders  5. Modalities for Pain      TREATMENT PLAN: Frequency/Duration: twice daily for duration of hospital stay  Rehabilitation Potential For Stated Goals: GOOD      RECOMMENDED REHABILITATION/EQUIPMENT: (at time of discharge pending progress): Continue Skilled Therapy and Outpatient: Physical Therapy per MD's request.                   HISTORY:   History of Present Injury/Illness (Reason for Referral):  Painful right shoulder  Past Medical History/Comorbidities:   Mr. Augustine Perdue  has a past medical history of Acute sinusitis; Anaphylactic reaction (2/1/2016); AR (allergic rhinitis) (7/27/2016); Arrhythmia; Bicipital tenosynovitis (2/17/2012); Bradycardia (02/02/2016); Carpal tunnel syndrome (2/17/2012); Chronic back pain (12/23/2014); Chronic deep vein thrombosis of lower extremity ([address] (3/21/2016); Chronic pain; Chronic pansinusitis (7/27/2016); Chronic sinusitis (7/27/2016); DNS (deviated nasal septum); ARCE (dyspnea on exertion) (12/23/2014); Elevated serum creatinine (12/23/2014); Epistaxis; Essential hypertension, benign (8/16/2011); Factor V Leiden ([address] Glaucoma; H/O echocardiogram (02/02/2016); 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); Hypercholesteremia; Hyperkalemia (12/23/2014); Hypertension; Hypertrophy of nasal turbinates (7/27/2016); Left leg DVT ([address] (12/23/2014); Leukocytosis (12/23/2014); Lumbar spinal stenosis; Nasal obstruction; Nasal polyp (7/27/2016);  Nausea & vomiting; Osteoarthritis; Osteoarthritis of left hip (12/12/2012); Primary localized osteoarthrosis, shoulder region (2/17/2012); Pulmonary embolism ([address] (1981); 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 He also has no past medical history of Aneurysm ([address] Asthma; Autoimmune disease ([address] CAD (coronary artery disease); Cancer ([address] Chronic kidney disease; Chronic obstructive pulmonary disease ([address] Diabetes ([address] Difficult intubation; Endocarditis; GERD (gastroesophageal reflux disease); Heart failure ([address] Liver disease; Malignant hyperthermia due to anesthesia; Morbid obesity ([address] Nicotine vapor product user; Non-nicotine vapor product user; Pseudocholinesterase deficiency; Psychiatric disorder; PUD (peptic ulcer disease); Rheumatic fever; Seizures ([address] Sleep apnea; Stroke [facility]); or Thyroid disease. Mr. Anuradha Carrington  has a past surgical history that includes colonoscopy (00,05,10); carpal tunnel release (2012); carpal tunnel release (1980); blepharoplasty (Bilateral, 2009); vascular access (2008); vascular access (2012); lap cholecystectomy (2001); appendectomy (1980); hernia repair (2010); hernia repair (Left, 2007); other surgical (2000); lumbar laminectomy; hip replacement (Left, 12/12/2012); vitrectomy (Left, 6/15/15); orthopaedic (Bilateral); orthopaedic (Right, 2006); orthopaedic (Bilateral); knee arthroscopy (Right, 2006); shoulder arthroscopy (Left, 2/17/2012); orthopaedic (Right); back surgery; heent (Right, 11/08); tonsillectomy (1950); cataract removal (Bilateral); corneal transplant (Right, 2004); sinus surgery proc unlisted; heent; heent; and corneal transplant (Right, 06/14/2016).   Social History/Living Environment:   Home Environment: Private residence  # Steps to Enter: 0  One/Two Story Residence: One story  Living Alone: Yes  Support Systems: Spouse/Significant Other/Partner  Patient Expects to be Discharged to[de-identified] Private residence  Current DME Used/Available at Home: None  Prior Level of Function/Work/Activity:  Pt was independent without an assistive device prior to this admission   Number of Personal Factors/Comorbidities that affect the Plan of Care: 1-2: MODERATE COMPLEXITY   EXAMINATION:   Most Recent Physical Functioning:   Gross Assessment:                  Posture:     Balance:  Sitting: Intact  Standing: Intact Bed Mobility:  Supine to Sit: Independent  Sit to Supine: Independent  Wheelchair Mobility:     Transfers:  Sit to Stand: Independent  Stand to Sit: Independent  Gait:         Functional Mobility:         Gait/Ambulation:  SBA        Transfers:  SBA        Bed Mobility:  SUP   Body Structures Involved:  1. Bones  2. Joints  3. Muscles Body Functions Affected:  1. Sensory/Pain  2. Movement Related Activities and Participation Affected:  1. Learning and Applying Knowledge  2. Mobility   Number of elements that affect the Plan of Care: 4+: HIGH COMPLEXITY   CLINICAL PRESENTATION:   Presentation: Stable and uncomplicated: LOW COMPLEXITY   CLINICAL DECISION MAKIN[address] [place], [place] [address] 6 Clicks   Basic Mobility Inpatient Short Form  How much difficulty does the patient currently have. .. Unable A Lot A Little None   1. Turning over in bed (including adjusting bedclothes, sheets and blankets)? [ ] 1   [ ] 2   [X] 3   [ ] 4   2. Sitting down on and standing up from a chair with arms ( e.g., wheelchair, bedside commode, etc.)   [ ] 1   [ ] 2   [X] 3   [ ] 4   3. Moving from lying on back to sitting on the side of the bed? [ ] 1   [ ] 2   [X] 3   [ ] 4   How much help from another person does the patient currently need. .. Total A Lot A Little None   4.   Moving to and from a bed to a chair (including a wheelchair)? [ ] 1   [ ] 2   [X] 3   [ ] 4   5. Need to walk in hospital room? [ ] 1   [ ] 2   [X] 3   [ ] 4   6. Climbing 3-5 steps with a railing? [ ] 1   [ ] 2   [ ] 3   [ ] 4   © 2007, Trustees of [address] [place] [address] [address] [address], under license to [employer]. All rights reserved    Score:  Initial: 18 Most Recent: X (Date: -- )     Interpretation of Tool:  Represents activities that are increasingly more difficult (i.e. Bed mobility, Transfers, Gait). Score 24 23 22-20 19-15 14-10 9-7 6       Modifier CH CI CJ CK CL CM CN         · Mobility - Walking and Moving Around:               - CURRENT STATUS:    CK - 40%-59% impaired, limited or restricted               - GOAL STATUS:           CJ - 20%-39% impaired, limited or restricted               - D/C STATUS:                       ---------------To be determined---------------  [place] / Product Type: Medicare /       Medical Necessity:     · Patient is expected to demonstrate progress in strength, range of motion, balance, coordination and functional technique to decrease assistance required with functioanl mobility & HEP. Reason for Services/Other Comments:  · Patient continues to require skilled intervention due to pt not safe with functional mobility & HEP. Use of outcome tool(s) and clinical judgement create a POC that gives a: Clear prediction of patient's progress: LOW COMPLEXITY                 TREATMENT:   (In addition to Assessment/Re-Assessment sessions the following treatments were rendered)   Pre-treatment Symptoms/Complaints:  soreness  Pain: Initial:   Pain Intensity 1: 10  Pain Location 1: Shoulder  Pain Orientation 1: Right  Pain Intervention(s) 1: Cold pack  Post Session:  10/10      Therapeutic Exercise: (18 Minutes):  Exercises per grid below to improve mobility and dynamic movement of arm - right to improve functional endurance.   Required minimal verbal cues to promote proper body alignment and promote proper body mechanics. Date:  6/2 6/3 Date:   6/4/17   ACTIVITY/EXERCISE AM PM AM PM AM PM   Gripping 15  15  20  20  20 20    Wrist Flexion/Extension 15  15  20  20  20  20   Wrist Ulnar/Radial Deviation               Pronation/Supination 15  15  20 20   20  10   Elbow Flexion/Extension 15aa  15  aa  15aa  20aa  20aa 10aa   Shoulder Flexion/Extension 15aa flex to tolerance  15 aa flex to tolerance  12aa to tolerance 10aa   20aa 5aa   Shoulder AB/ADduction               Shoulder IR/ER               Pulleys               Pendulums 15aa clock & counter clockwise  15 cw/ccw 20 cw/ccw  20 cw/ccw   20cw/ccw     Shrugs               Isometric:                 Flexion               Extension               ABduction               ADduction               Biceps/Triceps                               B = bilateral; AA = active assistive; A = active; P = passive  Education:  [X]  Home Exercises      [X]  Sling Application       [X]  Movement Precautions        [ ]  Pulleys          [ ]  Use of Ice    [ ]  Other:   Treatment/Session Assessment:    · Response to Treatment:  tolerated well  · Interdisciplinary Collaboration:  · Physical Therapist and Registered Nurse  · After treatment position/precautions:  · Supine in bed, Bed/Chair-wheels locked and Call light within reach  · Compliance with Program/Exercises: Will assess as treatment progresses. · Recommendations/Intent for next treatment session: \"Next visit will focus on reduction in assistance provided\".   Total Treatment Duration:  PT Patient Time In/Time Out  Time In: 1120  Time Out: 124 ABIGAIL Simon

## 2017-06-04 NOTE — PROGRESS NOTES
Problem: Mobility Impaired (Adult and Pediatric)  Goal: *Acute Goals and Plan of Care (Insert Text)  GOALS (1-4 days):  (1.) Patient will move from supine to sit and sit to supine in bed with MODIFIED INDEPENDENCE. (2.) Patient will transfer from bed to chair and chair to bed with SUPERVISION using the least restrictive device. Met 6/3/17  (3.) Patient will ambulate with SUPERVISION for 300 feet with the least restrictive device. Met 6/4/17  (4.) Patient will be safe (with spouses help)with shoulder HEP to increase range of motion per MD orders. ________________________________________________________________________________________________      PHYSICAL THERAPY: Treatment Day: 3rd and AM 6/4/2017  INPATIENT: Hospital Day: 4  [place] / Product Type: Medicare /      NAME/AGE/GENDER: Pantera Stroud is a 81 y.o. male      PRIMARY DIAGNOSIS: Rotator cuff arthropathy, right [M12.811]  Biceps tendinitis on right [M75.21] Rotator cuff tear arthropathy of right shoulder Rotator cuff tear arthropathy of right shoulder  Procedure(s) (LRB):  RIGHT SHOULDER HARDWARE REMOVAL ( HOOK PLATE) (Right)  RIGHT SHOULDER ARTHROPLASTY TOTAL REVERSE / BICEPS TENODESIS AND  LATISSIMUS DORSI AND TERES MAJOR TRANSFER / DELTA (Right)  3 Days Post-Op  ICD-10: Treatment Diagnosis:       · Pain in Right Shoulder (M25.511)  · Stiffness of Right Shoulder, Not elsewhere classified (M25.611)  · Difficulty in walking, Not elsewhere classified (R26.2)   Precaution/Allergies:  Epinephrine and Aspirin       ASSESSMENT:      Mr. Kaykay Lawson  Was ambulating in the halls and had already walked 2 laps around, when I was headed to his room. He was on his way to ask the nurse for another pain shot. He reported 10/10 pain, but agreeable to therapy. He said his pain is centered around the distal triceps and distal biceps. He said he also had been working on some HEP for his shoulder earlier this morning.  With [facility] shoulder flexion he was able to get more motion than yesterday without any increased pain in the shoulder. He tolerated other exercises very well. Nurse was coming in just as I was leaving to give him his shot for pain. This section established at most recent assessment   PROBLEM LIST (Impairments causing functional limitations):  1. Decreased [place] with Bed Mobility  2. Decreased [place] with Transfers  3. Decreased [place] with Ambulation  4. Decreased [place] with shoulder HEP    INTERVENTIONS PLANNED: (Benefits and precautions of physical therapy have been discussed with the patient.)  1. Bed Mobility Training  2. Transfer Training  3. Gait Training  4. Therapeutic Exercises per MD orders  5. Modalities for Pain      TREATMENT PLAN: Frequency/Duration: twice daily for duration of hospital stay  Rehabilitation Potential For Stated Goals: GOOD      RECOMMENDED REHABILITATION/EQUIPMENT: (at time of discharge pending progress): Continue Skilled Therapy and Outpatient: Physical Therapy per MD's request.                   HISTORY:   History of Present Injury/Illness (Reason for Referral):  Painful right shoulder  Past Medical History/Comorbidities:   Mr. Kathy Brooks  has a past medical history of Acute sinusitis; Anaphylactic reaction (2/1/2016); AR (allergic rhinitis) (7/27/2016); Arrhythmia; Bicipital tenosynovitis (2/17/2012); Bradycardia (02/02/2016); Carpal tunnel syndrome (2/17/2012); Chronic back pain (12/23/2014); Chronic deep vein thrombosis of lower extremity ([address] (3/21/2016); Chronic pain; Chronic pansinusitis (7/27/2016); Chronic sinusitis (7/27/2016); DNS (deviated nasal septum); ARCE (dyspnea on exertion) (12/23/2014); Elevated serum creatinine (12/23/2014); Epistaxis; Essential hypertension, benign (8/16/2011); Factor V Leiden ([address] Glaucoma; H/O echocardiogram (02/02/2016); 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); Hypercholesteremia;  Hyperkalemia (12/23/2014); Hypertension; Hypertrophy of nasal turbinates (7/27/2016); Left leg DVT ([address] (12/23/2014); Leukocytosis (12/23/2014); Lumbar spinal stenosis; Nasal obstruction; Nasal polyp (7/27/2016); Nausea & vomiting; Osteoarthritis; Osteoarthritis of left hip (12/12/2012); Primary localized osteoarthrosis, shoulder region (2/17/2012); Pulmonary embolism ([address] (1981); 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 He also has no past medical history of Aneurysm ([address] Asthma; Autoimmune disease ([address] CAD (coronary artery disease); Cancer ([address] Chronic kidney disease; Chronic obstructive pulmonary disease ([address] Diabetes ([address] Difficult intubation; Endocarditis; GERD (gastroesophageal reflux disease); Heart failure ([address] Liver disease; Malignant hyperthermia due to anesthesia; Morbid obesity ([address] Nicotine vapor product user; Non-nicotine vapor product user; Pseudocholinesterase deficiency; Psychiatric disorder; PUD (peptic ulcer disease); Rheumatic fever; Seizures ([address] Sleep apnea; Stroke [facility]); or Thyroid disease.   Mr. Samantha Green  has a past surgical history that includes colonoscopy (00,05,10); carpal tunnel release (2012); carpal tunnel release (1980); blepharoplasty (Bilateral, 2009); vascular access (2008); vascular access (2012); lap cholecystectomy (2001); appendectomy (1980); hernia repair (2010); hernia repair (Left, 2007); other surgical (2000); lumbar laminectomy; hip replacement (Left, 12/12/2012); vitrectomy (Left, 6/15/15); orthopaedic (Bilateral); orthopaedic (Right, 2006); orthopaedic (Bilateral); knee arthroscopy (Right, 2006); shoulder arthroscopy (Left, 2/17/2012); orthopaedic (Right); back surgery; heent (Right, ); tonsillectomy (1950); cataract removal (Bilateral); corneal transplant (Right, ); sinus surgery proc unlisted; heent; heent; and corneal transplant (Right, 2016). Social History/Living Environment:   Home Environment: Private residence  # Steps to Enter: 0  One/Two Story Residence: One story  Living Alone: Yes  Support Systems: Spouse/Significant Other/Partner  Patient Expects to be Discharged to[de-identified] Private residence  Current DME Used/Available at Home: None  Prior Level of Function/Work/Activity:  Pt was independent without an assistive device prior to this admission   Number of Personal Factors/Comorbidities that affect the Plan of Care: 1-2: MODERATE COMPLEXITY   EXAMINATION:   Most Recent Physical Functioning:   Gross Assessment:                  Posture:     Balance:  Sitting: Intact  Standing: Intact Bed Mobility:     Wheelchair Mobility:     Transfers:  Sit to Stand: Supervision  Stand to Sit: Supervision  Gait:         Functional Mobility:         Gait/Ambulation:  SBA        Transfers:  SBA        Bed Mobility:  SUP   Body Structures Involved:  1. Bones  2. Joints  3. Muscles Body Functions Affected:  1. Sensory/Pain  2. Movement Related Activities and Participation Affected:  1. Learning and Applying Knowledge  2. Mobility   Number of elements that affect the Plan of Care: 4+: HIGH COMPLEXITY   CLINICAL PRESENTATION:   Presentation: Stable and uncomplicated: LOW COMPLEXITY   CLINICAL DECISION MAKIN [facility] [place] [address] [id number] AM-PAC 6 Clicks   Basic Mobility Inpatient Short Form  How much difficulty does the patient currently have. .. Unable A Lot A Little None   1. Turning over in bed (including adjusting bedclothes, sheets and blankets)? [ ] 1   [ ] 2   [X] 3   [ ] 4   2. Sitting down on and standing up from a chair with arms ( e.g., wheelchair, bedside commode, etc.)   [ ] 1   [ ] 2   [X] 3   [ ] 4   3.   Moving from lying on back to sitting on the side of the bed? [ ] 1   [ ] 2   [X] 3   [ ] 4   How much help from another person does the patient currently need. .. Total A Lot A Little None   4. Moving to and from a bed to a chair (including a wheelchair)? [ ] 1   [ ] 2   [X] 3   [ ] 4   5. Need to walk in hospital room? [ ] 1   [ ] 2   [X] 3   [ ] 4   6. Climbing 3-5 steps with a railing? [ ] 1   [ ] 2   [ ] 3   [ ] 4   © 2007, Trustees of [address] [place] [address] [address] [address], under license to [employer]. All rights reserved    Score:  Initial: 18 Most Recent: X (Date: -- )     Interpretation of Tool:  Represents activities that are increasingly more difficult (i.e. Bed mobility, Transfers, Gait). Score 24 23 22-20 19-15 14-10 9-7 6       Modifier CH CI CJ CK CL CM CN         · Mobility - Walking and Moving Around:               - CURRENT STATUS:    CK - 40%-59% impaired, limited or restricted               - GOAL STATUS:           CJ - 20%-39% impaired, limited or restricted               - D/C STATUS:                       ---------------To be determined---------------  [place] / Product Type: Medicare /       Medical Necessity:     · Patient is expected to demonstrate progress in strength, range of motion, balance, coordination and functional technique to decrease assistance required with functioanl mobility & HEP. Reason for Services/Other Comments:  · Patient continues to require skilled intervention due to pt not safe with functional mobility & HEP.    Use of outcome tool(s) and clinical judgement create a POC that gives a: Clear prediction of patient's progress: LOW COMPLEXITY                 TREATMENT:   (In addition to Assessment/Re-Assessment sessions the following treatments were rendered)   Pre-treatment Symptoms/Complaints:  soreness  Pain: Initial: 10/10  Pain Intensity 1: 7  Pain Location 1: Shoulder  Pain Orientation 1: Right  Pain Intervention(s) 1: Cold pack  Post Session:  10/10 Therapeutic Exercise: (15 Minutes):  Exercises per grid below to improve mobility and dynamic movement of arm - right to improve functional endurance. Required minimal verbal cues to promote proper body alignment and promote proper body mechanics. Progressed repetitions as indicated. Date:  6/2 6/3 Date:   6/4/17   ACTIVITY/EXERCISE AM PM AM PM AM PM   Gripping 15  15  20  20  20     Wrist Flexion/Extension 15  15  20  20  20     Wrist Ulnar/Radial Deviation               Pronation/Supination 15  15  20 20   20     Elbow Flexion/Extension 15aa  15  aa  15aa  20aa  20aa     Shoulder Flexion/Extension 15aa flex to tolerance  15 aa flex to tolerance  12aa to tolerance 10aa   20aa     Shoulder AB/ADduction               Shoulder IR/ER               Pulleys               Pendulums 15aa clock & counter clockwise  15 cw/ccw 20 cw/ccw  20 cw/ccw   20cw/ccw     Shrugs               Isometric:                 Flexion               Extension               ABduction               ADduction               Biceps/Triceps                               B = bilateral; AA = active assistive; A = active; P = passive  Education:  [X]  Home Exercises      [X]  Sling Application       [X]  Movement Precautions        [ ]  Pulleys          [ ]  Use of Ice    [ ]  Other:   Treatment/Session Assessment:    · Response to Treatment:  tolerated well  · Interdisciplinary Collaboration:  · Physical Therapist and Registered Nurse  · After treatment position/precautions:  · Up in chair, Bed/Chair-wheels locked, Call light within reach and Family at bedside  · Compliance with Program/Exercises: Will assess as treatment progresses. · Recommendations/Intent for next treatment session: \"Next visit will focus on reduction in assistance provided\".   Total Treatment Duration:  PT Patient Time In/Time Out  Time In: 1120  Time Out: 124 ABIGAIL Simon

## 2017-06-04 NOTE — PROGRESS NOTES
Alert and oriented  Wound area is benign with no obvious infection  Hg 12.5  Denies chest pain or dyspnea  No inordinate pain  Pain management improving. Needs continued hospital stay for parenteral pain meds as needed. I recommend at least twelve hours without need for parenteral narcotics for pain management. Needs continued inpatient physiotherapy  Hydration status acceptable.  Wean IV fluids as able today

## 2017-06-05 VITALS
HEART RATE: 64 BPM | BODY MASS INDEX: 26.07 KG/M2 | DIASTOLIC BLOOD PRESSURE: 59 MMHG | RESPIRATION RATE: 16 BRPM | SYSTOLIC BLOOD PRESSURE: 130 MMHG | OXYGEN SATURATION: 96 % | WEIGHT: 176 LBS | TEMPERATURE: 99.7 F | HEIGHT: 69 IN

## 2017-06-05 PROBLEM — M75.100 ROTATOR CUFF TEAR ARTHROPATHY: Status: RESOLVED | Noted: 2017-06-01 | Resolved: 2017-06-05

## 2017-06-05 PROBLEM — M12.819 ROTATOR CUFF TEAR ARTHROPATHY: Status: RESOLVED | Noted: 2017-06-01 | Resolved: 2017-06-05

## 2017-06-05 LAB
ANION GAP BLD CALC-SCNC: 7 MMOL/L (ref 7–16)
BUN SERPL-MCNC: 12 MG/DL (ref 8–23)
CALCIUM SERPL-MCNC: 8.3 MG/DL (ref 8.3–10.4)
CHLORIDE SERPL-SCNC: 100 MMOL/L (ref 98–107)
CO2 SERPL-SCNC: 31 MMOL/L (ref 21–32)
CREAT SERPL-MCNC: 0.96 MG/DL (ref 0.8–1.5)
ERYTHROCYTE [DISTWIDTH] IN BLOOD BY AUTOMATED COUNT: 13 % (ref 11.9–14.6)
GLUCOSE SERPL-MCNC: 113 MG/DL (ref 65–100)
HCT VFR BLD AUTO: 33.4 % (ref 41.1–50.3)
HGB BLD-MCNC: 11 G/DL (ref 13.6–17.2)
MAGNESIUM SERPL-MCNC: 1.9 MG/DL (ref 1.8–2.4)
MCH RBC QN AUTO: 31.3 PG (ref 26.1–32.9)
MCHC RBC AUTO-ENTMCNC: 32.9 G/DL (ref 31.4–35)
MCV RBC AUTO: 95.2 FL (ref 79.6–97.8)
PLATELET # BLD AUTO: 146 K/UL (ref 150–450)
PMV BLD AUTO: 10.6 FL (ref 10.8–14.1)
POTASSIUM SERPL-SCNC: 3.6 MMOL/L (ref 3.5–5.1)
RBC # BLD AUTO: 3.51 M/UL (ref 4.23–5.67)
SODIUM SERPL-SCNC: 138 MMOL/L (ref 136–145)
WBC # BLD AUTO: 7.7 K/UL (ref 4.3–11.1)

## 2017-06-05 PROCEDURE — 83735 ASSAY OF MAGNESIUM: CPT | Performed by: ORTHOPAEDIC SURGERY

## 2017-06-05 PROCEDURE — 97116 GAIT TRAINING THERAPY: CPT

## 2017-06-05 PROCEDURE — 97110 THERAPEUTIC EXERCISES: CPT

## 2017-06-05 PROCEDURE — 80048 BASIC METABOLIC PNL TOTAL CA: CPT | Performed by: ORTHOPAEDIC SURGERY

## 2017-06-05 PROCEDURE — 85027 COMPLETE CBC AUTOMATED: CPT | Performed by: ORTHOPAEDIC SURGERY

## 2017-06-05 PROCEDURE — 74011250637 HC RX REV CODE- 250/637: Performed by: ORTHOPAEDIC SURGERY

## 2017-06-05 PROCEDURE — 36415 COLL VENOUS BLD VENIPUNCTURE: CPT | Performed by: ORTHOPAEDIC SURGERY

## 2017-06-05 RX ADMIN — DOCUSATE SODIUM 100 MG: 100 CAPSULE, LIQUID FILLED ORAL at 08:33

## 2017-06-05 RX ADMIN — HYDROMORPHONE HYDROCHLORIDE 4 MG: 4 TABLET ORAL at 05:57

## 2017-06-05 RX ADMIN — FERROUS SULFATE TAB 325 MG (65 MG ELEMENTAL FE) 325 MG: 325 (65 FE) TAB at 08:33

## 2017-06-05 RX ADMIN — HYDROMORPHONE HYDROCHLORIDE 4 MG: 4 TABLET ORAL at 02:57

## 2017-06-05 RX ADMIN — HYDROMORPHONE HYDROCHLORIDE 4 MG: 4 TABLET ORAL at 08:34

## 2017-06-05 RX ADMIN — OXYCODONE HYDROCHLORIDE 20 MG: 20 TABLET, FILM COATED, EXTENDED RELEASE ORAL at 08:34

## 2017-06-05 RX ADMIN — RIVAROXABAN 20 MG: 20 TABLET, FILM COATED ORAL at 08:34

## 2017-06-05 RX ADMIN — HYDROMORPHONE HYDROCHLORIDE 4 MG: 4 TABLET ORAL at 11:07

## 2017-06-05 NOTE — PROGRESS NOTES
Patient resting quietly, alert and oriented, no distress noted. Right shoulder dressing c/d/i, voiding clear yellow urine, ambulates to bathroom. Right arm elevated on pillows with sling in place. Neurovascular and peripheral vascular checks WNL. Bed low and locked position. Call light within reach. Patient instructed to call for assistance, verbalizes understanding. Nursing assessment complete.

## 2017-06-05 NOTE — DISCHARGE SUMMARY
[address] [facility]       Name:  Weston Talbert   MR#:  [id number]   :  1945   Account #:  [de-identified]   Date of Adm:  2017       ADMISSION DIAGNOSES   1. Rotator cuff tear arthropathy, right shoulder. 2. Biceps tendonitis, right shoulder. 3. Painful hardware, right shoulder. DISCHARGE DIAGNOSES   1. Rotator cuff tear arthropathy, right shoulder. 2. Biceps tendonitis, right shoulder. 3. Painful hardware, right shoulder. HOSPITAL COURSE: Please see HPI, operative notes, summaries, and   consults for details. The patient is a 71-year-old gentleman who was admitted on   2017, underwent an uncomplicated hardware removal, right   shoulder, and reverse right total shoulder arthroplasty with a   Delta stem prosthesis, biceps tenodesis, latissimus dorsi and   teres major tendon transfer of right shoulder. Immediately   perioperatively, a hospitalist consult was obtained to manage   his hypercoagulable state and cardiac issues. On postoperative   day #1, hemoglobin was 12.2, potassium was 4.2, magnesium 1.8. He was begun on physical therapy as per reverse shoulder   arthroplasty protocol. He was having significant pain issues on   postoperative day #1. On postoperative day #2, potassium,   magnesium, and hemoglobin were stable. He was still having   significant pain issues. We gave him IV medication. On   postoperative day #3, hemoglobin, potassium, and magnesium all   within normal limits. Again, still having pain issues. On   postoperative day #4, his pain was improved. All labs were   within normal limits. He was discharged home on postoperative   day #4. He will continue therapy on the outside and followup in   my office in 10 days. Mr. 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          MD TAYLOR TolentinoP / LOVE   D:  2017   07:21   T:  2017   08:00   Job #:  [id number]

## 2017-06-05 NOTE — PROGRESS NOTES
Problem: Mobility Impaired (Adult and Pediatric)  Goal: *Acute Goals and Plan of Care (Insert Text)  GOALS (1-4 days):  (1.) Patient will move from supine to sit and sit to supine in bed with MODIFIED INDEPENDENCE. (2.) Patient will transfer from bed to chair and chair to bed with SUPERVISION using the least restrictive device. Met 6/3/17  (3.) Patient will ambulate with SUPERVISION for 300 feet with the least restrictive device. Met 6/4/17  (4.) Patient will be safe (with spouses help)with shoulder HEP to increase range of motion per MD orders. 6/5  ________________________________________________________________________________________________      PHYSICAL THERAPY: Treatment Day: 4th and AM 6/5/2017  INPATIENT: Hospital Day: 5  [place] / [place] / Product Type: Medicare /      NAME/AGE/GENDER: Jean-Paul Velez is a 81 y.o. male      PRIMARY DIAGNOSIS: Rotator cuff arthropathy, right [M12.811]  Biceps tendinitis on right [M75.21] Rotator cuff tear arthropathy of right shoulder Rotator cuff tear arthropathy of right shoulder  Procedure(s) (LRB):  RIGHT SHOULDER HARDWARE REMOVAL ( HOOK PLATE) (Right)  RIGHT SHOULDER ARTHROPLASTY TOTAL REVERSE / BICEPS TENODESIS AND  LATISSIMUS DORSI AND TERES MAJOR TRANSFER / DELTA (Right)  4 Days Post-Op  ICD-10: Treatment Diagnosis:       · Pain in Right Shoulder (M25.511)  · Stiffness of Right Shoulder, Not elsewhere classified (M25.611)  · Difficulty in walking, Not elsewhere classified (R26.2)   Precaution/Allergies:  Epinephrine and Aspirin       ASSESSMENT:      Mr. Doty Grandchild is supine upon arrival.  He performs his exercises on the EOB. Therapist and pt review all exercises without any problems. He is independent with gait. He is ready for D/C. This section established at most recent assessment   PROBLEM LIST (Impairments causing functional limitations):  1. Decreased [place] with Bed Mobility  2.  Decreased [place] with Transfers  3. Decreased [place] with Ambulation  4. Decreased [place] with shoulder HEP    INTERVENTIONS PLANNED: (Benefits and precautions of physical therapy have been discussed with the patient.)  1. Bed Mobility Training  2. Transfer Training  3. Gait Training  4. Therapeutic Exercises per MD orders  5. Modalities for Pain      TREATMENT PLAN: Frequency/Duration: twice daily for duration of hospital stay  Rehabilitation Potential For Stated Goals: GOOD      RECOMMENDED REHABILITATION/EQUIPMENT: (at time of discharge pending progress): Outpatient: Physical Therapy per MD's request.                   HISTORY:   History of Present Injury/Illness (Reason for Referral):  Painful right shoulder  Past Medical History/Comorbidities:   Mr. Leanna Laurent  has a past medical history of Acute sinusitis; Anaphylactic reaction (2/1/2016); AR (allergic rhinitis) (7/27/2016); Arrhythmia; Bicipital tenosynovitis (2/17/2012); Bradycardia (02/02/2016); Carpal tunnel syndrome (2/17/2012); Chronic back pain (12/23/2014); Chronic deep vein thrombosis of lower extremity ([address] (3/21/2016); Chronic pain; Chronic pansinusitis (7/27/2016); Chronic sinusitis (7/27/2016); DNS (deviated nasal septum); ARCE (dyspnea on exertion) (12/23/2014); Elevated serum creatinine (12/23/2014); Epistaxis; Essential hypertension, benign (8/16/2011); Factor V Leiden ([address] Glaucoma; H/O echocardiogram (02/02/2016); 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); Hypercholesteremia; Hyperkalemia (12/23/2014); Hypertension; Hypertrophy of nasal turbinates (7/27/2016); Left leg DVT ([address] (12/23/2014); Leukocytosis (12/23/2014); Lumbar spinal stenosis; Nasal obstruction; Nasal polyp (7/27/2016); Nausea & vomiting; Osteoarthritis; Osteoarthritis of left hip (12/12/2012); Primary localized osteoarthrosis, shoulder region (2/17/2012); Pulmonary embolism ([address] (1981); 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 He also has no past medical history of Aneurysm ([address] Asthma; Autoimmune disease ([address] CAD (coronary artery disease); Cancer ([address] Chronic kidney disease; Chronic obstructive pulmonary disease ([address] Diabetes ([address] Difficult intubation; Endocarditis; GERD (gastroesophageal reflux disease); Heart failure ([address] Liver disease; Malignant hyperthermia due to anesthesia; Morbid obesity ([address] Nicotine vapor product user; Non-nicotine vapor product user; Pseudocholinesterase deficiency; Psychiatric disorder; PUD (peptic ulcer disease); Rheumatic fever; Seizures ([address] Sleep apnea; Stroke [facility]); or Thyroid disease. Mr. Lesley Torres  has a past surgical history that includes colonoscopy (00,05,10); carpal tunnel release (2012); carpal tunnel release (1980); blepharoplasty (Bilateral, 2009); vascular access (2008); vascular access (2012); lap cholecystectomy (2001); appendectomy (1980); hernia repair (2010); hernia repair (Left, 2007); other surgical (2000); lumbar laminectomy; hip replacement (Left, 12/12/2012); vitrectomy (Left, 6/15/15); orthopaedic (Bilateral); orthopaedic (Right, 2006); orthopaedic (Bilateral); knee arthroscopy (Right, 2006); shoulder arthroscopy (Left, 2/17/2012); orthopaedic (Right); back surgery; heent (Right, 11/08); tonsillectomy (1950); cataract removal (Bilateral); corneal transplant (Right, 2004); sinus surgery proc unlisted; heent; heent; and corneal transplant (Right, 06/14/2016).   Social History/Living Environment:   Home Environment: Private residence  # Steps to Enter: 0  One/Two Story Residence: One story  Living Alone: Yes  Support Systems: Spouse/Significant Other/Partner  Patient Expects to be Discharged to[de-identified] Private residence  Current DME Used/Available at Home: None  Prior Level of Function/Work/Activity:  Pt was independent without an assistive device prior to this admission   Number of Personal Factors/Comorbidities that affect the Plan of Care: 1-2: MODERATE COMPLEXITY   EXAMINATION:   Most Recent Physical Functioning:   Gross Assessment:                  Posture:     Balance:    Bed Mobility:  Supine to Sit: Independent  Sit to Supine: Independent  Wheelchair Mobility:     Transfers:  Sit to Stand: Independent  Stand to Sit: Independent  Gait:     Distance (ft): 50 Feet (ft) (around the room)  Assistive Device: Other (comment) (no AD)  Duration: 8 Minutes   Functional Mobility:         Gait/Ambulation:  SBA        Transfers:  SBA        Bed Mobility:  SUP   Body Structures Involved:  1. Bones  2. Joints  3. Muscles Body Functions Affected:  1. Sensory/Pain  2. Movement Related Activities and Participation Affected:  1. Learning and Applying Knowledge  2. Mobility   Number of elements that affect the Plan of Care: 4+: HIGH COMPLEXITY   CLINICAL PRESENTATION:   Presentation: Stable and uncomplicated: LOW COMPLEXITY   CLINICAL DECISION MAKIN[address] [place] [address] AM-PAC 6 Clicks   Basic Mobility Inpatient Short Form  How much difficulty does the patient currently have. .. Unable A Lot A Little None   1. Turning over in bed (including adjusting bedclothes, sheets and blankets)? [ ] 1   [ ] 2   [X] 3   [ ] 4   2. Sitting down on and standing up from a chair with arms ( e.g., wheelchair, bedside commode, etc.)   [ ] 1   [ ] 2   [X] 3   [ ] 4   3. Moving from lying on back to sitting on the side of the bed? [ ] 1   [ ] 2   [X] 3   [ ] 4   How much help from another person does the patient currently need. .. Total A Lot A Little None   4. Moving to and from a bed to a chair (including a wheelchair)? [ ] 1   [ ] 2   [X] 3   [ ] 4   5. Need to walk in hospital room?    [ ] 1   [ ] 2   [X] 3   [ ] 4 6.  Climbing 3-5 steps with a railing? [ ] 1   [ ] 2   [ ] 3   [ ] 4   © 2007, Trustees of [address] [place] [address] [address] [id number], under license to [employer]. All rights reserved    Score:  Initial: 18 Most Recent: X (Date: -- )     Interpretation of Tool:  Represents activities that are increasingly more difficult (i.e. Bed mobility, Transfers, Gait). Score 24 23 22-20 19-15 14-10 9-7 6       Modifier CH CI CJ CK CL CM CN         · Mobility - Walking and Moving Around:               - CURRENT STATUS:    CK - 40%-59% impaired, limited or restricted               - GOAL STATUS:           CJ - 20%-39% impaired, limited or restricted               - D/C STATUS:                       ---------------To be determined---------------  [place] / [place] / Product Type: Medicare /       Medical Necessity:     · Patient is expected to demonstrate progress in strength, range of motion, balance, coordination and functional technique to decrease assistance required with functioanl mobility & HEP. Reason for Services/Other Comments:  · Patient continues to require skilled intervention due to pt not safe with functional mobility & HEP. Use of outcome tool(s) and clinical judgement create a POC that gives a: Clear prediction of patient's progress: LOW COMPLEXITY                 TREATMENT:   (In addition to Assessment/Re-Assessment sessions the following treatments were rendered)   Pre-treatment Symptoms/Complaints:  soreness  Pain: Initial:   Pain Intensity 1: 4 (9/10 after therapy, but was given medicine)  Post Session:  10/10      Therapeutic Exercise: (15 Minutes):  Exercises per grid below to improve mobility and dynamic movement of arm - right to improve functional endurance. Required minimal verbal cues to promote proper body alignment and promote proper body mechanics.    Other (comment) (no AD)               Date:  6/2 6/3 Date:   6/4/17 Date  6/5    ACTIVITY/EXERCISE AM PM AM PM AM PM AM    Gripping 15  15  20  20  20 20  20    Wrist Flexion/Extension 15  15  20  20  20  20 20    Wrist Ulnar/Radial Deviation                 Pronation/Supination 15  15  20 20   20  10 10    Elbow Flexion/Extension 15aa  15  aa  15aa  20aa  20aa 10aa 10 aa    Shoulder Flexion/Extension 15aa flex to tolerance  15 aa flex to tolerance  12aa to tolerance 10aa   20aa 5aa 10 aa    Shoulder AB/ADduction                 Shoulder IR/ER                 Pulleys                 Pendulums 15aa clock & counter clockwise  15 cw/ccw 20 cw/ccw  20 cw/ccw   20cw/ccw   20 cw/ccw    Shrugs                 Isometric:                 Flexion                 Extension                 ABduction                 ADduction                 Biceps/Triceps                                   B = bilateral; AA = active assistive; A = active; P = passive  Education:  [X]  Home Exercises      [X]  Sling Application       [X]  Movement Precautions        [ ]  Pulleys          [ ]  Use of Ice    [ ]  Other:   Treatment/Session Assessment:    · Response to Treatment:  He tolerated tx will, he ready for D/C. · Interdisciplinary Collaboration:  · Registered Nurse  · After treatment position/precautions:  · Up in chair, Bed/Chair-wheels locked and Call light within reach  · Compliance with Program/Exercises: Will assess as treatment progresses. · Recommendations/Intent for next treatment session: \"Next visit will focus on reduction in assistance provided\".   Total Treatment Duration:  PT Patient Time In/Time Out  Time In: 0700  Time Out: [address] PTA

## 2017-06-05 NOTE — PROGRESS NOTES
Orthopedic Joint Progress Note    2017  Admit Date: 2017  Admit Diagnosis: Rotator cuff arthropathy, right [M12.811]  Biceps tendinitis on right [M75.21]    4 Days Post-Op    Subjective: pain a little better controlled off iv dilaudid     Gera Weston     Review of Systems: Pertinent items are noted in HPI. Objective:     PT/OT:     PATIENT MOBILITY    Bed Mobility  Supine to Sit: Independent  Sit to Supine: Independent  Scooting: Supervision  Transfers  Sit to Stand: Independent  Stand to Sit: Independent  Bed to Chair: Stand-by asssistance      Gait  Speed/Malina: Delayed  Gait Abnormalities: Decreased step clearance  Ambulation - Level of Assistance: Stand-by asssistance  Distance (ft): 600 Feet (ft)  Assistive Device:  (no AD)  Duration: 10 Minutes            Vital Signs:    Blood pressure 137/66, pulse 65, temperature 97.2 °F (36.2 °C), resp. rate 16, SpO2 93 %.   Temp (24hrs), Av.2 °F (36.8 °C), Min:96.8 °F (36 °C), Max:99.9 °F (37.7 °C)      Pain Control:   Pain Assessment  Pain Scale 1: Visual  Pain Intensity 1: 0  Pain Onset 1: postop  Pain Location 1: Shoulder  Pain Orientation 1: Right  Pain Description 1: Constant, Aching  Pain Intervention(s) 1: Medication (see MAR)    Meds:  Current Facility-Administered Medications   Medication Dose Route Frequency    oxyCODONE ER (OxyCONTIN) tablet 20 mg  20 mg Oral Q12H    HYDROmorphone (DILAUDID) tablet 2 mg  2 mg Oral Q3H PRN    HYDROmorphone (DILAUDID) tablet 4 mg  4 mg Oral Q3H PRN    sodium chloride (NS) flush 5-10 mL  5-10 mL IntraVENous Q8H    sodium chloride (NS) flush 5-10 mL  5-10 mL IntraVENous PRN    bisacodyl (DULCOLAX) suppository 10 mg  10 mg Rectal DAILY PRN    sodium phosphate (FLEET'S) enema 118 mL  1 Enema Rectal PRN    ferrous sulfate tablet 325 mg  1 Tab Oral BID WITH MEALS    docusate sodium (COLACE) capsule 100 mg  100 mg Oral BID    promethazine (PHENERGAN) tablet 25 mg  25 mg Oral Q4H PRN    HYDROmorphone (PF) (DILAUDID) injection 1 mg  1 mg IntraVENous Q1H PRN    temazepam (RESTORIL) capsule 15 mg  15 mg Oral QHS PRN    rivaroxaban (XARELTO) tablet 20 mg  20 mg Oral DAILY WITH BREAKFAST    dextrose 40% (GLUTOSE) oral gel 1 Tube  15 g Oral PRN    glucagon (GLUCAGEN) injection 1 mg  1 mg IntraMUSCular PRN    dextrose (D50W) injection syrg 12.5-25 g  25-50 mL IntraVENous PRN    hydrALAZINE (APRESOLINE) 20 mg/mL injection 20 mg  20 mg IntraVENous Q6H PRN        LAB:    Lab Results   Component Value Date/Time    INR 1.3 05/26/2017 02:40 PM    INR 1.3 03/16/2015 07:40 AM    INR 1.3 12/24/2014 04:35 AM     Lab Results   Component Value Date/Time    HGB 11.0 06/05/2017 04:55 AM    HGB 12.5 06/04/2017 04:51 AM    HGB 11.2 06/03/2017 05:32 AM       Wound Incision Hand Left (Active)   Number of days:1935       Wound Incision Shoulder Left (Active)   Number of days:1935       Wound Incision Arm Left (Active)   Number of days:1935       Wound Incision Shoulder Left (Active)   Number of days:1935       Wound Hip Left (Active)   Number of days:1636       Wound Back Posterior; Lower (Active)   Number of days:1169       Wound Finger Right (Active)   Number of days:944       Wound Eye Left (Active)   Number of days:721       Wound Shoulder Right (Active)   Number of days:130       Wound Shoulder Right (Active)   Number of days:74       Wound Shoulder Right (Active)   DRESSING STATUS Clean, dry, and intact 6/4/2017  8:00 PM   DRESSING TYPE 4 x 4;Transparent film;Dry dressing 6/4/2017  8:00 PM   SPLINT TYPE/MATERIAL Sling 6/1/2017  5:37 PM   Number of days:4       Wound Arm Right (Active)   DRESSING STATUS Intact 6/2/2017  7:39 PM   DRESSING TYPE 4 x 4;ABD pad; Adhesive wound closure strips (Steri-Strips); Adhesive wound dressing (Mastisol) 6/2/2017  7:39 PM   Number of days:4             Physical Exam:  No significant changes    Assessment:      Principal Problem:    Rotator cuff tear arthropathy of right shoulder (5/26/2017)    Active Problems:    Bicipital tendinitis of right shoulder (5/26/2017)      Pain from implanted hardware (5/30/2017)         Plan:     Continue PT/OT/Rehab  Discharge home    Patient Expects to be Discharged to[de-identified] Private residence     Signed By: Leonardo Helms MD

## 2017-06-05 NOTE — PROGRESS NOTES
Shift assessment complete. Patient is alert and oriented x4. Ambulating to bathroom voiding yellow clear urine. Dressing to shoulder is dry and intact. Sling intact. Palpable pulses. Incentive spirometer at bedside. Bed is low and locked. Call light within reach. Instructed to call for assistance.

## 2017-06-05 NOTE — DISCHARGE INSTRUCTIONS
DISCHARGE SUMMARY from Nurse    The following personal items collected during your admission are returned to you:   Dental Appliance: Dental Appliances: Lowers; Other (comment) (bridge )  Vision: Visual Aid: Glasses  Hearing Aid:   na  Jewelry: Jewelry: None  Clothing: Clothing: At bedside  Other Valuables: Other Valuables: None  Valuables sent to safe:   na          PATIENT INSTRUCTIONS:    New Medications:    Dilaudid 4 mg tabs Take 1-2 tabs every 4-6 hrs as needed for pain. Phenergan 25 mg tabs Take 1 tab every 8 hrs as needed for nausea. Restoril 15 mg tabs Take 1 tab at bedtime as needed for sleep. After general anesthesia or intravenous sedation, for 24 hours or while taking prescription Narcotics:  · Limit your activities  · Do not drive and operate hazardous machinery  · Do not make important personal or business decisions  · Do  not drink alcoholic beverages  · If you have not urinated within 8 hours after discharge, please contact your surgeon on call. Report the following to your surgeon:  · Excessive pain, swelling, redness or odor of or around the surgical area  · Temperature over 101  · Nausea and vomiting lasting longer than 4 hours or if unable to take medications  · Any signs of decreased circulation or nerve impairment to extremity: change in color, persistent  numbness, tingling, coldness or increase pain  · Any questions, call office @ [phone]. What to do at Home:  Recommended activity: activity as tolerated, as instructed per Dr. Jyoti Lazo. Continue with exercises taught by Physical Therapy. Resume per hospital diet. Wear sling to right arm. Use ice and elevate arm to decrease pain and swelling. If you experience any of the following symptoms temp>101, pain unrelieved by meds, or persistent nausea or vomitting, please follow up with Dr. Jyoti Lazo @ [phone]. *  Please give a list of your current medications to your Primary Care Provider.     *  Please update this list whenever your medications are discontinued, doses are      changed, or new medications (including over-the-counter products) are added. *  Please carry medication information at all times in case of emergency situations. Shoulder Replacement Surgery: What to Expect at Home  Your Recovery  Shoulder replacement surgery replaces the worn parts of your shoulder joint. When you leave the hospital, your arm will be in a sling. It will be helpful if there is someone to help you at home for the next few weeks or until you have more energy and can move around better. You will go home with a bandage and stitches or staples. You can remove the bandage when your doctor tells you to. If the stitches are not the type that dissolve, your doctor will remove them in 10 to 14 days. You may still have some mild pain, and the area may be swollen for several months after surgery. Your doctor will give you medicine for the pain. You will continue the rehabilitation program (rehab) you started in the hospital. The better you do with your rehab exercises, the sooner you will get your strength and movement back. Depending on your job, you may be able to return to work as early as 2 to 3 weeks after surgery, as long as you avoid certain arm movements, such as lifting. This care sheet gives you a general idea about how long it will take for you to recover. But each person recovers at a different pace. Follow the steps below to get better as quickly as possible. How can you care for yourself at home? Activity  · Rest when you feel tired. You may take a nap, but don't stay in bed all day. · Work with your physical therapist to learn the best way to exercise. · You will have a sling to wear at night. And it's a good idea to also put a small stack of folded sheets or towels under your upper arm while you are in bed to keep your arm from dropping too far back.   · Your arm should stay next to your body or in front of it for several weeks, both while you are up and during sleep. · Don't lift anything with the affected arm for 6 weeks. · You may need to take sponge baths until your stitches or staples have been removed. You will probably be able to shower 24 hours after they are removed. · Ask your doctor when you can drive again. · Ask your doctor when it is okay for you to have sex. · Your doctor may advise you to give up activities that put stress on that shoulder. This includes sports such as weight lifting or tennis, unless your tennis arm was not the one affected. Diet  · By the time you leave the hospital, you will probably be eating your normal diet. If your stomach is upset, try bland, low-fat foods like plain rice, broiled chicken, toast, and yogurt. Your doctor may recommend that you take iron and vitamin supplements. · Drink plenty of fluids (unless your doctor tells you not to). · You may notice that your bowel movements are not regular right after your surgery. This is common. Try to avoid constipation and straining with bowel movements. You may want to take a fiber supplement every day. If you have not had a bowel movement after a couple of days, ask your doctor about taking a mild laxative. Medicines  · Be safe with medicines. Take pain medicines exactly as directed. ¨ If the doctor gave you a prescription medicine for pain, take it as prescribed. ¨ If you are not taking a prescription pain medicine, ask your doctor if you can take an over-the-counter medicine. · If you think your pain medicine is making you sick to your stomach:  ¨ Take your medicine after meals (unless your doctor has told you not to). ¨ Ask your doctor for a different pain medicine. · If your doctor prescribed antibiotics, take them as directed. Don't stop taking them just because you feel better. You need to take the full course of antibiotics. · If you take a blood thinner, be sure you get instructions about how to take your medicine safely. Blood thinners can cause serious bleeding problems. Incision care  · You will have a dressing over the cut (incision). A dressing helps the incision heal and protects it. Your doctor will tell you how to take care of this. · Keep the area clean and dry. Exercise  · Shoulder rehabilitation is a series of exercises you do after your surgery. This helps you get back your shoulder's range of motion and strength. You will work with your doctor and physical therapist to plan this exercise program. To get the best results, you need to do the exercises correctly and as often and as long as your doctor tells you. Ice  · For pain, put ice or a cold pack on the area for 10 to 20 minutes at a time. Put a thin cloth between the ice and your skin. Other instructions  · Wear medical alert jewelry that says you may need antibiotics before any procedure, including dental work. You can buy this at most drugstores. Follow-up care is a key part of your treatment and safety. Be sure to make and go to all appointments, and call your doctor if you are having problems. It's also a good idea to know your test results and keep a list of the medicines you take. When should you call for help? Call 911 anytime you think you may need emergency care. For example, call if:  · You have severe trouble breathing. · You have symptoms of a blood clot in your lung (called a pulmonary embolism). These may include:  ¨ Sudden chest pain. ¨ Trouble breathing. ¨ Coughing up blood. Call your doctor now or seek immediate medical care if:  · You have severe or increasing pain. · You have symptoms of infection, such as:  ¨ Increased pain, swelling, warmth, or redness. ¨ Red streaks or pus. ¨ A fever. · You have tingling, weakness, or numbness in your arm. · Your arm turns cold or changes color. · You have symptoms of a blood clot in your leg (called a deep vein thrombosis).  These may include:  ¨ Pain in the calf, back of the knee, thigh, or groin.  ¨ Redness and swelling in the leg or groin. Watch closely for changes in your health, and be sure to contact your doctor if:  · You do not get better as expected. Where can you learn more? Go to [employer]  Enter M637 in the search box to learn more about \"Shoulder Replacement Surgery: What to Expect at Home. \"   © 7351-5312 [employer]wise, [employer]. Care instructions adapted under license by Faustin [employer] (which disclaims liability or warranty for this information). This care instruction is for use with your licensed healthcare professional. If you have questions about a medical condition or this instruction, always ask your healthcare professional. [place] [address] any warranty or liability for your use of this information. Content Version: 4.7.309859; Last Revised: August 6, 2013                These are general instructions for a healthy lifestyle:    No smoking/ No tobacco products/ Avoid exposure to second hand smoke    Surgeon General's Warning:  Quitting smoking now greatly reduces serious risk to your health. Obesity, smoking, and sedentary lifestyle greatly increases your risk for illness    A healthy diet, regular physical exercise & weight monitoring are important for maintaining a healthy lifestyle    You may be retaining fluid if you have a history of heart failure or if you experience any of the following symptoms:  Weight gain of 3 pounds or more overnight or 5 pounds in a week, increased swelling in our hands or feet or shortness of breath while lying flat in bed. Please call your doctor as soon as you notice any of these symptoms; do not wait until your next office visit.     Recognize signs and symptoms of STROKE:    F-face looks uneven    A-arms unable to move or move unevenly    S-speech slurred or non-existent    T-time-call 911 as soon as signs and symptoms begin-DO NOT go       Back to bed or wait to see if you get better-TIME IS BRAIN.        The discharge information has been reviewed with the patient. The patient verbalized understanding.

## 2017-06-06 ENCOUNTER — HOME CARE VISIT (OUTPATIENT)
Dept: SCHEDULING | Facility: HOME HEALTH | Age: 72
End: 2017-06-06
Payer: MEDICARE

## 2017-06-06 PROCEDURE — G0151 HHCP-SERV OF PT,EA 15 MIN: HCPCS

## 2017-06-06 PROCEDURE — 400013 HH SOC

## 2017-06-06 PROCEDURE — 3331090001 HH PPS REVENUE CREDIT

## 2017-06-06 PROCEDURE — 3331090002 HH PPS REVENUE DEBIT

## 2017-06-07 VITALS
DIASTOLIC BLOOD PRESSURE: 68 MMHG | TEMPERATURE: 99.8 F | HEIGHT: 69 IN | SYSTOLIC BLOOD PRESSURE: 104 MMHG | HEART RATE: 60 BPM | RESPIRATION RATE: 18 BRPM

## 2017-06-07 PROCEDURE — 3331090001 HH PPS REVENUE CREDIT

## 2017-06-07 PROCEDURE — 3331090002 HH PPS REVENUE DEBIT

## 2017-06-08 ENCOUNTER — HOME CARE VISIT (OUTPATIENT)
Dept: SCHEDULING | Facility: HOME HEALTH | Age: 72
End: 2017-06-08
Payer: MEDICARE

## 2017-06-08 PROCEDURE — 3331090002 HH PPS REVENUE DEBIT

## 2017-06-08 PROCEDURE — 3331090001 HH PPS REVENUE CREDIT

## 2017-06-08 PROCEDURE — G0157 HHC PT ASSISTANT EA 15: HCPCS

## 2017-06-09 PROCEDURE — 3331090002 HH PPS REVENUE DEBIT

## 2017-06-09 PROCEDURE — 3331090001 HH PPS REVENUE CREDIT

## 2017-06-10 PROCEDURE — 3331090002 HH PPS REVENUE DEBIT

## 2017-06-10 PROCEDURE — 3331090001 HH PPS REVENUE CREDIT

## 2017-06-11 VITALS
DIASTOLIC BLOOD PRESSURE: 86 MMHG | TEMPERATURE: 98.3 F | RESPIRATION RATE: 17 BRPM | SYSTOLIC BLOOD PRESSURE: 131 MMHG | HEART RATE: 86 BPM

## 2017-06-11 PROCEDURE — 3331090002 HH PPS REVENUE DEBIT

## 2017-06-11 PROCEDURE — 3331090001 HH PPS REVENUE CREDIT

## 2017-06-12 ENCOUNTER — HOME CARE VISIT (OUTPATIENT)
Dept: SCHEDULING | Facility: HOME HEALTH | Age: 72
End: 2017-06-12
Payer: MEDICARE

## 2017-06-12 PROCEDURE — G0157 HHC PT ASSISTANT EA 15: HCPCS

## 2017-06-12 PROCEDURE — 3331090002 HH PPS REVENUE DEBIT

## 2017-06-12 PROCEDURE — 3331090001 HH PPS REVENUE CREDIT

## 2017-06-13 ENCOUNTER — HOME CARE VISIT (OUTPATIENT)
Dept: SCHEDULING | Facility: HOME HEALTH | Age: 72
End: 2017-06-13
Payer: MEDICARE

## 2017-06-13 VITALS
OXYGEN SATURATION: 98 % | DIASTOLIC BLOOD PRESSURE: 68 MMHG | SYSTOLIC BLOOD PRESSURE: 130 MMHG | HEART RATE: 50 BPM | TEMPERATURE: 98.1 F

## 2017-06-13 PROCEDURE — 3331090002 HH PPS REVENUE DEBIT

## 2017-06-13 PROCEDURE — G0152 HHCP-SERV OF OT,EA 15 MIN: HCPCS

## 2017-06-13 PROCEDURE — 3331090001 HH PPS REVENUE CREDIT

## 2017-06-14 ENCOUNTER — HOME CARE VISIT (OUTPATIENT)
Dept: HOME HEALTH SERVICES | Facility: HOME HEALTH | Age: 72
End: 2017-06-14
Payer: MEDICARE

## 2017-06-14 PROCEDURE — 3331090002 HH PPS REVENUE DEBIT

## 2017-06-14 PROCEDURE — G0151 HHCP-SERV OF PT,EA 15 MIN: HCPCS

## 2017-06-14 PROCEDURE — 3331090001 HH PPS REVENUE CREDIT

## 2017-06-15 VITALS — DIASTOLIC BLOOD PRESSURE: 76 MMHG | RESPIRATION RATE: 18 BRPM | SYSTOLIC BLOOD PRESSURE: 128 MMHG | HEART RATE: 70 BPM

## 2017-06-15 PROCEDURE — 3331090001 HH PPS REVENUE CREDIT

## 2017-06-15 PROCEDURE — 3331090002 HH PPS REVENUE DEBIT

## 2017-06-16 PROCEDURE — 3331090001 HH PPS REVENUE CREDIT

## 2017-06-16 PROCEDURE — 3331090002 HH PPS REVENUE DEBIT

## 2017-06-17 PROCEDURE — 3331090001 HH PPS REVENUE CREDIT

## 2017-06-17 PROCEDURE — 3331090002 HH PPS REVENUE DEBIT

## 2017-06-18 PROCEDURE — 3331090002 HH PPS REVENUE DEBIT

## 2017-06-18 PROCEDURE — 3331090001 HH PPS REVENUE CREDIT

## 2017-06-19 PROCEDURE — 3331090001 HH PPS REVENUE CREDIT

## 2017-06-19 PROCEDURE — 3331090002 HH PPS REVENUE DEBIT

## 2017-06-20 PROCEDURE — 3331090001 HH PPS REVENUE CREDIT

## 2017-06-20 PROCEDURE — 3331090002 HH PPS REVENUE DEBIT

## 2017-06-21 PROCEDURE — 3331090002 HH PPS REVENUE DEBIT

## 2017-06-21 PROCEDURE — 3331090001 HH PPS REVENUE CREDIT

## 2017-06-22 VITALS
DIASTOLIC BLOOD PRESSURE: 86 MMHG | HEART RATE: 86 BPM | TEMPERATURE: 98.2 F | RESPIRATION RATE: 18 BRPM | SYSTOLIC BLOOD PRESSURE: 128 MMHG

## 2017-06-22 PROCEDURE — 3331090001 HH PPS REVENUE CREDIT

## 2017-06-22 PROCEDURE — 3331090002 HH PPS REVENUE DEBIT

## 2017-06-23 ENCOUNTER — HOSPITAL ENCOUNTER (OUTPATIENT)
Dept: PHYSICAL THERAPY | Age: 72
Discharge: HOME | End: 2017-06-23
Payer: MEDICARE

## 2017-06-23 PROCEDURE — G8985 CARRY GOAL STATUS: HCPCS

## 2017-06-23 PROCEDURE — 97162 PT EVAL MOD COMPLEX 30 MIN: CPT

## 2017-06-23 PROCEDURE — 3331090001 HH PPS REVENUE CREDIT

## 2017-06-23 PROCEDURE — G8984 CARRY CURRENT STATUS: HCPCS

## 2017-06-23 PROCEDURE — 3331090002 HH PPS REVENUE DEBIT

## 2017-06-23 PROCEDURE — 97035 APP MDLTY 1+ULTRASOUND EA 15: CPT

## 2017-06-23 NOTE — PROGRESS NOTES
Ambulatory/Rehab Services H2 Model Falls Risk Assessment    Risk Factor Pts. ·   Confusion/Disorientation/Impulsivity  []    4 ·   Symptomatic Depression  []   2 ·   Altered Elimination  []   1 ·   Dizziness/Vertigo  []   1 ·   Gender (Male)  [x]   1 ·   Any administered antiepileptics (anticonvulsants):  []   2 ·   Any administered benzodiazepines:  []   1 ·   Visual Impairment (specify):  []   1 ·   Portable Oxygen Use  []   1 ·   Orthostatic ? BP  []   1 ·   History of Recent Falls (within 3 mos.)  []   5     Ability to Rise from Chair (choose one) Pts. ·   Ability to rise in a single movement  [x]   0 ·   Pushes up, successful in one attempt  []   1 ·   Multiple attempts, but successful  []   3 ·   Unable to rise without assistance  []   4   Total: (5 or greater = High Risk) 1     Falls Prevention Plan:   []                Physical Limitations to Exercise (specify):   []                Mobility Assistance Device (type):   []                Exercise/Equipment Adaptation (specify):    ©2010 [facility] Patent #[id number].  Federal Law prohibits the replication, distribution or use without written permission from [facility] [employer]

## 2017-06-23 NOTE — PROGRESS NOTES
Niesha Pagan  : 1945 [facility] FARRUKH LUNA  [address] [facility], [address] [place] [address],[address], [facility] [address]  [phone]   [phone]       OUTPATIENT PHYSICAL THERAPY:Initial Assessment 2017      ICD-10: Treatment Diagnosis: Pain in right shoulder (M25.511),Presence of right artificial shoulder joint (Z96.611),Stiffness of right shoulder, not elsewhere classified (M25.611)  Precautions/Allergies:   Post-op/Epinephrine and Aspirin   Fall Risk Score: 1 (? 5 = High Risk)  MD Orders: PT- evaluate and treat, HEP, ROM MEDICAL/REFERRING DIAGNOSIS:  right shoulder hardware removed/reverse TSA, bicep tendon transfer- 17  DATE OF ONSET: initial injury- 17 with R shoulder scope, ASD/RCR/BT-17   [facility], [place], operated by [facility] joint separation with biceps rupture -2017 with surgical repair-3/23/17  REFERRING PHYSICIAN: Geovanna Dennis MD  RETURN PHYSICIAN APPOINTMENT: 17     INITIAL ASSESSMENT:  Mr. Christophe Velásquez is 22 days s/p R shoulder hardware removal/reverse TSA and bicep tendon transfer. He presents with post-op pain, swelling, wound healing, weakness and decreased ROM that are limiting normalized use and function of R UE. Progress will be slow due to the amount of trauma with surgical interventions to the R shoulder since 2016. He cares for his home and yard as well and needs to be able to drive. He will benefit from PT for guided shoulder rehab per Post-operative protocol to promote safe return to normalized use of the RUE with functional ADL activities. PROBLEM LIST (Impacting functional limitations):  1. Post-op R shoulder pain and swelling  2. Decreased R shoulder ROM   3. Weakness R shoulder INTERVENTIONS PLANNED:  1. Posture education  2. Thermal and electric modalities, manual therapies for pain   3. Manual therapies, therapeutic exercises, HEP for ROM    4. Therapeutic exercises and HEP for strength   TREATMENT PLAN:  Effective Dates: 17 TO 9/15/17.   Frequency/Duration: 2-3 times a week for 12 weeks  GOALS: (Goals have been discussed and agreed upon with patient.)  Short-Term Functional Goals: Time Frame: 6 weeks  1. Report no more than mild intermittent pain to R shoulder with compensatory use during basic functional activities. 2. R shoulder PROM forward elevation greater than 100 degrees to progress into functional ranges. 3. Demonstrate good R shoulder isometric strength with manual testing to progress into strength phase. 4. Independent with initial HEP. Discharge Goals: Time Frame: 12 weeks  1. Pt to demonstrate Good posture correction during exercises  2. No more than 3/10 intermittent pain R shoulder with return to normalized household and work activities, and score less than 50% on the DASH. 3. R shoulder AROM forward elevation greater than 110 degrees and strength to shoulder are grossly WFL's for safe use with normalized activities. 4. Independent with advanced shoulder HEP for continued self-management. Rehabilitation Potential For Stated Goals: Good  Regarding Kiki Gregg therapy, I certify that the treatment plan above will be carried out by a therapist or under their direction. Thank you for this referral,  Antonette Fragoso, PT,MSPT       Referring Physician Signature: Dick Calderon MD              Date                    The information in this section was collected on 6/23/17 (except where otherwise noted). HISTORY:   History of Present Injury/Illness (Reason for Referral):  Mr Ld Kapadia initial injury was the result of a fall back in November of 2016 requiring surgical repair on 1/27/17. He came to therapy reporting the biceps muscle ruptured on February 21st. Therapy was OK'd by MD. He then came to the 2/28/17 therapy appointment reporting the [facility] joint had . MD ok\"d therapy to tolerance. Pt opted for surgery on 3/23/17 to plate the clavicle when no progress could be made with treatment.  He returned to therapy but complained that pain was limiting any functional use of the arm so he opted for shoulder replacement surgery. He received in patient therapy for hand/elbow ROM and then was discharged to home health PT for hand/elbow ROM only. Past Medical History/Comorbidities:   Mr. 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 Mr. Lesley Torres  has a past surgical history that includes carpal tunnel release (2012); carpal tunnel release (1980); hernia repair (2010); hernia repair (Left, 2007); lumbar laminectomy; hip replacement (Left, 12/12/2012); knee arthroscopy (Right, 2006); shoulder arthroscopy (Left, 2/17/2012); corneal transplant (Right, 2004) and corneal transplant (Right, 06/14/2016). Social History/Living Environment:     Pt lives with his wife in a one story home. He cares for the yard and home repairs  Prior Level of Function/Work/Activity:  retired  Previous Treatment Approaches:          PT for the R shoulder since 2/14/17  Personal Factors: Other factors that influence how disability is experienced by the patient:  See above PMH for back pain, hip replacement  Current Medications:       Current Outpatient Prescriptions:     temazepam (RESTORIL) 15 mg capsule, Take 15 mg by mouth nightly., Disp: , Rfl:     promethazine (PHENERGAN) 25 mg tablet, Take 25 mg by mouth every eight (8) hours as needed. , Disp: , Rfl:     HYDROmorphone (DILAUDID) 4 mg tablet, Take 4 mg by mouth every four (4) hours as needed. , Disp: , Rfl:        rivaroxaban (XARELTO) 20 mg tab tablet, Take 1 Tab by mouth daily (with breakfast). , Disp: 30 Tab, Rfl: 11    atorvastatin (LIPITOR) 80 mg tablet, Take 40 mg by mouth daily. 1/2 tablet daily  Take DOS per anesthesia protocol. Indications: hyperlipidemia, Disp: , Rfl:     Brimonidine-Timolol (COMBIGAN) 0.2-0.5 % Drop      Date Last Reviewed:  6/23/17   Number of Personal Factors/Comorbidities that affect the Plan of Care: 3+: HIGH COMPLEXITY   EXAMINATION: evaluation     Observation/Orthostatic Postural Assessment:  Surgical wound to R shoulder appears to be healed. Moderate swelling and edema noted along the upper arm down to the elbow and medial forearm region. Bruise is resolving over the upper arm. Palpation:  Tender to palpate over the entire shoulder, biceps and distal clavicle regions. Hardened scar tissue noted along the surgical incision/ biceps region     ROM: limited by pain and stiffness                RUE AROM  R Forearm Supination: 50  R Wrist Flexion: 90  R Wrist Extension: 60  RUE PROM  R Shoulder Flexion: 42 (forward elevation)  R Shoulder ABduction: 40  R Shoulder Internal Rotation: 12 (at 35 deg abd)  R Shoulder External Rotation: 10 (at 45 deg abd)         Strength: wrist flexion/extn grossly 4+                 Special Tests:  None  Neurological Screen: Sensory to R UE diminished to light touch. Functional Mobility:  independent with extra time and compensation. Balance:  No deficits        Body Structures Involved:  1. Bones  2. Joints  3. Muscles  4. Ligaments Body Functions Affected:  1. Sensory/Pain  2. Neuromusculoskeletal  3. Movement Related Activities and Participation Affected:  1. Self Care  2.  Domestic Life  3. home care   Number of elements (examined above) that affect the Plan of Care: 3: MODERATE COMPLEXITY   CLINICAL PRESENTATION:   Presentation: Evolving clinical presentation with unstable and unpredictable characteristics: HIGH COMPLEXITY CLINICAL DECISION MAKING:   Outcome Measure: Tool Used: Disabilities of the Arm, Shoulder and Hand (DASH) Questionnaire - Quick Version  Score:  Initial: 97  Most Recent: X/55 (Date: -- )   Interpretation of Score: The DASH is designed to measure the activities of daily living in person's with upper extremity dysfunction or pain. Each section is scored on a 1-5 scale, 5 representing the greatest disability. The scores of each section are added together for a total score of 55. Score 11 12-19 20-28 29-37 38-45 46-54 55   Modifier CH CI CJ CK CL CM CN     ? Carrying, Moving, and Handling Objects:     - CURRENT STATUS: CM - 80%-99% impaired, limited or restricted    - GOAL STATUS: CK - 40%-59% impaired, limited or restricted    - D/C STATUS:  ---------------To be determined---------------    Medical Necessity:   · Skilled intervention continues to be required due to need for manual treatment and modalities within post-operative precautions. Reason for Services/Other Comments:  · Patient continues to require skilled intervention due to need for guided progression through rehab. Use of outcome tool(s) and clinical judgement create a POC that gives a: Difficult prediction of patient's progress: HIGH COMPLEXITY            TREATMENT:   (In addition to Assessment/Re-Assessment sessions the following treatments were rendered)  Pre-treatment Symptoms/Complaints:  Pt complains of constant pain in the entire shoulder that becomes severely sharp with any attempts to move. Reports having numbnes and tingling in the lower arm. Not wearing the sling unless he is out in public. Driving to therapy. Taking post-operative medication  Pain: Initial:   Pain Intensity 1: 10  Post Session:  8/10     MANUAL THERAPY: (7 minutes): for ROM. Gentle PROM R shoulder ER/IR, flexion and abd with respect to pain. MODALITIES: (10 minutes):  Ice via vaso pneumatic compression/Game ready to R shoulder at 44 deg and 50% compression. No adverse effects   Therapeutic Exercise (3 Minutes): reviewed and issued HEP for wrist AROM flex/extn, ball squeezes, rubber band finger extn    HEP: Provided written HEP for the above exercises, use of ice for pain and swelling,  Mr Chuck Madrigal understanding. Variance: none    Treatment/Session Assessment:    · Response to Treatment:  No pain with treatment. Shoulder is very stiff and painfull. · Compliance with Program/Exercises: Will assess as treatment progresses. · Recommendations/Intent for next treatment session: \"Next visit will focus on manual treatment, modalities and exercise when cleared by MD\".   Total Treatment Duration: 55 minutes  PT Patient Time In/Time Out  Time In: 1030  Time Out: [address] [facility], PT

## 2017-06-24 PROCEDURE — 3331090002 HH PPS REVENUE DEBIT

## 2017-06-24 PROCEDURE — 3331090001 HH PPS REVENUE CREDIT

## 2017-06-25 PROCEDURE — 3331090002 HH PPS REVENUE DEBIT

## 2017-06-25 PROCEDURE — 3331090001 HH PPS REVENUE CREDIT

## 2017-06-26 ENCOUNTER — HOSPITAL ENCOUNTER (OUTPATIENT)
Dept: PHYSICAL THERAPY | Age: 72
Discharge: HOME | End: 2017-06-26
Payer: MEDICARE

## 2017-06-26 PROCEDURE — 97035 APP MDLTY 1+ULTRASOUND EA 15: CPT

## 2017-06-26 PROCEDURE — 97016 VASOPNEUMATIC DEVICE THERAPY: CPT

## 2017-06-26 PROCEDURE — 97140 MANUAL THERAPY 1/> REGIONS: CPT

## 2017-06-26 NOTE — PROGRESS NOTES
Lucy Neri  : 1945 Therapy Center at [facility] FARRUKH LUNA  [address] [facility], [address] [place] [address],[address], [facility] [address].  [phone]   [phone]       OUTPATIENT PHYSICAL THERAPY:Daily Note 2017      ICD-10: Treatment Diagnosis: Pain in right shoulder (M25.511),Presence of right artificial shoulder joint (Z96.611),Stiffness of right shoulder, not elsewhere classified (M25.611)  Precautions/Allergies:   Post-op/Epinephrine and Aspirin   Fall Risk Score: 1 (? 5 = High Risk)  MD Orders: PT- evaluate and treat, HEP, ROM MEDICAL/REFERRING DIAGNOSIS:  right shoulder hardware removed/reverse TSA, bicep tendon transfer- 17  DATE OF ONSET: initial injury- 17 with R shoulder scope, ASD/RCR/BT-17   [facility] joint separation with biceps rupture -2017 with surgical repair-3/23/17  REFERRING PHYSICIAN: Jason Mccrary MD  RETURN PHYSICIAN APPOINTMENT: 17     INITIAL ASSESSMENT:  Mr. Kathy Brooks is 22 days s/p R shoulder hardware removal/reverse TSA and bicep tendon transfer. He presents with post-op pain, swelling, wound healing, weakness and decreased ROM that are limiting normalized use and function of R UE. Progress will be slow due to the amount of trauma with surgical interventions to the R shoulder since 2016. He cares for his home and yard as well and needs to be able to drive. He will benefit from PT for guided shoulder rehab per Post-operative protocol to promote safe return to normalized use of the RUE with functional ADL activities. PROBLEM LIST (Impacting functional limitations):  1. Post-op R shoulder pain and swelling  2. Decreased R shoulder ROM   3. Weakness R shoulder INTERVENTIONS PLANNED:  1. Posture education  2. Thermal and electric modalities, manual therapies for pain   3. Manual therapies, therapeutic exercises, HEP for ROM    4. Therapeutic exercises and HEP for strength   TREATMENT PLAN:  Effective Dates: 17 TO 9/15/17.   Frequency/Duration: 2-3 times a week for 12 weeks  GOALS: (Goals have been discussed and agreed upon with patient.)  Short-Term Functional Goals: Time Frame: 6 weeks  1. Report no more than mild intermittent pain to R shoulder with compensatory use during basic functional activities. 2. R shoulder PROM forward elevation greater than 100 degrees to progress into functional ranges. 3. Demonstrate good R shoulder isometric strength with manual testing to progress into strength phase. 4. Independent with initial HEP. Discharge Goals: Time Frame: 12 weeks  1. Pt to demonstrate Good posture correction during exercises  2. No more than 3/10 intermittent pain R shoulder with return to normalized household and work activities, and score less than 50% on the DASH. 3. R shoulder AROM forward elevation greater than 110 degrees and strength to shoulder are grossly WFL's for safe use with normalized activities. 4. Independent with advanced shoulder HEP for continued self-management. Rehabilitation Potential For Stated Goals: Good  Regarding Soren Stevenson therapy, I certify that the treatment plan above will be carried out by a therapist or under their direction. Thank you for this referral,  Camden Callahan PT,MSPT       Referring Physician Signature: Isaiah Lopez MD              Date                    The information in this section was collected on 6/23/17 (except where otherwise noted). HISTORY:   History of Present Injury/Illness (Reason for Referral):  Mr Yoselin Presley initial injury was the result of a fall back in November of 2016 requiring surgical repair on 1/27/17. He came to therapy reporting the biceps muscle ruptured on February 21st. Therapy was OK'd by MD. He then came to the 2/28/17 therapy appointment reporting the [facility] joint had . MD ok\"d therapy to tolerance. Pt opted for surgery on 3/23/17 to plate the clavicle when no progress could be made with treatment.  He returned to therapy but complained that pain was limiting any functional use of the arm so he opted for shoulder replacement surgery. He received in patient therapy for hand/elbow ROM and then was discharged to home health PT for hand/elbow ROM only. Past Medical History/Comorbidities:   Mr. 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 Mr. Maya Nichols  has a past surgical history that includes carpal tunnel release (2012); carpal tunnel release (1980); hernia repair (2010); hernia repair (Left, 2007); lumbar laminectomy; hip replacement (Left, 12/12/2012); knee arthroscopy (Right, 2006); shoulder arthroscopy (Left, 2/17/2012); corneal transplant (Right, 2004) and corneal transplant (Right, 06/14/2016). Social History/Living Environment:     Pt lives with his wife in a one story home. He cares for the yard and home repairs  Prior Level of Function/Work/Activity:  retired  Previous Treatment Approaches:          PT for the R shoulder since 2/14/17  Personal Factors: Other factors that influence how disability is experienced by the patient:  See above PMH for back pain, hip replacement  Current Medications:       Current Outpatient Prescriptions:     temazepam (RESTORIL) 15 mg capsule, Take 15 mg by mouth nightly., Disp: , Rfl:     HYDROmorphone (DILAUDID) 4 mg tablet, Take 4 mg by mouth every four (4) hours as needed. , Disp: , Rfl:        rivaroxaban (XARELTO) 20 mg tab tablet, Take 1 Tab by mouth daily (with breakfast). , Disp: 30 Tab, Rfl: 11    atorvastatin (LIPITOR) 80 mg tablet, Take 40 mg by mouth daily. 1/2 tablet daily  Take DOS per anesthesia protocol. Indications: hyperlipidemia, Disp: , Rfl:     Brimonidine-Timolol (COMBIGAN) 0.2-0.5 % Drop      Date Last Reviewed:  6/26/17   Number of Personal Factors/Comorbidities that affect the Plan of Care: 3+: HIGH COMPLEXITY   EXAMINATION: at evaluation unless otherwise noted     Observation/Orthostatic Postural Assessment:  Surgical wound to R shoulder appears to be healed. Moderate swelling and edema noted along the upper arm down to the elbow and medial forearm region. Bruise is resolving over the upper arm. Palpation:  Tender to palpate over the entire shoulder, biceps and distal clavicle regions. Hardened scar tissue noted along the surgical incision/ biceps region     ROM: 6/26/17                RUE PROM  R Shoulder Flexion: 80 (forward elevation)  R Shoulder ABduction: 70  R Shoulder External Rotation: 20  R Elbow Extension: 22 (from full extn)         Strength: wrist flexion/extn grossly 4+                 Special Tests:  None  Neurological Screen: Sensory to R UE diminished to light touch. Functional Mobility:  independent with extra time and compensation. Balance:  No deficits        Body Structures Involved:  1. Bones  2. Joints  3. Muscles  4. Ligaments Body Functions Affected:  1. Sensory/Pain  2. Neuromusculoskeletal  3. Movement Related Activities and Participation Affected:  1. Self Care  2. Domestic Life  3. home care   Number of elements (examined above) that affect the Plan of Care: 3: MODERATE COMPLEXITY   CLINICAL PRESENTATION:   Presentation: Evolving clinical presentation with unstable and unpredictable characteristics: HIGH COMPLEXITY   CLINICAL DECISION MAKING:   Outcome Measure: Tool Used: Disabilities of the Arm, Shoulder and Hand (DASH) Questionnaire - Quick Version  Score:  Initial: 97  Most Recent: X/55 (Date: -- )   Interpretation of Score:  The DASH is designed to measure the activities of daily living in person's with upper extremity dysfunction or pain. Each section is scored on a 1-5 scale, 5 representing the greatest disability. The scores of each section are added together for a total score of 55. Score 11 12-19 20-28 29-37 38-45 46-54 55   Modifier CH CI CJ CK CL CM CN     ? Carrying, Moving, and Handling Objects:     - CURRENT STATUS: CM - 80%-99% impaired, limited or restricted    - GOAL STATUS: CK - 40%-59% impaired, limited or restricted    - D/C STATUS:  ---------------To be determined---------------    Medical Necessity:   · Skilled intervention continues to be required due to need for manual treatment and modalities within post-operative precautions. Reason for Services/Other Comments:  · Patient continues to require skilled intervention due to need for guided progression through rehab. Use of outcome tool(s) and clinical judgement create a POC that gives a: Difficult prediction of patient's progress: HIGH COMPLEXITY            TREATMENT:   (In addition to Assessment/Re-Assessment sessions the following treatments were rendered)  Pre-treatment Symptoms/Complaints:  Pt reports having soreness pain with movement. Reports having numbnes and tingling in the right hand. wearing the sling in public. Trying to lay on the right shoulder but pain prevents it for any length of time. Pain: Initial:   Pain Intensity 1: 8  Post Session:  8/10     MANUAL THERAPY: (15 minutes): for ROM. Gentle PROM to grade 2 physiological mobilizations R shoulder ER/IR, flexion and abd with respect to pain. Elbow extension and supination stretch, wrist extension stretch   MODALITIES: (15 minutes): Ice via vaso pneumatic compression/Game ready to R shoulder at 42 deg and 50% compression.  No adverse effects  Ultrasound :( 9 minutes) continuous ultrasound at 1.8 W/cm2 to right anterior shoulder and biceps tendon region   Therapeutic Exercise ( ): none HEP: continue HEP exercises, use of ice for pain and swelling,  Mr Almeida Foots understanding. Variance: none    Treatment/Session Assessment:    · Response to Treatment:  No pain with treatment. Much better tolerance to manual treatment. Advise pt not to lay on the right side  · Compliance with Program/Exercises: yes per pt. · Recommendations/Intent for next treatment session: \"Next visit will focus on manual treatment, modalities and exercise when cleared by MD\".   Total Treatment Duration:      39    minutes  PT Patient Time In/Time Out  Time In: Shabnam Freeman, PT

## 2017-06-27 ENCOUNTER — HOSPITAL ENCOUNTER (OUTPATIENT)
Dept: PHYSICAL THERAPY | Age: 72
Discharge: HOME | End: 2017-06-27
Payer: MEDICARE

## 2017-06-27 PROCEDURE — 97140 MANUAL THERAPY 1/> REGIONS: CPT

## 2017-06-27 PROCEDURE — 97016 VASOPNEUMATIC DEVICE THERAPY: CPT

## 2017-06-27 PROCEDURE — 97035 APP MDLTY 1+ULTRASOUND EA 15: CPT

## 2017-06-27 NOTE — PROGRESS NOTES
Cortney Zazueta  : 1945 Therapy Center at [facility] FARRUKH LUNA  [address] [facility], [address] [place] [address],[address], [facility] [place].  [phone]   [phone]       OUTPATIENT PHYSICAL THERAPY:Daily Note 2017      ICD-10: Treatment Diagnosis: Pain in right shoulder (M25.511),Presence of right artificial shoulder joint (Z96.611),Stiffness of right shoulder, not elsewhere classified (M25.611)  Precautions/Allergies:   Post-op/Epinephrine and Aspirin   Fall Risk Score: 1 (? 5 = High Risk)  MD Orders: PT- evaluate and treat, HEP, ROM MEDICAL/REFERRING DIAGNOSIS:  right shoulder hardware removed/reverse TSA, bicep tendon transfer- 17  DATE OF ONSET: initial injury- 17 with R shoulder scope, ASD/RCR/BT-17   [facility] joint separation with biceps rupture -2017 with surgical repair-3/23/17  REFERRING PHYSICIAN: Jose Carlos Paez MD  RETURN PHYSICIAN APPOINTMENT: 17     INITIAL ASSESSMENT:  Mr. Sid Melchor is 22 days s/p R shoulder hardware removal/reverse TSA and bicep tendon transfer. He presents with post-op pain, swelling, wound healing, weakness and decreased ROM that are limiting normalized use and function of R UE. Progress will be slow due to the amount of trauma with surgical interventions to the R shoulder since 2016. He cares for his home and yard as well and needs to be able to drive. He will benefit from PT for guided shoulder rehab per Post-operative protocol to promote safe return to normalized use of the RUE with functional ADL activities. PROBLEM LIST (Impacting functional limitations):  1. Post-op R shoulder pain and swelling  2. Decreased R shoulder ROM   3. Weakness R shoulder INTERVENTIONS PLANNED:  1. Posture education  2. Thermal and electric modalities, manual therapies for pain   3. Manual therapies, therapeutic exercises, HEP for ROM    4. Therapeutic exercises and HEP for strength   TREATMENT PLAN:  Effective Dates: 17 TO 9/15/17.   Frequency/Duration: 2-3 times a week for 12 weeks  GOALS: (Goals have been discussed and agreed upon with patient.)  Short-Term Functional Goals: Time Frame: 6 weeks  1. Report no more than mild intermittent pain to R shoulder with compensatory use during basic functional activities. 2. R shoulder PROM forward elevation greater than 100 degrees to progress into functional ranges. 3. Demonstrate good R shoulder isometric strength with manual testing to progress into strength phase. 4. Independent with initial HEP. Discharge Goals: Time Frame: 12 weeks  1. Pt to demonstrate Good posture correction during exercises  2. No more than 3/10 intermittent pain R shoulder with return to normalized household and work activities, and score less than 50% on the DASH. 3. R shoulder AROM forward elevation greater than 110 degrees and strength to shoulder are grossly WFL's for safe use with normalized activities. 4. Independent with advanced shoulder HEP for continued self-management. Rehabilitation Potential For Stated Goals: Good  [facility], I certify that the treatment plan above will be carried out by a therapist or under their direction. Thank you for this referral,  Breana Muñoz, PT,MSPT                            The information in this section was collected on 6/23/17 (except where otherwise noted). HISTORY:   History of Present Injury/Illness (Reason for Referral):  Mr Nikki Rosario initial injury was the result of a fall back in November of 2016 requiring surgical repair on 1/27/17. He came to therapy reporting the biceps muscle ruptured on February 21st. Therapy was OK'd by MD. He then came to the 2/28/17 therapy appointment reporting the [facility] joint had . MD ok\"d therapy to tolerance. Pt opted for surgery on 3/23/17 to plate the clavicle when no progress could be made with treatment.  He returned to therapy but complained that pain was limiting any functional use of the arm so he opted for shoulder replacement surgery. He received in patient therapy for hand/elbow ROM and then was discharged to home health PT for hand/elbow ROM only. Past Medical History/Comorbidities:   Mr. 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 Mr. Ricka Siemens  has a past surgical history that includes carpal tunnel release (2012); carpal tunnel release (1980); hernia repair (2010); hernia repair (Left, 2007); lumbar laminectomy; hip replacement (Left, 12/12/2012); knee arthroscopy (Right, 2006); shoulder arthroscopy (Left, 2/17/2012); corneal transplant (Right, 2004) and corneal transplant (Right, 06/14/2016). Social History/Living Environment:     Pt lives with his wife in a one story home. He cares for the yard and home repairs  Prior Level of Function/Work/Activity:  retired  Previous Treatment Approaches:          PT for the R shoulder since 2/14/17  Personal Factors: Other factors that influence how disability is experienced by the patient:  See above PMH for back pain, hip replacement  Current Medications:       Current Outpatient Prescriptions:     temazepam (RESTORIL) 15 mg capsule, Take 15 mg by mouth nightly., Disp: , Rfl:     HYDROmorphone (DILAUDID) 4 mg tablet, Take 4 mg by mouth every four (4) hours as needed. , Disp: , Rfl:        rivaroxaban (XARELTO) 20 mg tab tablet, Take 1 Tab by mouth daily (with breakfast). , Disp: 30 Tab, Rfl: 11    atorvastatin (LIPITOR) 80 mg tablet, Take 40 mg by mouth daily. 1/2 tablet daily  Take DOS per anesthesia protocol. Indications: hyperlipidemia, Disp: , Rfl:     Brimonidine-Timolol (COMBIGAN) 0.2-0.5 % Drop      Date Last Reviewed:  6/26/17   Number of Personal Factors/Comorbidities that affect the Plan of Care: 3+: HIGH COMPLEXITY   EXAMINATION: at evaluation unless otherwise noted     Observation/Orthostatic Postural Assessment:  Surgical wound to R shoulder appears to be healed. Moderate swelling and edema noted along the upper arm down to the elbow and medial forearm region. Bruise is resolving over the upper arm. Palpation:  Tender to palpate over the entire shoulder, biceps and distal clavicle regions. Hardened scar tissue noted along the surgical incision/ biceps region     ROM: 6/26/17                          Strength: wrist flexion/extn grossly 4+                 Special Tests:  None  Neurological Screen: Sensory to R UE diminished to light touch. Functional Mobility:  independent with extra time and compensation. Balance:  No deficits        Body Structures Involved:  1. Bones  2. Joints  3. Muscles  4. Ligaments Body Functions Affected:  1. Sensory/Pain  2. Neuromusculoskeletal  3. Movement Related Activities and Participation Affected:  1. Self Care  2. Domestic Life  3. home care   Number of elements (examined above) that affect the Plan of Care: 3: MODERATE COMPLEXITY   CLINICAL PRESENTATION:   Presentation: Evolving clinical presentation with unstable and unpredictable characteristics: HIGH COMPLEXITY   CLINICAL DECISION MAKING:   Outcome Measure: Tool Used: Disabilities of the Arm, Shoulder and Hand (DASH) Questionnaire - Quick Version  Score:  Initial: 97  Most Recent: X/55 (Date: -- )   Interpretation of Score: The DASH is designed to measure the activities of daily living in person's with upper extremity dysfunction or pain. Each section is scored on a 1-5 scale, 5 representing the greatest disability.   The scores of each section are added together for a total score of 55. Score 11 12-19 20-28 29-37 38-45 46-54 55   Modifier CH CI CJ CK CL CM CN     ? Carrying, Moving, and Handling Objects:     - CURRENT STATUS: CM - 80%-99% impaired, limited or restricted    - GOAL STATUS: CK - 40%-59% impaired, limited or restricted    - D/C STATUS:  ---------------To be determined---------------    Medical Necessity:   · Skilled intervention continues to be required due to need for manual treatment and modalities within post-operative precautions. Reason for Services/Other Comments:  · Patient continues to require skilled intervention due to need for guided progression through rehab. Use of outcome tool(s) and clinical judgement create a POC that gives a: Difficult prediction of patient's progress: HIGH COMPLEXITY            TREATMENT:   (In addition to Assessment/Re-Assessment sessions the following treatments were rendered)  Pre-treatment Symptoms/Complaints:  Pt reports having soreness. Reports doing HEP and pulleys. Pain: Initial: 6     Post Session:  4-5/10     MANUAL THERAPY: ( 15 minutes): retrograde massage for swelling reduction from the elbow to right pectoralis region, biceps stretch. Kinesio taping for bruise and swelling reduction in the upper right arm   MODALITIES: (15 minutes): Ice via vaso pneumatic compression/Game ready to R shoulder at 42 deg and 50% compression. No adverse effects  Ultrasound :( 10 minutes) continuous ultrasound at 2 W/cm2 to right anterior shoulder and biceps region prior to manual treatment  Therapeutic Exercise ( ): none    HEP: continue HEP exercises, use of ice for pain and swelling,  Mr Renard Garcia understanding. Variance: no ROM due to having PT again tomorrow ( three days in a row)     Treatment/Session Assessment:    · Response to Treatment:  No pain with treatment.  Better scar mobility after treatment  · Compliance with Program/Exercises: yes per pt.  · Recommendations/Intent for next treatment session: \"Next visit will focus on manual treatment, modalities and exercise when cleared by MD\".  ROM next visit  Total Treatment Duration:      40    minutes  PT Patient Time In/Time Out  Time In: 0945  Time Out: [facility], [address] [facility]

## 2017-06-28 ENCOUNTER — HOSPITAL ENCOUNTER (OUTPATIENT)
Dept: PHYSICAL THERAPY | Age: 72
Discharge: HOME | End: 2017-06-28
Payer: MEDICARE

## 2017-06-28 PROCEDURE — 97016 VASOPNEUMATIC DEVICE THERAPY: CPT

## 2017-06-28 PROCEDURE — 97035 APP MDLTY 1+ULTRASOUND EA 15: CPT

## 2017-06-28 PROCEDURE — 97140 MANUAL THERAPY 1/> REGIONS: CPT

## 2017-06-28 NOTE — PROGRESS NOTES
Bere Whitehead  : 1945 [facility] FARRU[place] LUNA  [address] [facility], [address] [place] [address] [address], [address] [facility]  [phone]   [phone]       OUTPATIENT PHYSICAL THERAPY:Daily Note 2017      ICD-10: Treatment Diagnosis: Pain in right shoulder (M25.511),Presence of right artificial shoulder joint (Z96.611),Stiffness of right shoulder, not elsewhere classified (M25.611)  Precautions/Allergies:   Post-op/Epinephrine and Aspirin   Fall Risk Score: 1 (? 5 = High Risk)  MD Orders: PT- evaluate and treat, HEP, ROM MEDICAL/REFERRING DIAGNOSIS:  right shoulder hardware removed/reverse TSA, bicep tendon transfer- 17  DATE OF ONSET: initial injury- 17 with R shoulder scope, ASD/RCR/BT-17   [facility] joint separation with biceps rupture -2017 with surgical repair-3/23/17  REFERRING PHYSICIAN: Jill Conti MD  RETURN PHYSICIAN APPOINTMENT: 17     INITIAL ASSESSMENT:  Mr. Mirela Montesinos is 22 days s/p R shoulder hardware removal/reverse TSA and bicep tendon transfer. He presents with post-op pain, swelling, wound healing, weakness and decreased ROM that are limiting normalized use and function of R UE. Progress will be slow due to the amount of trauma with surgical interventions to the R shoulder since 2016. He cares for his home and yard as well and needs to be able to drive. He will benefit from PT for guided shoulder rehab per Post-operative protocol to promote safe return to normalized use of the RUE with functional ADL activities. PROBLEM LIST (Impacting functional limitations):  1. Post-op R shoulder pain and swelling  2. Decreased R shoulder ROM   3. Weakness R shoulder INTERVENTIONS PLANNED:  1. Posture education  2. Thermal and electric modalities, manual therapies for pain   3. Manual therapies, therapeutic exercises, HEP for ROM    4. Therapeutic exercises and HEP for strength   TREATMENT PLAN:  Effective Dates: 17 TO 9/15/17.   Frequency/Duration: 2-3 times a week for 12 weeks  GOALS: (Goals have been discussed and agreed upon with patient.)  Short-Term Functional Goals: Time Frame: 6 weeks  1. Report no more than mild intermittent pain to R shoulder with compensatory use during basic functional activities. 2. R shoulder PROM forward elevation greater than 100 degrees to progress into functional ranges. 3. Demonstrate good R shoulder isometric strength with manual testing to progress into strength phase. 4. Independent with initial HEP. Discharge Goals: Time Frame: 12 weeks  1. Pt to demonstrate Good posture correction during exercises  2. No more than 3/10 intermittent pain R shoulder with return to normalized household and work activities, and score less than 50% on the DASH. 3. R shoulder AROM forward elevation greater than 110 degrees and strength to shoulder are grossly WFL's for safe use with normalized activities. 4. Independent with advanced shoulder HEP for continued self-management. Rehabilitation Potential For Stated Goals: Good  Regarding Fern Kawasaki therapy, I certify that the treatment plan above will be carried out by a therapist or under their direction. Thank you for this referral,  Wilbert Horne, PT,MSPT                            The information in this section was collected on 6/23/17 (except where otherwise noted). HISTORY:   History of Present Injury/Illness (Reason for Referral):  Mr Roopa Gilbert initial injury was the result of a fall back in November of 2016 requiring surgical repair on 1/27/17. He came to therapy reporting the biceps muscle ruptured on February 21st. Therapy was OK'd by MD. He then came to the 2/28/17 therapy appointment reporting the [facility] joint had . MD ok\"d therapy to tolerance. Pt opted for surgery on 3/23/17 to plate the clavicle when no progress could be made with treatment.  He returned to therapy but complained that pain was limiting any functional use of the arm so he opted for shoulder replacement surgery. He received in patient therapy for hand/elbow ROM and then was discharged to home health PT for hand/elbow ROM only. Past Medical History/Comorbidities:   Mr. 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 Mr. Sid Melchor  has a past surgical history that includes carpal tunnel release (2012); carpal tunnel release (1980); hernia repair (2010); hernia repair (Left, 2007); lumbar laminectomy; hip replacement (Left, 12/12/2012); knee arthroscopy (Right, 2006); shoulder arthroscopy (Left, 2/17/2012); corneal transplant (Right, 2004) and corneal transplant (Right, 06/14/2016). Social History/Living Environment:     Pt lives with his wife in a one story home. He cares for the yard and home repairs  Prior Level of Function/Work/Activity:  retired  Previous Treatment Approaches:          PT for the R shoulder since 2/14/17  Personal Factors: Other factors that influence how disability is experienced by the patient:  See above PMH for back pain, hip replacement  Current Medications:       Current Outpatient Prescriptions:     temazepam (RESTORIL) 15 mg capsule, Take 15 mg by mouth nightly., Disp: , Rfl:     HYDROmorphone (DILAUDID) 4 mg tablet, Take 4 mg by mouth every four (4) hours as needed. , Disp: , Rfl:        rivaroxaban (XARELTO) 20 mg tab tablet, Take 1 Tab by mouth daily (with breakfast). , Disp: 30 Tab, Rfl: 11    atorvastatin (LIPITOR) 80 mg tablet, Take 40 mg by mouth daily. 1/2 tablet daily  Take DOS per anesthesia protocol. Indications: hyperlipidemia, Disp: , Rfl:     Brimonidine-Timolol (COMBIGAN) 0.2-0.5 % Drop      Date Last Reviewed:  6/26/17   Number of Personal Factors/Comorbidities that affect the Plan of Care: 3+: HIGH COMPLEXITY   EXAMINATION: at evaluation unless otherwise noted     Observation/Orthostatic Postural Assessment:  Surgical wound to R shoulder appears to be healed. Moderate swelling and edema noted along the upper arm down to the elbow and medial forearm region. Bruise is resolving over the upper arm. Palpation:  Tender to palpate over the entire shoulder, biceps and distal clavicle regions. Hardened scar tissue noted along the surgical incision/ biceps region     ROM: 6/28/17                RUE AROM  R Forearm Supination: (P) 72  RUE PROM  R Shoulder ABduction: (P) 73  R Shoulder External Rotation: (P) 36 (at 45 deg abd)  R Elbow Extension: (P) 17 (from full extn)         Strength: n/t                 Special Tests:  None  Neurological Screen: Sensory to R UE diminished to light touch. Functional Mobility:  independent with extra time and compensation. Balance:  No deficits        Body Structures Involved:  1. Bones  2. Joints  3. Muscles  4. Ligaments Body Functions Affected:  1. Sensory/Pain  2. Neuromusculoskeletal  3. Movement Related Activities and Participation Affected:  1. Self Care  2. Domestic Life  3. home care   Number of elements (examined above) that affect the Plan of Care: 3: MODERATE COMPLEXITY   CLINICAL PRESENTATION:   Presentation: Evolving clinical presentation with unstable and unpredictable characteristics: HIGH COMPLEXITY   CLINICAL DECISION MAKING:   Outcome Measure: Tool Used: Disabilities of the Arm, Shoulder and Hand (DASH) Questionnaire - Quick Version  Score:  Initial: 97  Most Recent: X/55 (Date: -- )   Interpretation of Score:  The DASH is designed to measure the activities of daily living in person's with upper extremity dysfunction or pain. Each section is scored on a 1-5 scale, 5 representing the greatest disability. The scores of each section are added together for a total score of 55. Score 11 12-19 20-28 29-37 38-45 46-54 55   Modifier CH CI CJ CK CL CM CN     ? Carrying, Moving, and Handling Objects:     - CURRENT STATUS: CM - 80%-99% impaired, limited or restricted    - GOAL STATUS: CK - 40%-59% impaired, limited or restricted    - D/C STATUS:  ---------------To be determined---------------    Medical Necessity:   · Skilled intervention continues to be required due to need for manual treatment and modalities within post-operative precautions. Reason for Services/Other Comments:  · Patient continues to require skilled intervention due to need for guided progression through rehab. Use of outcome tool(s) and clinical judgement create a POC that gives a: Difficult prediction of patient's progress: HIGH COMPLEXITY            TREATMENT:   (In addition to Assessment/Re-Assessment sessions the following treatments were rendered)  Pre-treatment Symptoms/Complaints:  Pt reports only having soreness. Reports doing HEP, pulleys and stretching the arm out when sitting in the recliner. Pain: Initial:   Pain Intensity 1: 6 (5 after)  Post Session:  5/10     MANUAL THERAPY: ( 20 minutes): soft tissue release to right pectoralis region, biceps with supination stretch while pt held active wrist extension. Grade 2 to 3- physiological mobilizations to R shoulder ER at 20 and 45 deg abd, IR at 50 deg abd, forward elevation and abd   MODALITIES: (15 minutes): Ice via vaso pneumatic compression/Game ready to R shoulder at 42 deg and 50% compression. No adverse effects  Ultrasound :( 9 minutes) continuous ultrasound at 2 W/cm2 to right anterior shoulder and biceps region prior to manual treatment  Therapeutic Exercise ( ): none    HEP: continue HEP exercises, use of ice for pain and swelling. Kinesio tape to be kept on until tomorrow. Can remove sooner if skin becomes irritated. Mr Gramajo [place] understanding. Variance: none     Treatment/Session Assessment:    · Response to Treatment:  Improved PROM after treatment. Pain at end ROM. Tight soft tissue in the biceps region, pect region and anterior deltoid region  · Compliance with Program/Exercises: yes per pt. · Recommendations/Intent for next treatment session: \"Next visit will focus on manual treatment, modalities and exercise when cleared by MD\".  ROM next visit  Total Treatment Duration:      44    minutes  PT Patient Time In/Time Out  Time In: 1103  Time Out: 14[facility],

## 2017-07-03 ENCOUNTER — HOSPITAL ENCOUNTER (OUTPATIENT)
Dept: PHYSICAL THERAPY | Age: 72
Discharge: HOME | End: 2017-07-03
Payer: MEDICARE

## 2017-07-03 PROCEDURE — 97035 APP MDLTY 1+ULTRASOUND EA 15: CPT

## 2017-07-03 PROCEDURE — 97016 VASOPNEUMATIC DEVICE THERAPY: CPT

## 2017-07-03 PROCEDURE — 97140 MANUAL THERAPY 1/> REGIONS: CPT

## 2017-07-03 NOTE — PROGRESS NOTES
Sarah Myers  : 1945 [facility] [place]ARR[place] LUNA  [address] [facility], [address] [place] [address],[address], [address]  [phone]   [phone]       OUTPATIENT PHYSICAL THERAPY:Daily Note 7/3/2017      ICD-10: Treatment Diagnosis: Pain in right shoulder (M25.511),Presence of right artificial shoulder joint (Z96.611),Stiffness of right shoulder, not elsewhere classified (M25.611)  Precautions/Allergies:   Post-op/Epinephrine and Aspirin   Fall Risk Score: 1 (? 5 = High Risk)  MD Orders: PT- evaluate and treat, HEP, ROM MEDICAL/REFERRING DIAGNOSIS:  right shoulder hardware removed/reverse TSA, bicep tendon transfer- 17  DATE OF ONSET: initial injury- 17 with R shoulder scope, ASD/RCR/BT-17   [facility] joint separation with biceps rupture -2017 with surgical repair-3/23/17  REFERRING PHYSICIAN: Alejandra Medley MD  RETURN PHYSICIAN APPOINTMENT: 17     INITIAL ASSESSMENT:  Mr. Austin Mcknight is 22 days s/p R shoulder hardware removal/reverse TSA and bicep tendon transfer. He presents with post-op pain, swelling, wound healing, weakness and decreased ROM that are limiting normalized use and function of R UE. Progress will be slow due to the amount of trauma with surgical interventions to the R shoulder since 2016. He cares for his home and yard as well and needs to be able to drive. He will benefit from PT for guided shoulder rehab per Post-operative protocol to promote safe return to normalized use of the RUE with functional ADL activities. PROBLEM LIST (Impacting functional limitations):  1. Post-op R shoulder pain and swelling  2. Decreased R shoulder ROM   3. Weakness R shoulder INTERVENTIONS PLANNED:  1. Posture education  2. Thermal and electric modalities, manual therapies for pain   3. Manual therapies, therapeutic exercises, HEP for ROM    4. Therapeutic exercises and HEP for strength   TREATMENT PLAN:  Effective Dates: 17 TO 9/15/17.   Frequency/Duration: 2-3 times a week for 12 weeks  GOALS: (Goals have been discussed and agreed upon with patient.)  Short-Term Functional Goals: Time Frame: 6 weeks  1. Report no more than mild intermittent pain to R shoulder with compensatory use during basic functional activities. 2. R shoulder PROM forward elevation greater than 100 degrees to progress into functional ranges. 3. Demonstrate good R shoulder isometric strength with manual testing to progress into strength phase. 4. Independent with initial HEP. Discharge Goals: Time Frame: 12 weeks  1. Pt to demonstrate Good posture correction during exercises  2. No more than 3/10 intermittent pain R shoulder with return to normalized household and work activities, and score less than 50% on the DASH. 3. R shoulder AROM forward elevation greater than 110 degrees and strength to shoulder are grossly WFL's for safe use with normalized activities. 4. Independent with advanced shoulder HEP for continued self-management. Rehabilitation Potential For Stated Goals: Good  Regarding [facility], I certify that the treatment plan above will be carried out by a therapist or under their direction. Thank you for this referral,  Breana Muñoz, PT,MSPT                            The information in this section was collected on 6/23/17 (except where otherwise noted). HISTORY:   History of Present Injury/Illness (Reason for Referral):  Mr Nikki Rosario initial injury was the result of a fall back in November of 2016 requiring surgical repair on 1/27/17. He came to therapy reporting the biceps muscle ruptured on February 21st. Therapy was OK'd by MD. He then came to the 2/28/17 therapy appointment reporting the [facility] [place] joint had . MD ok\"d therapy to tolerance. Pt opted for surgery on 3/23/17 to plate the clavicle when no progress could be made with treatment.  He returned to therapy but complained that pain was limiting any functional use of the arm so he opted for shoulder replacement surgery. He received in patient therapy for hand/elbow ROM and then was discharged to home health PT for hand/elbow ROM only. Past Medical History/Comorbidities:   Mr. 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 Mr. Austin Mcknight  has a past surgical history that includes carpal tunnel release (2012); carpal tunnel release (1980); hernia repair (2010); hernia repair (Left, 2007); lumbar laminectomy; hip replacement (Left, 12/12/2012); knee arthroscopy (Right, 2006); shoulder arthroscopy (Left, 2/17/2012); corneal transplant (Right, 2004) and corneal transplant (Right, 06/14/2016). Social History/Living Environment:     Pt lives with his wife in a one story home. He cares for the yard and home repairs  Prior Level of Function/Work/Activity:  retired  Previous Treatment Approaches:          PT for the R shoulder since 2/14/17  Personal Factors: Other factors that influence how disability is experienced by the patient:  See above PMH for back pain, hip replacement  Current Medications:       Current Outpatient Prescriptions:     temazepam (RESTORIL) 15 mg capsule, Take 15 mg by mouth nightly., Disp: , Rfl:     HYDROmorphone (DILAUDID) 4 mg tablet, Take 4 mg by mouth every four (4) hours as needed. , Disp: , Rfl:        rivaroxaban (XARELTO) 20 mg tab tablet, Take 1 Tab by mouth daily (with breakfast). , Disp: 30 Tab, Rfl: 11    atorvastatin (LIPITOR) 80 mg tablet, Take 40 mg by mouth daily. 1/2 tablet daily  Take DOS per anesthesia protocol. Indications: hyperlipidemia, Disp: , Rfl:     Brimonidine-Timolol (COMBIGAN) 0.2-0.5 % Drop      Date Last Reviewed:  7/3/17   Number of Personal Factors/Comorbidities that affect the Plan of Care: 3+: HIGH COMPLEXITY   EXAMINATION: at evaluation unless otherwise noted     Observation/Orthostatic Postural Assessment:  Surgical wound to R shoulder appears to be healed. Moderate swelling and edema noted along the upper arm down to the elbow and medial forearm region. Bruise is resolving over the upper arm. Palpation:  Tender to palpate over the entire shoulder, biceps and distal clavicle regions. Hardened scar tissue noted along the surgical incision/ biceps region     ROM: 6/28/17                RUE PROM  R Shoulder Flexion: 45 (  90 after treatment)  R Shoulder ABduction: 65 ( 69   after treatment)  R Shoulder External Rotation: 25 (34 ater treatment)         Strength: n/t                 Special Tests:  None  Neurological Screen: Sensory to R UE diminished to light touch. Functional Mobility:  independent with extra time and compensation. Balance:  No deficits        Body Structures Involved:  1. Bones  2. Joints  3. Muscles  4. Ligaments Body Functions Affected:  1. Sensory/Pain  2. Neuromusculoskeletal  3. Movement Related Activities and Participation Affected:  1. Self Care  2. Domestic Life  3. home care   Number of elements (examined above) that affect the Plan of Care: 3: MODERATE COMPLEXITY   CLINICAL PRESENTATION:   Presentation: Evolving clinical presentation with unstable and unpredictable characteristics: HIGH COMPLEXITY   CLINICAL DECISION MAKING:   Outcome Measure: Tool Used: Disabilities of the Arm, Shoulder and Hand (DASH) Questionnaire - Quick Version  Score:  Initial: 97  Most Recent: X/55 (Date: -- )   Interpretation of Score:  The DASH is designed to measure the activities of daily living in person's with upper extremity dysfunction or pain. Each section is scored on a 1-5 scale, 5 representing the greatest disability. The scores of each section are added together for a total score of 55. Score 11 12-19 20-28 29-37 38-45 46-54 55   Modifier CH CI CJ CK CL CM CN     ? Carrying, Moving, and Handling Objects:     - CURRENT STATUS: CM - 80%-99% impaired, limited or restricted    - GOAL STATUS: CK - 40%-59% impaired, limited or restricted    - D/C STATUS:  ---------------To be determined---------------    Medical Necessity:   · Skilled intervention continues to be required due to need for manual treatment and modalities within post-operative precautions. Reason for Services/Other Comments:  · Patient continues to require skilled intervention due to need for guided progression through rehab. Use of outcome tool(s) and clinical judgement create a POC that gives a: Difficult prediction of patient's progress: HIGH COMPLEXITY            TREATMENT:   (In addition to Assessment/Re-Assessment sessions the following treatments were rendered)  Pre-treatment Symptoms/Complaints:  Pt reports not doing the HEP and pulleys due to soreness. Arm has been hurting really bad since he woke up at 4:30 this morning. Unsure of why   Pain: Initial:     8/10 Post Session:  5/10     MANUAL THERAPY: ( 20 minutes): ROM. gentle biceps with supination stretch while pt held active wrist extension. Grade 2 gentle progression to 3- physiological mobilizations to R shoulder ER at 20,45 deg and 50 deg abd, IR at 50 deg abd, forward elevation and abd   MODALITIES: (15 minutes): Ice via vaso pneumatic compression/Game ready to R shoulder at 42 deg and 50% compression. No adverse effects  Ultrasound :( 9 minutes) continuous ultrasound at 2 W/cm2 to right anterior shoulder and biceps region prior to manual treatment  Therapeutic Exercise ( ): none    HEP: continue HEP exercises, use of ice for pain and swelling.    Variance: none Treatment/Session Assessment:    · Response to Treatment: loss of motion improved after treatment. Pain and stiffness. Tight soft tissue in the biceps and anterior shoulder region  · Compliance with Program/Exercises: yes per pt. · Recommendations/Intent for next treatment session: \"Next visit will focus on manual treatment, modalities and exercise when cleared by MD\".   Total Treatment Duration:   44       minutes  PT Patient Time In/Time Out  Time In: 1510  Time Out: [address],

## 2017-07-05 ENCOUNTER — HOSPITAL ENCOUNTER (OUTPATIENT)
Dept: PHYSICAL THERAPY | Age: 72
Discharge: HOME | End: 2017-07-05
Payer: MEDICARE

## 2017-07-05 ENCOUNTER — APPOINTMENT (OUTPATIENT)
Dept: PHYSICAL THERAPY | Age: 72
End: 2017-07-05
Payer: MEDICARE

## 2017-07-05 PROCEDURE — 97140 MANUAL THERAPY 1/> REGIONS: CPT

## 2017-07-05 PROCEDURE — 97016 VASOPNEUMATIC DEVICE THERAPY: CPT

## 2017-07-05 PROCEDURE — 97035 APP MDLTY 1+ULTRASOUND EA 15: CPT

## 2017-07-05 NOTE — PROGRESS NOTES
Mickey Mcallister  : 1945 Therapy Center at [facility] FARRUKH LUNA  1101 [facility], [address] [place] [address],[address], [facility] [address]  [phone]   [phone]       OUTPATIENT PHYSICAL THERAPY:Daily Note 2017      ICD-10: Treatment Diagnosis: Pain in right shoulder (M25.511),Presence of right artificial shoulder joint (Z96.611),Stiffness of right shoulder, not elsewhere classified (M25.611)  Precautions/Allergies:   Post-op/Epinephrine and Aspirin   Fall Risk Score: 1 (? 5 = High Risk)  MD Orders: PT- evaluate and treat, HEP, ROM MEDICAL/REFERRING DIAGNOSIS:  right shoulder hardware removed/reverse TSA, bicep tendon transfer- 17  DATE OF ONSET: initial injury- 17 with R shoulder scope, ASD/RCR/BT-17   [facility] joint separation with biceps rupture -2017 with surgical repair-3/23/17  REFERRING PHYSICIAN: Ivanna Lewis MD  RETURN PHYSICIAN APPOINTMENT: 17     INITIAL ASSESSMENT:  Mr. Klaudia Eng is 22 days s/p R shoulder hardware removal/reverse TSA and bicep tendon transfer. He presents with post-op pain, swelling, wound healing, weakness and decreased ROM that are limiting normalized use and function of R UE. Progress will be slow due to the amount of trauma with surgical interventions to the R shoulder since 2016. He cares for his home and yard as well and needs to be able to drive. He will benefit from PT for guided shoulder rehab per Post-operative protocol to promote safe return to normalized use of the RUE with functional ADL activities. PROBLEM LIST (Impacting functional limitations):  1. Post-op R shoulder pain and swelling  2. Decreased R shoulder ROM   3. Weakness R shoulder INTERVENTIONS PLANNED:  1. Posture education  2. Thermal and electric modalities, manual therapies for pain   3. Manual therapies, therapeutic exercises, HEP for ROM    4. Therapeutic exercises and HEP for strength   TREATMENT PLAN:  Effective Dates: 17 TO 9/15/17.   Frequency/Duration: 2-3 times a week for 12 weeks  GOALS: (Goals have been discussed and agreed upon with patient.)  Short-Term Functional Goals: Time Frame: 6 weeks  1. Report no more than mild intermittent pain to R shoulder with compensatory use during basic functional activities. 2. R shoulder PROM forward elevation greater than 100 degrees to progress into functional ranges. 3. Demonstrate good R shoulder isometric strength with manual testing to progress into strength phase. 4. Independent with initial HEP. Discharge Goals: Time Frame: 12 weeks  1. Pt to demonstrate Good posture correction during exercises  2. No more than 3/10 intermittent pain R shoulder with return to normalized household and work activities, and score less than 50% on the DASH. 3. R shoulder AROM forward elevation greater than 110 degrees and strength to shoulder are grossly WFL's for safe use with normalized activities. 4. Independent with advanced shoulder HEP for continued self-management. Rehabilitation Potential For Stated Goals: Good  Regarding Rene Mixer therapy, I certify that the treatment plan above will be carried out by a therapist or under their direction. Thank you for this referral,  Paulina Forrester, PT,MSPT                            The information in this section was collected on 6/23/17 (except where otherwise noted). HISTORY:   History of Present Injury/Illness (Reason for Referral):  Mr Amber Brery initial injury was the result of a fall back in November of 2016 requiring surgical repair on 1/27/17. He came to therapy reporting the biceps muscle ruptured on February 21st. Therapy was OK'd by MD. He then came to the 2/28/17 therapy appointment reporting the [facility] joint had . MD ok\"d therapy to tolerance. Pt opted for surgery on 3/23/17 to plate the clavicle when no progress could be made with treatment.  He returned to therapy but complained that pain was limiting any functional use of the arm so he opted for shoulder replacement surgery. He received in patient therapy for hand/elbow ROM and then was discharged to home health PT for hand/elbow ROM only. Past Medical History/Comorbidities:   Mr. 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 Mr. Marjorie Antoine  has a past surgical history that includes carpal tunnel release (2012); carpal tunnel release (1980); hernia repair (2010); hernia repair (Left, 2007); lumbar laminectomy; hip replacement (Left, 12/12/2012); knee arthroscopy (Right, 2006); shoulder arthroscopy (Left, 2/17/2012); corneal transplant (Right, 2004) and corneal transplant (Right, 06/14/2016). Social History/Living Environment:     Pt lives with his wife in a one story home. He cares for the yard and home repairs  Prior Level of Function/Work/Activity:  retired  Previous Treatment Approaches:          PT for the R shoulder since 2/14/17  Personal Factors: Other factors that influence how disability is experienced by the patient:  See above PMH for back pain, hip replacement  Current Medications:       Current Outpatient Prescriptions:     temazepam (RESTORIL) 15 mg capsule, Take 15 mg by mouth nightly., Disp: , Rfl:     HYDROmorphone (DILAUDID) 4 mg tablet, Take 4 mg by mouth every four (4) hours as needed. , Disp: , Rfl:        rivaroxaban (XARELTO) 20 mg tab tablet, Take 1 Tab by mouth daily (with breakfast). , Disp: 30 Tab, Rfl: 11    atorvastatin (LIPITOR) 80 mg tablet, Take 40 mg by mouth daily. 1/2 tablet daily  Take DOS per anesthesia protocol. Indications: hyperlipidemia, Disp: , Rfl:     Brimonidine-Timolol (COMBIGAN) 0.2-0.5 % Drop      Date Last Reviewed:  7/3/17   Number of Personal Factors/Comorbidities that affect the Plan of Care: 3+: HIGH COMPLEXITY   EXAMINATION: at evaluation unless otherwise noted     Observation/Orthostatic Postural Assessment:  Surgical wound to R shoulder appears to be healed. Moderate swelling and edema noted along the upper arm down to the elbow and medial forearm region. Bruise is resolving over the upper arm. Palpation:  Tender to palpate over the entire shoulder, biceps and distal clavicle regions. Hardened scar tissue noted along the surgical incision/ biceps region     ROM: 6/28/17                RUE AROM  R Forearm Supination: 74  RUE PROM  R Shoulder Flexion: 90 (after treatment)  R Shoulder ABduction: 70 (after treatment)  R Shoulder External Rotation: 40 (at 45 deg/after treatment)         Strength: n/t                 Special Tests:  None  Neurological Screen: Sensory to R UE diminished to light touch. Functional Mobility:  independent with extra time and compensation. Balance:  No deficits        Body Structures Involved:  1. Bones  2. Joints  3. Muscles  4. Ligaments Body Functions Affected:  1. Sensory/Pain  2. Neuromusculoskeletal  3. Movement Related Activities and Participation Affected:  1. Self Care  2. Domestic Life  3. home care   Number of elements (examined above) that affect the Plan of Care: 3: MODERATE COMPLEXITY   CLINICAL PRESENTATION:   Presentation: Evolving clinical presentation with unstable and unpredictable characteristics: HIGH COMPLEXITY   CLINICAL DECISION MAKING:   Outcome Measure: Tool Used: Disabilities of the Arm, Shoulder and Hand (DASH) Questionnaire - Quick Version  Score:  Initial: 97  Most Recent: X/55 (Date: -- )   Interpretation of Score:  The DASH is designed to measure the activities of daily living in person's with upper extremity dysfunction or pain. Each section is scored on a 1-5 scale, 5 representing the greatest disability. The scores of each section are added together for a total score of 55. Score 11 12-19 20-28 29-37 38-45 46-54 55   Modifier CH CI CJ CK CL CM CN     ? Carrying, Moving, and Handling Objects:     - CURRENT STATUS: CM - 80%-99% impaired, limited or restricted    - GOAL STATUS: CK - 40%-59% impaired, limited or restricted    - D/C STATUS:  ---------------To be determined---------------    Medical Necessity:   · Skilled intervention continues to be required due to need for manual treatment and modalities within post-operative precautions. Reason for Services/Other Comments:  · Patient continues to require skilled intervention due to need for guided progression through rehab. Use of outcome tool(s) and clinical judgement create a POC that gives a: Difficult prediction of patient's progress: HIGH COMPLEXITY            TREATMENT:   (In addition to Assessment/Re-Assessment sessions the following treatments were rendered)  Pre-treatment Symptoms/Complaints:  Pt reports doing the HEP and pulleys. No real pain and trying to not hold it against my body. Feeling stiff   Pain: Initial:   Pain Intensity 1: 1 (2)  Post Session:  2/10     MANUAL THERAPY: ( 19 minutes): ROM. gentle biceps with supination stretch while pt held active wrist extension. Grade 3-- gentle progression to 3- physiological mobilizations to R shoulder ER at 20,45 deg and 65 deg abd, IR at 50 deg abd and abd. Grade 2 to 3-- forward elevation mobilizations with respect to pain   MODALITIES: (15 minutes): Ice via vaso pneumatic compression/Game ready to R shoulder at 42 deg and 50% compression for pain relief .  No adverse effects  Ultrasound :( 9 minutes) continuous ultrasound at 2 W/cm2 to right anterior shoulder and biceps region for mechanical effects on tissue prior to manual treatment  Therapeutic Exercise ( ): none    HEP: continue HEP exercises, use of ice for pain and swelling. Variance: none     Treatment/Session Assessment:    · Response to Treatment:  motion improved after treatment but painful. Pain and stiffness between sessions. soft tissue in the biceps and anterior shoulder region less tight  · Compliance with Program/Exercises: yes per pt. · Recommendations/Intent for next treatment session: \"Next visit will focus on manual treatment, modalities and exercise when cleared by MD\".   Total Treatment Duration:   43       minutes  PT Patient Time In/Time Out  Time In: 0900  Time Out: [address], PT

## 2017-07-10 ENCOUNTER — HOSPITAL ENCOUNTER (OUTPATIENT)
Dept: PHYSICAL THERAPY | Age: 72
Discharge: HOME | End: 2017-07-10
Payer: MEDICARE

## 2017-07-10 PROCEDURE — 97035 APP MDLTY 1+ULTRASOUND EA 15: CPT

## 2017-07-10 PROCEDURE — 97140 MANUAL THERAPY 1/> REGIONS: CPT

## 2017-07-10 PROCEDURE — 97016 VASOPNEUMATIC DEVICE THERAPY: CPT

## 2017-07-10 NOTE — PROGRESS NOTES
Rock Verma  : 1945 [facility] at [facility] FARRUKH LUNA  [address] [facility], [address] [place] [address],[address], [place] [address].  [phone]   [phone]       OUTPATIENT PHYSICAL THERAPY:Daily Note 7/10/2017      ICD-10: Treatment Diagnosis: Pain in right shoulder (M25.511),Presence of right artificial shoulder joint (Z96.611),Stiffness of right shoulder, not elsewhere classified (M25.611)  Precautions/Allergies:   Post-op/Epinephrine and Aspirin   Fall Risk Score: 1 (? 5 = High Risk)  MD Orders: PT- evaluate and treat, HEP, ROM MEDICAL/REFERRING DIAGNOSIS:  right shoulder hardware removed/reverse TSA, bicep tendon transfer- 17  DATE OF ONSET: initial injury- 17 with R shoulder scope, ASD/RCR/BT-17   [facility] joint separation with biceps rupture -2017 with surgical repair-3/23/17  REFERRING PHYSICIAN: Luz Barnes MD  RETURN PHYSICIAN APPOINTMENT: 17     INITIAL ASSESSMENT:  Mr. Koby Grady is 22 days s/p R shoulder hardware removal/reverse TSA and bicep tendon transfer. He presents with post-op pain, swelling, wound healing, weakness and decreased ROM that are limiting normalized use and function of R UE. Progress will be slow due to the amount of trauma with surgical interventions to the R shoulder since 2016. He cares for his home and yard as well and needs to be able to drive. He will benefit from PT for guided shoulder rehab per Post-operative protocol to promote safe return to normalized use of the RUE with functional ADL activities. PROBLEM LIST (Impacting functional limitations):  1. Post-op R shoulder pain and swelling  2. Decreased R shoulder ROM   3. Weakness R shoulder INTERVENTIONS PLANNED:  1. Posture education  2. Thermal and electric modalities, manual therapies for pain   3. Manual therapies, therapeutic exercises, HEP for ROM    4. Therapeutic exercises and HEP for strength   TREATMENT PLAN:  Effective Dates: 17 TO 9/15/17.   Frequency/Duration: 2-3 times a week for 12 weeks  GOALS: (Goals have been discussed and agreed upon with patient.)  Short-Term Functional Goals: Time Frame: 6 weeks  1. Report no more than mild intermittent pain to R shoulder with compensatory use during basic functional activities. 2. R shoulder PROM forward elevation greater than 100 degrees to progress into functional ranges. 3. Demonstrate good R shoulder isometric strength with manual testing to progress into strength phase. 4. Independent with initial HEP. Discharge Goals: Time Frame: 12 weeks  1. Pt to demonstrate Good posture correction during exercises  2. No more than 3/10 intermittent pain R shoulder with return to normalized household and work activities, and score less than 50% on the DASH. 3. R shoulder AROM forward elevation greater than 110 degrees and strength to shoulder are grossly WFL's for safe use with normalized activities. 4. Independent with advanced shoulder HEP for continued self-management. Rehabilitation Potential For Stated Goals: Good  Regarding Alycia Fitzpatrick therapy, I certify that the treatment plan above will be carried out by a therapist or under their direction. Thank you for this referral,  Bailey Elizalde, PT,MSPT                            The information in this section was collected on 6/23/17 (except where otherwise noted). HISTORY:   History of Present Injury/Illness (Reason for Referral):  Mr Camilo Fine initial injury was the result of a fall back in November of 2016 requiring surgical repair on 1/27/17. He came to therapy reporting the biceps muscle ruptured on February 21st. Therapy was OK'd by MD. He then came to the 2/28/17 therapy appointment reporting the [facility] joint had . MD ok\"d therapy to tolerance. Pt opted for surgery on 3/23/17 to plate the clavicle when no progress could be made with treatment.  He returned to therapy but complained that pain was limiting any functional use of the arm so he opted for shoulder replacement surgery. He received in patient therapy for hand/elbow ROM and then was discharged to home health PT for hand/elbow ROM only. Past Medical History/Comorbidities:   Mr. 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 Mr. Kevan Sanchez  has a past surgical history that includes carpal tunnel release (2012); carpal tunnel release (1980); hernia repair (2010); hernia repair (Left, 2007); lumbar laminectomy; hip replacement (Left, 12/12/2012); knee arthroscopy (Right, 2006); shoulder arthroscopy (Left, 2/17/2012); corneal transplant (Right, 2004) and corneal transplant (Right, 06/14/2016). Social History/Living Environment:     Pt lives with his wife in a one story home. He cares for the yard and home repairs  Prior Level of Function/Work/Activity:  retired  Previous Treatment Approaches:          PT for the R shoulder since 2/14/17  Personal Factors: Other factors that influence how disability is experienced by the patient:  See above PMH for back pain, hip replacement  Current Medications:       Current Outpatient Prescriptions:     temazepam (RESTORIL) 15 mg capsule, Take 15 mg by mouth nightly., Disp: , Rfl:     HYDROmorphone (DILAUDID) - out of medication but will get refill       rivaroxaban (XARELTO) 20 mg tab tablet, Take 1 Tab by mouth daily (with breakfast). , Disp: 30 Tab, Rfl: 11    atorvastatin (LIPITOR) 80 mg tablet, Take 40 mg by mouth daily.  1/2 tablet daily Take DOS per anesthesia protocol. Indications: hyperlipidemia, Disp: , Rfl:     Brimonidine-Timolol (COMBIGAN) 0.2-0.5 % Drop      Date Last Reviewed:  7/10/17   Number of Personal Factors/Comorbidities that affect the Plan of Care: 3+: HIGH COMPLEXITY   EXAMINATION: at evaluation unless otherwise noted     Observation/Orthostatic Postural Assessment:  Surgical wound to R shoulder appears to be healed. Moderate swelling and edema noted along the upper arm down to the elbow and medial forearm region. Bruise is resolving over the upper arm. Palpation:  Tender to palpate over the entire shoulder, biceps and distal clavicle regions. Hardened scar tissue noted along the surgical incision/ biceps region     ROM: 7/10/17                RUE PROM  R Shoulder Flexion: 66 (forward elevation)  R Shoulder ABduction: 62  R Shoulder External Rotation: 37 ( at 35 deg abd)         Strength: 7/10/17           wrist and elbow observed to be at least 3+/5      Special Tests:  None  Neurological Screen: Sensory to R UE diminished to light touch. Functional Mobility:  independent with extra time and compensation. Balance:  No deficits        Body Structures Involved:  1. Bones  2. Joints  3. Muscles  4. Ligaments Body Functions Affected:  1. Sensory/Pain  2. Neuromusculoskeletal  3. Movement Related Activities and Participation Affected:  1. Self Care  2. Domestic Life  3. home care   Number of elements (examined above) that affect the Plan of Care: 3: MODERATE COMPLEXITY   CLINICAL PRESENTATION:   Presentation: Evolving clinical presentation with unstable and unpredictable characteristics: HIGH COMPLEXITY   CLINICAL DECISION MAKING:   Outcome Measure: Tool Used: Disabilities of the Arm, Shoulder and Hand (DASH) Questionnaire - Quick Version  Score:  Initial: 97  Most Recent: X/55 (Date: -- )   Interpretation of Score:  The DASH is designed to measure the activities of daily living in person's with upper extremity dysfunction or pain. Each section is scored on a 1-5 scale, 5 representing the greatest disability. The scores of each section are added together for a total score of 55. Score 11 12-19 20-28 29-37 38-45 46-54 55   Modifier CH CI CJ CK CL CM CN     ? Carrying, Moving, and Handling Objects:     - CURRENT STATUS: CM - 80%-99% impaired, limited or restricted    - GOAL STATUS: CK - 40%-59% impaired, limited or restricted    - D/C STATUS:  ---------------To be determined---------------    Medical Necessity:   · Skilled intervention continues to be required due to need for manual treatment and modalities within post-operative precautions. Reason for Services/Other Comments:  · Patient continues to require skilled intervention due to need for guided progression through rehab. Use of outcome tool(s) and clinical judgement create a POC that gives a: Difficult prediction of patient's progress: HIGH COMPLEXITY            TREATMENT:   (In addition to Assessment/Re-Assessment sessions the following treatments were rendered)  Pre-treatment Symptoms/Complaints:  Pt reports the shoulder not popping since last Thursday but has extreme pain with any movement of the wrist, elbow or shoulder. Has not done any of the exercises nor wearing the brace. Using ice due to running out of pain medication  Pain: Initial:   Pain Intensity 1: 8  Post Session:  6/10   Observation: no visible change in soft tissue along the pect or biceps regions. No bruising, swelling or redness noted. Small, 3cm x4 cm spot of redness on the lateral arm area just distal to the middle deltoid. Palpation: tender to palpate along the bicep/incision scar region   MANUAL THERAPY: (20 minutes): for ROM. Gentle PROM progressing to grade 2 physiological mobilizations R shoulder ER/IR, flexion and abduction. Elbow with wrist extension stretching   MODALITIES: (15 minutes):  Ice via vaso pneumatic compression/Game ready to R shoulder at 42 deg and 50% compression for pain relief after manual treatment. No adverse effects  Ultrasound :( 9 minutes)  For mechanical effects on tissue. Continuous at 1.8W/cm2 to biceps/anterior upper R arm region. Therapeutic Exercise ( ): none    HEP: Hold HEP until cleared by MD. use of ice for pain and swelling. Variance: no treatment last visit due to complaints of pain     Treatment/Session Assessment:    · Response to Treatment: poor tolerance to manual treatment when moving only into discomfort  · Compliance with Program/Exercises: no, per pt. · Recommendations/Intent for next treatment session: \"Next visit will focus on manual treatment, modalities and exercise when cleared by MD\".  To MD  Total Treatment Duration:   44       minutes  PT Patient Time In/Time Out  Time In: 1440  Time Out: 287 Fredis Burrell PT

## 2017-07-11 NOTE — PROGRESS NOTES
Ld Love  : 1945 Therapy Center at [facility] JEREMY  [address] [place], [address] [place] [address],[address], [address] [facility]  [phone]   [phone]    Outpatient PHYSICAL THERAPY: PHYSICIAN COMMUNICATION    REFERRING PHYSICIAN: Halie Casillas MD  Return Physician Appointment: 17  MEDICAL/REFERRING DIAGNOSIS:  · right shoulder  ATTENDANCE: Ld Love has attended 8 out of 8 visits     ASSESSMENT:  DATE: 2017    PROGRESS: Ld Love was making slow but steady progress with PROM until coming in to therapy  with complaints of pain \"that felt like the pect muscle had torn\". No signs of tissue injury were noted. Bruise on the upper R arm was resolving as usual. Dr office was notified. Only ice was provided. He was advised to stop the HEP, use ice, support the arm as needed and notify Dr Irina Hagan office if problems worsened. He returned to 7/10/17 visit saying that his pain was not as bad. Again no signs of tissue injury were noted. Therapy focus has been on gently PROM of the shoulder and stretching of the biceps with respect to pain. He has not been wearing the sling due to the amount of discomfort it causes.     AROM ( limited by pain)              7/5/17                                    7/10/17  Shoulder Flexion:                       90 deg                                   66 deg  Shoulder abd:                            70 deg                                   62 deg  Shoulder ER:                            40 deg at 45 deg abd              37 deg at 35 deg abd    RECOMMENDATIONS: Assess and advise on PT    Thank you for this referral,  Savannah Wheeler, PT ,MSPT

## 2017-07-12 ENCOUNTER — HOSPITAL ENCOUNTER (OUTPATIENT)
Dept: PHYSICAL THERAPY | Age: 72
Discharge: HOME | End: 2017-07-12
Payer: MEDICARE

## 2017-07-12 NOTE — PROGRESS NOTES
Shaquille An  : 1945 [facility] at [facility] FARRUKH LUNA  1101 [facility], [address] [place] [address],[address], [facility] [address]  [phone]   [phone]       OUTPATIENT PHYSICAL THERAPY:cancellation note 2017      ICD-10: Treatment Diagnosis: Pain in right shoulder (M25.511),Presence of right artificial shoulder joint (Z96.611),Stiffness of right shoulder, not elsewhere classified (M25.611)  Precautions/Allergies:   Post-op/Epinephrine and Aspirin   Fall Risk Score: 1 (? 5 = High Risk)  MD Orders: PT- evaluate and treat, HEP, ROM MEDICAL/REFERRING DIAGNOSIS:  right shoulder hardware removed/reverse TSA, bicep tendon transfer- 17  DATE OF ONSET: initial injury- 17 with R shoulder scope, ASD/RCR/BT-17   [facility] joint separation with biceps rupture -2017 with surgical repair-3/23/17  REFERRING PHYSICIAN: Mayda Espitia MD  RETURN PHYSICIAN APPOINTMENT: 17     INITIAL ASSESSMENT:  Mr. Nora Vogt is 22 days s/p R shoulder hardware removal/reverse TSA and bicep tendon transfer. He presents with post-op pain, swelling, wound healing, weakness and decreased ROM that are limiting normalized use and function of R UE. Progress will be slow due to the amount of trauma with surgical interventions to the R shoulder since 2016. He cares for his home and yard as well and needs to be able to drive. He will benefit from PT for guided shoulder rehab per Post-operative protocol to promote safe return to normalized use of the RUE with functional ADL activities. PROBLEM LIST (Impacting functional limitations):  1. Post-op R shoulder pain and swelling  2. Decreased R shoulder ROM   3. Weakness R shoulder INTERVENTIONS PLANNED:  1. Posture education  2. Thermal and electric modalities, manual therapies for pain   3. Manual therapies, therapeutic exercises, HEP for ROM    4. Therapeutic exercises and HEP for strength   TREATMENT PLAN:  Effective Dates: 17 TO 9/15/17.   Frequency/Duration: 2-3 times a week for 12 weeks  GOALS: (Goals have been discussed and agreed upon with patient.)  Short-Term Functional Goals: Time Frame: 6 weeks  1. Report no more than mild intermittent pain to R shoulder with compensatory use during basic functional activities. 2. R shoulder PROM forward elevation greater than 100 degrees to progress into functional ranges. 3. Demonstrate good R shoulder isometric strength with manual testing to progress into strength phase. 4. Independent with initial HEP. Discharge Goals: Time Frame: 12 weeks  1. Pt to demonstrate Good posture correction during exercises  2. No more than 3/10 intermittent pain R shoulder with return to normalized household and work activities, and score less than 50% on the DASH. 3. R shoulder AROM forward elevation greater than 110 degrees and strength to shoulder are grossly WFL's for safe use with normalized activities. 4. Independent with advanced shoulder HEP for continued self-management. Rehabilitation Potential For Stated Goals: Good  Regarding Estefany Postin therapy, I certify that the treatment plan above will be carried out by a therapist or under their direction. Thank you for this referral,  Sharda Echeverria PT,MSPT                   Pt stated that MD is putting therapy on hold for now.  Will see about coming back after returning to the doctor

## 2017-07-13 ENCOUNTER — APPOINTMENT (OUTPATIENT)
Dept: PHYSICAL THERAPY | Age: 72
End: 2017-07-13
Payer: MEDICARE

## 2017-07-17 ENCOUNTER — APPOINTMENT (OUTPATIENT)
Dept: PHYSICAL THERAPY | Age: 72
End: 2017-07-17
Payer: MEDICARE

## 2017-07-21 ENCOUNTER — APPOINTMENT (OUTPATIENT)
Dept: PHYSICAL THERAPY | Age: 72
End: 2017-07-21
Payer: MEDICARE

## 2017-07-24 ENCOUNTER — APPOINTMENT (OUTPATIENT)
Dept: PHYSICAL THERAPY | Age: 72
End: 2017-07-24
Payer: MEDICARE

## 2017-07-26 ENCOUNTER — HOSPITAL ENCOUNTER (OUTPATIENT)
Dept: PHYSICAL THERAPY | Age: 72
Discharge: HOME | End: 2017-07-26
Payer: MEDICARE

## 2017-07-26 ENCOUNTER — HOSPITAL ENCOUNTER (OUTPATIENT)
Dept: PHYSICAL THERAPY | Age: 72
End: 2017-07-26
Payer: MEDICARE

## 2017-07-26 PROCEDURE — G8984 CARRY CURRENT STATUS: HCPCS

## 2017-07-26 PROCEDURE — 97110 THERAPEUTIC EXERCISES: CPT

## 2017-07-26 PROCEDURE — 97140 MANUAL THERAPY 1/> REGIONS: CPT

## 2017-07-26 PROCEDURE — G8985 CARRY GOAL STATUS: HCPCS

## 2017-07-26 NOTE — PROGRESS NOTES
Julián Ayon  : 1945 Therapy Center at [address] [place] [address] Rd  1101 [facility], [address] [place] [address] 83,[address], [address] [facility]  [phone]   [phone]       OUTPATIENT PHYSICAL THERAPY:Daily Note and Progress Report 2017      ICD-10: Treatment Diagnosis: Pain in right shoulder (M25.511),Presence of right artificial shoulder joint (Z96.611),Stiffness of right shoulder, not elsewhere classified (M25.611)  Precautions/Allergies:   Post-op/Epinephrine and Aspirin   Fall Risk Score: 1 (? 5 = High Risk)  MD Orders: PT- evaluate and treat, HEP, FULL MOTION/FULL STRENGTH MEDICAL/REFERRING DIAGNOSIS:  right shoulder hardware removed/reverse TSA, bicep tendon transfer- 17  DATE OF ONSET: initial injury- 17 with R shoulder scope, ASD/RCR/BT-17   [facility] joint separation with biceps rupture -2017 with surgical repair-3/23/17  REFERRING PHYSICIAN: Sylvester Tucker MD  RETURN PHYSICIAN APPOINTMENT: 17     INITIAL ASSESSMENT:  Mr. Amarjit Woods is s/p R shoulder reverse TSA with latissimus and teres major tendon transfer. He has previous bicep repair 17 and R shoulder rotator cuff repair 17. He started physical therapy 17 and attended 8 physical therapy sessions and then was held out of therapy for 2 1/2 weeks due to shoulder pain and inflammation. He has returned to therapy 17. He is stiff to the shoulder with PROM and weakness around the R shoulder limiting functional use of R UE. He will benefit from skilled physical therapy to progress ROM, strength, and functional mobility. PROBLEM LIST (Impacting functional limitations):  1. Post-op R shoulder pain and swelling  2. Decreased R shoulder ROM   3. Weakness R shoulder INTERVENTIONS PLANNED:  1. Posture education  2. Thermal and electric modalities, manual therapies for pain   3. Manual therapies, therapeutic exercises, HEP for ROM    4.  Therapeutic exercises and HEP for strength   TREATMENT PLAN:  Effective Dates: 17 TO 9/15/17. Frequency/Duration: 2-3 times a week for 12 weeks  GOALS: (Goals have been discussed and agreed upon with patient.)  Short-Term Functional Goals: Time Frame: 6 weeks  1. Report no more than mild intermittent pain to R shoulder with compensatory use during basic functional activities. Progressing towards  2. R shoulder PROM forward elevation greater than 100 degrees to progress into functional ranges. Progressing towards  3. Demonstrate good R shoulder isometric strength with manual testing to progress into strength phase. Progressing towards  4. Independent with initial HEP. ongoing  Discharge Goals: Time Frame: 12 weeks ONGOING  1. Pt to demonstrate Good posture correction during exercises  2. No more than 3/10 intermittent pain R shoulder with return to normalized household and work activities, and score less than 50% on the DASH. 3. R shoulder AROM forward elevation greater than 110 degrees and strength to shoulder are grossly WFL's for safe use with normalized activities. 4. Independent with advanced shoulder HEP for continued self-management. Rehabilitation Potential For Stated Goals: Good  Regarding Brigantine Endo therapy, I certify that the treatment plan above will be carried out by a therapist or under their direction. Thank you for this referral,    Gonzales Stevens, PT                         The information in this section was collected on 6/23/17 (except where otherwise noted). HISTORY:   History of Present Injury/Illness (Reason for Referral):  Mr Smitha Egan initial injury was the result of a fall back in November of 2016 requiring surgical repair on 1/27/17. He came to therapy reporting the biceps muscle ruptured on February 21st. Therapy was OK'd by MD. He then came to the 2/28/17 therapy appointment reporting the [facility] joint had . MD ok\"d therapy to tolerance. Pt opted for surgery on 3/23/17 to plate the clavicle when no progress could be made with treatment.  He returned to therapy but complained that pain was limiting any functional use of the arm so he opted for shoulder replacement surgery. He received in patient therapy for hand/elbow ROM and then was discharged to home health PT for hand/elbow ROM only. Past Medical History/Comorbidities:   Mr. 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 Mr. Nora Vogt  has a past surgical history that includes carpal tunnel release (2012); carpal tunnel release (1980); hernia repair (2010); hernia repair (Left, 2007); lumbar laminectomy; hip replacement (Left, 12/12/2012); knee arthroscopy (Right, 2006); shoulder arthroscopy (Left, 2/17/2012); corneal transplant (Right, 2004) and corneal transplant (Right, 06/14/2016). Social History/Living Environment:     Pt lives with his wife in a one story home. He cares for the yard and home repairs  Prior Level of Function/Work/Activity:  retired  Previous Treatment Approaches:          PT for the R shoulder since 2/14/17  Personal Factors: Other factors that influence how disability is experienced by the patient:  See above PMH for back pain, hip replacement  Current Medications:     Current Outpatient Prescriptions:     temazepam (RESTORIL) 15 mg capsule, Take 15 mg by mouth nightly., Disp: , Rfl:     HYDROmorphone (DILAUDID) - out of medication but will get refill     rivaroxaban (XARELTO) 20 mg tab tablet, Take 1 Tab by mouth daily (with breakfast). , Disp: 30 Tab, Rfl: 11    atorvastatin (LIPITOR) 80 mg tablet, Take 40 mg by mouth daily. 1/2 tablet daily  Take DOS per anesthesia protocol. Indications: hyperlipidemia, Disp: , Rfl:     Brimonidine-Timolol (COMBIGAN) 0.2-0.5 % Drop    Date Last Reviewed:  7/26/17   Number of Personal Factors/Comorbidities that affect the Plan of Care: 3+: HIGH COMPLEXITY   EXAMINATION: at evaluation unless otherwise noted     Observation/Orthostatic Postural Assessment: Healed incision on Right shoulder. Palpation: decreased tissue mobility to R anterior shoulder and upper arm and deltoid, tightness to right pec major, slight tenderness to subscapularis   ROM:                 RUE AROM  R Shoulder Flexion: 50  RUE PROM  R Shoulder Flexion: 90  R Shoulder ABduction: 70  R Shoulder Internal Rotation: 35  R Shoulder External Rotation: 30 (at neutral)         Strength:         isometric shoulder strength testing in supine with arm at neutral: flexion-strong/painless, abduction-strong/painful, IR-weak/painful, ER-strong/painless            CLINICAL DECISION MAKING:   Outcome Measure: Tool Used: Disabilities of the Arm, Shoulder and Hand (DASH) Questionnaire - Quick Version  Score:  Initial: 97  Most Recent: 44/55 (Date: 7-26-17 )   Interpretation of Score: The DASH is designed to measure the activities of daily living in person's with upper extremity dysfunction or pain. Each section is scored on a 1-5 scale, 5 representing the greatest disability. The scores of each section are added together for a total score of 55. Score 11 12-19 20-28 29-37 38-45 46-54 55   Modifier CH CI CJ CK CL CM CN     ?  Carrying, Moving, and Handling Objects:     - CURRENT STATUS: CL - 60%-79% impaired, limited or restricted    - GOAL STATUS: CK - 40%-59% impaired, limited or restricted    - D/C STATUS:  ---------------To be determined---------------    Medical Necessity:   · Skilled intervention continues to be required due to need for manual treatment and modalities within post-operative precautions. Reason for Services/Other Comments:  · Patient continues to require skilled intervention due to need for guided progression through rehab. Use of outcome tool(s) and clinical judgement create a POC that gives a: Difficult prediction of patient's progress: HIGH COMPLEXITY            TREATMENT:   (In addition to Assessment/Re-Assessment sessions the following treatments were rendered)  Pre-treatment Symptoms/Complaints:  Pt reports he does not have pain, only soreness. Pain: Initial:     soreness 5/10 Post Session:  5/10     MANUAL THERAPY: (30 minutes): for ROM and improved tissue mobility. Pt supine and soft tissue mobilization around R anterior shoulder and upper arm and deltoid. Arm at neutral and subscapularis soft tissue mobilization. PROM to physiological mobilizations for shoulder ER, IR with scapular stabilized, abduction, and forward elevation. Therapeutic Exercise: (10 Minutes):  for muscle activation around the R shoulder. Pt supine and manual isometrics for shoulder IR and ER 5\"x10 ea, shoulder abduction 5\"x10 ea. Active assisted bench press with R UE 2x10. Instructed in pulleys with arm in scapular plane and abduction x10 reps ea. Instructed on shoulder isometrics with shoulder flexion and abduction sitting with using L arm to resist motion, 5\"x10 ea. HEP: pulleys, flexion and abduction shoulder isometrics    Treatment/Session Assessment:    · Response to Treatment: No complaints of pain with ROM or muscle activation exercises. He is very stiff to the R shoulder. · Compliance with Program/Exercises: no, per pt.   · Recommendations/Intent for next treatment session: R shoulder ROM, tissue mobility, and muscle activation  Total Treatment Duration:   40    minutes  PT Patient Time In/Time Out  Time In: 5930  Time Out: [address] [facility] Nasim,

## 2017-07-28 ENCOUNTER — APPOINTMENT (OUTPATIENT)
Dept: PHYSICAL THERAPY | Age: 72
End: 2017-07-28
Payer: MEDICARE

## 2017-07-28 ENCOUNTER — HOSPITAL ENCOUNTER (OUTPATIENT)
Dept: PHYSICAL THERAPY | Age: 72
Discharge: HOME | End: 2017-07-28
Payer: MEDICARE

## 2017-07-28 PROCEDURE — 97140 MANUAL THERAPY 1/> REGIONS: CPT

## 2017-07-28 PROCEDURE — 97110 THERAPEUTIC EXERCISES: CPT

## 2017-07-28 NOTE — PROGRESS NOTES
Flora Dooley  : 1945 Therapy Center at [facility] FARRUKH LUNA  1101 [facility], [address] [place] [address],[address], [facility] [address]  [phone]   [phone]       OUTPATIENT PHYSICAL THERAPY:Daily Note 2017      ICD-10: Treatment Diagnosis: Pain in right shoulder (M25.511),Presence of right artificial shoulder joint (Z96.611),Stiffness of right shoulder, not elsewhere classified (M25.611)  Precautions/Allergies:   Post-op/Epinephrine and Aspirin   Fall Risk Score: 1 (? 5 = High Risk)  MD Orders: PT- evaluate and treat, HEP, FULL MOTION/FULL STRENGTH MEDICAL/REFERRING DIAGNOSIS:  right shoulder hardware removed/reverse TSA, bicep tendon transfer- 17  DATE OF ONSET: initial injury- 17 with R shoulder scope, ASD/RCR/BT-17   [facility] joint separation with biceps rupture -2017 with surgical repair-3/23/17  REFERRING PHYSICIAN: Yonathan Barth MD  RETURN PHYSICIAN APPOINTMENT: 17     INITIAL ASSESSMENT:  Mr. Buster Wing is s/p R shoulder reverse TSA with latissimus and teres major tendon transfer. He has previous bicep repair 17 and R shoulder rotator cuff repair 17. He started physical therapy 17 and attended 8 physical therapy sessions and then was held out of therapy for 2 1/2 weeks due to shoulder pain and inflammation. He has returned to therapy 17. He is stiff to the shoulder with PROM and weakness around the R shoulder limiting functional use of R UE. He will benefit from skilled physical therapy to progress ROM, strength, and functional mobility. PROBLEM LIST (Impacting functional limitations):  1. Post-op R shoulder pain and swelling  2. Decreased R shoulder ROM   3. Weakness R shoulder INTERVENTIONS PLANNED:  1. Posture education  2. Thermal and electric modalities, manual therapies for pain   3. Manual therapies, therapeutic exercises, HEP for ROM    4. Therapeutic exercises and HEP for strength   TREATMENT PLAN:  Effective Dates: 17 TO 9/15/17. Frequency/Duration: 2-3 times a week for 12 weeks  GOALS: (Goals have been discussed and agreed upon with patient.)  Short-Term Functional Goals: Time Frame: 6 weeks  1. Report no more than mild intermittent pain to R shoulder with compensatory use during basic functional activities. Progressing towards  2. R shoulder PROM forward elevation greater than 100 degrees to progress into functional ranges. Progressing towards  3. Demonstrate good R shoulder isometric strength with manual testing to progress into strength phase. Progressing towards  4. Independent with initial HEP. ongoing  Discharge Goals: Time Frame: 12 weeks ONGOING  1. Pt to demonstrate Good posture correction during exercises  2. No more than 3/10 intermittent pain R shoulder with return to normalized household and work activities, and score less than 50% on the DASH. 3. R shoulder AROM forward elevation greater than 110 degrees and strength to shoulder are grossly WFL's for safe use with normalized activities. 4. Independent with advanced shoulder HEP for continued self-management. Rehabilitation Potential For Stated Goals: Good  Regarding Kwasi Phillip therapy, I certify that the treatment plan above will be carried out by a therapist or under their direction. Thank you for this referral,    Palak Wood PT                         The information in this section was collected on 6/23/17 (except where otherwise noted). HISTORY:   History of Present Injury/Illness (Reason for Referral):  Mr Tee Swan initial injury was the result of a fall back in November of 2016 requiring surgical repair on 1/27/17. He came to therapy reporting the biceps muscle ruptured on February 21st. Therapy was OK'd by MD. He then came to the 2/28/17 therapy appointment reporting the [facility] joint had . MD ok\"d therapy to tolerance. Pt opted for surgery on 3/23/17 to plate the clavicle when no progress could be made with treatment.  He returned to therapy but complained that pain was limiting any functional use of the arm so he opted for shoulder replacement surgery. He received in patient therapy for hand/elbow ROM and then was discharged to home health PT for hand/elbow ROM only. Past Medical History/Comorbidities:   Mr. 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 Mr. Klaudia Eng  has a past surgical history that includes carpal tunnel release (2012); carpal tunnel release (1980); hernia repair (2010); hernia repair (Left, 2007); lumbar laminectomy; hip replacement (Left, 12/12/2012); knee arthroscopy (Right, 2006); shoulder arthroscopy (Left, 2/17/2012); corneal transplant (Right, 2004) and corneal transplant (Right, 06/14/2016). Social History/Living Environment:     Pt lives with his wife in a one story home. He cares for the yard and home repairs  Prior Level of Function/Work/Activity:  retired  Previous Treatment Approaches:          PT for the R shoulder since 2/14/17  Personal Factors: Other factors that influence how disability is experienced by the patient:  See above PMH for back pain, hip replacement  Current Medications:     Current Outpatient Prescriptions:     temazepam (RESTORIL) 15 mg capsule, Take 15 mg by mouth nightly., Disp: , Rfl:     HYDROmorphone (DILAUDID) - out of medication but will get refill     rivaroxaban (XARELTO) 20 mg tab tablet, Take 1 Tab by mouth daily (with breakfast). , Disp: 30 Tab, Rfl: 11    atorvastatin (LIPITOR) 80 mg tablet, Take 40 mg by mouth daily. 1/2 tablet daily  Take DOS per anesthesia protocol. Indications: hyperlipidemia, Disp: , Rfl:     Brimonidine-Timolol (COMBIGAN) 0.2-0.5 % Drop    Date Last Reviewed:  7/26/17   Number of Personal Factors/Comorbidities that affect the Plan of Care: 3+: HIGH COMPLEXITY   EXAMINATION: at evaluation unless otherwise noted     Observation/Orthostatic Postural Assessment: Healed incision on Right shoulder. Palpation: decreased tissue mobility to R anterior shoulder and upper arm and deltoid  ROM:                 RUE PROM  R Shoulder Flexion: 100  R Shoulder ABduction: 80  R Shoulder External Rotation: 40 (at neutral, 48 at 70 deg abd)         Strength:                     CLINICAL DECISION MAKING:   Outcome Measure: Tool Used: Disabilities of the Arm, Shoulder and Hand (DASH) Questionnaire - Quick Version  Score:  Initial: 97  Most Recent: 44/55 (Date: 7-26-17 )   Interpretation of Score: The DASH is designed to measure the activities of daily living in person's with upper extremity dysfunction or pain. Each section is scored on a 1-5 scale, 5 representing the greatest disability. The scores of each section are added together for a total score of 55. Score 11 12-19 20-28 29-37 38-45 46-54 55   Modifier CH CI CJ CK CL CM CN     ? Carrying, Moving, and Handling Objects:     - CURRENT STATUS: CL - 60%-79% impaired, limited or restricted    - GOAL STATUS: CK - 40%-59% impaired, limited or restricted    - D/C STATUS:  ---------------To be determined---------------    Medical Necessity:   · Skilled intervention continues to be required due to need for manual treatment and modalities within post-operative precautions. Reason for Services/Other Comments:  · Patient continues to require skilled intervention due to need for guided progression through rehab.    Use of outcome tool(s) and clinical judgement create a POC that gives a: Difficult prediction of patient's progress: HIGH COMPLEXITY            TREATMENT:   (In addition to Assessment/Re-Assessment sessions the following treatments were rendered)  Pre-treatment Symptoms/Complaints:  Pt reports he worked on that [place] for 4 hours yesterday. No pain in the shoulder, just soreness. Pain: Initial:     soreness 6./10 Post Session:  5/10     MANUAL THERAPY: (30 minutes): for ROM and improved tissue mobility. Pt supine and soft tissue mobilization around R anterior shoulder and upper arm and deltoid. Arm at neutral and subscapularis soft tissue mobilization. PROM to physiological mobilizations for shoulder ER, IR with scapular stabilized, abduction, and forward elevation. MET for ER at neutral, ER at 70 deg abd, and FE. Therapeutic Exercise: (10 Minutes):  for muscle activation around the R shoulder. Pt supine and rhythmic stabilization with arm at neutral 30\"x3, shoulder isometric for flexion and abduction 5\"x10 ea. Active assisted bench press in supine with R UE 2x10.  pulleys with arm in scapular plane and abduction x10 reps ea. HEP: pulleys, flexion and abduction shoulder isometrics    Treatment/Session Assessment:    · Response to Treatment: Improved L shoulder PROM today. · Compliance with Program/Exercises: no, per pt.   · Recommendations/Intent for next treatment session: R shoulder ROM, tissue mobility, and muscle activation  Total Treatment Duration:   40    minutes  PT Patient Time In/Time Out  Time In: 1015  Time Out: Blu Hong

## 2017-08-01 ENCOUNTER — HOSPITAL ENCOUNTER (OUTPATIENT)
Dept: PHYSICAL THERAPY | Age: 72
Discharge: HOME | End: 2017-08-01
Payer: MEDICARE

## 2017-08-01 PROCEDURE — 97016 VASOPNEUMATIC DEVICE THERAPY: CPT

## 2017-08-01 PROCEDURE — 97140 MANUAL THERAPY 1/> REGIONS: CPT

## 2017-08-01 NOTE — PROGRESS NOTES
Francisco Javier Marilyn  : 1945 [facility], [address] [place] [address],[address], [address]  [phone]   [phone]       OUTPATIENT PHYSICAL THERAPY:Daily Note 2017      ICD-10: Treatment Diagnosis: Pain in right shoulder (M25.511),Presence of right artificial shoulder joint (Z96.611),Stiffness of right shoulder, not elsewhere classified (M25.611)  Precautions/Allergies:   Post-op/Epinephrine and Aspirin   Fall Risk Score: 1 (? 5 = High Risk)  MD Orders: PT- evaluate and treat, HEP, FULL MOTION/FULL STRENGTH MEDICAL/REFERRING DIAGNOSIS:  right shoulder hardware removed/reverse TSA, bicep tendon transfer- 17  DATE OF ONSET: initial injury- 17 with R shoulder scope, ASD/RCR/BT-17   [facility], [place], operated by [facility] joint separation with biceps rupture -2017 with surgical repair-3/23/17  REFERRING PHYSICIAN: Orion Henderson MD  RETURN PHYSICIAN APPOINTMENT: 17     INITIAL ASSESSMENT:  Mr. Denisha Chaudhary is s/p R shoulder reverse TSA with latissimus and teres major tendon transfer. He has previous bicep repair 17 and R shoulder rotator cuff repair 17. He started physical therapy 17 and attended 8 physical therapy sessions and then was held out of therapy for 2 1/2 weeks due to shoulder pain and inflammation. He has returned to therapy 17. He is stiff to the shoulder with PROM and weakness around the R shoulder limiting functional use of R UE. He will benefit from skilled physical therapy to progress ROM, strength, and functional mobility. PROBLEM LIST (Impacting functional limitations):  1. Post-op R shoulder pain and swelling  2. Decreased R shoulder ROM   3. Weakness R shoulder INTERVENTIONS PLANNED:  1. Posture education  2. Thermal and electric modalities, manual therapies for pain   3. Manual therapies, therapeutic exercises, HEP for ROM    4. Therapeutic exercises and HEP for strength   TREATMENT PLAN:  Effective Dates: 17 TO 9/15/17. Frequency/Duration: 2-3 times a week for 12 weeks  GOALS: (Goals have been discussed and agreed upon with patient.)  Short-Term Functional Goals: Time Frame: 6 weeks  1. Report no more than mild intermittent pain to R shoulder with compensatory use during basic functional activities. Progressing towards  2. R shoulder PROM forward elevation greater than 100 degrees to progress into functional ranges. Progressing towards  3. Demonstrate good R shoulder isometric strength with manual testing to progress into strength phase. Progressing towards  4. Independent with initial HEP. ongoing  Discharge Goals: Time Frame: 12 weeks ONGOING  1. Pt to demonstrate Good posture correction during exercises  2. No more than 3/10 intermittent pain R shoulder with return to normalized household and work activities, and score less than 50% on the DASH. 3. R shoulder AROM forward elevation greater than 110 degrees and strength to shoulder are grossly WFL's for safe use with normalized activities. 4. Independent with advanced shoulder HEP for continued self-management. Rehabilitation Potential For Stated Goals: Good  Regarding [facility] July therapy, I certify that the treatment plan above will be carried out by a therapist or under their direction. Thank you for this referral,    Nova Jones, PT                         The information in this section was collected on 6/23/17 (except where otherwise noted). HISTORY:   History of Present Injury/Illness (Reason for Referral):  Mr Je Fuentes initial injury was the result of a fall back in November of 2016 requiring surgical repair on 1/27/17. He came to therapy reporting the biceps muscle ruptured on February 21st. Therapy was OK'd by MD. He then came to the 2/28/17 therapy appointment reporting the [facility] joint had . MD ok\"d therapy to tolerance. Pt opted for surgery on 3/23/17 to plate the clavicle when no progress could be made with treatment.  He returned to therapy but complained that pain was limiting any functional use of the arm so he opted for shoulder replacement surgery. He received in patient therapy for hand/elbow ROM and then was discharged to home health PT for hand/elbow ROM only. Past Medical History/Comorbidities:   Mr. 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 Mr. Janiya Enriquez  has a past surgical history that includes carpal tunnel release (2012); carpal tunnel release (1980); hernia repair (2010); hernia repair (Left, 2007); lumbar laminectomy; hip replacement (Left, 12/12/2012); knee arthroscopy (Right, 2006); shoulder arthroscopy (Left, 2/17/2012); corneal transplant (Right, 2004) and corneal transplant (Right, 06/14/2016). Social History/Living Environment:     Pt lives with his wife in a one story home. He cares for the yard and home repairs  Prior Level of Function/Work/Activity:  retired  Previous Treatment Approaches:          PT for the R shoulder since 2/14/17  Personal Factors: Other factors that influence how disability is experienced by the patient:  See above PMH for back pain, hip replacement  Current Medications:     Current Outpatient Prescriptions:     temazepam (RESTORIL) 15 mg capsule, Take 15 mg by mouth nightly., Disp: , Rfl:     HYDROmorphone (DILAUDID) - out of medication but will get refill     rivaroxaban (XARELTO) 20 mg tab tablet, Take 1 Tab by mouth daily (with breakfast). , Disp: 30 Tab, Rfl: 11   atorvastatin (LIPITOR) 80 mg tablet, Take 40 mg by mouth daily. 1/2 tablet daily  Take DOS per anesthesia protocol. Indications: hyperlipidemia, Disp: , Rfl:     Brimonidine-Timolol (COMBIGAN) 0.2-0.5 % Drop    Date Last Reviewed:  8/1/17   Number of Personal Factors/Comorbidities that affect the Plan of Care: 3+: HIGH COMPLEXITY   EXAMINATION: at evaluation unless otherwise noted     Observation/Orthostatic Postural Assessment: Healed incision on Right shoulder. Palpation: decreased tissue mobility to R anterior shoulder and upper arm and deltoid  ROM:                           Strength:                     CLINICAL DECISION MAKING:   Outcome Measure: Tool Used: Disabilities of the Arm, Shoulder and Hand (DASH) Questionnaire - Quick Version  Score:  Initial: 97  Most Recent: 44/55 (Date: 7-26-17 )   Interpretation of Score: The DASH is designed to measure the activities of daily living in person's with upper extremity dysfunction or pain. Each section is scored on a 1-5 scale, 5 representing the greatest disability. The scores of each section are added together for a total score of 55. Score 11 12-19 20-28 29-37 38-45 46-54 55   Modifier CH CI CJ CK CL CM CN     ? Carrying, Moving, and Handling Objects:     - CURRENT STATUS: CL - 60%-79% impaired, limited or restricted    - GOAL STATUS: CK - 40%-59% impaired, limited or restricted    - D/C STATUS:  ---------------To be determined---------------    Medical Necessity:   · Skilled intervention continues to be required due to need for manual treatment and modalities within post-operative precautions. Reason for Services/Other Comments:  · Patient continues to require skilled intervention due to need for guided progression through rehab.    Use of outcome tool(s) and clinical judgement create a POC that gives a: Difficult prediction of patient's progress: HIGH COMPLEXITY            TREATMENT:   (In addition to Assessment/Re-Assessment sessions the following treatments were rendered)  Pre-treatment Symptoms/Complaints:  Pt reports he rearranged his bedroom and drove his stick shift camaro this weekend. Did not use the right arm hardly at all. Pain: Initial:   Pain Intensity 1: 10  Post Session:  0-5/10     MANUAL THERAPY: (29 minutes): for ROM. Pt supine and soft tissue mobilization around R pect minor and deltoid. PROM to grade 3- physiological mobilizations for shoulder ER at neutral progressing to 50 deg abd, IR with scapular stabilized, abduction, and forward elevation. Therapeutic Exercise: ( ):  none  Modalities: (10 minutes) ice via game ready/ vaso-pneumatic compression at 44 deg and 50% compression for pain relief after treatment    Treatment/Session Assessment:    · Response to Treatment: shoulder stiff with loss of L shoulder PROM since last visit. Advised pt not to perform heavy activity  · Compliance with Program/Exercises: no, per pt.   · Recommendations/Intent for next treatment session: R shoulder ROM, tissue mobility, and muscle activation  Total Treatment Duration:   39    minutes  PT Patient Time In/Time Out  Time In: 0950  Time Out: [address], [place]

## 2017-08-02 ENCOUNTER — HOSPITAL ENCOUNTER (OUTPATIENT)
Dept: PHYSICAL THERAPY | Age: 72
Discharge: HOME | End: 2017-08-02
Payer: MEDICARE

## 2017-08-02 PROCEDURE — 97110 THERAPEUTIC EXERCISES: CPT

## 2017-08-02 NOTE — PROGRESS NOTES
Francisco Javier Marilyn  : 1945 Therapy Center at [facility] FARRUKH LUNA  [address] [facility], [address] [place] [address],[address], [facility] [address]  [phone]   [phone]       OUTPATIENT PHYSICAL THERAPY:Daily Note 2017      ICD-10: Treatment Diagnosis: Pain in right shoulder (M25.511),Presence of right artificial shoulder joint (Z96.611),Stiffness of right shoulder, not elsewhere classified (M25.611)  Precautions/Allergies:   Post-op/Epinephrine and Aspirin   Fall Risk Score: 1 (? 5 = High Risk)  MD Orders: PT- evaluate and treat, HEP, FULL MOTION/FULL STRENGTH MEDICAL/REFERRING DIAGNOSIS:  right shoulder hardware removed/reverse TSA, bicep tendon transfer- 17  DATE OF ONSET: initial injury- 17 with R shoulder scope, ASD/RCR/BT-17   [facility] joint separation with biceps rupture -2017 with surgical repair-3/23/17  REFERRING PHYSICIAN: Orion Henderson MD  RETURN PHYSICIAN APPOINTMENT: 17     INITIAL ASSESSMENT:  Mr. Denisha Chaudhary is s/p R shoulder reverse TSA with latissimus and teres major tendon transfer. He has previous bicep repair 17 and R shoulder rotator cuff repair 17. He started physical therapy 17 and attended 8 physical therapy sessions and then was held out of therapy for 2 1/2 weeks due to shoulder pain and inflammation. He has returned to therapy 17. He is stiff to the shoulder with PROM and weakness around the R shoulder limiting functional use of R UE. He will benefit from skilled physical therapy to progress ROM, strength, and functional mobility. PROBLEM LIST (Impacting functional limitations):  1. Post-op R shoulder pain and swelling  2. Decreased R shoulder ROM   3. Weakness R shoulder INTERVENTIONS PLANNED:  1. Posture education  2. Thermal and electric modalities, manual therapies for pain   3. Manual therapies, therapeutic exercises, HEP for ROM    4. Therapeutic exercises and HEP for strength   TREATMENT PLAN:  Effective Dates: 17 TO 9/15/17. Frequency/Duration: 2-3 times a week for 12 weeks  GOALS: (Goals have been discussed and agreed upon with patient.)  Short-Term Functional Goals: Time Frame: 6 weeks  1. Report no more than mild intermittent pain to R shoulder with compensatory use during basic functional activities. Progressing towards  2. R shoulder PROM forward elevation greater than 100 degrees to progress into functional ranges. Progressing towards  3. Demonstrate good R shoulder isometric strength with manual testing to progress into strength phase. Progressing towards  4. Independent with initial HEP. ongoing  Discharge Goals: Time Frame: 12 weeks ONGOING  1. Pt to demonstrate Good posture correction during exercises  2. No more than 3/10 intermittent pain R shoulder with return to normalized household and work activities, and score less than 50% on the DASH. 3. R shoulder AROM forward elevation greater than 110 degrees and strength to shoulder are grossly WFL's for safe use with normalized activities. 4. Independent with advanced shoulder HEP for continued self-management. Rehabilitation Potential For Stated Goals: Good  Regarding [facility] therapy, I certify that the treatment plan above will be carried out by a therapist or under their direction. Thank you for this referral,    Orvan Cooler, PT                         The information in this section was collected on 6/23/17 (except where otherwise noted). HISTORY:   History of Present Injury/Illness (Reason for Referral):  Mr Jazmine Zhou initial injury was the result of a fall back in November of 2016 requiring surgical repair on 1/27/17. He came to therapy reporting the biceps muscle ruptured on February 21st. Therapy was OK'd by MD. He then came to the 2/28/17 therapy appointment reporting the [facility] joint had . MD ok\"d therapy to tolerance. Pt opted for surgery on 3/23/17 to plate the clavicle when no progress could be made with treatment.  He returned to therapy but complained that pain was limiting any functional use of the arm so he opted for shoulder replacement surgery. He received in patient therapy for hand/elbow ROM and then was discharged to home health PT for hand/elbow ROM only. Past Medical History/Comorbidities:   Mr. 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 Mr. Sid Melchor  has a past surgical history that includes carpal tunnel release (2012); carpal tunnel release (1980); hernia repair (2010); hernia repair (Left, 2007); lumbar laminectomy; hip replacement (Left, 12/12/2012); knee arthroscopy (Right, 2006); shoulder arthroscopy (Left, 2/17/2012); corneal transplant (Right, 2004) and corneal transplant (Right, 06/14/2016). Social History/Living Environment:     Pt lives with his wife in a one story home. He cares for the yard and home repairs  Prior Level of Function/Work/Activity:  retired  Previous Treatment Approaches:          PT for the R shoulder since 2/14/17  Personal Factors: Other factors that influence how disability is experienced by the patient:  See above PMH for back pain, hip replacement  Current Medications:     Current Outpatient Prescriptions:     temazepam (RESTORIL) 15 mg capsule, Take 15 mg by mouth nightly., Disp: , Rfl:     HYDROmorphone (DILAUDID) - out of medication but will get refill     rivaroxaban (XARELTO) 20 mg tab tablet, Take 1 Tab by mouth daily (with breakfast). , Disp: 30 Tab, Rfl: 11   atorvastatin (LIPITOR) 80 mg tablet, Take 40 mg by mouth daily. 1/2 tablet daily  Take DOS per anesthesia protocol. Indications: hyperlipidemia, Disp: , Rfl:     Brimonidine-Timolol (COMBIGAN) 0.2-0.5 % Drop    Date Last Reviewed:  8/1/17   Number of Personal Factors/Comorbidities that affect the Plan of Care: 3+: HIGH COMPLEXITY   EXAMINATION: at 8/2/17 unless otherwise noted     Observation/Orthostatic Postural Assessment: shrugging and trunk extension with attempts to reach, muscle atrophy with swelling along the R anterior shoulder. Palpation: decreased tissue mobility to R anterior shoulder and upper arm and deltoid, generalized tenderness along the entire shoulder and distal clavicle regions  ROM:                 RUE PROM  R Shoulder Flexion: 104 (forward elevation)  R Shoulder ABduction: 78  R Shoulder External Rotation: 38 (at 10 deg abd, 44 at 45 deg abd)         Strength:        RUE Strength  R Shoulder Flexion: 3+ (within available rrange)  R Shoulder Extension: 4  R Shoulder Horizontal ABduction: 3+ (within available range)  R Shoulder Internal Rotation: 4-  R Shoulder External Rotation: 3  R Elbow Flexion: 4-  R Elbow Extension: 4            CLINICAL DECISION MAKING:   Outcome Measure: Tool Used: Disabilities of the Arm, Shoulder and Hand (DASH) Questionnaire - Quick Version  Score:  Initial: 97  Most Recent: 44/55 (Date: 7-26-17 )   Interpretation of Score: The DASH is designed to measure the activities of daily living in person's with upper extremity dysfunction or pain. Each section is scored on a 1-5 scale, 5 representing the greatest disability. The scores of each section are added together for a total score of 55. Score 11 12-19 20-28 29-37 38-45 46-54 55   Modifier CH CI CJ CK CL CM CN     ?  Carrying, Moving, and Handling Objects:     - CURRENT STATUS: CL - 60%-79% impaired, limited or restricted    - GOAL STATUS: CK - 40%-59% impaired, limited or restricted    - D/C STATUS:  ---------------To be determined---------------    Medical Necessity:   · Skilled intervention continues to be required due to need for manual treatment and modalities within post-operative precautions. Reason for Services/Other Comments:  · Patient continues to require skilled intervention due to need for guided progression through rehab. Use of outcome tool(s) and clinical judgement create a POC that gives a: Difficult prediction of patient's progress: HIGH COMPLEXITY            TREATMENT:   (In addition to Assessment/Re-Assessment sessions the following treatments were rendered)  Pre-treatment Symptoms/Complaints:  Pt reports he rearranged his bedroom and drove his stick shift camaro this weekend. Did not use the right arm hardly at all. Pain: Initial:   Pain Intensity 1: 10  Post Session:  0-5/10     MANUAL THERAPY: ( minutes): none   Therapeutic Exercise: (30 Minutes): for muscle facilitation and strengthening. Pt supine alternating isometrics ER/IR at 20 deg, 45 and 55 deg abd; at 65 deg and 90 deg flexion; active assisted/guided forward elevation with pt controlling descent. Pt sidelying active assisted shoulder abd with manual depression and guidance of the scapula; scapular retraction with manual guidance of the scapula; active assisted midtrap lifts. Standing full arc B bicep curls with 2# R/3# L - all 3x8 reps and respect to fatigue. Modalities: ( minutes) none per pt due to not having any pain    Treatment/Session Assessment:    · Response to Treatment: shoulder remains stiff but improving scapular stability strength. · Compliance with Program/Exercises: no, per pt.   · Recommendations/Intent for next treatment session: R shoulder ROM, tissue mobility, and muscle activation  Total Treatment Duration:   30    minutes  PT Patient Time In/Time Out  Time In: 1115  Time Out: [address], PT

## 2017-08-04 ENCOUNTER — HOSPITAL ENCOUNTER (OUTPATIENT)
Dept: PHYSICAL THERAPY | Age: 72
Discharge: HOME | End: 2017-08-04
Payer: MEDICARE

## 2017-08-04 PROCEDURE — 97110 THERAPEUTIC EXERCISES: CPT

## 2017-08-04 PROCEDURE — 97140 MANUAL THERAPY 1/> REGIONS: CPT

## 2017-08-04 NOTE — PROGRESS NOTES
Jose Lawson  : 1945 [facility] FARRUKH LUNA  [address] [facility], [address] [place] [address],[address], [facility] [address].  [phone]   [phone]       OUTPATIENT PHYSICAL THERAPY:Daily Note 2017      ICD-10: Treatment Diagnosis: Pain in right shoulder (M25.511),Presence of right artificial shoulder joint (Z96.611),Stiffness of right shoulder, not elsewhere classified (M25.611)  Precautions/Allergies:   Post-op/Epinephrine and Aspirin   Fall Risk Score: 1 (? 5 = High Risk)  MD Orders: PT- evaluate and treat, HEP, FULL MOTION/FULL STRENGTH MEDICAL/REFERRING DIAGNOSIS:  right shoulder hardware removed/reverse TSA, bicep tendon transfer- 17  DATE OF ONSET: initial injury- 17 with R shoulder scope, ASD/RCR/BT-17   [facility] joint separation with biceps rupture -2017 with surgical repair-3/23/17  REFERRING PHYSICIAN: Raghav Prescott MD  RETURN PHYSICIAN APPOINTMENT: 17     INITIAL ASSESSMENT:  Mr. Jacque Hernandez is s/p R shoulder reverse TSA with latissimus and teres major tendon transfer. He has previous bicep repair 17 and R shoulder rotator cuff repair 17. He started physical therapy 17 and attended 8 physical therapy sessions and then was held out of therapy for 2 1/2 weeks due to shoulder pain and inflammation. He has returned to therapy 17. He is stiff to the shoulder with PROM and weakness around the R shoulder limiting functional use of R UE. He will benefit from skilled physical therapy to progress ROM, strength, and functional mobility. PROBLEM LIST (Impacting functional limitations):  1. Post-op R shoulder pain and swelling  2. Decreased R shoulder ROM   3. Weakness R shoulder INTERVENTIONS PLANNED:  1. Posture education  2. Thermal and electric modalities, manual therapies for pain   3. Manual therapies, therapeutic exercises, HEP for ROM    4. Therapeutic exercises and HEP for strength   TREATMENT PLAN:  Effective Dates: 17 TO 9/15/17. Frequency/Duration: 2-3 times a week for 12 weeks  GOALS: (Goals have been discussed and agreed upon with patient.)  Short-Term Functional Goals: Time Frame: 6 weeks  1. Report no more than mild intermittent pain to R shoulder with compensatory use during basic functional activities. Progressing towards  2. R shoulder PROM forward elevation greater than 100 degrees to progress into functional ranges. Progressing towards  3. Demonstrate good R shoulder isometric strength with manual testing to progress into strength phase. Progressing towards  4. Independent with initial HEP. ongoing  Discharge Goals: Time Frame: 12 weeks ONGOING  1. Pt to demonstrate Good posture correction during exercises  2. No more than 3/10 intermittent pain R shoulder with return to normalized household and work activities, and score less than 50% on the DASH. 3. R shoulder AROM forward elevation greater than 110 degrees and strength to shoulder are grossly WFL's for safe use with normalized activities. 4. Independent with advanced shoulder HEP for continued self-management. Rehabilitation Potential For Stated Goals: Good              The information in this section was collected on 6/23/17 (except where otherwise noted). HISTORY:   History of Present Injury/Illness (Reason for Referral):  Mr Jazmine Zhou initial injury was the result of a fall back in November of 2016 requiring surgical repair on 1/27/17. He came to therapy reporting the biceps muscle ruptured on February 21st. Therapy was OK'd by MD. He then came to the 2/28/17 therapy appointment reporting the [facility] joint had . MD ok\"d therapy to tolerance. Pt opted for surgery on 3/23/17 to plate the clavicle when no progress could be made with treatment. He returned to therapy but complained that pain was limiting any functional use of the arm so he opted for shoulder replacement surgery.  He received in patient therapy for hand/elbow ROM and then was discharged to home health PT for hand/elbow ROM only. Past Medical History/Comorbidities:   Mr. 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 Mr. Maldonado Zhu  has a past surgical history that includes carpal tunnel release (2012); carpal tunnel release (1980); hernia repair (2010); hernia repair (Left, 2007); lumbar laminectomy; hip replacement (Left, 12/12/2012); knee arthroscopy (Right, 2006); shoulder arthroscopy (Left, 2/17/2012); corneal transplant (Right, 2004) and corneal transplant (Right, 06/14/2016). Social History/Living Environment:     Pt lives with his wife in a one story home. He cares for the yard and home repairs  Prior Level of Function/Work/Activity:  retired  Previous Treatment Approaches:          PT for the R shoulder since 2/14/17  Personal Factors: Other factors that influence how disability is experienced by the patient:  See above PMH for back pain, hip replacement  Current Medications:     Current Outpatient Prescriptions:     temazepam (RESTORIL) 15 mg capsule, Take 15 mg by mouth nightly., Disp: , Rfl:     HYDROmorphone (DILAUDID) - out of medication but will get refill     rivaroxaban (XARELTO) 20 mg tab tablet, Take 1 Tab by mouth daily (with breakfast). , Disp: 30 Tab, Rfl: 11    atorvastatin (LIPITOR) 80 mg tablet, Take 40 mg by mouth daily. 1/2 tablet daily  Take DOS per anesthesia protocol.    Indications: hyperlipidemia, Disp: , Rfl:     Brimonidine-Timolol (COMBIGAN) 0.2-0.5 % Drop    Date Last Reviewed:  8/1/17   Number of Personal Factors/Comorbidities that affect the Plan of Care: 3+: HIGH COMPLEXITY   EXAMINATION: at 8/2/17 unless otherwise noted   8/4/2017  Observation/Orthostatic Postural Assessment: Comes into clinic without distress. Well healed surgical scars R shoulder. Atrophy to deltoid, biceps on R. Palpation: Not assessed. ROM:                 RUE AROM  R Shoulder Flexion: 70 (forard elevation with compenstation)  RUE PROM  R Shoulder Flexion: 90 (forward elevation)  R Shoulder ABduction: 70 (with scapula stabilized)  R Shoulder Internal Rotation: 40 (stabilized in 45 deg scapular plane.)  R Shoulder External Rotation: 40 (in shoulder neutral, 55 at 45-60 deg abduction)         Strength: not measured. CLINICAL DECISION MAKING:   Outcome Measure: Tool Used: Disabilities of the Arm, Shoulder and Hand (DASH) Questionnaire - Quick Version  Score:  Initial: 97  Most Recent: 44/55 (Date: 7-26-17 )   Interpretation of Score: The DASH is designed to measure the activities of daily living in person's with upper extremity dysfunction or pain. Each section is scored on a 1-5 scale, 5 representing the greatest disability. The scores of each section are added together for a total score of 55. Score 11 12-19 20-28 29-37 38-45 46-54 55   Modifier CH CI CJ CK CL CM CN     ? Carrying, Moving, and Handling Objects:     - CURRENT STATUS: CL - 60%-79% impaired, limited or restricted    - GOAL STATUS: CK - 40%-59% impaired, limited or restricted    - D/C STATUS:  ---------------To be determined---------------    Medical Necessity:   · Skilled intervention continues to be required due to need for manual treatment and modalities within post-operative precautions. Reason for Services/Other Comments:  · Patient continues to require skilled intervention due to need for guided progression through rehab.    Use of outcome tool(s) and clinical judgement create a POC that gives a: Difficult prediction of patient's progress: HIGH COMPLEXITY            TREATMENT:   (In addition to Assessment/Re-Assessment sessions the following treatments were rendered)  8/4/2017  Pre-treatment Symptoms/Complaints:  Says his shoulder is sore this morning from last session. Has not been doing too much with it at home. Has been using the pulleys. Pain: Initial:   No VAS. Post Session:       Manual Therapy: (20 Minutes): Positional Release technique to R pec major and lat dorsi/teres major for muscle restrictions to motion. PROM and physiologic mobilizations to R shoulder ER in neutral, 45 and 60 deg abd, abduction, IR stabilized at 45-60 deg abduction/scapton, and forward elevation, grade 2 to 4- - to 4-, 3x30\" each. Therapeutic Exercise: (15 Minutes): Assisted Active Isolated stretch technique to R biceps, wrist/long finger flexors and extensors, and pronators, 3\"x10 each. Instruction in active wrist flexor and extensor stretch with elbow extended; and instruction and performance in supine wand ER stretch at 30 and 45 deg abd and supine wand flexion \"press\" to be done with HEP. Low level shoulder girdle strength with prone unilateral extension to neutral extension x10, side-lying middle trap scap adduction+UE lift-off x10, side-lying abduction to 90-deg, and side-lying ER place and hold with instruction in HEP performance of these. HEP: Verbal instruction for the motion and active strength exercise done above. He verbalizes understanding, but will need review. Treatment/Session Assessment:    · Response to Treatment: Marked stiffness and weakness to whole R UE. He is now 2 months post op his last surgery. He has a very complex R shoulder surgical history.    · Compliance with Program/Exercises:   · Recommendations/Intent for next treatment session: Continue with R shoulder motion treatment; shoulder girdle and whole arm active phase strength progression; and review and update HEP with written instruction.   Total Treatment Duration: 35  minutes  PT Patient Time In/Time Out  Time In: 1130  Time Out: [address] [facility] [place], PT, MSPT, OCS

## 2017-08-07 ENCOUNTER — HOSPITAL ENCOUNTER (OUTPATIENT)
Dept: PHYSICAL THERAPY | Age: 72
Discharge: HOME | End: 2017-08-07
Payer: MEDICARE

## 2017-08-07 PROCEDURE — 97016 VASOPNEUMATIC DEVICE THERAPY: CPT

## 2017-08-07 PROCEDURE — 97110 THERAPEUTIC EXERCISES: CPT

## 2017-08-07 PROCEDURE — 97140 MANUAL THERAPY 1/> REGIONS: CPT

## 2017-08-07 NOTE — PROGRESS NOTES
Pawan Kramer  : 1945 [facility] at [facility] FARRUKH LUNA  [address] [facility], [address] [place] [address],[address], [facility] [address].  [phone]   [phone]       OUTPATIENT PHYSICAL THERAPY:Daily Note 2017      ICD-10: Treatment Diagnosis: Pain in right shoulder (M25.511),Presence of right artificial shoulder joint (Z96.611),Stiffness of right shoulder, not elsewhere classified (M25.611)  Precautions/Allergies:   Post-op/Epinephrine and Aspirin   Fall Risk Score: 1 (? 5 = High Risk)  MD Orders: PT- evaluate and treat, HEP, FULL MOTION/FULL STRENGTH MEDICAL/REFERRING DIAGNOSIS:  right shoulder hardware removed/reverse TSA, bicep tendon transfer- 17  DATE OF ONSET: initial injury- 17 with R shoulder scope, ASD/RCR/BT-17   [facility] joint separation with biceps rupture -2017 with surgical repair-3/23/17  REFERRING PHYSICIAN: Felicity Lewis MD  RETURN PHYSICIAN APPOINTMENT: 17     INITIAL ASSESSMENT:  Mr. Marjorie Antoine is s/p R shoulder reverse TSA with latissimus and teres major tendon transfer. He has previous bicep repair 17 and R shoulder rotator cuff repair 17. He started physical therapy 17 and attended 8 physical therapy sessions and then was held out of therapy for 2 1/2 weeks due to shoulder pain and inflammation. He has returned to therapy 17. He is stiff to the shoulder with PROM and weakness around the R shoulder limiting functional use of R UE. He will benefit from skilled physical therapy to progress ROM, strength, and functional mobility. PROBLEM LIST (Impacting functional limitations):  1. Post-op R shoulder pain and swelling  2. Decreased R shoulder ROM   3. Weakness R shoulder INTERVENTIONS PLANNED:  1. Posture education  2. Thermal and electric modalities, manual therapies for pain   3. Manual therapies, therapeutic exercises, HEP for ROM    4. Therapeutic exercises and HEP for strength   TREATMENT PLAN:  Effective Dates: 17 TO 9/15/17. Frequency/Duration: 2-3 times a week for 12 weeks  GOALS: (Goals have been discussed and agreed upon with patient.)  Short-Term Functional Goals: Time Frame: 6 weeks  1. Report no more than mild intermittent pain to R shoulder with compensatory use during basic functional activities. Progressing towards  2. R shoulder PROM forward elevation greater than 100 degrees to progress into functional ranges. Progressing towards  3. Demonstrate good R shoulder isometric strength with manual testing to progress into strength phase. Progressing towards  4. Independent with initial HEP. ongoing  Discharge Goals: Time Frame: 12 weeks ONGOING  1. Pt to demonstrate Good posture correction during exercises  2. No more than 3/10 intermittent pain R shoulder with return to normalized household and work activities, and score less than 50% on the DASH. 3. R shoulder AROM forward elevation greater than 110 degrees and strength to shoulder are grossly WFL's for safe use with normalized activities. 4. Independent with advanced shoulder HEP for continued self-management. Rehabilitation Potential For Stated Goals: Good              The information in this section was collected on 6/23/17 (except where otherwise noted). HISTORY:   History of Present Injury/Illness (Reason for Referral):  Mr Beau Mendez initial injury was the result of a fall back in November of 2016 requiring surgical repair on 1/27/17. He came to therapy reporting the biceps muscle ruptured on February 21st. Therapy was OK'd by MD. He then came to the 2/28/17 therapy appointment reporting the [facility] joint had . MD ok\"d therapy to tolerance. Pt opted for surgery on 3/23/17 to plate the clavicle when no progress could be made with treatment. He returned to therapy but complained that pain was limiting any functional use of the arm so he opted for shoulder replacement surgery.  He received in patient therapy for hand/elbow ROM and then was discharged to home health PT for hand/elbow ROM only. Past Medical History/Comorbidities:   Mr. 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 Mr. Jen Calix  has a past surgical history that includes carpal tunnel release (2012); carpal tunnel release (1980); hernia repair (2010); hernia repair (Left, 2007); lumbar laminectomy; hip replacement (Left, 12/12/2012); knee arthroscopy (Right, 2006); shoulder arthroscopy (Left, 2/17/2012); corneal transplant (Right, 2004) and corneal transplant (Right, 06/14/2016). Social History/Living Environment:     Pt lives with his wife in a one story home. He cares for the yard and home repairs  Prior Level of Function/Work/Activity:  retired  Previous Treatment Approaches:          PT for the R shoulder since 2/14/17  Personal Factors: Other factors that influence how disability is experienced by the patient:  See above PMH for back pain, hip replacement  Current Medications:     Current Outpatient Prescriptions:     temazepam (RESTORIL) 15 mg capsule, Take 15 mg by mouth nightly., Disp: , Rfl:     HYDROmorphone (DILAUDID) - out of medication but will get refill     rivaroxaban (XARELTO) 20 mg tab tablet, Take 1 Tab by mouth daily (with breakfast). , Disp: 30 Tab, Rfl: 11    atorvastatin (LIPITOR) 80 mg tablet, Take 40 mg by mouth daily. 1/2 tablet daily  Take DOS per anesthesia protocol.    Indications: hyperlipidemia, Disp: , Rfl:     Brimonidine-Timolol (COMBIGAN) 0.2-0.5 % Drop    Date Last Reviewed:  8/7/17   Number of Personal Factors/Comorbidities that affect the Plan of Care: 3+: HIGH COMPLEXITY   EXAMINATION:    8/9/2017  Observation/Orthostatic Postural Assessment: Atrophy to deltoid, biceps on R. Arm held in rested elbow flexion/shoulder IR. Palpation: tender over entire shoulder and upper arm regions. Hardened tissue over the anterior shoulder and biceps region  ROM:                 RUE PROM  R Shoulder Flexion: 102 (forward elevation after treatment)  R Shoulder Internal Rotation: 43 (after treatment, scapula stabilized at 50 deg abd)  R Shoulder External Rotation: 45 (at 45 deg abd)         Strength: not measured. CLINICAL DECISION MAKING:   Outcome Measure: Tool Used: Disabilities of the Arm, Shoulder and Hand (DASH) Questionnaire - Quick Version  Score:  Initial: 97  Most Recent: 44/55 (Date: 7-26-17 )   Interpretation of Score: The DASH is designed to measure the activities of daily living in person's with upper extremity dysfunction or pain. Each section is scored on a 1-5 scale, 5 representing the greatest disability. The scores of each section are added together for a total score of 55. Score 11 12-19 20-28 29-37 38-45 46-54 55   Modifier CH CI CJ CK CL CM CN     ? Carrying, Moving, and Handling Objects:     - CURRENT STATUS: CL - 60%-79% impaired, limited or restricted    - GOAL STATUS: CK - 40%-59% impaired, limited or restricted    - D/C STATUS:  ---------------To be determined---------------    Medical Necessity:   · Skilled intervention continues to be required due to need for manual treatment and modalities within post-operative precautions. Reason for Services/Other Comments:  · Patient continues to require skilled intervention due to need for guided progression through rehab.    Use of outcome tool(s) and clinical judgement create a POC that gives a: Difficult prediction of patient's progress: HIGH COMPLEXITY TREATMENT:   (In addition to Assessment/Re-Assessment sessions the following treatments were rendered)  8/9/2017  Pre-treatment Symptoms/Complaints:  Says his shoulder has been hurting. Has been moving furniture around and cleaning out the house. Has not been using the pulleys but has been trying to do the exercises  Pain: Initial:   8/10 Post Session:  6/10     Manual Therapy: (20 Minutes): Positional Release technique and trigger point massage to R pec major and lat dorsi/teres major for muscle restrictions to motion. PROM progressing to grade 3-- to 4-- physiologic mobilizations to R shoulder ER at 10 deg abd, 45 and again at 60 deg abd, abduction, IR stabilized at 50 deg abduction/scapton, and forward elevation. Therapeutic Exercise: (15 Minutes): Assisted Active Isolated stretch technique to R biceps, wrist extensors, and pronators. Date:  8/7/17 Date:   Date:     Activity/Exercise Parameters Parameters Parameters   midtrap Side 2 x10     Shoulder extn Standing red tband 2x8     Scapular retraction Side manual resisted 2x10     Shoulder abd Side Rhythmic stabilization at 80 and 90 deg,  active 2x8 reps     FE Reacher at waist height with forward step 2x8  Supine rhythmic stabilization at 80 and 90 deg     triceps Supine 2# 2x8                Modalities: (15 minutes): ice via vaso pneumatic compression Game ready at 50% compression and 44 deg for pain relief after stretching. HEP: continue exercises as reviewed and ice for pain control. He verbalizes understanding. Treatment/Session Assessment:    · Response to Treatment: still with significant stiffness and weakness to whole R UE. He is now 2 months post op his last surgery. He has a very complex R shoulder surgical history.  Recommended pt not perform heavy activity with the UE's  · Compliance with Program/Exercises: some, per pt  · Recommendations/Intent for next treatment session: Continue with R shoulder motion treatment; shoulder girdle and whole arm active phase strength progression; and review and update HEP with written instruction.   Total Treatment Duration: 50  minutes  PT Patient Time In/Time Out  Time In: 1300  Time Out: 288 [facility], [address] [place] [address]

## 2017-08-09 ENCOUNTER — HOSPITAL ENCOUNTER (OUTPATIENT)
Dept: PHYSICAL THERAPY | Age: 72
Discharge: HOME | End: 2017-08-09
Payer: MEDICARE

## 2017-08-09 PROCEDURE — 97110 THERAPEUTIC EXERCISES: CPT

## 2017-08-09 PROCEDURE — 97140 MANUAL THERAPY 1/> REGIONS: CPT

## 2017-08-09 NOTE — PROGRESS NOTES
Sarah Myers  : 1945 [facility] FARRUKH LUNA  [address] [facility], [address] [place] [address],[address], [facility] [address].  [phone]   [phone]       OUTPATIENT PHYSICAL THERAPY:Daily Note 2017      ICD-10: Treatment Diagnosis: Pain in right shoulder (M25.511),Presence of right artificial shoulder joint (Z96.611),Stiffness of right shoulder, not elsewhere classified (M25.611)  Precautions/Allergies:   Post-op/Epinephrine and Aspirin   Fall Risk Score: 1 (? 5 = High Risk)  MD Orders: PT- evaluate and treat, HEP, FULL MOTION/FULL STRENGTH MEDICAL/REFERRING DIAGNOSIS:  right shoulder hardware removed/reverse TSA, bicep tendon transfer- 17  DATE OF ONSET: initial injury- 17 with R shoulder scope, ASD/RCR/BT-17   [facility] joint separation with biceps rupture -2017 with surgical repair-3/23/17  REFERRING PHYSICIAN: Alejandra Medley MD  RETURN PHYSICIAN APPOINTMENT: 17     INITIAL ASSESSMENT:  Mr. Austin Mcknight is s/p R shoulder reverse TSA with latissimus and teres major tendon transfer. He has previous bicep repair 17 and R shoulder rotator cuff repair 17. He started physical therapy 17 and attended 8 physical therapy sessions and then was held out of therapy for 2 1/2 weeks due to shoulder pain and inflammation. He has returned to therapy 17. He is stiff to the shoulder with PROM and weakness around the R shoulder limiting functional use of R UE. He will benefit from skilled physical therapy to progress ROM, strength, and functional mobility. PROBLEM LIST (Impacting functional limitations):  1. Post-op R shoulder pain and swelling  2. Decreased R shoulder ROM   3. Weakness R shoulder INTERVENTIONS PLANNED:  1. Posture education  2. Thermal and electric modalities, manual therapies for pain   3. Manual therapies, therapeutic exercises, HEP for ROM    4. Therapeutic exercises and HEP for strength   TREATMENT PLAN:  Effective Dates: 17 TO 9/15/17. Frequency/Duration: 2-3 times a week for 12 weeks  GOALS: (Goals have been discussed and agreed upon with patient.)  Short-Term Functional Goals: Time Frame: 6 weeks  1. Report no more than mild intermittent pain to R shoulder with compensatory use during basic functional activities. Progressing towards  2. R shoulder PROM forward elevation greater than 100 degrees to progress into functional ranges. Progressing towards  3. Demonstrate good R shoulder isometric strength with manual testing to progress into strength phase. Progressing towards  4. Independent with initial HEP. ongoing  Discharge Goals: Time Frame: 12 weeks ONGOING  1. Pt to demonstrate Good posture correction during exercises  2. No more than 3/10 intermittent pain R shoulder with return to normalized household and work activities, and score less than 50% on the DASH. 3. R shoulder AROM forward elevation greater than 110 degrees and strength to shoulder are grossly WFL's for safe use with normalized activities. 4. Independent with advanced shoulder HEP for continued self-management. Rehabilitation Potential For Stated Goals: Good              The information in this section was collected on 6/23/17 (except where otherwise noted). HISTORY:   History of Present Injury/Illness (Reason for Referral):  Mr Nikunj Steen initial injury was the result of a fall back in November of 2016 requiring surgical repair on 1/27/17. He came to therapy reporting the biceps muscle ruptured on February 21st. Therapy was OK'd by MD. He then came to the 2/28/17 therapy appointment reporting the [facility] joint had . MD ok\"d therapy to tolerance. Pt opted for surgery on 3/23/17 to plate the clavicle when no progress could be made with treatment. He returned to therapy but complained that pain was limiting any functional use of the arm so he opted for shoulder replacement surgery.  He received in patient therapy for hand/elbow ROM and then was discharged to home health PT for hand/elbow ROM only. Past Medical History/Comorbidities:   Mr. 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 Mr. Domonique Khoury  has a past surgical history that includes carpal tunnel release (2012); carpal tunnel release (1980); hernia repair (2010); hernia repair (Left, 2007); lumbar laminectomy; hip replacement (Left, 12/12/2012); knee arthroscopy (Right, 2006); shoulder arthroscopy (Left, 2/17/2012); corneal transplant (Right, 2004) and corneal transplant (Right, 06/14/2016). Social History/Living Environment:     Pt lives with his wife in a one story home. He cares for the yard and home repairs  Prior Level of Function/Work/Activity:  retired  Previous Treatment Approaches:          PT for the R shoulder since 2/14/17  Personal Factors: Other factors that influence how disability is experienced by the patient:  See above PMH for back pain, hip replacement  Current Medications:     Current Outpatient Prescriptions:     temazepam (RESTORIL) 15 mg capsule, Take 15 mg by mouth nightly., Disp: , Rfl:     HYDROmorphone (DILAUDID) - out of medication but will get refill     rivaroxaban (XARELTO) 20 mg tab tablet, Take 1 Tab by mouth daily (with breakfast). , Disp: 30 Tab, Rfl: 11    atorvastatin (LIPITOR) 80 mg tablet, Take 40 mg by mouth daily. 1/2 tablet daily  Take DOS per anesthesia protocol.    Indications: hyperlipidemia, Disp: , Rfl:     Brimonidine-Timolol (COMBIGAN) 0.2-0.5 % Drop    Date Last Reviewed:  8/7/17   Number of Personal Factors/Comorbidities that affect the Plan of Care: 3+: HIGH COMPLEXITY   EXAMINATION:    8/9/2017  Observation/Orthostatic Postural Assessment: Atrophy to deltoid, biceps on R. Arm held in rested elbow flexion/shoulder IR. Palpation: tender over entire shoulder and upper arm regions. Hardened tissue over the anterior shoulder and biceps region  ROM:                           Strength: not measured. CLINICAL DECISION MAKING:   Outcome Measure: Tool Used: Disabilities of the Arm, Shoulder and Hand (DASH) Questionnaire - Quick Version  Score:  Initial: 97  Most Recent: 44/55 (Date: 7-26-17 )   Interpretation of Score: The DASH is designed to measure the activities of daily living in person's with upper extremity dysfunction or pain. Each section is scored on a 1-5 scale, 5 representing the greatest disability. The scores of each section are added together for a total score of 55. Score 11 12-19 20-28 29-37 38-45 46-54 55   Modifier CH CI CJ CK CL CM CN     ? Carrying, Moving, and Handling Objects:     - CURRENT STATUS: CL - 60%-79% impaired, limited or restricted    - GOAL STATUS: CK - 40%-59% impaired, limited or restricted    - D/C STATUS:  ---------------To be determined---------------    Medical Necessity:   · Skilled intervention continues to be required due to need for manual treatment and modalities within post-operative precautions. Reason for Services/Other Comments:  · Patient continues to require skilled intervention due to need for guided progression through rehab.    Use of outcome tool(s) and clinical judgement create a POC that gives a: Difficult prediction of patient's progress: HIGH COMPLEXITY            TREATMENT:   (In addition to Assessment/Re-Assessment sessions the following treatments were rendered)  8/9/2017  Pre-treatment Symptoms/Complaints:  Says his shoulder has been sore from laying sod in his yard yesterday. Has been using the pulleys to stretch. doing the exercises as he can  Pain: Initial:   4/10 Post Session:  2/10     Manual Therapy: (15 Minutes): Positional Release technique and trigger point massage to R pec major and lat dorsi/teres major for muscle restrictions to motion. PROM progressing to grade 3-- to 4-- physiologic mobilizations to R shoulder ER at 10 deg abd, 45 and again at 60 deg abd, abduction, IR stabilized at 50 deg abduction/scapton, and forward elevation. Therapeutic Exercise: (28 Minutes): Active Assisted forward elevation in standing x8 reps . Date:  8/7/17 Date:  8/9/17 Date:     Activity/Exercise Parameters Parameters Parameters   midtrap Side 2 x10 Side 2x10    Shoulder extn Standing red tband 2x8 Standing in tandem stance red 2x10    Scapular retraction Side manual resisted 2x10 Side 2x12    Shoulder abd Side Rhythmic stabilization at 80 and 90 deg,  active 2x8 reps Side 2x10  Rhythmic stabilization at 80 and 90 deg    FE Reacher at waist height with forward step 2x8  Supine rhythmic stabilization at 80 and 90 deg Supine rhythmic stabilization at 75, 85 and 90 deg  Supine with elbow flexed 2x8  Standing AA    triceps Supine 2# 2x8 Supine 2# x 8 x 10               Modalities: ( minutes):none. HEP: continue HEP. He verbalizes understanding. Treatment/Session Assessment:    · Response to Treatment: still with significant stiffness and weakness to whole R UE. Improved tolerance and technique with exercises today. He has a very complex R shoulder surgical history. · Compliance with Program/Exercises: some, per pt  · Recommendations/Intent for next treatment session: Continue with R shoulder motion treatment; shoulder girdle and whole arm active phase strength progression; and review and update HEP with written instruction as ready.   Total Treatment Duration: 43  minutes  PT Patient Time In/Time Out  Time In: 1300  Time Out: Storm 25, PT, [place]

## 2017-08-11 ENCOUNTER — HOSPITAL ENCOUNTER (OUTPATIENT)
Dept: PHYSICAL THERAPY | Age: 72
Discharge: HOME | End: 2017-08-11
Payer: MEDICARE

## 2017-08-11 PROCEDURE — 97140 MANUAL THERAPY 1/> REGIONS: CPT

## 2017-08-11 PROCEDURE — 97016 VASOPNEUMATIC DEVICE THERAPY: CPT

## 2017-08-11 PROCEDURE — 97110 THERAPEUTIC EXERCISES: CPT

## 2017-08-11 NOTE — PROGRESS NOTES
Dameon Salvador  : 1945 [facility] FARRUKH LUNA  1101 [facility], [address] [place] [address],[address], [facility] [address]  [phone]   [phone]       OUTPATIENT PHYSICAL THERAPY:Daily Note 2017      ICD-10: Treatment Diagnosis: Pain in right shoulder (M25.511),Presence of right artificial shoulder joint (Z96.611),Stiffness of right shoulder, not elsewhere classified (M25.611)  Precautions/Allergies:   Post-op/Epinephrine and Aspirin   Fall Risk Score: 1 (? 5 = High Risk)  MD Orders: PT- evaluate and treat, HEP, FULL MOTION/FULL STRENGTH MEDICAL/REFERRING DIAGNOSIS:  right shoulder hardware removed/reverse TSA, bicep tendon transfer- 17  DATE OF ONSET: initial injury- 17 with R shoulder scope, ASD/RCR/BT-17   [facility] joint separation with biceps rupture -2017 with surgical repair-3/23/17  REFERRING PHYSICIAN: Osmany Rodriguez MD  RETURN PHYSICIAN APPOINTMENT: 17     INITIAL ASSESSMENT:  Mr. Maldonado Zhu is s/p R shoulder reverse TSA with latissimus and teres major tendon transfer. He has a very complex R shoulder surgical history. He had previous bicep repair 17 and R shoulder rotator cuff repair 17. He started physical therapy 17 and attended 8 physical therapy sessions and then was held out of therapy for 2 1/2 weeks due to shoulder pain and inflammation. He has returned to therapy 17. He is stiff to the shoulder with PROM and weakness around the R shoulder limiting functional use of R UE. He will benefit from skilled physical therapy to progress ROM, strength, and functional mobility. PROBLEM LIST (Impacting functional limitations):  1. Post-op R shoulder pain and swelling  2. Decreased R shoulder ROM   3. Weakness R shoulder INTERVENTIONS PLANNED:  1. Posture education  2. Thermal and electric modalities, manual therapies for pain   3. Manual therapies, therapeutic exercises, HEP for ROM    4.  Therapeutic exercises and HEP for strength   TREATMENT PLAN:  Effective Dates: 6/23/17 TO 9/15/17. Frequency/Duration: 2-3 times a week for 12 weeks  GOALS: (Goals have been discussed and agreed upon with patient.)  Short-Term Functional Goals: Time Frame: 6 weeks  1. Report no more than mild intermittent pain to R shoulder with compensatory use during basic functional activities. Progressing towards  2. R shoulder PROM forward elevation greater than 100 degrees to progress into functional ranges. Progressing towards  3. Demonstrate good R shoulder isometric strength with manual testing to progress into strength phase. Progressing towards  4. Independent with initial HEP. ongoing  Discharge Goals: Time Frame: 12 weeks ONGOING  1. Pt to demonstrate Good posture correction during exercises  2. No more than 3/10 intermittent pain R shoulder with return to normalized household and work activities, and score less than 50% on the DASH. 3. R shoulder AROM forward elevation greater than 110 degrees and strength to shoulder are grossly WFL's for safe use with normalized activities. 4. Independent with advanced shoulder HEP for continued self-management. Rehabilitation Potential For Stated Goals: Good              The information in this section was collected on 6/23/17 (except where otherwise noted). HISTORY:   History of Present Injury/Illness (Reason for Referral):  Mr Luis Alfredo Yates initial injury was the result of a fall back in November of 2016 requiring surgical repair on 1/27/17. He came to therapy reporting the biceps muscle ruptured on February 21st. Therapy was OK'd by MD. He then came to the 2/28/17 therapy appointment reporting the [facility] joint had . MD ok\"d therapy to tolerance. Pt opted for surgery on 3/23/17 to plate the clavicle when no progress could be made with treatment. He returned to therapy but complained that pain was limiting any functional use of the arm so he opted for shoulder replacement surgery.  He received in patient therapy for hand/elbow ROM and then was discharged to home health PT for hand/elbow ROM only. Past Medical History/Comorbidities:   Mr. 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 Mr. Domonique Khoury  has a past surgical history that includes carpal tunnel release (2012); carpal tunnel release (1980); hernia repair (2010); hernia repair (Left, 2007); lumbar laminectomy; hip replacement (Left, 12/12/2012); knee arthroscopy (Right, 2006); shoulder arthroscopy (Left, 2/17/2012); corneal transplant (Right, 2004) and corneal transplant (Right, 06/14/2016). Social History/Living Environment:     Pt lives with his wife in a one story home. He cares for the yard and home repairs  Prior Level of Function/Work/Activity:  retired  Previous Treatment Approaches:          PT for the R shoulder since 2/14/17  Personal Factors: Other factors that influence how disability is experienced by the patient:  See above PMH for back pain, hip replacement  Current Medications:     Current Outpatient Prescriptions:     temazepam (RESTORIL) 15 mg capsule, Take 15 mg by mouth nightly., Disp: , Rfl:     HYDROmorphone (DILAUDID) - out of medication but will get refill     rivaroxaban (XARELTO) 20 mg tab tablet, Take 1 Tab by mouth daily (with breakfast). , Disp: 30 Tab, Rfl: 11    atorvastatin (LIPITOR) 80 mg tablet, Take 40 mg by mouth daily. 1/2 tablet daily  Take DOS per anesthesia protocol.    Indications: hyperlipidemia, Disp: , Rfl:   Brimonidine-Timolol (COMBIGAN) 0.2-0.5 % Drop    Date Last Reviewed:  8/11/17   Number of Personal Factors/Comorbidities that affect the Plan of Care: 3+: HIGH COMPLEXITY   EXAMINATION:    8/11/2017  Observation/Orthostatic Postural Assessment: Atrophy to deltoid, biceps on R. Arm held in rested elbow flexion/shoulder IR. Palpation: tender over entire shoulder and upper arm regions. Hardened tissue over the anterior shoulder and biceps region  ROM:                 RUE PROM  R Shoulder Flexion: 103  R Shoulder ABduction: 75         Strength: not measured. CLINICAL DECISION MAKING:   Outcome Measure: Tool Used: Disabilities of the Arm, Shoulder and Hand (DASH) Questionnaire - Quick Version  Score:  Initial: 97  Most Recent: 44/55 (Date: 7-26-17 )   Interpretation of Score: The DASH is designed to measure the activities of daily living in person's with upper extremity dysfunction or pain. Each section is scored on a 1-5 scale, 5 representing the greatest disability. The scores of each section are added together for a total score of 55. Score 11 12-19 20-28 29-37 38-45 46-54 55   Modifier CH CI CJ CK CL CM CN     ? Carrying, Moving, and Handling Objects:     - CURRENT STATUS: CL - 60%-79% impaired, limited or restricted    - GOAL STATUS: CK - 40%-59% impaired, limited or restricted    - D/C STATUS:  ---------------To be determined---------------    Medical Necessity:   · Skilled intervention continues to be required due to need for manual treatment and modalities within post-operative precautions. Reason for Services/Other Comments:  · Patient continues to require skilled intervention due to need for guided progression through rehab.    Use of outcome tool(s) and clinical judgement create a POC that gives a: Difficult prediction of patient's progress: HIGH COMPLEXITY            TREATMENT:   (In addition to Assessment/Re-Assessment sessions the following treatments were rendered)  8/11/2017  Pre-treatment Symptoms/Complaints:  Says his shoulder has been doing ok. doing the exercises and stretching with the pulleys  Pain: Initial: Pain Intensity 1: 2 /10 Post Session:  2/10     Manual Therapy: (10 Minutes): Positional Release technique and trigger point massage to R lat dorsi/teres major for muscle restrictions to motion. grade 3-- to 4-- physiologic mobilizations to R shoulder ER at 10 deg abd, 45 and again at 65 deg abd, abduction, IR stabilized at 50 deg abduction, and forward elevation. Therapeutic Exercise: (20 Minutes): for strengthening and AROM. Date:  8/7/17 Date:  8/9/17 Date:  8/11/17   Activity/Exercise Parameters Parameters Parameters   midtrap Side 2 x10 Side 2x10 Side 2x10   Shoulder extn Standing red tband 2x8 Standing in tandem stance red 2x10    Scapular retraction Side manual resisted 2x10 Side 2x12 Side 3x8   Shoulder abd Side Rhythmic stabilization at 80 and 90 deg,  active 2x8 reps Side 2x10  Rhythmic stabilization at 80 and 90 deg Side x8 x 12  Rhythmic stabilization at 75 and 90 deg   FE Reacher at waist height with forward step 2x8  Supine rhythmic stabilization at 80 and 90 deg Supine rhythmic stabilization at 75, 85 and 90 deg  Supine with elbow flexed 2x8  Standing AA supine Rhythmic stabilization at 75, 85 and 90 deg  Supine elbow flexed x10   triceps Supine 2# 2x8 Supine 2# x 8 x 10 Supine 2# 2x10              Modalities: (10  Minutes): ice via vaso pneumatic compression to R shoulder at 50% compression and 42 deg for pain relief. HEP: continue HEP. He verbalizes understanding. Treatment/Session Assessment:    · Response to Treatment:     · Compliance with Program/Exercises: some, per pt  · Recommendations/Intent for next treatment session: Continue with R shoulder motion treatment; shoulder girdle and whole arm active phase strength progression; and review and update HEP with written instruction as ready.   Total Treatment Duration: 40         minutes  PT Patient Time In/Time Out  Time In: 0900  Time Out: 201 [facility], PT, [facility]

## 2017-08-14 ENCOUNTER — HOSPITAL ENCOUNTER (OUTPATIENT)
Dept: PHYSICAL THERAPY | Age: 72
Discharge: HOME | End: 2017-08-14
Payer: MEDICARE

## 2017-08-14 PROCEDURE — 97140 MANUAL THERAPY 1/> REGIONS: CPT

## 2017-08-14 PROCEDURE — 97110 THERAPEUTIC EXERCISES: CPT

## 2017-08-14 PROCEDURE — 97016 VASOPNEUMATIC DEVICE THERAPY: CPT

## 2017-08-14 NOTE — PROGRESS NOTES
Francisco Javier Marilyn  : 1945 [facility]  [address] [facility], [address] [place] [address],[address], [facility] [address].  [phone]   [phone]       OUTPATIENT PHYSICAL THERAPY:Daily Note 2017      ICD-10: Treatment Diagnosis: Pain in right shoulder (M25.511),Presence of right artificial shoulder joint (Z96.611),Stiffness of right shoulder, not elsewhere classified (M25.611)  Precautions/Allergies:   Post-op/Epinephrine and Aspirin   Fall Risk Score: 1 (? 5 = High Risk)  MD Orders: PT- evaluate and treat, HEP, FULL MOTION/FULL STRENGTH MEDICAL/REFERRING DIAGNOSIS:  right shoulder hardware removed/reverse TSA, bicep tendon transfer- 17  DATE OF ONSET: initial injury- 17 with R shoulder scope, ASD/RCR/BT-17   [facility] [place] joint separation with biceps rupture -2017 with surgical repair-3/23/17  REFERRING PHYSICIAN: Orion Henderson MD  RETURN PHYSICIAN APPOINTMENT: 17     INITIAL ASSESSMENT:  Mr. Denisha Chaudhary is s/p R shoulder reverse TSA with latissimus and teres major tendon transfer. He has a very complex R shoulder surgical history. He had previous bicep repair 17 and R shoulder rotator cuff repair 17. He started physical therapy 17 and attended 8 physical therapy sessions and then was held out of therapy for 2 1/2 weeks due to shoulder pain and inflammation. He has returned to therapy 17. He is stiff to the shoulder with PROM and weakness around the R shoulder limiting functional use of R UE. He will benefit from skilled physical therapy to progress ROM, strength, and functional mobility. PROBLEM LIST (Impacting functional limitations):  1. Post-op R shoulder pain and swelling  2. Decreased R shoulder ROM   3. Weakness R shoulder INTERVENTIONS PLANNED:  1. Posture education  2. Thermal and electric modalities, manual therapies for pain   3. Manual therapies, therapeutic exercises, HEP for ROM    4.  Therapeutic exercises and HEP for strength   TREATMENT PLAN:  Effective Dates: 6/23/17 TO 9/15/17. Frequency/Duration: 2-3 times a week for 12 weeks  GOALS: (Goals have been discussed and agreed upon with patient.)  Short-Term Functional Goals: Time Frame: 6 weeks  1. Report no more than mild intermittent pain to R shoulder with compensatory use during basic functional activities. Progressing towards  2. R shoulder PROM forward elevation greater than 100 degrees to progress into functional ranges. Progressing towards  3. Demonstrate good R shoulder isometric strength with manual testing to progress into strength phase. Progressing towards  4. Independent with initial HEP. ongoing  Discharge Goals: Time Frame: 12 weeks ONGOING  1. Pt to demonstrate Good posture correction during exercises  2. No more than 3/10 intermittent pain R shoulder with return to normalized household and work activities, and score less than 50% on the DASH. 3. R shoulder AROM forward elevation greater than 110 degrees and strength to shoulder are grossly WFL's for safe use with normalized activities. 4. Independent with advanced shoulder HEP for continued self-management. Rehabilitation Potential For Stated Goals: Good              The information in this section was collected on 6/23/17 (except where otherwise noted). HISTORY:   History of Present Injury/Illness (Reason for Referral):  Mr Heike Abraham initial injury was the result of a fall back in November of 2016 requiring surgical repair on 1/27/17. He came to therapy reporting the biceps muscle ruptured on February 21st. Therapy was OK'd by MD. He then came to the 2/28/17 therapy appointment reporting the [facility] joint had . MD ok\"d therapy to tolerance. Pt opted for surgery on 3/23/17 to plate the clavicle when no progress could be made with treatment. He returned to therapy but complained that pain was limiting any functional use of the arm so he opted for shoulder replacement surgery.  He received in patient therapy for hand/elbow ROM and then was discharged to home health PT for hand/elbow ROM only. Past Medical History/Comorbidities:   Mr. 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 Mr. Marjorie Antoine  has a past surgical history that includes carpal tunnel release (2012); carpal tunnel release (1980); hernia repair (2010); hernia repair (Left, 2007); lumbar laminectomy; hip replacement (Left, 12/12/2012); knee arthroscopy (Right, 2006); shoulder arthroscopy (Left, 2/17/2012); corneal transplant (Right, 2004) and corneal transplant (Right, 06/14/2016). Social History/Living Environment:     Pt lives with his wife in a one story home. He cares for the yard and home repairs  Prior Level of Function/Work/Activity:  retired  Previous Treatment Approaches:          PT for the R shoulder since 2/14/17  Personal Factors: Other factors that influence how disability is experienced by the patient:  See above PMH for back pain, hip replacement  Current Medications:     Current Outpatient Prescriptions:     temazepam (RESTORIL) 15 mg capsule, Take 15 mg by mouth nightly., Disp: , Rfl:     HYDROmorphone (DILAUDID) - out of medication but will get refill     rivaroxaban (XARELTO) 20 mg tab tablet, Take 1 Tab by mouth daily (with breakfast). , Disp: 30 Tab, Rfl: 11    atorvastatin (LIPITOR) 80 mg tablet, Take 40 mg by mouth daily. 1/2 tablet daily  Take DOS per anesthesia protocol.    Indications: hyperlipidemia, Disp: , Rfl:   Brimonidine-Timolol (COMBIGAN) 0.2-0.5 % Drop      Sleeping medication   Date Last Reviewed:  8/14/17   Number of Personal Factors/Comorbidities that affect the Plan of Care: 3+: HIGH COMPLEXITY   EXAMINATION:    8/14/2017  Observation/Orthostatic Postural Assessment: Atrophy to deltoid, biceps on R. Arm held in more relaxed position. Bruising over the left ey from falling    Palpation: tender over the anterior shoulder, bicep and upper arm regions. Hardened tissue over the anterior shoulder and biceps region  ROM:                 RUE AROM  R Shoulder Flexion: 83         Strength: not measured. CLINICAL DECISION MAKING:   Outcome Measure: Tool Used: Disabilities of the Arm, Shoulder and Hand (DASH) Questionnaire - Quick Version  Score:  Initial: 97  Most Recent: 44/55 (Date: 7-26-17 )   Interpretation of Score: The DASH is designed to measure the activities of daily living in person's with upper extremity dysfunction or pain. Each section is scored on a 1-5 scale, 5 representing the greatest disability. The scores of each section are added together for a total score of 55. Score 11 12-19 20-28 29-37 38-45 46-54 55   Modifier CH CI CJ CK CL CM CN     ? Carrying, Moving, and Handling Objects:     - CURRENT STATUS: CL - 60%-79% impaired, limited or restricted    - GOAL STATUS: CK - 40%-59% impaired, limited or restricted    - D/C STATUS:  ---------------To be determined---------------    Medical Necessity:   · Skilled intervention continues to be required due to need for manual treatment and modalities within post-operative precautions. Reason for Services/Other Comments:  · Patient continues to require skilled intervention due to need for guided progression through rehab.    Use of outcome tool(s) and clinical judgement create a POC that gives a: Difficult prediction of patient's progress: HIGH COMPLEXITY            TREATMENT:   (In addition to Assessment/Re-Assessment sessions the following treatments were rendered)  8/14/2017  Pre-treatment Symptoms/Complaints:  Says his shoulder has been doing pretty good. Feels like it moves better. Pt reports falling out of the bed of his  truck and again later in his yard. did the exercises some and stretching with the pulleys  Pain: Initial: Pain Intensity 1: 5 /10 Post Session:  2/10     Manual Therapy: (12 Minutes): Positional Release technique and trigger point massage to R pect, R lat dorsi/teres major and bicep for muscle restrictions to motion. grade 3-- to 4-- physiologic mobilizations to R shoulder ER at 10 deg abd, 75 deg abd and 90 deg flex, abduction, IR stabilized at 50 deg abduction, and forward elevation. Therapeutic Exercise: (22 Minutes): for strengthening and AROM. date Date:  8/7/17 Date:  8/9/17 Date:  8/11/17 8/14/17     Activity/Exercise Parameters Parameters Parameters      midtrap Side 2 x10 Side 2x10 Side 2x10 Side 1# 2x10     Shoulder extn Standing red tband 2x8 Standing in tandem stance red 2x10  Standing yellow x10     Scapular retraction Side manual resisted 2x10 Side 2x12 Side 3x8 Standing yellow 2x10     Shoulder abd Side Rhythmic stabilization at 80 and 90 deg,  active 2x8 reps Side 2x10  Rhythmic stabilization at 80 and 90 deg Side x8 x 12  Rhythmic stabilization at 75 and 90 deg Side 1# 2x8     FE Reacher at waist height with forward step 2x8  Supine rhythmic stabilization at 80 and 90 deg Supine rhythmic stabilization at 75, 85 and 90 deg  Supine with elbow flexed 2x8  Standing AA supine Rhythmic stabilization at 75, 85 and 90 deg  Supine elbow flexed x10 reacher at waist height 2x10   . Supine 1# 2x10     triceps Supine 2# 2x8 Supine 2# x 8 x 10 Supine 2# 2x10 Supine 3# 2x10     Bicep curls    Supine 2# 2x10          Modalities: (10  Minutes): ice via vaso pneumatic compression to R shoulder at 50% compression and 42 deg for pain relief after manual treatment. HEP: continue HEP.  He verbalizes understanding. Treatment/Session Assessment:    · Response to Treatment:  Less pain with exercises. Less tension in the muscles. Encouraged pt to perform the HEP   · Compliance with Program/Exercises: some, per pt. My working was exercise  · Recommendations/Intent for next treatment session: Continue with R shoulder motion treatment; shoulder girdle and whole arm active phase strength progression; and review and update HEP with written instruction as ready.   Total Treatment Duration:         44         minutes  PT Patient Time In/Time Out  Time In: 1300  Time Out: Storm 25, PT, [place]

## 2017-08-16 ENCOUNTER — HOSPITAL ENCOUNTER (OUTPATIENT)
Dept: PHYSICAL THERAPY | Age: 72
Discharge: HOME | End: 2017-08-16
Payer: MEDICARE

## 2017-08-16 PROCEDURE — 97140 MANUAL THERAPY 1/> REGIONS: CPT

## 2017-08-16 PROCEDURE — 97110 THERAPEUTIC EXERCISES: CPT

## 2017-08-16 NOTE — PROGRESS NOTES
Ky Blizzard  : 1945 [facility]  [address] [facility], [address] [place] [address],[address], [facility] [address]  [phone]   [phone]       OUTPATIENT PHYSICAL THERAPY:Daily Note 2017      ICD-10: Treatment Diagnosis: Pain in right shoulder (M25.511),Presence of right artificial shoulder joint (Z96.611),Stiffness of right shoulder, not elsewhere classified (M25.611)  Precautions/Allergies:   Post-op/Epinephrine and Aspirin   Fall Risk Score: 1 (? 5 = High Risk)  MD Orders: PT- evaluate and treat, HEP, FULL MOTION/FULL STRENGTH MEDICAL/REFERRING DIAGNOSIS:  right shoulder hardware removed/reverse TSA, bicep tendon transfer- 17  DATE OF ONSET: initial injury- 17 with R shoulder scope, ASD/RCR/BT-17   [facility] joint separation with biceps rupture -2017 with surgical repair-3/23/17  REFERRING PHYSICIAN: Haydee Rosales MD  RETURN PHYSICIAN APPOINTMENT: 17     INITIAL ASSESSMENT:  Mr. Daisy Jimenez is s/p R shoulder reverse TSA with latissimus and teres major tendon transfer. He has a very complex R shoulder surgical history. He had previous bicep repair 17 and R shoulder rotator cuff repair 17. He started physical therapy 17 and attended 8 physical therapy sessions and then was held out of therapy for 2 1/2 weeks due to shoulder pain and inflammation. He has returned to therapy 17. He is stiff to the shoulder with PROM and weakness around the R shoulder limiting functional use of R UE. He will benefit from skilled physical therapy to progress ROM, strength, and functional mobility. PROBLEM LIST (Impacting functional limitations):  1. Post-op R shoulder pain and swelling  2. Decreased R shoulder ROM   3. Weakness R shoulder INTERVENTIONS PLANNED:  1. Posture education  2. Thermal and electric modalities, manual therapies for pain   3. Manual therapies, therapeutic exercises, HEP for ROM    4.  Therapeutic exercises and HEP for strength   TREATMENT PLAN:  Effective Dates: 6/23/17 TO 9/15/17. Frequency/Duration: 2-3 times a week for 12 weeks  GOALS: (Goals have been discussed and agreed upon with patient.)  Short-Term Functional Goals: Time Frame: 6 weeks  1. Report no more than mild intermittent pain to R shoulder with compensatory use during basic functional activities. Progressing towards  2. R shoulder PROM forward elevation greater than 100 degrees to progress into functional ranges. Progressing towards  3. Demonstrate good R shoulder isometric strength with manual testing to progress into strength phase. Progressing towards  4. Independent with initial HEP. ongoing  Discharge Goals: Time Frame: 12 weeks ONGOING  1. Pt to demonstrate Good posture correction during exercises  2. No more than 3/10 intermittent pain R shoulder with return to normalized household and work activities, and score less than 50% on the DASH. 3. R shoulder AROM forward elevation greater than 110 degrees and strength to shoulder are grossly WFL's for safe use with normalized activities. 4. Independent with advanced shoulder HEP for continued self-management. Rehabilitation Potential For Stated Goals: Good              The information in this section was collected on 6/23/17 (except where otherwise noted). HISTORY:   History of Present Injury/Illness (Reason for Referral):  Mr Johana Benson initial injury was the result of a fall back in November of 2016 requiring surgical repair on 1/27/17. He came to therapy reporting the biceps muscle ruptured on February 21st. Therapy was OK'd by MD. He then came to the 2/28/17 therapy appointment reporting the [facility] joint had . MD ok\"d therapy to tolerance. Pt opted for surgery on 3/23/17 to plate the clavicle when no progress could be made with treatment. He returned to therapy but complained that pain was limiting any functional use of the arm so he opted for shoulder replacement surgery.  He received in patient therapy for hand/elbow ROM and then was discharged to home health PT for hand/elbow ROM only. Past Medical History/Comorbidities:   Mr. 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 Mr. Maria Guadalupe Little  has a past surgical history that includes carpal tunnel release (2012); carpal tunnel release (1980); hernia repair (2010); hernia repair (Left, 2007); lumbar laminectomy; hip replacement (Left, 12/12/2012); knee arthroscopy (Right, 2006); shoulder arthroscopy (Left, 2/17/2012); corneal transplant (Right, 2004) and corneal transplant (Right, 06/14/2016). Social History/Living Environment:     Pt lives with his wife in a one story home. He cares for the yard and home repairs  Prior Level of Function/Work/Activity:  retired  Previous Treatment Approaches:          PT for the R shoulder since 2/14/17  Personal Factors: Other factors that influence how disability is experienced by the patient:  See above PMH for back pain, hip replacement  Current Medications:     Current Outpatient Prescriptions:     temazepam (RESTORIL) 15 mg capsule, Take 15 mg by mouth nightly., Disp: , Rfl:     HYDROmorphone (DILAUDID) - out of medication but will get refill     rivaroxaban (XARELTO) 20 mg tab tablet, Take 1 Tab by mouth daily (with breakfast). , Disp: 30 Tab, Rfl: 11    atorvastatin (LIPITOR) 80 mg tablet, Take 40 mg by mouth daily. 1/2 tablet daily  Take DOS per anesthesia protocol.    Indications: hyperlipidemia, Disp: , Rfl:   Brimonidine-Timolol (COMBIGAN) 0.2-0.5 % Drop      Sleeping medication   Date Last Reviewed:  8/14/17   Number of Personal Factors/Comorbidities that affect the Plan of Care: 3+: HIGH COMPLEXITY   EXAMINATION:    8/16/2017  Observation/Orthostatic Postural Assessment: Atrophy to deltoid, biceps on R. Arm held in more relaxed position. Bruising over the left ey from falling    Palpation: tender over the anterior shoulder, bicep and upper arm regions. Hardened tissue over the anterior shoulder and biceps region  ROM:                           Strength: not measured. CLINICAL DECISION MAKING:   Outcome Measure: Tool Used: Disabilities of the Arm, Shoulder and Hand (DASH) Questionnaire - Quick Version  Score:  Initial: 97  Most Recent: 44/55 (Date: 7-26-17 )   Interpretation of Score: The DASH is designed to measure the activities of daily living in person's with upper extremity dysfunction or pain. Each section is scored on a 1-5 scale, 5 representing the greatest disability. The scores of each section are added together for a total score of 55. Score 11 12-19 20-28 29-37 38-45 46-54 55   Modifier CH CI CJ CK CL CM CN     ? Carrying, Moving, and Handling Objects:     - CURRENT STATUS: CL - 60%-79% impaired, limited or restricted    - GOAL STATUS: CK - 40%-59% impaired, limited or restricted    - D/C STATUS:  ---------------To be determined---------------    Medical Necessity:   · Skilled intervention continues to be required due to need for manual treatment and modalities within post-operative precautions. Reason for Services/Other Comments:  · Patient continues to require skilled intervention due to need for guided progression through rehab.    Use of outcome tool(s) and clinical judgement create a POC that gives a: Difficult prediction of patient's progress: HIGH COMPLEXITY            TREATMENT:   (In addition to Assessment/Re-Assessment sessions the following treatments were rendered)  8/16/2017  Pre-treatment Symptoms/Complaints:  Says his shoulder is sore from exercising yesterday. doing the pulleys  Pain: Initial:    8/10 Post Session:  7/10     Manual Therapy: (10 Minutes): Positional Release technique and trigger point massage to R pect, R lat dorsi/teres major and bicep for muscle restrictions to motion. grade 3-- to 4-- physiologic mobilizations to R shoulder ER at 10 deg abd, 75 deg abd and 90 deg flex, abduction, IR stabilized at 50 deg abduction, and forward elevation. Therapeutic Exercise: (28 Minutes): for strengthening and AROM. date Date:  8/7/17 Date:  8/9/17 Date:  8/11/17 8/14/17 8/15/17    Activity/Exercise Parameters Parameters Parameters      midtrap Side 2 x10 Side 2x10 Side 2x10 Side 1# 2x10 Side  1# 2 x10    Shoulder extn Standing red tband 2x8 Standing in tandem stance red 2x10  Standing yellow x10 Standing yellow 2x10    Scapular retraction Side manual resisted 2x10 Side 2x12 Side 3x8 Standing yellow 2x10 --    Shoulder abd Side Rhythmic stabilization at 80 and 90 deg,  active 2x8 reps Side 2x10  Rhythmic stabilization at 80 and 90 deg Side x8 x 12  Rhythmic stabilization at 75 and 90 deg Side 1# 2x8 Side  2# 2x10    FE Reacher at waist height with forward step 2x8  Supine rhythmic stabilization at 80 and 90 deg Supine rhythmic stabilization at 75, 85 and 90 deg  Supine with elbow flexed 2x8  Standing AA supine Rhythmic stabilization at 75, 85 and 90 deg  Supine elbow flexed x10 reacher at waist height 2x10   . Supine 1# 2x10 Supine elbow bent 2# 2x10  Supine resisted 75 deg to 90 deg x10  Reacher at waist height    triceps Supine 2# 2x8 Supine 2# x 8 x 10 Supine 2# 2x10 Supine 3# 2x10 Supine 3# 2x12    Bicep curls    Supine 2# 2x10 Standing 3# x10    diagonal     Back and down yellow 2x10                  Modalities: (  Minutes): moist heat to right shoulder after treatment session for soreness relief  HEP: continue HEP.  He verbalizes understanding. Treatment/Session Assessment:    · Response to Treatment:  Tolerated increased reps or weight with exercises with improved control. · Compliance with Program/Exercises: some, per pt. My working was exercise  · Recommendations/Intent for next treatment session: Continue with R shoulder motion treatment; shoulder girdle and whole arm active phase strength progression; and review and update HEP with written instruction as ready.   Total Treatment Duration:        38         minutes  PT Patient Time In/Time Out  Time In: 0945  Time Out: [address], [place]

## 2017-08-18 ENCOUNTER — HOSPITAL ENCOUNTER (OUTPATIENT)
Dept: PHYSICAL THERAPY | Age: 72
Discharge: HOME | End: 2017-08-18
Payer: MEDICARE

## 2017-08-18 PROCEDURE — 97110 THERAPEUTIC EXERCISES: CPT

## 2017-08-18 PROCEDURE — 97140 MANUAL THERAPY 1/> REGIONS: CPT

## 2017-08-18 NOTE — PROGRESS NOTES
Flora Dooley  : 1945 [facility] at [facility] FARRUKH LUNA  1101 [facility], [address] [place] [address],[address], [facility] [address]  [phone]   [phone]       OUTPATIENT PHYSICAL THERAPY:Daily Note 2017      ICD-10: Treatment Diagnosis: Pain in right shoulder (M25.511),Presence of right artificial shoulder joint (Z96.611),Stiffness of right shoulder, not elsewhere classified (M25.611)  Precautions/Allergies:   Post-op/Epinephrine and Aspirin   Fall Risk Score: 1 (? 5 = High Risk)  MD Orders: PT- evaluate and treat, HEP, FULL MOTION/FULL STRENGTH MEDICAL/REFERRING DIAGNOSIS:  right shoulder hardware removed/reverse TSA, bicep tendon transfer- 17  DATE OF ONSET: initial injury- 17 with R shoulder scope, ASD/RCR/BT-17   [facility] joint separation with biceps rupture -2017 with surgical repair-3/23/17  REFERRING PHYSICIAN: Yonathan Barth MD  RETURN PHYSICIAN APPOINTMENT: 17     INITIAL ASSESSMENT:  Mr. Buster Wing is s/p R shoulder reverse TSA with latissimus and teres major tendon transfer. He has a very complex R shoulder surgical history. He had previous bicep repair 17 and R shoulder rotator cuff repair 17. He started physical therapy 17 and attended 8 physical therapy sessions and then was held out of therapy for 2 1/2 weeks due to shoulder pain and inflammation. He has returned to therapy 17. He is stiff to the shoulder with PROM and weakness around the R shoulder limiting functional use of R UE. He will benefit from skilled physical therapy to progress ROM, strength, and functional mobility. PROBLEM LIST (Impacting functional limitations):  1. Post-op R shoulder pain and swelling  2. Decreased R shoulder ROM   3. Weakness R shoulder INTERVENTIONS PLANNED:  1. Posture education  2. Thermal and electric modalities, manual therapies for pain   3. Manual therapies, therapeutic exercises, HEP for ROM    4.  Therapeutic exercises and HEP for strength   TREATMENT PLAN:  Effective Dates: 6/23/17 TO 9/15/17. Frequency/Duration: 2-3 times a week for 12 weeks  GOALS: (Goals have been discussed and agreed upon with patient.)  Short-Term Functional Goals: Time Frame: 6 weeks  1. Report no more than mild intermittent pain to R shoulder with compensatory use during basic functional activities. Progressing towards  2. R shoulder PROM forward elevation greater than 100 degrees to progress into functional ranges. Progressing towards  3. Demonstrate good R shoulder isometric strength with manual testing to progress into strength phase. Progressing towards  4. Independent with initial HEP. ongoing  Discharge Goals: Time Frame: 12 weeks ONGOING  1. Pt to demonstrate Good posture correction during exercises  2. No more than 3/10 intermittent pain R shoulder with return to normalized household and work activities, and score less than 50% on the DASH. 3. R shoulder AROM forward elevation greater than 110 degrees and strength to shoulder are grossly WFL's for safe use with normalized activities. 4. Independent with advanced shoulder HEP for continued self-management. Rehabilitation Potential For Stated Goals: Good              The information in this section was collected on 6/23/17 (except where otherwise noted). HISTORY:   History of Present Injury/Illness (Reason for Referral):  Mr Kendall Shaikh initial injury was the result of a fall back in November of 2016 requiring surgical repair on 1/27/17. He came to therapy reporting the biceps muscle ruptured on February 21st. Therapy was OK'd by MD. He then came to the 2/28/17 therapy appointment reporting the [facility] joint had . MD ok\"d therapy to tolerance. Pt opted for surgery on 3/23/17 to plate the clavicle when no progress could be made with treatment. He returned to therapy but complained that pain was limiting any functional use of the arm so he opted for shoulder replacement surgery.  He received in patient therapy for hand/elbow ROM and then was discharged to home health PT for hand/elbow ROM only. Past Medical History/Comorbidities:   Mr. 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 Mr. Higinio Mendoza  has a past surgical history that includes carpal tunnel release (2012); carpal tunnel release (1980); hernia repair (2010); hernia repair (Left, 2007); lumbar laminectomy; hip replacement (Left, 12/12/2012); knee arthroscopy (Right, 2006); shoulder arthroscopy (Left, 2/17/2012); corneal transplant (Right, 2004) and corneal transplant (Right, 06/14/2016). Social History/Living Environment:     Pt lives with his wife in a one story home. He cares for the yard and home repairs  Prior Level of Function/Work/Activity:  retired  Previous Treatment Approaches:          PT for the R shoulder since 2/14/17  Personal Factors: Other factors that influence how disability is experienced by the patient:  See above PMH for back pain, hip replacement  Current Medications:     Current Outpatient Prescriptions:     temazepam (RESTORIL) 15 mg capsule, Take 15 mg by mouth nightly., Disp: , Rfl:     HYDROmorphone (DILAUDID) - out of medication but will get refill     rivaroxaban (XARELTO) 20 mg tab tablet, Take 1 Tab by mouth daily (with breakfast). , Disp: 30 Tab, Rfl: 11    atorvastatin (LIPITOR) 80 mg tablet, Take 40 mg by mouth daily. 1/2 tablet daily  Take DOS per anesthesia protocol.    Indications: hyperlipidemia, Disp: , Rfl:   Brimonidine-Timolol (COMBIGAN) 0.2-0.5 % Drop      Sleeping medication   Date Last Reviewed:  8/14/17   Number of Personal Factors/Comorbidities that affect the Plan of Care: 3+: HIGH COMPLEXITY   EXAMINATION:    8/18/2017  Observation/Orthostatic Postural Assessment: Atrophy to deltoid, biceps on R. Arm held in more relaxed position. Bruising over the left ey from falling    Palpation: tender over the anterior shoulder, bicep and upper arm regions. Hardened tissue over the anterior shoulder and biceps region  ROM:                           Strength: not measured. CLINICAL DECISION MAKING:   Outcome Measure: Tool Used: Disabilities of the Arm, Shoulder and Hand (DASH) Questionnaire - Quick Version  Score:  Initial: 97  Most Recent: 44/55 (Date: 7-26-17 )   Interpretation of Score: The DASH is designed to measure the activities of daily living in person's with upper extremity dysfunction or pain. Each section is scored on a 1-5 scale, 5 representing the greatest disability. The scores of each section are added together for a total score of 55. Score 11 12-19 20-28 29-37 38-45 46-54 55   Modifier CH CI CJ CK CL CM CN     ? Carrying, Moving, and Handling Objects:     - CURRENT STATUS: CL - 60%-79% impaired, limited or restricted    - GOAL STATUS: CK - 40%-59% impaired, limited or restricted    - D/C STATUS:  ---------------To be determined---------------    Medical Necessity:   · Skilled intervention continues to be required due to need for manual treatment and modalities within post-operative precautions. Reason for Services/Other Comments:  · Patient continues to require skilled intervention due to need for guided progression through rehab.    Use of outcome tool(s) and clinical judgement create a POC that gives a: Difficult prediction of patient's progress: HIGH COMPLEXITY            TREATMENT:   (In addition to Assessment/Re-Assessment sessions the following treatments were rendered)  8/18/2017  Pre-treatment Symptoms/Complaints:  Says his shoulder is sore from exercising. Really sore around the muscles in the shoulder blade. doing the HEP as much as he can  Pain: Initial: Pain Intensity 1: 8   Post Session:  6-7/10     Manual Therapy: (10 Minutes): Positional Release technique and trigger point massage to R pect, R lat and subscap and mid trap for muscle restrictions to motion. grade 3-- to 4-- physiologic mobilizations to R shoulder ER at 10 deg abd, 75 deg abd, abduction, IR stabilized at 50 deg abduction, and forward elevation. Therapeutic Exercise: (30 Minutes): for strengthening and AROM. date Date:  8/7/17 Date:  8/9/17 Date:  8/11/17 8/14/17 8/15/17 8/18/18   Activity/Exercise Parameters Parameters Parameters      midtrap Side 2 x10 Side 2x10 Side 2x10 Side 1# 2x10 Side  1# 2 x10 Side 1#   Shoulder extn Standing red tband 2x8 Standing in tandem stance red 2x10  Standing yellow x10 Standing yellow 2x10 --   Scapular retraction Side manual resisted 2x10 Side 2x12 Side 3x8 Standing yellow 2x10 --    Shoulder abd Side Rhythmic stabilization at 80 and 90 deg,  active 2x8 reps Side 2x10  Rhythmic stabilization at 80 and 90 deg Side x8 x 12  Rhythmic stabilization at 75 and 90 deg Side 1# 2x8 Side  2# 2x10 Side 2# 2x10   FE Reacher at waist height with forward step 2x8  Supine rhythmic stabilization at 80 and 90 deg Supine rhythmic stabilization at 75, 85 and 90 deg  Supine with elbow flexed 2x8  Standing AA supine Rhythmic stabilization at 75, 85 and 90 deg  Supine elbow flexed x10 reacher at waist height 2x10   .  Supine 1# 2x10 Supine elbow bent 2# 2x10  Supine resisted 75 deg to 90 deg x10  Reacher at waist height Supine elbow bent 3# 2x8  Supine resisted 75 deg to 90 deg x 12  Reacher at waist height with forward step 2x 10   triceps Supine 2# 2x8 Supine 2# x 8 x 10 Supine 2# 2x10 Supine 3# 2x10 Supine 3# 2x12 Supine 4# x8,x10   Bicep curls    Supine 2# 2x10 Standing 3# x10 Supine 2# x10  Standing 3# x10   diagonal     Back and down yellow 2x10 Yellow 2x10                 Modalities: (  Minutes): moist heat to right shoulder x 10 minutes after treatment session for soreness relief  HEP: continue HEP. He verbalizes understanding. Can begin diagonal reach back and down with red tband x 10 reps once a day   Treatment/Session Assessment:    · Response to Treatment:  Tolerated increased reps or weight with exercises with improved control. · Compliance with Program/Exercises: some, per pt. My working was exercise  · Recommendations/Intent for next treatment session: Continue with R shoulder motion treatment; shoulder girdle and whole arm active phase strength progression; and review and update HEP with written instruction as ready.   Total Treatment Duration:        40         minutes  PT Patient Time In/Time Out  Time In: 0945  Time Out: [address], PT, [place]

## 2017-08-21 ENCOUNTER — HOSPITAL ENCOUNTER (OUTPATIENT)
Dept: PHYSICAL THERAPY | Age: 72
Discharge: HOME | End: 2017-08-21
Payer: MEDICARE

## 2017-08-21 PROCEDURE — G8984 CARRY CURRENT STATUS: HCPCS

## 2017-08-21 PROCEDURE — 97110 THERAPEUTIC EXERCISES: CPT

## 2017-08-21 PROCEDURE — G8985 CARRY GOAL STATUS: HCPCS

## 2017-08-21 PROCEDURE — 97140 MANUAL THERAPY 1/> REGIONS: CPT

## 2017-08-23 ENCOUNTER — HOSPITAL ENCOUNTER (OUTPATIENT)
Dept: PHYSICAL THERAPY | Age: 72
Discharge: HOME | End: 2017-08-23
Payer: MEDICARE

## 2017-08-23 PROCEDURE — 97110 THERAPEUTIC EXERCISES: CPT

## 2017-08-23 NOTE — PROGRESS NOTES
Lashaun Farley  : 1945 Therapy Center at [facility] FARRUKH LUNA  [address] [facility], [address] [place] [address],[address], [facility] [address]  [phone]   [phone]       OUTPATIENT PHYSICAL THERAPY:Daily Note 2017      ICD-10: Treatment Diagnosis: Pain in right shoulder (M25.511),Presence of right artificial shoulder joint (Z96.611),Stiffness of right shoulder, not elsewhere classified (M25.611)  Precautions/Allergies:   Post-op/Epinephrine and Aspirin   Fall Risk Score: 1 (? 5 = High Risk)  MD Orders: PT- evaluate and treat, HEP, FULL MOTION/FULL STRENGTH MEDICAL/REFERRING DIAGNOSIS:  right shoulder hardware removed/reverse TSA, bicep tendon transfer- 17  DATE OF ONSET: initial injury- 17 with R shoulder scope, ASD/RCR/BT-17   [facility] joint separation with biceps rupture -2017 with surgical repair-3/23/17  REFERRING PHYSICIAN: Rosie Muir MD  RETURN PHYSICIAN APPOINTMENT: 17     INITIAL ASSESSMENT:  Mr. Janiya Enriquez is s/p R shoulder reverse TSA with latissimus and teres major tendon transfer. He has a very complex R shoulder surgical history. He had previous bicep repair 17 and R shoulder rotator cuff repair 17. He started physical therapy 17 and attended 8 physical therapy sessions and then was held out of therapy for 2 1/2 weeks due to shoulder pain and inflammation. He returned to therapy 17. Progress is slow due to the complicated history. The R shoulder remains stiff weak limiting functional use. Therapy focus has progressed to AROM and strengthening but requires significant manual guidance. Goals are being addressed. He will benefit from continued skilled physical therapy to progress ROM, strength, and functional mobility. PROBLEM LIST (Impacting functional limitations):  1. Post-op R shoulder pain and swelling  2. Decreased R shoulder ROM   3. Weakness R shoulder INTERVENTIONS PLANNED:  1. Posture education  2.  Thermal and electric modalities, manual therapies for pain   3. Manual therapies, therapeutic exercises, HEP for ROM    4. Therapeutic exercises and HEP for strength   TREATMENT PLAN:  Effective Dates: 6/23/17 TO 9/15/17. Frequency/Duration: 2-3 times a week for 12 weeks  GOALS: (Goals have been discussed and agreed upon with patient.)  Short-Term Functional Goals: Time Frame: 6 weeks  1. Report no more than mild intermittent pain to R shoulder with compensatory use during basic functional activities. Not consistently met  2. R shoulder PROM forward elevation greater than 100 degrees to progress into functional ranges. Not consistently met  3. Demonstrate good R shoulder isometric strength with manual testing to progress into strength phase. Progressing towards  4. Independent with initial HEP. ongoing  Discharge Goals: Time Frame: 12 weeks  1. Pt to demonstrate Good posture correction during exercises- being met  2. No more than 3/10 intermittent pain R shoulder with return to normalized household and work activities, and score less than 50% on the DASH. Ongoing and not consistently met  3. R shoulder AROM forward elevation greater than 110 degrees and strength to shoulder are grossly WFL's for safe use with normalized activities. ongoing   4. Independent with advanced shoulder HEP for continued self-management. Rehabilitation Potential For Stated Goals: Good              The information in this section was collected on 6/23/17 (except where otherwise noted). HISTORY:   History of Present Injury/Illness (Reason for Referral):  Mr Elizabeth Olsen initial injury was the result of a fall back in November of 2016 requiring surgical repair on 1/27/17. He came to therapy reporting the biceps muscle ruptured on February 21st. Therapy was OK'd by MD. He then came to the 2/28/17 therapy appointment reporting the [facility] joint had . MD ok\"d therapy to tolerance. Pt opted for surgery on 3/23/17 to plate the clavicle when no progress could be made with treatment.  He returned to therapy but complained that pain was limiting any functional use of the arm so he opted for shoulder replacement surgery. He received in patient therapy for hand/elbow ROM and then was discharged to home health PT for hand/elbow ROM only. Past Medical History/Comorbidities:   Mr. 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 Mr. Denisha Chaudhary  has a past surgical history that includes carpal tunnel release (2012); carpal tunnel release (1980); hernia repair (2010); hernia repair (Left, 2007); lumbar laminectomy; hip replacement (Left, 12/12/2012); knee arthroscopy (Right, 2006); shoulder arthroscopy (Left, 2/17/2012); corneal transplant (Right, 2004) and corneal transplant (Right, 06/14/2016). Social History/Living Environment:     Pt lives with his wife in a one story home. He cares for the yard and home repairs  Prior Level of Function/Work/Activity:  retired  Previous Treatment Approaches:          PT for the R shoulder since 2/14/17  Personal Factors: Other factors that influence how disability is experienced by the patient:  See above PMH for back pain, hip replacement  Current Medications:     Current Outpatient Prescriptions:     temazepam (RESTORIL) 15 mg capsule, Take 15 mg by mouth nightly., Disp: , Rfl:     HYDROmorphone (DILAUDID) - out of medication but will get refill     rivaroxaban (XARELTO) 20 mg tab tablet, Take 1 Tab by mouth daily (with breakfast). , Disp: 30 Tab, Rfl: 11    atorvastatin (LIPITOR) 80 mg tablet, Take 40 mg by mouth daily. 1/2 tablet daily  Take DOS per anesthesia protocol. Indications: hyperlipidemia, Disp: , Rfl:     Brimonidine-Timolol (COMBIGAN) 0.2-0.5 % Drop      Sleeping medication   Date Last Reviewed:  8/21/17   Number of Personal Factors/Comorbidities that affect the Plan of Care: 3+: HIGH COMPLEXITY   EXAMINATION:    8/23/2017  Observation/Orthostatic Postural Assessment: Atrophy to deltoid, biceps on R. Arm held in more relaxed position. Shrugging, elbow flexion and trunk extension with attempts to reach    Palpation: tender over the anterior shoulder, bicep and upper arm region. ROM:                 RUE AROM  R Shoulder Flexion: 87         Strength:                        CLINICAL DECISION MAKING:   Outcome Measure: Tool Used: Disabilities of the Arm, Shoulder and Hand (DASH) Questionnaire - Quick Version  Score:  Initial: 97  Most Recent: 44/55 (Date: 7-26-17 )  57 (8/21/17)   Interpretation of Score: The DASH is designed to measure the activities of daily living in person's with upper extremity dysfunction or pain. Each section is scored on a 1-5 scale, 5 representing the greatest disability. The scores of each section are added together for a total score of 55. Score 11 12-19 20-28 29-37 38-45 46-54 55   Modifier CH CI CJ CK CL CM CN     ? Carrying, Moving, and Handling Objects:     - CURRENT STATUS: CK - 40%-59% impaired, limited or restricted    - GOAL STATUS: CK - 40%-59% impaired, limited or restricted    - D/C STATUS:  ---------------To be determined---------------    Medical Necessity:   · Skilled intervention continues to be required due to need for manual treatment and modalities within post-operative precautions. Reason for Services/Other Comments:  · Patient continues to require skilled intervention due to need for guided progression through rehab.    Use of outcome tool(s) and clinical judgement create a POC that gives a: Difficult prediction of patient's progress: HIGH COMPLEXITY            TREATMENT:   (In addition to Assessment/Re-Assessment sessions the following treatments were rendered)  8/23/2017  Pre-treatment Symptoms/Complaints:  Says his shoulder is sore from exercising yesterday. Pain: Initial:    3/10 Post Session: 1/10     Manual Therapy: ( Minutes):   none  Therapeutic Exercise: (40 Minutes): for strengthening and AROM. date 8/18/18 8/21/17 8/23/17    Activity/Exercise       midtrap Side 1# x10 2x10 Side manual guidance at scapula     Shoulder extn -- Red x10 Red 2x10    Scapular retraction  Red x10  Supine manual resist x 12     Shoulder abd Side 2# 2x10 Side x10, 1# x10 Side reclined     FE Supine elbow bent 3# 2x8  Supine resisted 75 deg to 90 deg x 12  Reacher at waist height with forward step 2x 10 Supine 3# 2x10  Elbow bent  Supine resisted 75 to 90 deg flexion x 10  Reacher with step 2x12 Reclined:  elbow straight 2x10  Elbow bent 2# x10  Reacher with step 3x10     triceps Supine 4# x8,x10 Supine 4# 2x10 Reclined 3# 2x10    Bicep curls Supine 2# x10  Standing 3# x10 Supine 3# 2x10 Reclined 3# 2x15 controlled descent    diagonal Yellow 2x10 Red x12 Reclined \"Y\" x8                Modalities: (  Minutes): moist heat to right shoulder x 10 minutes after treatment session for soreness relief  HEP: continue HEP with stretching daily and strengthening every other day. He verbalizes understanding. Treatment/Session Assessment:    · Response to Treatment:  Tolerated exercises with focus on control. Weak at 80 to 90 deg forward elevation and abd  · Compliance with Program/Exercises: some, per pt. My working was exercise  · Recommendations/Intent for next treatment session: Continue with R shoulder motion treatment; shoulder girdle and whole arm active phase strength progression; and review and update HEP with written instruction as ready. Try 1# with reacher exercises next visit.   Total Treatment Duration:        40         minutes  PT Patient Time In/Time Out  Time In: 0900  Time Out: [address] [facility], PT, [facility]

## 2017-08-25 ENCOUNTER — HOSPITAL ENCOUNTER (OUTPATIENT)
Dept: PHYSICAL THERAPY | Age: 72
Discharge: HOME | End: 2017-08-25
Payer: MEDICARE

## 2017-08-25 PROCEDURE — 97110 THERAPEUTIC EXERCISES: CPT

## 2017-08-25 NOTE — PROGRESS NOTES
Ld Love  : 1945 [facility] FARRUKH LUNA  [address] [facility], [address] [place] [address],[address], [place] [address].  [phone]   [phone]       OUTPATIENT PHYSICAL THERAPY:Daily Note 2017      ICD-10: Treatment Diagnosis: Pain in right shoulder (M25.511),Presence of right artificial shoulder joint (Z96.611),Stiffness of right shoulder, not elsewhere classified (M25.611)  Precautions/Allergies:   Post-op/Epinephrine and Aspirin   Fall Risk Score: 1 (? 5 = High Risk)  MD Orders: PT- evaluate and treat, HEP, FULL MOTION/FULL STRENGTH MEDICAL/REFERRING DIAGNOSIS:  right shoulder hardware removed/reverse TSA, bicep tendon transfer- 17  DATE OF ONSET: initial injury- 17 with R shoulder scope, ASD/RCR/BT-17   [facility] joint separation with biceps rupture -2017 with surgical repair-3/23/17  REFERRING PHYSICIAN: Halie Casillas MD  RETURN PHYSICIAN APPOINTMENT: 17     INITIAL ASSESSMENT:  Mr. Luz Hassan is s/p R shoulder reverse TSA with latissimus and teres major tendon transfer. He has a very complex R shoulder surgical history. He had previous bicep repair 17 and R shoulder rotator cuff repair 17. He started physical therapy 17 and attended 8 physical therapy sessions and then was held out of therapy for 2 1/2 weeks due to shoulder pain and inflammation. He returned to therapy 17. Progress is slow due to the complicated history. The R shoulder remains stiff weak limiting functional use. Therapy focus has progressed to AROM and strengthening but requires significant manual guidance. Goals are being addressed. He will benefit from continued skilled physical therapy to progress ROM, strength, and functional mobility. PROBLEM LIST (Impacting functional limitations):  1. Post-op R shoulder pain and swelling  2. Decreased R shoulder ROM   3. Weakness R shoulder INTERVENTIONS PLANNED:  1. Posture education  2.  Thermal and electric modalities, manual therapies for pain   3. Manual therapies, therapeutic exercises, HEP for ROM    4. Therapeutic exercises and HEP for strength   TREATMENT PLAN:  Effective Dates: 6/23/17 TO 9/15/17. Frequency/Duration: 2-3 times a week for 12 weeks  GOALS: (Goals have been discussed and agreed upon with patient.)  Short-Term Functional Goals: Time Frame: 6 weeks  1. Report no more than mild intermittent pain to R shoulder with compensatory use during basic functional activities. Not consistently met  2. R shoulder PROM forward elevation greater than 100 degrees to progress into functional ranges. Not consistently met  3. Demonstrate good R shoulder isometric strength with manual testing to progress into strength phase. Progressing towards  4. Independent with initial HEP. ongoing  Discharge Goals: Time Frame: 12 weeks  1. Pt to demonstrate Good posture correction during exercises- being met  2. No more than 3/10 intermittent pain R shoulder with return to normalized household and work activities, and score less than 50% on the DASH. Ongoing and not consistently met  3. R shoulder AROM forward elevation greater than 110 degrees and strength to shoulder are grossly WFL's for safe use with normalized activities. ongoing   4. Independent with advanced shoulder HEP for continued self-management. Rehabilitation Potential For Stated Goals: Good              The information in this section was collected on 6/23/17 (except where otherwise noted). HISTORY:   History of Present Injury/Illness (Reason for Referral):  Mr Sejal Morris initial injury was the result of a fall back in November of 2016 requiring surgical repair on 1/27/17. He came to therapy reporting the biceps muscle ruptured on February 21st. Therapy was OK'd by MD. He then came to the 2/28/17 therapy appointment reporting the [facility] joint had . MD ok\"d therapy to tolerance. Pt opted for surgery on 3/23/17 to plate the clavicle when no progress could be made with treatment.  He returned to therapy but complained that pain was limiting any functional use of the arm so he opted for shoulder replacement surgery. He received in patient therapy for hand/elbow ROM and then was discharged to home health PT for hand/elbow ROM only. Past Medical History/Comorbidities:   Mr. 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 Mr. Radha Garner  has a past surgical history that includes carpal tunnel release (2012); carpal tunnel release (1980); hernia repair (2010); hernia repair (Left, 2007); lumbar laminectomy; hip replacement (Left, 12/12/2012); knee arthroscopy (Right, 2006); shoulder arthroscopy (Left, 2/17/2012); corneal transplant (Right, 2004) and corneal transplant (Right, 06/14/2016). Social History/Living Environment:     Pt lives with his wife in a one story home. He cares for the yard and home repairs  Prior Level of Function/Work/Activity:  retired  Previous Treatment Approaches:          PT for the R shoulder since 2/14/17  Personal Factors: Other factors that influence how disability is experienced by the patient:  See above PMH for back pain, hip replacement  Current Medications:     Current Outpatient Prescriptions:     temazepam (RESTORIL) 15 mg capsule, Take 15 mg by mouth nightly., Disp: , Rfl:     HYDROmorphone (DILAUDID) - out of medication but will get refill     rivaroxaban (XARELTO) 20 mg tab tablet, Take 1 Tab by mouth daily (with breakfast). , Disp: 30 Tab, Rfl: 11    atorvastatin (LIPITOR) 80 mg tablet, Take 40 mg by mouth daily. 1/2 tablet daily  Take DOS per anesthesia protocol. Indications: hyperlipidemia, Disp: , Rfl:     Brimonidine-Timolol (COMBIGAN) 0.2-0.5 % Drop      Sleeping medication   Date Last Reviewed:  8/21/17   Number of Personal Factors/Comorbidities that affect the Plan of Care: 3+: HIGH COMPLEXITY   EXAMINATION:    8/25/2017  Observation/Orthostatic Postural Assessment: Atrophy to deltoid, biceps on R. Arm held in more relaxed position. Shrugging, elbow flexion and trunk extension with attempts to reach    Palpation: tender over the anterior shoulder, bicep and upper arm region. ROM:                           Strength:                        CLINICAL DECISION MAKING:   Outcome Measure: Tool Used: Disabilities of the Arm, Shoulder and Hand (DASH) Questionnaire - Quick Version  Score:  Initial: 97  Most Recent: 44/55 (Date: 7-26-17 )  57 (8/21/17)   Interpretation of Score: The DASH is designed to measure the activities of daily living in person's with upper extremity dysfunction or pain. Each section is scored on a 1-5 scale, 5 representing the greatest disability. The scores of each section are added together for a total score of 55. Score 11 12-19 20-28 29-37 38-45 46-54 55   Modifier CH CI CJ CK CL CM CN     ? Carrying, Moving, and Handling Objects:     - CURRENT STATUS: CK - 40%-59% impaired, limited or restricted    - GOAL STATUS: CK - 40%-59% impaired, limited or restricted    - D/C STATUS:  ---------------To be determined---------------    Medical Necessity:   · Skilled intervention continues to be required due to need for manual treatment and modalities within post-operative precautions. Reason for Services/Other Comments:  · Patient continues to require skilled intervention due to need for guided progression through rehab.    Use of outcome tool(s) and clinical judgement create a POC that gives a: Difficult prediction of patient's progress: HIGH COMPLEXITY            TREATMENT:   (In addition to Assessment/Re-Assessment sessions the following treatments were rendered)  8/25/2017  Pre-treatment Symptoms/Complaints:  Says his shoulder blade and back area are sore . Pain: Initial:    4/10 Post Session: 2-3/10     Manual Therapy: ( Minutes):   none  Therapeutic Exercise: (40 Minutes): for strengthening and AROM. date 8/18/18 8/21/17 8/23/17 8/25/17   Activity/Exercise       midtrap Side 1# x10 2x10 Side manual guidance at scapula  Side 2x10   Shoulder extn -- Red x10 Red 2x10    Scapular retraction  Red x10  Supine manual resist x 12  Side reclined manual resist x10   Shoulder abd Side 2# 2x10 Side x10, 1# x10 Side reclined  Side reclined 3x8 manual guidance    FE Supine elbow bent 3# 2x8  Supine resisted 75 deg to 90 deg x 12  Reacher at waist height with forward step 2x 10 Supine 3# 2x10  Elbow bent  Supine resisted 75 to 90 deg flexion x 10  Reacher with step 2x12 Reclined:  elbow straight 2x10  Elbow bent 2# x10  Reacher with step 3x10  Reclined:  Elbow straight 2x8  Elbow bent 2#  2x8  Reacher 1#x10  0# x 10   triceps Supine 4# x8,x10 Supine 4# 2x10 Reclined 3# 2x10 Reclined 3# 2x10   Bicep curls Supine 2# x10  Standing 3# x10 Supine 3# 2x10 Reclined 3# 2x15 controlled descent Reclined 3# x10  Standing 3# x10   diagonal Yellow 2x10 Red x12 Reclined \"Y\" x8 Yellow back and down in standing 2x10               Modalities: (  Minutes): moist heat to right shoulder x 10 minutes after treatment session for soreness relief  HEP: continue HEP with stretching daily and strengthening every other day. He verbalizes understanding. Treatment/Session Assessment:    · Response to Treatment:  Tolerated exercises with focus on moving within available motion. Pt tends to try to reach beyond shoulder motion. Weak deltioids  · Compliance with Program/Exercises: some, per pt.   · Recommendations/Intent for next treatment session: Continue with R shoulder motion treatment; shoulder girdle and whole arm active phase strength progression; and review and update HEP with written instruction as ready.    Total Treatment Duration:        40         minutes  PT Patient Time In/Time Out  Time In: 0950  Time Out: [address], [place], [place]

## 2017-08-28 ENCOUNTER — HOSPITAL ENCOUNTER (OUTPATIENT)
Dept: PHYSICAL THERAPY | Age: 72
Discharge: HOME | End: 2017-08-28
Payer: MEDICARE

## 2017-08-28 PROCEDURE — 97140 MANUAL THERAPY 1/> REGIONS: CPT

## 2017-08-28 PROCEDURE — 97110 THERAPEUTIC EXERCISES: CPT

## 2017-08-28 NOTE — PROGRESS NOTES
Flora Dooley  : 1945 [facility] FARRUKH LUNA  [address] [facility], [address] [place] [address],[address], [facility] [address]  [phone]   [phone]       OUTPATIENT PHYSICAL THERAPY:Daily Note 2017      ICD-10: Treatment Diagnosis: Pain in right shoulder (M25.511),Presence of right artificial shoulder joint (Z96.611),Stiffness of right shoulder, not elsewhere classified (M25.611)  Precautions/Allergies:   Post-op/Epinephrine and Aspirin   Fall Risk Score: 1 (? 5 = High Risk)  MD Orders: PT- evaluate and treat, HEP, FULL MOTION/FULL STRENGTH MEDICAL/REFERRING DIAGNOSIS:  right shoulder hardware removed/reverse TSA, bicep tendon transfer- 17  DATE OF ONSET: initial injury- 17 with R shoulder scope, ASD/RCR/BT-17   [facility] joint separation with biceps rupture -2017 with surgical repair-3/23/17  REFERRING PHYSICIAN: Yonathan Barth MD  RETURN PHYSICIAN APPOINTMENT: 17     INITIAL ASSESSMENT:  Mr. Buster Wing is s/p R shoulder reverse TSA with latissimus and teres major tendon transfer. He has a very complex R shoulder surgical history. He had previous bicep repair 17 and R shoulder rotator cuff repair 17. He started physical therapy 17 and attended 8 physical therapy sessions and then was held out of therapy for 2 1/2 weeks due to shoulder pain and inflammation. He returned to therapy 17. Progress is slow due to the complicated history. The R shoulder remains stiff weak limiting functional use. Therapy focus has progressed to AROM and strengthening but requires significant manual guidance. Goals are being addressed. He will benefit from continued skilled physical therapy to progress ROM, strength, and functional mobility. PROBLEM LIST (Impacting functional limitations):  1. Post-op R shoulder pain and swelling  2. Decreased R shoulder ROM   3. Weakness R shoulder INTERVENTIONS PLANNED:  1. Posture education  2.  Thermal and electric modalities, manual therapies for pain   3. Manual therapies, therapeutic exercises, HEP for ROM    4. Therapeutic exercises and HEP for strength   TREATMENT PLAN:  Effective Dates: 6/23/17 TO 9/15/17. Frequency/Duration: 2-3 times a week for 12 weeks  GOALS: (Goals have been discussed and agreed upon with patient.)  Short-Term Functional Goals: Time Frame: 6 weeks  1. Report no more than mild intermittent pain to R shoulder with compensatory use during basic functional activities. Not consistently met  2. R shoulder PROM forward elevation greater than 100 degrees to progress into functional ranges. Not consistently met  3. Demonstrate good R shoulder isometric strength with manual testing to progress into strength phase. Progressing towards  4. Independent with initial HEP. ongoing  Discharge Goals: Time Frame: 12 weeks  1. Pt to demonstrate Good posture correction during exercises- being met  2. No more than 3/10 intermittent pain R shoulder with return to normalized household and work activities, and score less than 50% on the DASH. Ongoing and not consistently met  3. R shoulder AROM forward elevation greater than 110 degrees and strength to shoulder are grossly WFL's for safe use with normalized activities. ongoing   4. Independent with advanced shoulder HEP for continued self-management. Rehabilitation Potential For Stated Goals: Good              The information in this section was collected on 6/23/17 (except where otherwise noted). HISTORY:   History of Present Injury/Illness (Reason for Referral):  Mr Abelardo Yoon initial injury was the result of a fall back in November of 2016 requiring surgical repair on 1/27/17. He came to therapy reporting the biceps muscle ruptured on February 21st. Therapy was OK'd by MD. He then came to the 2/28/17 therapy appointment reporting the [facility] joint had . MD ok\"d therapy to tolerance. Pt opted for surgery on 3/23/17 to plate the clavicle when no progress could be made with treatment.  He returned to therapy but complained that pain was limiting any functional use of the arm so he opted for shoulder replacement surgery. He received in patient therapy for hand/elbow ROM and then was discharged to home health PT for hand/elbow ROM only. Past Medical History/Comorbidities:   Mr. 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 Mr. Domonique Khoury  has a past surgical history that includes carpal tunnel release (2012); carpal tunnel release (1980); hernia repair (2010); hernia repair (Left, 2007); lumbar laminectomy; hip replacement (Left, 12/12/2012); knee arthroscopy (Right, 2006); shoulder arthroscopy (Left, 2/17/2012); corneal transplant (Right, 2004) and corneal transplant (Right, 06/14/2016). Social History/Living Environment:     Pt lives with his wife in a one story home. He cares for the yard and home repairs  Prior Level of Function/Work/Activity:  retired  Previous Treatment Approaches:          PT for the R shoulder since 2/14/17  Personal Factors: Other factors that influence how disability is experienced by the patient:  See above PMH for back pain, hip replacement  Current Medications:     Current Outpatient Prescriptions:     temazepam (RESTORIL) 15 mg capsule, Take 15 mg by mouth nightly., Disp: , Rfl:     HYDROmorphone (DILAUDID) - out of medication but will get refill     rivaroxaban (XARELTO) 20 mg tab tablet, Take 1 Tab by mouth daily (with breakfast). , Disp: 30 Tab, Rfl: 11    atorvastatin (LIPITOR) 80 mg tablet, Take 40 mg by mouth daily. 1/2 tablet daily  Take DOS per anesthesia protocol. Indications: hyperlipidemia, Disp: , Rfl:     Brimonidine-Timolol (COMBIGAN) 0.2-0.5 % Drop      Sleeping medication   Date Last Reviewed:  8/28/17   Number of Personal Factors/Comorbidities that affect the Plan of Care: 3+: HIGH COMPLEXITY   EXAMINATION:    8/28/2017  Observation/Orthostatic Postural Assessment: Atrophy to deltoid, biceps on R. Arm held in more relaxed position. Shrugging, elbow flexion and trunk extension with attempts to reach    Palpation: tender over the anterior shoulder, bicep and upper arm region. ROM:                           Strength:                        CLINICAL DECISION MAKING:   Outcome Measure: Tool Used: Disabilities of the Arm, Shoulder and Hand (DASH) Questionnaire - Quick Version  Score:  Initial: 97  Most Recent: 44/55 (Date: 7-26-17 )  57 (8/21/17)   Interpretation of Score: The DASH is designed to measure the activities of daily living in person's with upper extremity dysfunction or pain. Each section is scored on a 1-5 scale, 5 representing the greatest disability. The scores of each section are added together for a total score of 55. Score 11 12-19 20-28 29-37 38-45 46-54 55   Modifier CH CI CJ CK CL CM CN     ? Carrying, Moving, and Handling Objects:     - CURRENT STATUS: CK - 40%-59% impaired, limited or restricted    - GOAL STATUS: CK - 40%-59% impaired, limited or restricted    - D/C STATUS:  ---------------To be determined---------------    Medical Necessity:   · Skilled intervention continues to be required due to need for manual treatment and modalities within post-operative precautions. Reason for Services/Other Comments:  · Patient continues to require skilled intervention due to need for guided progression through rehab.    Use of outcome tool(s) and clinical judgement create a POC that gives a: Difficult prediction of patient's progress: HIGH COMPLEXITY            TREATMENT:   (In addition to Assessment/Re-Assessment sessions the following treatments were rendered)  8/28/2017  Pre-treatment Symptoms/Complaints:  Says his shoulder blade feels better since having his wife massage it. Did the HEP    Pain: Initial:   2 /10 Post Session: 2-3/10     Manual Therapy: (10 Minutes): for ROM. Soft tissue release at R latissimus, grade 3 to 4-- R shoulder ER/IR, abd and flexion  Therapeutic Exercise: (35 Minutes): for strengthening and AROM.       date 8/18/18 8/21/17 8/23/17 8/25/17 8/28/17    Activity/Exercise         midtrap Side 1# x10 2x10 Side manual guidance at scapula  Side 2x10 Side 1# x8, 0# 2x8    Shoulder extn -- Red x10 Red 2x10  Prone 2x10    Scapular retraction  Red x10  Supine manual resist x 12  Side reclined manual resist x10 Bent Row 1# 2x10     Shoulder abd Side 2# 2x10 Side x10, 1# x10 Side reclined  Side reclined 3x8 manual guidance  Side reclined  0# 2x10    FE Supine elbow bent 3# 2x8  Supine resisted 75 deg to 90 deg x 12  Reacher at waist height with forward step 2x 10 Supine 3# 2x10  Elbow bent  Supine resisted 75 to 90 deg flexion x 10  Reacher with step 2x12 Reclined:  elbow straight 2x10  Elbow bent 2# x10  Reacher with step 3x10  Reclined:  Elbow straight 2x8  Elbow bent 2#  2x8  Reacher 1#x10  0# x 10 Supine with yellow tband for abd 2x8  Reclined  Elbow bent 2# 2x10  Reacher 1# 2x10    triceps Supine 4# x8,x10 Supine 4# 2x10 Reclined 3# 2x10 Reclined 3# 2x10 Reclined 4# x10, 3# x10    Bicep curls Supine 2# x10  Standing 3# x10 Supine 3# 2x10 Reclined 3# 2x15 controlled descent Reclined 3# x10  Standing 3# x10 Reclined 4# 2x10  Standing 4# x10    diagonal Yellow 2x10 Red x12 Reclined \"Y\" x8 Yellow back and down in standing 2x10 \"Y\" reclined with manual guidance 2x8  Standing yellow back and down 2x10                  Modalities: (  Minutes): moist heat to right shoulder x 10 minutes after treatment session for soreness relief  HEP: continue HEP with stretching daily and strengthening every other day. He verbalizes understanding. Treatment/Session Assessment:    · Response to Treatment:  Tolerated exercises with increasing reps or weight. Shoulder blade pops and back muscles cramp when Pt tends to try to reach beyond shoulder motion. Weak but getting stronger  · Compliance with Program/Exercises: some, per pt. · Recommendations/Intent for next treatment session: Continue with R shoulder motion treatment; shoulder girdle and whole arm active phase strength progression; and review and update HEP with written instruction as ready.    Total Treatment Duration:        45         minutes  PT Patient Time In/Time Out  Time In: 0900  Time Out: [address] [facility], PT, [facility]

## 2017-08-30 ENCOUNTER — HOSPITAL ENCOUNTER (OUTPATIENT)
Dept: PHYSICAL THERAPY | Age: 72
Discharge: HOME | End: 2017-08-30
Payer: MEDICARE

## 2017-08-30 PROCEDURE — 97110 THERAPEUTIC EXERCISES: CPT

## 2017-08-30 PROCEDURE — 97140 MANUAL THERAPY 1/> REGIONS: CPT

## 2017-08-30 NOTE — PROGRESS NOTES
Bere Whitehead  : 1945 [facility] at [facility] FARRUKH LUNA  [address] [facility], [address] [place] [address] [address], [address] [facility]  [phone]   [phone]       OUTPATIENT PHYSICAL THERAPY:Daily Note 2017      ICD-10: Treatment Diagnosis: Pain in right shoulder (M25.511),Presence of right artificial shoulder joint (Z96.611),Stiffness of right shoulder, not elsewhere classified (M25.611)  Precautions/Allergies:   Post-op/Epinephrine and Aspirin   Fall Risk Score: 1 (? 5 = High Risk)  MD Orders: PT- evaluate and treat, HEP, FULL MOTION/FULL STRENGTH MEDICAL/REFERRING DIAGNOSIS:  right shoulder hardware removed/reverse TSA, bicep tendon transfer- 17  DATE OF ONSET: initial injury- 17 with R shoulder scope, ASD/RCR/BT-17   [facility] joint separation with biceps rupture -2017 with surgical repair-3/23/17  REFERRING PHYSICIAN: Jill Conti MD  RETURN PHYSICIAN APPOINTMENT: 17     INITIAL ASSESSMENT:  Mr. Mirela Montesinos is s/p R shoulder reverse TSA with latissimus and teres major tendon transfer. He has a very complex R shoulder surgical history. He had previous bicep repair 17 and R shoulder rotator cuff repair 17. He started physical therapy 17 and attended 8 physical therapy sessions and then was held out of therapy for 2 1/2 weeks due to shoulder pain and inflammation. He returned to therapy 17. Progress is slow due to the complicated history. The R shoulder remains stiff weak limiting functional use. Therapy focus has progressed to AROM and strengthening but requires significant manual guidance. Goals are being addressed. He will benefit from continued skilled physical therapy to progress ROM, strength, and functional mobility. PROBLEM LIST (Impacting functional limitations):  1. Post-op R shoulder pain and swelling  2. Decreased R shoulder ROM   3. Weakness R shoulder INTERVENTIONS PLANNED:  1. Posture education  2.  Thermal and electric modalities, manual therapies for pain   3. Manual therapies, therapeutic exercises, HEP for ROM    4. Therapeutic exercises and HEP for strength   TREATMENT PLAN:  Effective Dates: 6/23/17 TO 9/15/17. Frequency/Duration: 2-3 times a week for 12 weeks  GOALS: (Goals have been discussed and agreed upon with patient.)  Short-Term Functional Goals: Time Frame: 6 weeks  1. Report no more than mild intermittent pain to R shoulder with compensatory use during basic functional activities. Not consistently met  2. R shoulder PROM forward elevation greater than 100 degrees to progress into functional ranges. Not consistently met  3. Demonstrate good R shoulder isometric strength with manual testing to progress into strength phase. Progressing towards  4. Independent with initial HEP. ongoing  Discharge Goals: Time Frame: 12 weeks  1. Pt to demonstrate Good posture correction during exercises- being met  2. No more than 3/10 intermittent pain R shoulder with return to normalized household and work activities, and score less than 50% on the DASH. Ongoing and not consistently met  3. R shoulder AROM forward elevation greater than 110 degrees and strength to shoulder are grossly WFL's for safe use with normalized activities. ongoing   4. Independent with advanced shoulder HEP for continued self-management. Rehabilitation Potential For Stated Goals: Good              The information in this section was collected on 6/23/17 (except where otherwise noted). HISTORY:   History of Present Injury/Illness (Reason for Referral):  Mr Simona Chatman initial injury was the result of a fall back in November of 2016 requiring surgical repair on 1/27/17. He came to therapy reporting the biceps muscle ruptured on February 21st. Therapy was OK'd by MD. He then came to the 2/28/17 therapy appointment reporting the [facility] joint had . MD ok\"d therapy to tolerance. Pt opted for surgery on 3/23/17 to plate the clavicle when no progress could be made with treatment.  He returned to therapy but complained that pain was limiting any functional use of the arm so he opted for shoulder replacement surgery. He received in patient therapy for hand/elbow ROM and then was discharged to home health PT for hand/elbow ROM only. Past Medical History/Comorbidities:   Mr. 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 Mr. Mirela Montesinos  has a past surgical history that includes carpal tunnel release (2012); carpal tunnel release (1980); hernia repair (2010); hernia repair (Left, 2007); lumbar laminectomy; hip replacement (Left, 12/12/2012); knee arthroscopy (Right, 2006); shoulder arthroscopy (Left, 2/17/2012); corneal transplant (Right, 2004) and corneal transplant (Right, 06/14/2016). Social History/Living Environment:     Pt lives with his wife in a one story home. He cares for the yard and home repairs  Prior Level of Function/Work/Activity:  retired  Previous Treatment Approaches:          PT for the R shoulder since 2/14/17  Personal Factors: Other factors that influence how disability is experienced by the patient:  See above PMH for back pain, hip replacement  Current Medications:     Current Outpatient Prescriptions:     temazepam (RESTORIL) 15 mg capsule, Take 15 mg by mouth nightly., Disp: , Rfl:     HYDROmorphone (DILAUDID) - out of medication but will get refill     rivaroxaban (XARELTO) 20 mg tab tablet, Take 1 Tab by mouth daily (with breakfast). , Disp: 30 Tab, Rfl: 11    atorvastatin (LIPITOR) 80 mg tablet, Take 40 mg by mouth daily. 1/2 tablet daily  Take DOS per anesthesia protocol. Indications: hyperlipidemia, Disp: , Rfl:     Brimonidine-Timolol (COMBIGAN) 0.2-0.5 % Drop      Sleeping medication   Date Last Reviewed:  8/28/17   Number of Personal Factors/Comorbidities that affect the Plan of Care: 3+: HIGH COMPLEXITY   EXAMINATION:    8/30/2017  Observation/Orthostatic Postural Assessment: Atrophy to deltoid, biceps on R. Arm held in more relaxed position. Shrugging, elbow flexion and trunk extension with attempts to reach    Palpation: tender over the anterior shoulder, bicep and upper arm region. ROM:                           Strength:                        CLINICAL DECISION MAKING:   Outcome Measure: Tool Used: Disabilities of the Arm, Shoulder and Hand (DASH) Questionnaire - Quick Version  Score:  Initial: 97  Most Recent: 44/55 (Date: 7-26-17 )  57 (8/21/17)   Interpretation of Score: The DASH is designed to measure the activities of daily living in person's with upper extremity dysfunction or pain. Each section is scored on a 1-5 scale, 5 representing the greatest disability. The scores of each section are added together for a total score of 55. Score 11 12-19 20-28 29-37 38-45 46-54 55   Modifier CH CI CJ CK CL CM CN     ? Carrying, Moving, and Handling Objects:     - CURRENT STATUS: CK - 40%-59% impaired, limited or restricted    - GOAL STATUS: CK - 40%-59% impaired, limited or restricted    - D/C STATUS:  ---------------To be determined---------------    Medical Necessity:   · Skilled intervention continues to be required due to need for manual treatment and modalities within post-operative precautions. Reason for Services/Other Comments:  · Patient continues to require skilled intervention due to need for guided progression through rehab.    Use of outcome tool(s) and clinical judgement create a POC that gives a: Difficult prediction of patient's progress: HIGH COMPLEXITY            TREATMENT:   (In addition to Assessment/Re-Assessment sessions the following treatments were rendered)  8/30/2017  Pre-treatment Symptoms/Complaints:  Says his shoulder blade feels better, able to lay on the right side and moving better. Did the HEP    Pain: Initial:   7 /10 \"but only soreness, no pain\" Post Session:    3 /10     Manual Therapy: (12 Minutes): for ROM. Soft tissue release at R latissimus, grade 3 to 4-- R shoulder ER/IR, abd and flexion  Therapeutic Exercise: (33 Minutes): for strengthening and AROM. date 8/18/18 8/21/17 8/23/17 8/25/17 8/28/17 8/30/17   Activity/Exercise         midtrap Side 1# x10 2x10 Side manual guidance at scapula  Side 2x10 Side 1# x8, 0# 2x8 Supine manual resisted x 10  Side 1# 2x8   Shoulder extn -- Red x10 Red 2x10  Prone 2x10 Red 2x12   Scapular retraction  Red x10  Supine manual resist x 12  Side reclined manual resist x10 Bent Row 1# 2x10  Bent row 2# 2x10   Shoulder abd Side 2# 2x10 Side x10, 1# x10 Side reclined  Side reclined 3x8 manual guidance  Side reclined  0# 2x10 Side reclined 1# 2x10   FE Supine elbow bent 3# 2x8  Supine resisted 75 deg to 90 deg x 12  Reacher at waist height with forward step 2x 10 Supine 3# 2x10  Elbow bent  Supine resisted 75 to 90 deg flexion x 10  Reacher with step 2x12 Reclined:  elbow straight 2x10  Elbow bent 2# x10  Reacher with step 3x10  Reclined:  Elbow straight 2x8  Elbow bent 2#  2x8  Reacher 1#x10  0# x 10 Supine with yellow tband for abd 2x8  Reclined  Elbow bent 2# 2x10  Reacher 1# 2x10 Reclined 1.5# and yellow tband for abd hold.  2x10  Elbow bent 2.5# 2x10   triceps Supine 4# x8,x10 Supine 4# 2x10 Reclined 3# 2x10 Reclined 3# 2x10 Reclined 4# x10, 3# x10 Reclined 5.5# 2x10   Bicep curls Supine 2# x10  Standing 3# x10 Supine 3# 2x10 Reclined 3# 2x15 controlled descent Reclined 3# x10  Standing 3# x10 Reclined 4# 2x10  Standing 4# x10 Reclined 5.5# 2x10  Standing 5.5#  x15   diagonal Yellow 2x10 Red x12 Reclined \"Y\" x8 Yellow back and down in standing 2x10 \"Y\" reclined with manual guidance 2x8  Standing yellow back and down 2x10 \"Y\" reclined 2x10  Standing back and down 2x12                 Modalities: (  Minutes): none per pt ok  HEP: continue HEP with stretching daily and strengthening every other day. He verbalizes understanding. Treatment/Session Assessment:    · Response to Treatment:  Tolerated exercises with increasing reps or weight. getting stronger with the ability to tolerate exercises  · Compliance with Program/Exercises: some, per pt. · Recommendations/Intent for next treatment session: Continue with R shoulder motion treatment; shoulder girdle and whole arm active phase strength progression; and review and update HEP with written instruction as ready.    Total Treatment Duration:       45       minutes  PT Patient Time In/Time Out  Time In: 0900  Time Out: Alina Amato 178, PT, MSPT

## 2017-08-31 ENCOUNTER — HOSPITAL ENCOUNTER (OUTPATIENT)
Dept: PHYSICAL THERAPY | Age: 72
Discharge: HOME | End: 2017-08-31
Payer: MEDICARE

## 2017-08-31 NOTE — PROGRESS NOTES
[facility]  : 1945 [facility] at [facility] FARRU[facility] LUNA  [address] [facility], [address] [place] [address], [facility] [address]  [phone]   [phone]       OUTPATIENT PHYSICAL THERAPY:cancellation note 2017      ICD-10: Treatment Diagnosis: Pain in right shoulder (M25.511),Presence of right artificial shoulder joint (Z96.611),Stiffness of right shoulder, not elsewhere classified (M25.611)  Precautions/Allergies:   Post-op/Epinephrine and Aspirin   Fall Risk Score: 1 (? 5 = High Risk)  MD Orders: PT- evaluate and treat, HEP, FULL MOTION/FULL STRENGTH MEDICAL/REFERRING DIAGNOSIS:  right shoulder hardware removed/reverse TSA, bicep tendon transfer- 17  DATE OF ONSET: initial injury- 17 with R shoulder scope, ASD/RCR/BT-17   [facility] joint separation with biceps rupture -2017 with surgical repair-3/23/17  REFERRING PHYSICIAN: Rocio Cr MD  RETURN PHYSICIAN APPOINTMENT: 17     INITIAL ASSESSMENT:  Mr. Nai Huber is s/p R shoulder reverse TSA with latissimus and teres major tendon transfer. He has a very complex R shoulder surgical history. He had previous bicep repair 17 and R shoulder rotator cuff repair 17. He started physical therapy 17 and attended 8 physical therapy sessions and then was held out of therapy for 2 1/2 weeks due to shoulder pain and inflammation. He returned to therapy 17. Progress is slow due to the complicated history. The R shoulder remains stiff weak limiting functional use. Therapy focus has progressed to AROM and strengthening but requires significant manual guidance. Goals are being addressed. He will benefit from continued skilled physical therapy to progress ROM, strength, and functional mobility. PROBLEM LIST (Impacting functional limitations):  1. Post-op R shoulder pain and swelling  2. Decreased R shoulder ROM   3. Weakness R shoulder INTERVENTIONS PLANNED:  1. Posture education  2.  Thermal and electric modalities, manual therapies for pain   3. Manual therapies, therapeutic exercises, HEP for ROM    4. Therapeutic exercises and HEP for strength   TREATMENT PLAN:  Effective Dates: 6/23/17 TO 9/15/17. Frequency/Duration: 2-3 times a week for 12 weeks  GOALS: (Goals have been discussed and agreed upon with patient.)  Short-Term Functional Goals: Time Frame: 6 weeks  1. Report no more than mild intermittent pain to R shoulder with compensatory use during basic functional activities. Not consistently met  2. R shoulder PROM forward elevation greater than 100 degrees to progress into functional ranges. Not consistently met  3. Demonstrate good R shoulder isometric strength with manual testing to progress into strength phase. Progressing towards  4. Independent with initial HEP. ongoing  Discharge Goals: Time Frame: 12 weeks  1. Pt to demonstrate Good posture correction during exercises- being met  2. No more than 3/10 intermittent pain R shoulder with return to normalized household and work activities, and score less than 50% on the DASH. Ongoing and not consistently met  3. R shoulder AROM forward elevation greater than 110 degrees and strength to shoulder are grossly WFL's for safe use with normalized activities. ongoing   4. Independent with advanced shoulder HEP for continued self-management. Rehabilitation Potential For Stated Goals: Good     Pt called to cancel due to schedule conflict with Dr sherrie Floyd Code PT, MSPT

## 2017-09-05 ENCOUNTER — HOSPITAL ENCOUNTER (OUTPATIENT)
Dept: PHYSICAL THERAPY | Age: 72
Discharge: HOME | End: 2017-09-05
Payer: MEDICARE

## 2017-09-05 ENCOUNTER — APPOINTMENT (OUTPATIENT)
Dept: PHYSICAL THERAPY | Age: 72
End: 2017-09-05
Payer: MEDICARE

## 2017-09-05 PROCEDURE — 97110 THERAPEUTIC EXERCISES: CPT

## 2017-09-05 PROCEDURE — 97140 MANUAL THERAPY 1/> REGIONS: CPT

## 2017-09-05 NOTE — PROGRESS NOTES
Flora Dooley  : 1945 [facility] FARRUKH LUNA  1101 [facility], [address] [place] [address],[address], [facility] [address] [facility].  [phone]   [phone]       OUTPATIENT PHYSICAL THERAPY:Daily Note 2017      ICD-10: Treatment Diagnosis: Pain in right shoulder (M25.511),Presence of right artificial shoulder joint (Z96.611),Stiffness of right shoulder, not elsewhere classified (M25.611)  Precautions/Allergies:   Post-op/Epinephrine and Aspirin   Fall Risk Score: 1 (? 5 = High Risk)  MD Orders: PT- evaluate and treat, HEP, FULL MOTION/FULL STRENGTH MEDICAL/REFERRING DIAGNOSIS:  right shoulder hardware removed/reverse TSA, bicep tendon transfer- 17  DATE OF ONSET: initial injury- 17 with R shoulder scope, ASD/RCR/BT-17   [facility], [place], operated by [facility] joint separation with biceps rupture -2017 with surgical repair-3/23/17  REFERRING PHYSICIAN: Yonathan Barth MD  RETURN PHYSICIAN APPOINTMENT: 17     INITIAL ASSESSMENT:  Mr. Buster Wing is s/p R shoulder reverse TSA with latissimus and teres major tendon transfer. He has a very complex R shoulder surgical history. He had previous bicep repair 17 and R shoulder rotator cuff repair 17. He started physical therapy 17 and attended 8 physical therapy sessions and then was held out of therapy for 2 1/2 weeks due to shoulder pain and inflammation. He returned to therapy 17. Progress is slow due to the complicated history. The R shoulder remains stiff weak limiting functional use. Therapy focus has progressed to AROM and strengthening but requires significant manual guidance. Goals are being addressed. He will benefit from continued skilled physical therapy to progress ROM, strength, and functional mobility. PROBLEM LIST (Impacting functional limitations):  1. Post-op R shoulder pain and swelling  2. Decreased R shoulder ROM   3. Weakness R shoulder INTERVENTIONS PLANNED:  1. Posture education  2.  Thermal and electric modalities, manual therapies for pain 3. Manual therapies, therapeutic exercises, HEP for ROM    4. Therapeutic exercises and HEP for strength   TREATMENT PLAN:  Effective Dates: 6/23/17 TO 9/15/17. Frequency/Duration: 2-3 times a week for 12 weeks  GOALS: (Goals have been discussed and agreed upon with patient.)  Short-Term Functional Goals: Time Frame: 6 weeks  1. Report no more than mild intermittent pain to R shoulder with compensatory use during basic functional activities. Not consistently met  2. R shoulder PROM forward elevation greater than 100 degrees to progress into functional ranges. Not consistently met  3. Demonstrate good R shoulder isometric strength with manual testing to progress into strength phase. Progressing towards  4. Independent with initial HEP. ongoing  Discharge Goals: Time Frame: 12 weeks  1. Pt to demonstrate Good posture correction during exercises- being met  2. No more than 3/10 intermittent pain R shoulder with return to normalized household and work activities, and score less than 50% on the DASH. Ongoing and not consistently met  3. R shoulder AROM forward elevation greater than 110 degrees and strength to shoulder are grossly WFL's for safe use with normalized activities. ongoing   4. Independent with advanced shoulder HEP for continued self-management. Rehabilitation Potential For Stated Goals: Good              The information in this section was collected on 6/23/17 (except where otherwise noted). HISTORY:   History of Present Injury/Illness (Reason for Referral):  Mr Flor Javier initial injury was the result of a fall back in November of 2016 requiring surgical repair on 1/27/17. He came to therapy reporting the biceps muscle ruptured on February 21st. Therapy was OK'd by MD. He then came to the 2/28/17 therapy appointment reporting the [facility] joint had . MD ok\"d therapy to tolerance. Pt opted for surgery on 3/23/17 to plate the clavicle when no progress could be made with treatment.  He returned to therapy but complained that pain was limiting any functional use of the arm so he opted for shoulder replacement surgery. He received in patient therapy for hand/elbow ROM and then was discharged to home health PT for hand/elbow ROM only. Past Medical History/Comorbidities:   Mr. 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 Mr. Maria Guadalupe Little  has a past surgical history that includes carpal tunnel release (2012); carpal tunnel release (1980); hernia repair (2010); hernia repair (Left, 2007); lumbar laminectomy; hip replacement (Left, 12/12/2012); knee arthroscopy (Right, 2006); shoulder arthroscopy (Left, 2/17/2012); corneal transplant (Right, 2004) and corneal transplant (Right, 06/14/2016). Social History/Living Environment:     Pt lives with his wife in a one story home. He cares for the yard and home repairs  Prior Level of Function/Work/Activity:  retired  Previous Treatment Approaches:          PT for the R shoulder since 2/14/17  Personal Factors: Other factors that influence how disability is experienced by the patient:  See above PMH for back pain, hip replacement  Current Medications:     Current Outpatient Prescriptions:     temazepam (RESTORIL) 15 mg capsule, Take 15 mg by mouth nightly., Disp: , Rfl:     HYDROmorphone (DILAUDID) - out of medication but will get refill     rivaroxaban (XARELTO) 20 mg tab tablet, Take 1 Tab by mouth daily (with breakfast). , Disp: 30 Tab, Rfl: 11    atorvastatin (LIPITOR) 80 mg tablet, Take 40 mg by mouth daily. 1/2 tablet daily  Take DOS per anesthesia protocol. Indications: hyperlipidemia, Disp: , Rfl:     Brimonidine-Timolol (COMBIGAN) 0.2-0.5 % Drop      Sleeping medication   Date Last Reviewed:  9/5/17   Number of Personal Factors/Comorbidities that affect the Plan of Care: 3+: HIGH COMPLEXITY   EXAMINATION:    9/5/2017  Observation/Orthostatic Postural Assessment: Atrophy to deltoid, biceps on R. . Shrugging, elbow flexion and trunk extension with attempts to reach    Palpation: tender over the [facility] [place] joint region, mild tenderness over anterior shoulder/ bicep and upper arm region, posterior shoulder/scapular region muscles. ROM:                           Strength:                        CLINICAL DECISION MAKING:   Outcome Measure: Tool Used: Disabilities of the Arm, Shoulder and Hand (DASH) Questionnaire - Quick Version  Score:  Initial: 97  Most Recent: 44/55 (Date: 7-26-17 )  57 (8/21/17)   Interpretation of Score: The DASH is designed to measure the activities of daily living in person's with upper extremity dysfunction or pain. Each section is scored on a 1-5 scale, 5 representing the greatest disability. The scores of each section are added together for a total score of 55. Score 11 12-19 20-28 29-37 38-45 46-54 55   Modifier CH CI CJ CK CL CM CN     ? Carrying, Moving, and Handling Objects:     - CURRENT STATUS: CK - 40%-59% impaired, limited or restricted    - GOAL STATUS: CK - 40%-59% impaired, limited or restricted    - D/C STATUS:  ---------------To be determined---------------    Medical Necessity:   · Skilled intervention continues to be required due to need for manual treatment and modalities within post-operative precautions. Reason for Services/Other Comments:  · Patient continues to require skilled intervention due to need for guided progression through rehab.    Use of outcome tool(s) and clinical judgement create a POC that gives a: Difficult prediction of patient's progress: HIGH COMPLEXITY            TREATMENT:   (In addition to Assessment/Re-Assessment sessions the following treatments were rendered)  9/5/2017  Pre-treatment Symptoms/Complaints:  Says his shoulder blade and back feel better since having massages. \"Been working that arm to death and doing more/different exercises\"    Pain: Initial: Pain Intensity 1: 6/10 \" soreness, no pain\" Post Session:    3 /10     Manual Therapy: (15 Minutes): for ROM. Soft tissue release at R latissimus and biceps muscle, grade 3 to 4-- R shoulder ER/IR, abd and flexion. Horizontal abd stretch  Therapeutic Exercise: (29 Minutes): for strengthening and AROM. AA = active assist   date 8/18/18 8/21/17 8/23/17 8/25/17 8/28/17 8/30/17 9/5/17    Activity/Exercise           midtrap Side 1# x10 2x10 Side manual guidance at scapula  Side 2x10 Side 1# x8, 0# 2x8 Supine manual resisted x 10  Side 1# 2x8     Shoulder extn -- Red x10 Red 2x10  Prone 2x10 Red 2x12     Scapular retraction  Red x10  Supine manual resist x 12  Side reclined manual resist x10 Bent Row 1# 2x10  Bent row 2# 2x10     Shoulder abd Side 2# 2x10 Side x10, 1# x10 Side reclined  Side reclined 3x8 manual guidance  Side reclined  0# 2x10 Side reclined 1# 2x10 Side 2# 2x10  Side reclined 1# x10    FE Supine elbow bent 3# 2x8  Supine resisted 75 deg to 90 deg x 12  Reacher at waist height with forward step 2x 10 Supine 3# 2x10  Elbow bent  Supine resisted 75 to 90 deg flexion x 10  Reacher with step 2x12 Reclined:  elbow straight 2x10  Elbow bent 2# x10  Reacher with step 3x10  Reclined:  Elbow straight 2x8  Elbow bent 2#  2x8  Reacher 1#x10  0# x 10 Supine with yellow tband for abd 2x8  Reclined  Elbow bent 2# 2x10  Reacher 1# 2x10 Reclined 1.5# and yellow tband for abd hold.  2x10  Elbow bent 2.5# 2x10 Reclined 1# strap wt and yellow tband abd hold x12  Elbow bent 2# 2x10    triceps Supine 4# x8,x10 Supine 4# 2x10 Reclined 3# 2x10 Reclined 3# 2x10 Reclined 4# x10, 3# x10 Reclined 5.5# 2x10 Reclined 4# 2x10. Assist for stabilization    Bicep curls Supine 2# x10  Standing 3# x10 Supine 3# 2x10 Reclined 3# 2x15 controlled descent Reclined 3# x10  Standing 3# x10 Reclined 4# 2x10  Standing 4# x10 Reclined 5.5# 2x10  Standing 5.5#  x15 Reclined 5# x10,4# x15    diagonal Yellow 2x10 Red x12 Reclined \"Y\" x8 Yellow back and down in standing 2x10 \"Y\" reclined with manual guidance 2x8  Standing yellow back and down 2x10 \"Y\" reclined 2x10  Standing back and down 2x12 Reclined \"Y\" x10 AA at end ROM  Wall slide D2 and D1 x8 ea                    Modalities: (  Minutes): none per pt ok  HEP: hold HEP with only stretching and pulleys until going back to the doctor. then every other day. He verbalizes understanding. Treatment/Session Assessment:    · Response to Treatment:   Low endurance during exercises with assistance needed to complete reps to end ROM when fatigued  · Compliance with Program/Exercises: some, per pt. · Recommendations/Intent for next treatment session: Continue with R shoulder motion treatment; shoulder girdle and whole arm active phase strength progression; and review and update HEP with written instruction as ready. Recommended pt only perform HEP as issued.    Total Treatment Duration:       44       minutes  PT Patient Time In/Time Out  Time In: 0900  Time Out: 201 [address] [place] [address] PT, [facility] [place]

## 2017-09-11 ENCOUNTER — HOSPITAL ENCOUNTER (OUTPATIENT)
Dept: PHYSICAL THERAPY | Age: 72
Discharge: HOME | End: 2017-09-11
Payer: MEDICARE

## 2017-09-11 ENCOUNTER — HOSPITAL ENCOUNTER (OUTPATIENT)
Dept: LAB | Age: 72
Discharge: HOME | End: 2017-09-11
Payer: MEDICARE

## 2017-09-11 DIAGNOSIS — I74.9 THROMBOEMBOLUS (HCC): ICD-10-CM

## 2017-09-11 LAB
ALBUMIN SERPL-MCNC: 3.7 G/DL (ref 3.2–4.6)
ALBUMIN/GLOB SERPL: 1 {RATIO} (ref 1.2–3.5)
ALP SERPL-CCNC: 67 U/L (ref 50–136)
ALT SERPL-CCNC: 29 U/L (ref 12–65)
ANION GAP SERPL CALC-SCNC: 6 MMOL/L (ref 7–16)
AST SERPL-CCNC: 21 U/L (ref 15–37)
BASOPHILS # BLD: 0 K/UL (ref 0–0.2)
BASOPHILS NFR BLD: 1 % (ref 0–2)
BILIRUB SERPL-MCNC: 0.5 MG/DL (ref 0.2–1.1)
BUN SERPL-MCNC: 12 MG/DL (ref 8–23)
CALCIUM SERPL-MCNC: 9.1 MG/DL (ref 8.3–10.4)
CHLORIDE SERPL-SCNC: 108 MMOL/L (ref 98–107)
CO2 SERPL-SCNC: 29 MMOL/L (ref 21–32)
CREAT SERPL-MCNC: 0.97 MG/DL (ref 0.8–1.5)
D DIMER PPP FEU-MCNC: 0.61 UG/ML(FEU)
DIFFERENTIAL METHOD BLD: ABNORMAL
EOSINOPHIL # BLD: 0.2 K/UL (ref 0–0.8)
EOSINOPHIL NFR BLD: 3 % (ref 0.5–7.8)
ERYTHROCYTE [DISTWIDTH] IN BLOOD BY AUTOMATED COUNT: 13 % (ref 11.9–14.6)
GLOBULIN SER CALC-MCNC: 3.7 G/DL (ref 2.3–3.5)
GLUCOSE SERPL-MCNC: 126 MG/DL (ref 65–100)
HCT VFR BLD AUTO: 46.3 % (ref 41.1–50.3)
HGB BLD-MCNC: 15 G/DL (ref 13.6–17.2)
LYMPHOCYTES # BLD: 1.4 K/UL (ref 0.5–4.6)
LYMPHOCYTES NFR BLD: 22 % (ref 13–44)
MCH RBC QN AUTO: 30.9 PG (ref 26.1–32.9)
MCHC RBC AUTO-ENTMCNC: 32.4 G/DL (ref 31.4–35)
MCV RBC AUTO: 95.3 FL (ref 79.6–97.8)
MONOCYTES # BLD: 0.5 K/UL (ref 0.1–1.3)
MONOCYTES NFR BLD: 7 % (ref 4–12)
NEUTS SEG # BLD: 4.3 K/UL (ref 1.7–8.2)
NEUTS SEG NFR BLD: 67 % (ref 43–78)
NRBC # BLD: 0 K/UL (ref 0–0.2)
PLATELET # BLD AUTO: 172 K/UL (ref 150–450)
PMV BLD AUTO: 10.2 FL (ref 10.8–14.1)
POTASSIUM SERPL-SCNC: 4.3 MMOL/L (ref 3.5–5.1)
PROT SERPL-MCNC: 7.4 G/DL (ref 6.3–8.2)
RBC # BLD AUTO: 4.86 M/UL (ref 4.23–5.67)
SODIUM SERPL-SCNC: 143 MMOL/L (ref 136–145)
WBC # BLD AUTO: 6.4 K/UL (ref 4.3–11.1)

## 2017-09-11 PROCEDURE — 97110 THERAPEUTIC EXERCISES: CPT

## 2017-09-11 PROCEDURE — 97140 MANUAL THERAPY 1/> REGIONS: CPT

## 2017-09-11 NOTE — PROGRESS NOTES
Rock Verma  : 1945 [facility] at [facility] FARRUKH LUNA  1101 [facility], [address] [place] [address],[address], [place] [address].  [phone]   [phone]       OUTPATIENT PHYSICAL THERAPY:Daily Note 2017      ICD-10: Treatment Diagnosis: Pain in right shoulder (M25.511),Presence of right artificial shoulder joint (Z96.611),Stiffness of right shoulder, not elsewhere classified (M25.611)  Precautions/Allergies:   Post-op/Epinephrine and Aspirin   Fall Risk Score: 1 (? 5 = High Risk)  MD Orders: PT- evaluate and treat, HEP, FULL MOTION/FULL STRENGTH MEDICAL/REFERRING DIAGNOSIS:  right shoulder hardware removed/reverse TSA, bicep tendon transfer- 17  DATE OF ONSET: initial injury- 17 with R shoulder scope, ASD/RCR/BT-17   [facility] joint separation with biceps rupture -2017 with surgical repair-3/23/17  REFERRING PHYSICIAN: Luz Barnes MD  RETURN PHYSICIAN APPOINTMENT: 17     INITIAL ASSESSMENT:  Mr. Koby Grady is s/p R shoulder reverse TSA with latissimus and teres major tendon transfer. He has a very complex R shoulder surgical history. He had previous bicep repair 17 and R shoulder rotator cuff repair 17. He started physical therapy 17 and attended 8 physical therapy sessions and then was held out of therapy for 2 1/2 weeks due to shoulder pain and inflammation. He returned to therapy 17. Progress is slow due to the complicated history. The R shoulder remains stiff weak limiting functional use. Therapy focus has progressed to AROM and strengthening but requires significant manual guidance. Goals are being addressed. He will benefit from continued skilled physical therapy to progress ROM, strength, and functional mobility. PROBLEM LIST (Impacting functional limitations):  1. Post-op R shoulder pain and swelling  2. Decreased R shoulder ROM   3. Weakness R shoulder INTERVENTIONS PLANNED:  1. Posture education  2.  Thermal and electric modalities, manual therapies for pain   3. Manual therapies, therapeutic exercises, HEP for ROM    4. Therapeutic exercises and HEP for strength   TREATMENT PLAN:  Effective Dates: 6/23/17 TO 9/15/17. Frequency/Duration: 2-3 times a week for 12 weeks  GOALS: (Goals have been discussed and agreed upon with patient.)  Short-Term Functional Goals: Time Frame: 6 weeks  1. Report no more than mild intermittent pain to R shoulder with compensatory use during basic functional activities. Not consistently met  2. R shoulder PROM forward elevation greater than 100 degrees to progress into functional ranges. Not consistently met  3. Demonstrate good R shoulder isometric strength with manual testing to progress into strength phase. Progressing towards  4. Independent with initial HEP. ongoing  Discharge Goals: Time Frame: 12 weeks  1. Pt to demonstrate Good posture correction during exercises- being met  2. No more than 3/10 intermittent pain R shoulder with return to normalized household and work activities, and score less than 50% on the DASH. Ongoing and not consistently met  3. R shoulder AROM forward elevation greater than 110 degrees and strength to shoulder are grossly WFL's for safe use with normalized activities. ongoing   4. Independent with advanced shoulder HEP for continued self-management. Rehabilitation Potential For Stated Goals: Good              The information in this section was collected on 6/23/17 (except where otherwise noted). HISTORY:   History of Present Injury/Illness (Reason for Referral):  Mr Maryellen Simon initial injury was the result of a fall back in November of 2016 requiring surgical repair on 1/27/17. He came to therapy reporting the biceps muscle ruptured on February 21st. Therapy was OK'd by MD. He then came to the 2/28/17 therapy appointment reporting the [facility] joint had . MD ok\"d therapy to tolerance. Pt opted for surgery on 3/23/17 to plate the clavicle when no progress could be made with treatment.  He returned to therapy but complained that pain was limiting any functional use of the arm so he opted for shoulder replacement surgery. He received in patient therapy for hand/elbow ROM and then was discharged to home health PT for hand/elbow ROM only. Past Medical History/Comorbidities:   Mr. 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 Mr. Idalia Castro  has a past surgical history that includes carpal tunnel release (2012); carpal tunnel release (1980); hernia repair (2010); hernia repair (Left, 2007); lumbar laminectomy; hip replacement (Left, 12/12/2012); knee arthroscopy (Right, 2006); shoulder arthroscopy (Left, 2/17/2012); corneal transplant (Right, 2004) and corneal transplant (Right, 06/14/2016). Social History/Living Environment:     Pt lives with his wife in a one story home. He cares for the yard and home repairs  Prior Level of Function/Work/Activity:  retired  Previous Treatment Approaches:          PT for the R shoulder since 2/14/17  Personal Factors: Other factors that influence how disability is experienced by the patient:  See above PMH for back pain, hip replacement  Current Medications:     Current Outpatient Prescriptions:     temazepam (RESTORIL) 15 mg capsule, Take 15 mg by mouth nightly., Disp: , Rfl:     HYDROmorphone (DILAUDID) - out of medication but will get refill     rivaroxaban (XARELTO) 20 mg tab tablet, Take 1 Tab by mouth daily (with breakfast). , Disp: 30 Tab, Rfl: 11    atorvastatin (LIPITOR) 80 mg tablet, Take 40 mg by mouth daily. 1/2 tablet daily  Take DOS per anesthesia protocol. Indications: hyperlipidemia, Disp: , Rfl:     Brimonidine-Timolol (COMBIGAN) 0.2-0.5 % Drop      Sleeping medication   Date Last Reviewed:  9/5/17   Number of Personal Factors/Comorbidities that affect the Plan of Care: 3+: HIGH COMPLEXITY   EXAMINATION:    9/11/2017  Observation/Orthostatic Postural Assessment: Atrophy to deltoid, biceps on R. . Shrugging, elbow flexion and trunk extension with attempts to reach    Palpation: tender over the [facility], [place], operated by [facility] joint region, mild tenderness over anterior shoulder/ bicep and upper arm region, posterior shoulder/scapular region muscles. ROM:                           Strength:                        CLINICAL DECISION MAKING:   Outcome Measure: Tool Used: Disabilities of the Arm, Shoulder and Hand (DASH) Questionnaire - Quick Version  Score:  Initial: 97  Most Recent: 44/55 (Date: 7-26-17 )  57 (8/21/17)   Interpretation of Score: The DASH is designed to measure the activities of daily living in person's with upper extremity dysfunction or pain. Each section is scored on a 1-5 scale, 5 representing the greatest disability. The scores of each section are added together for a total score of 55. Score 11 12-19 20-28 29-37 38-45 46-54 55   Modifier CH CI CJ CK CL CM CN     ? Carrying, Moving, and Handling Objects:     - CURRENT STATUS: CK - 40%-59% impaired, limited or restricted    - GOAL STATUS: CK - 40%-59% impaired, limited or restricted    - D/C STATUS:  ---------------To be determined---------------    Medical Necessity:   · Skilled intervention continues to be required due to need for manual treatment and modalities within post-operative precautions. Reason for Services/Other Comments:  · Patient continues to require skilled intervention due to need for guided progression through rehab.    Use of outcome tool(s) and clinical judgement create a POC that gives a: Difficult prediction of patient's progress: HIGH COMPLEXITY            TREATMENT:   (In addition to Assessment/Re-Assessment sessions the following treatments were rendered)  9/11/2017  Pre-treatment Symptoms/Complaints:  States that he has no pain but is stiff today. Reports that he has been receiving massage from his wife to reduce tightness in the muscles around his shoulder blade. Pain: Initial:  8/10 \" soreness, no pain\" Post Session:    No VAS provided after PT     Manual Therapy: (15 Minutes): for ROM. Soft tissue release at R latissimus and biceps muscle, PROM R shoulder flexion, ABD and ER for improved shoulder ROM. Side-lying scapular mobilizations for improved scapular mobility, soft tissue mobilizations   Therapeutic Exercise: ( 26 minutes): for strengthening and AROM. AA = active assist   date 8/25/17 8/28/17 8/30/17 9/5/17 9-11-17   Activity/Exercise        midtrap Side 2x10 Side 1# x8, 0# 2x8 Supine manual resisted x 10  Side 1# 2x8     Shoulder extn  Prone 2x10 Red 2x12     Scapular retraction Side reclined manual resist x10 Bent Row 1# 2x10  Bent row 2# 2x10  Prone 2# 2 x 10   Shoulder abd Side reclined 3x8 manual guidance  Side reclined  0# 2x10 Side reclined 1# 2x10 Side 2# 2x10  Side reclined 1# x10 Side reclined 2# 2 x 10   FE Reclined:  Elbow straight 2x8  Elbow bent 2#  2x8  Reacher 1#x10  0# x 10 Supine with yellow tband for abd 2x8  Reclined  Elbow bent 2# 2x10  Reacher 1# 2x10 Reclined 1.5# and yellow tband for abd hold. 2x10  Elbow bent 2.5# 2x10 Reclined 1# strap wt and yellow tband abd hold x12  Elbow bent 2# 2x10 Reclined 2 x 10 w/o weight, 1# x 10, 2# x 10   triceps Reclined 3# 2x10 Reclined 4# x10, 3# x10 Reclined 5.5# 2x10 Reclined 4# 2x10.  Assist for stabilization Reclined 5# 2 x 10   Bicep curls Reclined 3# x10  Standing 3# x10 Reclined 4# 2x10  Standing 4# x10 Reclined 5.5# 2x10  Standing 5.5#  x15 Reclined 5# x10,4# x15 Reclined 5# 3 x 10 diagonal Yellow back and down in standing 2x10 \"Y\" reclined with manual guidance 2x8  Standing yellow back and down 2x10 \"Y\" reclined 2x10  Standing back and down 2x12 Reclined \"Y\" x10 AA at end ROM  Wall slide D2 and D1 x8 ea Reclined \"Y\" 2 x 12; wall slide D2 and D1 x 10 ea                Modalities: (  12 minutes): Heat applied to R shoulder for reduced stiffness. HEP: No changes made today to HEP. Treatment/Session Assessment:    · Response to Treatment:  Patient demonstrates stiffness with R shoulder flexion, ER and abduction which improved following manual interventions. Pt demonstrated increased ROM following manual interventions. Tightness in latissimus dorsi and teres major with palpation. · Compliance with Program/Exercises: some, per pt. · Recommendations/Intent for next treatment session: Continue with R shoulder motion treatment; shoulder girdle and whole arm active phase strength progression; and review and update HEP with written instruction as ready. Recommended pt only perform HEP as issued.    Total Treatment Duration:       41   minutes  PT Patient Time In/Time Out  Time In: 1030  Time Out: 710 [facility], [place], PT, DPT

## 2017-09-13 ENCOUNTER — HOSPITAL ENCOUNTER (OUTPATIENT)
Dept: PHYSICAL THERAPY | Age: 72
Discharge: HOME | End: 2017-09-13
Payer: MEDICARE

## 2017-09-13 PROCEDURE — 97110 THERAPEUTIC EXERCISES: CPT

## 2017-09-13 PROCEDURE — 97140 MANUAL THERAPY 1/> REGIONS: CPT

## 2017-09-13 NOTE — PROGRESS NOTES
Amador Keene  : 1945 [facility] FARRUKH LUNA  [address] [facility], [address] [place] [address],[address], [place] [address].  [phone]   [phone]       OUTPATIENT PHYSICAL THERAPY:Daily Note 2017      ICD-10: Treatment Diagnosis: Pain in right shoulder (M25.511),Presence of right artificial shoulder joint (Z96.611),Stiffness of right shoulder, not elsewhere classified (M25.611)  Precautions/Allergies:   Post-op/Epinephrine and Aspirin   Fall Risk Score: 1 (? 5 = High Risk)  MD Orders: PT- evaluate and treat, HEP, FULL MOTION/FULL STRENGTH MEDICAL/REFERRING DIAGNOSIS:  right shoulder hardware removed/reverse TSA, bicep tendon transfer- 17  DATE OF ONSET: initial injury- 17 with R shoulder scope, ASD/RCR/BT-17   [facility] joint separation with biceps rupture -2017 with surgical repair-3/23/17  REFERRING PHYSICIAN: Nidia Wilson MD  RETURN PHYSICIAN APPOINTMENT: 17     INITIAL ASSESSMENT:  Mr. Marquita Zaragoza is s/p R shoulder reverse TSA with latissimus and teres major tendon transfer. He has a very complex R shoulder surgical history. He had previous bicep repair 17 and R shoulder rotator cuff repair 17. He started physical therapy 17 and attended 8 physical therapy sessions and then was held out of therapy for 2 1/2 weeks due to shoulder pain and inflammation. He returned to therapy 17. Progress is slow due to the complicated history. The R shoulder remains stiff weak limiting functional use. Therapy focus has progressed to AROM and strengthening but requires significant manual guidance. Goals are being addressed. He will benefit from continued skilled physical therapy to progress ROM, strength, and functional mobility. PROBLEM LIST (Impacting functional limitations):  1. Post-op R shoulder pain and swelling  2. Decreased R shoulder ROM   3. Weakness R shoulder INTERVENTIONS PLANNED:  1. Posture education  2.  Thermal and electric modalities, manual therapies for pain   3. Manual therapies, therapeutic exercises, HEP for ROM    4. Therapeutic exercises and HEP for strength   TREATMENT PLAN:  Effective Dates: 6/23/17 TO 9/15/17. Frequency/Duration: 2-3 times a week for 12 weeks  GOALS: (Goals have been discussed and agreed upon with patient.)  Short-Term Functional Goals: Time Frame: 6 weeks  1. Report no more than mild intermittent pain to R shoulder with compensatory use during basic functional activities. Not consistently met  2. R shoulder PROM forward elevation greater than 100 degrees to progress into functional ranges. Not consistently met  3. Demonstrate good R shoulder isometric strength with manual testing to progress into strength phase. Progressing towards  4. Independent with initial HEP. ongoing  Discharge Goals: Time Frame: 12 weeks  1. Pt to demonstrate Good posture correction during exercises- being met  2. No more than 3/10 intermittent pain R shoulder with return to normalized household and work activities, and score less than 50% on the DASH. Ongoing and not consistently met  3. R shoulder AROM forward elevation greater than 110 degrees and strength to shoulder are grossly WFL's for safe use with normalized activities. ongoing   4. Independent with advanced shoulder HEP for continued self-management. Rehabilitation Potential For Stated Goals: Good              The information in this section was collected on 6/23/17 (except where otherwise noted). HISTORY:   History of Present Injury/Illness (Reason for Referral):  Mr Bertha Vaughan initial injury was the result of a fall back in November of 2016 requiring surgical repair on 1/27/17. He came to therapy reporting the biceps muscle ruptured on February 21st. Therapy was OK'd by MD. He then came to the 2/28/17 therapy appointment reporting the [facility] joint had . MD ok\"d therapy to tolerance. Pt opted for surgery on 3/23/17 to plate the clavicle when no progress could be made with treatment.  He returned to therapy but complained that pain was limiting any functional use of the arm so he opted for shoulder replacement surgery. He received in patient therapy for hand/elbow ROM and then was discharged to home health PT for hand/elbow ROM only. Past Medical History/Comorbidities:   Mr. 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 Mr. Lucy Hart  has a past surgical history that includes carpal tunnel release (2012); carpal tunnel release (1980); hernia repair (2010); hernia repair (Left, 2007); lumbar laminectomy; hip replacement (Left, 12/12/2012); knee arthroscopy (Right, 2006); shoulder arthroscopy (Left, 2/17/2012); corneal transplant (Right, 2004) and corneal transplant (Right, 06/14/2016). Social History/Living Environment:     Pt lives with his wife in a one story home. He cares for the yard and home repairs  Prior Level of Function/Work/Activity:  retired  Previous Treatment Approaches:          PT for the R shoulder since 2/14/17  Personal Factors: Other factors that influence how disability is experienced by the patient:  See above PMH for back pain, hip replacement  Current Medications:     Current Outpatient Prescriptions:     temazepam (RESTORIL) 15 mg capsule, Take 15 mg by mouth nightly., Disp: , Rfl:     HYDROmorphone (DILAUDID) - out of medication but will get refill     rivaroxaban (XARELTO) 20 mg tab tablet, Take 1 Tab by mouth daily (with breakfast). , Disp: 30 Tab, Rfl: 11    atorvastatin (LIPITOR) 80 mg tablet, Take 40 mg by mouth daily. 1/2 tablet daily  Take DOS per anesthesia protocol. Indications: hyperlipidemia, Disp: , Rfl:     Brimonidine-Timolol (COMBIGAN) 0.2-0.5 % Drop      Sleeping medication   Date Last Reviewed:  9/5/17   Number of Personal Factors/Comorbidities that affect the Plan of Care: 3+: HIGH COMPLEXITY   EXAMINATION:    9/13/2017  Observation/Orthostatic Postural Assessment: Atrophy to deltoid, biceps on R. . Shrugging, elbow flexion and trunk extension with attempts to reach    Palpation: tender over the [facility] joint region, mild tenderness over anterior shoulder/ bicep and upper arm region, posterior shoulder/scapular region muscles. ROM:                           Strength:                        CLINICAL DECISION MAKING:   Outcome Measure: Tool Used: Disabilities of the Arm, Shoulder and Hand (DASH) Questionnaire - Quick Version  Score:  Initial: 97  Most Recent: 44/55 (Date: 7-26-17 )  57 (8/21/17)   Interpretation of Score: The DASH is designed to measure the activities of daily living in person's with upper extremity dysfunction or pain. Each section is scored on a 1-5 scale, 5 representing the greatest disability. The scores of each section are added together for a total score of 55. Score 11 12-19 20-28 29-37 38-45 46-54 55   Modifier CH CI CJ CK CL CM CN     ? Carrying, Moving, and Handling Objects:     - CURRENT STATUS: CK - 40%-59% impaired, limited or restricted    - GOAL STATUS: CK - 40%-59% impaired, limited or restricted    - D/C STATUS:  ---------------To be determined---------------    Medical Necessity:   · Skilled intervention continues to be required due to need for manual treatment and modalities within post-operative precautions. Reason for Services/Other Comments:  · Patient continues to require skilled intervention due to need for guided progression through rehab.    Use of outcome tool(s) and clinical judgement create a POC that gives a: Difficult prediction of patient's progress: HIGH COMPLEXITY            TREATMENT:   (In addition to Assessment/Re-Assessment sessions the following treatments were rendered)  9/13/2017  Pre-treatment Symptoms/Complaints:  Pt denies pain today but reports increased soreness; states that his arm is \"very sore\". Pain: Initial:  no numeric value; \"sore\" Post Session:    No VAS provided after PT     Manual Therapy: (16 Minutes): for ROM. R shoulder PROM to end-range for improved R shoulder ROM and function; soft tissue mobilizations to R latissimus dorsi and posterior shoulder musculature for reduced muscle tightness. Therapeutic Exercise: ( 23 minutes): for strengthening and AROM. Increased resistance as tolerated by patient. AA = active assist   date 8/25/17 8/28/17 8/30/17 9/5/17 9-11-17 9-13-17   Activity/Exercise         midtrap Side 2x10 Side 1# x8, 0# 2x8 Supine manual resisted x 10  Side 1# 2x8      Shoulder extn  Prone 2x10 Red 2x12   Green 1 x 20   Scapular retraction Side reclined manual resist x10 Bent Row 1# 2x10  Bent row 2# 2x10  Prone 2# 2 x 10 Prone 2# 2 x 10, standing green 1 x 20   Shoulder abd Side reclined 3x8 manual guidance  Side reclined  0# 2x10 Side reclined 1# 2x10 Side 2# 2x10  Side reclined 1# x10 Side reclined 2# 2 x 10 Side reclined 2# 2 x 10   FE Reclined:  Elbow straight 2x8  Elbow bent 2#  2x8  Reacher 1#x10  0# x 10 Supine with yellow tband for abd 2x8  Reclined  Elbow bent 2# 2x10  Reacher 1# 2x10 Reclined 1.5# and yellow tband for abd hold. 2x10  Elbow bent 2.5# 2x10 Reclined 1# strap wt and yellow tband abd hold x12  Elbow bent 2# 2x10 Reclined 2 x 10 w/o weight, 1# x 10, 2# x 10 Reclined 1 x 10 w/o weight; 2# 2 x 10   triceps Reclined 3# 2x10 Reclined 4# x10, 3# x10 Reclined 5.5# 2x10 Reclined 4# 2x10.  Assist for stabilization Reclined 5# 2 x 10 5# 3 x 10; reclined   Bicep curls Reclined 3# x10  Standing 3# x10 Reclined 4# 2x10  Standing 4# x10 Reclined 5.5# 2x10  Standing 5.5#  x15 Reclined 5# x10,4# x15 Reclined 5# 3 x 10 5# 3 x 10, reclined   diagonal Yellow back and down in standing 2x10 \"Y\" reclined with manual guidance 2x8  Standing yellow back and down 2x10 \"Y\" reclined 2x10  Standing back and down 2x12 Reclined \"Y\" x10 AA at end ROM  Wall slide D2 and D1 x8 ea Reclined \"Y\" 2 x 12; wall slide D2 and D1 x 10 ea Reclined \"Y\" 2 x 12; D2 and D1 wall slides 3 x 10 ea   UE [place]      1 x 20 flexion        Modalities: (0 min): Not performed today  HEP: No changes made today to HEP. Treatment/Session Assessment:    · Response to Treatment:  Patient did not demonstrate as much muscle tightness in his R latissimus dorsi and teres major as at last appointment. Pt demonstrated slight improvement with R shoulder flexion AROM but remains stiff and limited. · Compliance with Program/Exercises: some, per pt. · Recommendations/Intent for next treatment session: Continue with R shoulder motion treatment; shoulder girdle and whole arm active phase strength progression; and review and update HEP with written instruction as ready. Recommended pt only perform HEP as issued.    Total Treatment Duration:   39   minutes  PT Patient Time In/Time Out  Time In: 0223  Time Out: Liu 86, PT, DPT

## 2017-09-18 ENCOUNTER — HOSPITAL ENCOUNTER (OUTPATIENT)
Dept: PHYSICAL THERAPY | Age: 72
Discharge: HOME | End: 2017-09-18
Payer: MEDICARE

## 2017-09-18 PROCEDURE — 97110 THERAPEUTIC EXERCISES: CPT

## 2017-09-18 PROCEDURE — 97140 MANUAL THERAPY 1/> REGIONS: CPT

## 2017-09-18 PROCEDURE — G8985 CARRY GOAL STATUS: HCPCS

## 2017-09-18 PROCEDURE — G8984 CARRY CURRENT STATUS: HCPCS

## 2017-09-18 NOTE — PROGRESS NOTES
Crow Larkin  : 1945 [facility] FARRUKH LUNA  [address] [facility], [address] [place] [address],[address], [facility] [address]  [phone]   [phone]       OUTPATIENT PHYSICAL THERAPY:Daily Note and Recertification       ICD-10: Treatment Diagnosis: Pain in right shoulder (M25.511),Presence of right artificial shoulder joint (Z96.611),Stiffness of right shoulder, not elsewhere classified (M25.611)  Precautions/Allergies:   Post-op/Epinephrine and Aspirin   Fall Risk Score: 1 (? 5 = High Risk)  MD Orders: PT- evaluate and treat, HEP, FULL MOTION/FULL STRENGTH MEDICAL/REFERRING DIAGNOSIS:  right shoulder hardware removed/reverse TSA, bicep tendon transfer- 17  DATE OF ONSET: initial injury- 17 with R shoulder scope, ASD/RCR/BT-17   [facility] [place], operated by [facility] joint separation with biceps rupture -2017 with surgical repair-3/23/17  REFERRING PHYSICIAN: Bebe Zuniga MD  RETURN PHYSICIAN APPOINTMENT: 17     ASSESSMENT:  Mr. Juan Manuel Pimentel is s/p R shoulder reverse TSA with latissimus and teres major tendon transfer. He has a very complex R shoulder surgical history. He had previous bicep repair 17 and R shoulder rotator cuff repair 17. He started physical therapy 17 and attended 8 physical therapy sessions and then was held out of therapy for 2 1/2 weeks due to shoulder pain and inflammation. He returned to therapy 17. Pt is making good progress with his R shoulder strength and ROM, but his progress is slow secondary to pre-existing complications per above. Patient will benefit from continued PT to facilitate improved R UE function. PROBLEM LIST (Impacting functional limitations):  1. Post-op R shoulder pain and swelling  2. Decreased R shoulder ROM   3. Weakness R shoulder INTERVENTIONS PLANNED:  1. Posture education  2. Thermal and electric modalities, manual therapies for pain   3. Manual therapies, therapeutic exercises, HEP for ROM    4.  Therapeutic exercises and HEP for strength TREATMENT PLAN:  Effective Dates: 9/18/17 TO 11/27/17. Frequency/Duration: 2-3 times a week for 10 weeks  GOALS: (Goals have been discussed and agreed upon with patient.)  Short-Term Functional Goals: Time Frame: 6 weeks  1. Report no more than mild intermittent pain to R shoulder with compensatory use during basic functional activities. Not consistently met  2. R shoulder PROM forward elevation greater than 100 degrees to progress into functional ranges. Not consistently met  3. Demonstrate good R shoulder isometric strength with manual testing to progress into strength phase. Progressing towards  4. Independent with initial HEP. ongoing  Discharge Goals: Time Frame: 12 weeks  1. Pt to demonstrate Good posture correction during exercises- being met  2. No more than 3/10 intermittent pain R shoulder with return to normalized household and work activities, and score less than 50% on the DASH. Ongoing and not consistently met  3. R shoulder AROM forward elevation greater than 110 degrees and strength to shoulder are grossly WFL's for safe use with normalized activities. ongoing   4. Independent with advanced shoulder HEP for continued self-management. Rehabilitation Potential For Stated Goals: Good    Regarding [place] therapy, I certify that the treatment plan above will be carried out by a therapist or under their direction. Thank you for this referral,      Nadja Schwartz PT         Referring Physician Signature:     Haydee Rosales MD          Date                        The information in this section was collected on 6/23/17 (except where otherwise noted). HISTORY:   History of Present Injury/Illness (Reason for Referral):  Mr Christopher Dozier initial injury was the result of a fall back in November of 2016 requiring surgical repair on 1/27/17.  He came to therapy reporting the biceps muscle ruptured on February 21st. Therapy was OK'd by MD. He then came to the 2/28/17 therapy appointment reporting the [facility] joint had . MD ok\"d therapy to tolerance. Pt opted for surgery on 3/23/17 to plate the clavicle when no progress could be made with treatment. He returned to therapy but complained that pain was limiting any functional use of the arm so he opted for shoulder replacement surgery. He received in patient therapy for hand/elbow ROM and then was discharged to home health PT for hand/elbow ROM only. Past Medical History/Comorbidities:   Mr. 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 Mr. Janiya Enriquez  has a past surgical history that includes carpal tunnel release (2012); carpal tunnel release (1980); hernia repair (2010); hernia repair (Left, 2007); lumbar laminectomy; hip replacement (Left, 12/12/2012); knee arthroscopy (Right, 2006); shoulder arthroscopy (Left, 2/17/2012); corneal transplant (Right, 2004) and corneal transplant (Right, 06/14/2016). Social History/Living Environment:     Pt lives with his wife in a one story home. He cares for the yard and home repairs  Prior Level of Function/Work/Activity:  retired  Previous Treatment Approaches:          PT for the R shoulder since 2/14/17  Personal Factors:           Other factors that influence how disability is experienced by the patient:  See above PMH for back pain, hip replacement  Current Medications:     Current Outpatient Prescriptions:     temazepam (RESTORIL) 15 mg capsule, Take 15 mg by mouth nightly., Disp: , Rfl:   HYDROmorphone (DILAUDID) - out of medication but will get refill     rivaroxaban (XARELTO) 20 mg tab tablet, Take 1 Tab by mouth daily (with breakfast). , Disp: 30 Tab, Rfl: 11    atorvastatin (LIPITOR) 80 mg tablet, Take 40 mg by mouth daily. 1/2 tablet daily  Take DOS per anesthesia protocol. Indications: hyperlipidemia, Disp: , Rfl:     Brimonidine-Timolol (COMBIGAN) 0.2-0.5 % Drop      Sleeping medication   Date Last Reviewed:  9/18/2017     Number of Personal Factors/Comorbidities that affect the Plan of Care: 3+: HIGH COMPLEXITY   EXAMINATION:    9/18/2017  Observation/Orthostatic Postural Assessment: Atrophy to deltoid, biceps on R. . Shrugging, elbow flexion and trunk extension with attempts to reach    Palpation: tender over the [facility] joint region, mild tenderness over anterior shoulder/ bicep and upper arm region, posterior shoulder/scapular region muscles. ROM:                           Strength:                        CLINICAL DECISION MAKING:   Outcome Measure: Tool Used: Disabilities of the Arm, Shoulder and Hand (DASH) Questionnaire - Quick Version  Score:  Initial: 97  Most Recent: 44/55 (Date: 7-26-17 )  57 (8/21/17) 39 (9/25/17)   Interpretation of Score: The DASH is designed to measure the activities of daily living in person's with upper extremity dysfunction or pain. Each section is scored on a 1-5 scale, 5 representing the greatest disability. The scores of each section are added together for a total score of 55. Score 11 12-19 20-28 29-37 38-45 46-54 55   Modifier CH CI CJ CK CL CM CN     ?  Carrying, Moving, and Handling Objects:     - CURRENT STATUS: CK - 40%-59% impaired, limited or restricted    - GOAL STATUS: CK - 40%-59% impaired, limited or restricted    - D/C STATUS:  ---------------To be determined---------------    Medical Necessity:   · Skilled intervention continues to be required due to need for manual treatment and modalities within post-operative precautions. Reason for Services/Other Comments:  · Patient continues to require skilled intervention due to need for guided progression through rehab. Use of outcome tool(s) and clinical judgement create a POC that gives a: Difficult prediction of patient's progress: HIGH COMPLEXITY            TREATMENT:   (In addition to Assessment/Re-Assessment sessions the following treatments were rendered)  9/18/2017  Pre-treatment Symptoms/Complaints:  Pt denies pain today but continues to report increased R shoulder 'soreness'. Pain: Initial:  no pain reported, just soreness Post Session:    No numeric value provided following PT     Manual Therapy: (20 Minutes): for ROM. R shoulder PROM to end-range for improved R shoulder ROM and function; soft tissue mobilizations to R deltoid musculature for reduced tightness. Joint mobilizations grade II-III for improved R shoulder ROM. Soft tissue mobilizations to R biceps for reduced tightness and tenderness. Therapeutic Exercise: ( 23 minutes): for strengthening and AROM. Increased resistance as tolerated by patient. AA = active assist   date 8/25/17 8/28/17 8/30/17 9/5/17 9-11-17 5-25-54 9-18-17   Activity/Exercise          midtrap Side 2x10 Side 1# x8, 0# 2x8 Supine manual resisted x 10  Side 1# 2x8    Prone, 2 x 15 w/o weight   Shoulder extn  Prone 2x10 Red 2x12   Green 1 x 20    Scapular retraction Side reclined manual resist x10 Bent Row 1# 2x10  Bent row 2# 2x10  Prone 2# 2 x 10 Prone 2# 2 x 10, standing green 1 x 20 Prone row 5# 2 x 15   Shoulder abd Side reclined 3x8 manual guidance  Side reclined  0# 2x10 Side reclined 1# 2x10 Side 2# 2x10  Side reclined 1# x10 Side reclined 2# 2 x 10 Side reclined 2# 2 x 10 Side reclined 2# 2 x 10   FE Reclined:  Elbow straight 2x8  Elbow bent 2#  2x8  Reacher 1#x10  0# x 10 Supine with yellow tband for abd 2x8  Reclined  Elbow bent 2# 2x10  Reacher 1# 2x10 Reclined 1.5# and yellow tband for abd hold.  2x10  Elbow bent 2.5# 2x10 Reclined 1# strap wt and yellow tband abd hold x12  Elbow bent 2# 2x10 Reclined 2 x 10 w/o weight, 1# x 10, 2# x 10 Reclined 1 x 10 w/o weight; 2# 2 x 10 Reclined 2# 2 x 10   triceps Reclined 3# 2x10 Reclined 4# x10, 3# x10 Reclined 5.5# 2x10 Reclined 4# 2x10. Assist for stabilization Reclined 5# 2 x 10 5# 3 x 10; reclined 5# 3 x 10   Bicep curls Reclined 3# x10  Standing 3# x10 Reclined 4# 2x10  Standing 4# x10 Reclined 5.5# 2x10  Standing 5.5#  x15 Reclined 5# x10,4# x15 Reclined 5# 3 x 10 5# 3 x 10, reclined 5# 3x 10   diagonal Yellow back and down in standing 2x10 \"Y\" reclined with manual guidance 2x8  Standing yellow back and down 2x10 \"Y\" reclined 2x10  Standing back and down 2x12 Reclined \"Y\" x10 AA at end ROM  Wall slide D2 and D1 x8 ea Reclined \"Y\" 2 x 12; wall slide D2 and D1 x 10 ea Reclined \"Y\" 2 x 12; D2 and D1 wall slides 3 x 10 ea D1and D2 wall slides 3 x 10; reclined \"Y\"s 2 x 12   UE [place]      1 x 20 flexion 1 x 20 flexion        Modalities: (12 min): Heat applied to R shoulder following PT for reduced tightness and soreness in R shoulder  HEP: No changes made today to HEP. Treatment/Session Assessment:    · Response to Treatment:  Patient continues to be limited by reduced R shoulder ROM, but did demonstrate improvement in PROM after manual interventions and warming of soft tissues. Patient   · Compliance with Program/Exercises: some, per pt. · Recommendations/Intent for next treatment session: Continue with R shoulder motion treatment; shoulder girdle and whole arm active phase strength progression; and review and update HEP with written instruction as ready. Recommended pt only perform HEP as issued.    Total Treatment Duration:   43   minutes  PT Patient Time In/Time Out  Time In: 1345  Time Out: 1442    April Villarreal PT, DPT

## 2017-09-26 ENCOUNTER — HOSPITAL ENCOUNTER (OUTPATIENT)
Dept: PHYSICAL THERAPY | Age: 72
Discharge: HOME | End: 2017-09-26
Payer: MEDICARE

## 2017-09-26 PROCEDURE — 97140 MANUAL THERAPY 1/> REGIONS: CPT

## 2017-09-26 PROCEDURE — 97110 THERAPEUTIC EXERCISES: CPT

## 2017-09-26 NOTE — PROGRESS NOTES
Lashaun Farley  : 1945 [facility], [address] [place] [address],[address], [facility] [address]  [phone]   [phone]       OUTPATIENT PHYSICAL THERAPY:Daily Note 2017      ICD-10: Treatment Diagnosis: Pain in right shoulder (M25.511),Presence of right artificial shoulder joint (Z96.611),Stiffness of right shoulder, not elsewhere classified (M25.611)  Precautions/Allergies:   Post-op/Epinephrine and Aspirin   Fall Risk Score: 1 (? 5 = High Risk)  MD Orders: PT- evaluate and treat, HEP, FULL MOTION/FULL STRENGTH MEDICAL/REFERRING DIAGNOSIS:  right shoulder hardware removed/reverse TSA, bicep tendon transfer- 17  DATE OF ONSET: initial injury- 17 with R shoulder scope, ASD/RCR/BT-17   [facility] joint separation with biceps rupture -2017 with surgical repair-3/23/17  REFERRING PHYSICIAN: Rosie Muir MD  RETURN PHYSICIAN APPOINTMENT: unsure     ASSESSMENT:  Mr. Janiya Enriquez is s/p R shoulder reverse TSA with latissimus and teres major tendon transfer. He has a very complex R shoulder surgical history. He had previous bicep repair 17 and R shoulder rotator cuff repair 17. He started physical therapy 17 and attended 8 physical therapy sessions and then was held out of therapy for 2 1/2 weeks due to shoulder pain and inflammation. He returned to therapy 17. Pt is making good progress with his R shoulder strength and ROM, but his progress is slow secondary to pre-existing complications per above. Patient will benefit from continued PT to facilitate improved R UE function. PROBLEM LIST (Impacting functional limitations):  1. Post-op R shoulder pain and swelling  2. Decreased R shoulder ROM   3. Weakness R shoulder INTERVENTIONS PLANNED:  1. Posture education  2. Thermal and electric modalities, manual therapies for pain   3. Manual therapies, therapeutic exercises, HEP for ROM    4.  Therapeutic exercises and HEP for strength   TREATMENT PLAN:  Effective Dates: 9/18/17 TO 11/27/17. Frequency/Duration: 2-3 times a week for 10 weeks  GOALS: (Goals have been discussed and agreed upon with patient.)  Short-Term Functional Goals: Time Frame: 6 weeks  1. Report no more than mild intermittent pain to R shoulder with compensatory use during basic functional activities. Not consistently met  2. R shoulder PROM forward elevation greater than 100 degrees to progress into functional ranges. Not consistently met  3. Demonstrate good R shoulder isometric strength with manual testing to progress into strength phase. Progressing towards  4. Independent with initial HEP. ongoing  Discharge Goals: Time Frame: 12 weeks  1. Pt to demonstrate Good posture correction during exercises- being met  2. No more than 3/10 intermittent pain R shoulder with return to normalized household and work activities, and score less than 50% on the DASH. Ongoing and not consistently met  3. R shoulder AROM forward elevation greater than 110 degrees and strength to shoulder are grossly WFL's for safe use with normalized activities. ongoing   4. Independent with advanced shoulder HEP for continued self-management. Rehabilitation Potential For Stated Goals: Good    Regarding Letty Camara therapy, I certify that the treatment plan above will be carried out by a therapist or under their direction. Thank you for this referral,      Carline Gomez, PT,MSPT                           The information in this section was collected on 6/23/17 (except where otherwise noted). HISTORY:   History of Present Injury/Illness (Reason for Referral):  Mr Chantal Wright initial injury was the result of a fall back in November of 2016 requiring surgical repair on 1/27/17. He came to therapy reporting the biceps muscle ruptured on February 21st. Therapy was OK'd by MD. He then came to the 2/28/17 therapy appointment reporting the [facility] joint had . MD ok\"d therapy to tolerance.  Pt opted for surgery on 3/23/17 to plate the clavicle when no progress could be made with treatment. He returned to therapy but complained that pain was limiting any functional use of the arm so he opted for shoulder replacement surgery. He received in patient therapy for hand/elbow ROM and then was discharged to home health PT for hand/elbow ROM only. Past Medical History/Comorbidities:   Mr. 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 Mr. Lisa Marcos  has a past surgical history that includes carpal tunnel release (2012); carpal tunnel release (1980); hernia repair (2010); hernia repair (Left, 2007); lumbar laminectomy; hip replacement (Left, 12/12/2012); knee arthroscopy (Right, 2006); shoulder arthroscopy (Left, 2/17/2012); corneal transplant (Right, 2004) and corneal transplant (Right, 06/14/2016). Social History/Living Environment:     Pt lives with his wife in a one story home. He cares for the yard and home repairs  Prior Level of Function/Work/Activity:  retired  Previous Treatment Approaches:          PT for the R shoulder since 2/14/17  Personal Factors:           Other factors that influence how disability is experienced by the patient:  See above PMH for back pain, hip replacement  Current Medications:     Current Outpatient Prescriptions:     temazepam (RESTORIL) 15 mg capsule, Take 15 mg by mouth nightly., Disp: , Rfl:     HYDROmorphone (DILAUDID) - out of medication but will get refill   rivaroxaban (XARELTO) 20 mg tab tablet, Take 1 Tab by mouth daily (with breakfast). , Disp: 30 Tab, Rfl: 11    atorvastatin (LIPITOR) 80 mg tablet, Take 40 mg by mouth daily. 1/2 tablet daily  Take DOS per anesthesia protocol. Indications: hyperlipidemia, Disp: , Rfl:     Brimonidine-Timolol (COMBIGAN) 0.2-0.5 % Drop      Sleeping medication   Date Last Reviewed:  9/27/2017     Number of Personal Factors/Comorbidities that affect the Plan of Care: 3+: HIGH COMPLEXITY   EXAMINATION:    9/27/2017  Observation/Orthostatic Postural Assessment: Atrophy to deltoid, biceps on R. . Shrugging, elbow flexion and trunk extension with attempts to reach    Palpation: n/t.  ROM:                 RUE AROM  R Shoulder Flexion: 86  R Shoulder ABduction: 61  R Shoulder Internal Rotation: 44  R Shoulder External Rotation: 48         Strength:  n/t                      CLINICAL DECISION MAKING:   Outcome Measure: Tool Used: Disabilities of the Arm, Shoulder and Hand (DASH) Questionnaire - Quick Version  Score:  Initial: 97  Most Recent: 44/55 (Date: 7-26-17 )  57 (8/21/17) 39 ( 9/18/17)   Interpretation of Score: The DASH is designed to measure the activities of daily living in person's with upper extremity dysfunction or pain. Each section is scored on a 1-5 scale, 5 representing the greatest disability. The scores of each section are added together for a total score of 55. Score 11 12-19 20-28 29-37 38-45 46-54 55   Modifier CH CI CJ CK CL CM CN     ? Carrying, Moving, and Handling Objects:     - CURRENT STATUS: CL - 60%-79% impaired, limited or restricted    - GOAL STATUS: CK - 40%-59% impaired, limited or restricted    - D/C STATUS:  ---------------To be determined---------------    Medical Necessity:   · Skilled intervention continues to be required due to need for manual treatment and modalities within post-operative precautions.   Reason for Services/Other Comments:  · Patient continues to require skilled intervention due to need for guided progression through rehab. Use of outcome tool(s) and clinical judgement create a POC that gives a: Difficult prediction of patient's progress: HIGH COMPLEXITY            TREATMENT:   (In addition to Assessment/Re-Assessment sessions the following treatments were rendered)  9/27/2017  Pre-treatment Symptoms/Complaints:  Pt having only soreness. I haven't been doing my exercises and it feels better and is moving just as good  Pain: Initial: Pain Intensity 1: 1/10 soreness Post Session:    No numeric value provided following PT   Discussed at length the importance of doing the HEP and complying with recommendations. Feel he is ready to be discharged and needs to demonstrate independence with the program. Recommend he comes once a week for the next two weeks but come in 1-2 other days for gym program.  Manual Therapy: (15 Minutes): for ROM. R shoulder soft tissue release of the pect, lat and upper trap. Grade 3 to 4- physiological mobilizations ER at 10 deg, 45 and 70deg abd, IR at 50 and 70 deg abd, flexion with scapular stabilization. Therapeutic Exercise: ( 27 minutes): for strengthening and AROM. Increased resistance as tolerated by patient.    AA = active assist   date 9-11-17 9-13-17 5-99-65 9/26/17    Activity/Exercise        midtrap   Prone, 2 x 15 w/o weight Prone 1# 2x10    Shoulder extn  Green 1 x 20  Prone 2# 2x10  Standing with green tband x10    Scapular retraction Prone 2# 2 x 10 Prone 2# 2 x 10, standing green 1 x 20 Prone row 5# 2 x 15 Prone row 2# 2x10    Shoulder abd Side reclined 2# 2 x 10 Side reclined 2# 2 x 10 Side reclined 2# 2 x 10 --    FE Reclined 2 x 10 w/o weight, 1# x 10, 2# x 10 Reclined 1 x 10 w/o weight; 2# 2 x 10 Reclined 2# 2 x 10     triceps Reclined 5# 2 x 10 5# 3 x 10; reclined 5# 3 x 10     Bicep curls Reclined 5# 3 x 10 5# 3 x 10, reclined 5# 3x 10     diagonal Reclined \"Y\" 2 x 12; wall slide D2 and D1 x 10 ea Reclined \"Y\" 2 x 12; D2 and D1 wall slides 3 x 10 ea D1and D2 wall slides 3 x 10; reclined \"Y\"s 2 x 12 D1 and D2 wall slide 1# 2x10  Standing down and back with green tband x10  Standing \"Y\" x10    UE Luckey  1 x 20 flexion 1 x 20 flexion          Modalities: ( min): none  HEP: continue HEP: prone midtrap, standing shoulder extn with tband, shoulder horizontal abd and diagonal ext, standing IR with tband, supine ER wand stretch and IR sleeper stretch as recommended. Treatment/Session Assessment:    · Response to Treatment: Loss of motion regained with treatment. Patient continues to need encouragement to comply with recommendations. Feel the loss of active motion is due to muscles not being able to overcome the stiffness. Explained the importance of strengthening and stretching maintenance program   · Compliance with Program/Exercises: not really, per pt. · Recommendations/Intent for next treatment session: review strength progression for gym and home program. Recommend PT 1x week with him coming in 1-2 more times for guided gym program in preparation for discharge.    Total Treatment Duration:   42   minutes  PT Patient Time In/Time Out  Time In: 1030  Time Out: [address] [facility], PT

## 2017-09-29 ENCOUNTER — APPOINTMENT (OUTPATIENT)
Dept: PHYSICAL THERAPY | Age: 72
End: 2017-09-29
Payer: MEDICARE

## 2017-10-02 ENCOUNTER — HOSPITAL ENCOUNTER (OUTPATIENT)
Dept: PHYSICAL THERAPY | Age: 72
Discharge: HOME | End: 2017-10-02
Payer: MEDICARE

## 2017-10-02 NOTE — PROGRESS NOTES
Jermaine Lorenz  : 1945 [facility] at [facility] FARRUKH LUNA  [address] [facility], [address] [place] [address],[address], [facility] [address]  [phone]   [phone]       OUTPATIENT PHYSICAL THERAPY:cancellation note 10/2/2017      ICD-10: Treatment Diagnosis: Pain in right shoulder (M25.511),Presence of right artificial shoulder joint (Z96.611),Stiffness of right shoulder, not elsewhere classified (M25.611)  Precautions/Allergies:   Post-op/Epinephrine and Aspirin   Fall Risk Score: 1 (? 5 = High Risk)  MD Orders: PT- evaluate and treat, HEP, FULL MOTION/FULL STRENGTH MEDICAL/REFERRING DIAGNOSIS:  right shoulder hardware removed/reverse TSA, bicep tendon transfer- 17  DATE OF ONSET: initial injury- 17 with R shoulder scope, ASD/RCR/BT-17   [facility] joint separation with biceps rupture -2017 with surgical repair-3/23/17  REFERRING PHYSICIAN: Alisia Bui MD  RETURN PHYSICIAN APPOINTMENT: unsure     ASSESSMENT:  Mr. Ceasar Galan is s/p R shoulder reverse TSA with latissimus and teres major tendon transfer. He has a very complex R shoulder surgical history. He had previous bicep repair 17 and R shoulder rotator cuff repair 17. He started physical therapy 17 and attended 8 physical therapy sessions and then was held out of therapy for 2 1/2 weeks due to shoulder pain and inflammation. He returned to therapy 17. Pt is making good progress with his R shoulder strength and ROM, but his progress is slow secondary to pre-existing complications per above. Patient will benefit from continued PT to facilitate improved R UE function. PROBLEM LIST (Impacting functional limitations):  1. Post-op R shoulder pain and swelling  2. Decreased R shoulder ROM   3. Weakness R shoulder INTERVENTIONS PLANNED:  1. Posture education  2. Thermal and electric modalities, manual therapies for pain   3. Manual therapies, therapeutic exercises, HEP for ROM    4.  Therapeutic exercises and HEP for strength   TREATMENT PLAN:  Effective Dates: 9/18/17 TO 11/27/17. Frequency/Duration: 2-3 times a week for 10 weeks  GOALS: (Goals have been discussed and agreed upon with patient.)  Short-Term Functional Goals: Time Frame: 6 weeks  1. Report no more than mild intermittent pain to R shoulder with compensatory use during basic functional activities. Not consistently met  2. R shoulder PROM forward elevation greater than 100 degrees to progress into functional ranges. Not consistently met  3. Demonstrate good R shoulder isometric strength with manual testing to progress into strength phase. Progressing towards  4. Independent with initial HEP. ongoing  Discharge Goals: Time Frame: 12 weeks  1. Pt to demonstrate Good posture correction during exercises- being met  2. No more than 3/10 intermittent pain R shoulder with return to normalized household and work activities, and score less than 50% on the DASH. Ongoing and not consistently met  3. R shoulder AROM forward elevation greater than 110 degrees and strength to shoulder are grossly WFL's for safe use with normalized activities. ongoing   4. Independent with advanced shoulder HEP for continued self-management. Rehabilitation Potential For Stated Goals: Good    Regarding Ivon Clemente therapy, I certify that the treatment plan above will be carried out by a therapist or under their direction. Thank you for this referral,      Cyndee Friend, PT,MSPT                  Pt called to cancel due to schedule conflict. Rescheduled for tomorrow.    Alejandrina Lazcano PT,MSPT

## 2017-10-03 ENCOUNTER — HOSPITAL ENCOUNTER (OUTPATIENT)
Dept: PHYSICAL THERAPY | Age: 72
Discharge: HOME | End: 2017-10-03
Payer: MEDICARE

## 2017-10-03 PROCEDURE — 97110 THERAPEUTIC EXERCISES: CPT

## 2017-10-03 NOTE — PROGRESS NOTES
Arash Finch  : 1945 [facility] FARRUKH LUNA  [address] [facility], [address] [place] [address],[address], [facility] 91.  [phone]   [phone]       OUTPATIENT PHYSICAL THERAPY:Daily Note 10/3/2017      ICD-10: Treatment Diagnosis: Pain in right shoulder (M25.511),Presence of right artificial shoulder joint (Z96.611),Stiffness of right shoulder, not elsewhere classified (M25.611)  Precautions/Allergies:   Post-op/Epinephrine and Aspirin   Fall Risk Score: 1 (? 5 = High Risk)  MD Orders: PT- evaluate and treat, HEP, FULL MOTION/FULL STRENGTH MEDICAL/REFERRING DIAGNOSIS:  right shoulder hardware removed/reverse TSA, bicep tendon transfer- 17  DATE OF ONSET: initial injury- 17 with R shoulder scope, ASD/RCR/BT-17   [facility] joint separation with biceps rupture -2017 with surgical repair-3/23/17  REFERRING PHYSICIAN: Caitlyn Cruz MD  RETURN PHYSICIAN APPOINTMENT: unsure     ASSESSMENT:  Mr. Crescencio Gibson is s/p R shoulder reverse TSA with latissimus and teres major tendon transfer. He has a very complex R shoulder surgical history. He had previous bicep repair 17 and R shoulder rotator cuff repair 17. He started physical therapy 17 and attended 8 physical therapy sessions and then was held out of therapy for 2 1/2 weeks due to shoulder pain and inflammation. He returned to therapy 17. Pt is making good progress with his R shoulder strength and ROM, but his progress is slow secondary to pre-existing complications per above. Patient will benefit from continued PT to facilitate improved R UE function. PROBLEM LIST (Impacting functional limitations):  1. Post-op R shoulder pain and swelling  2. Decreased R shoulder ROM   3. Weakness R shoulder INTERVENTIONS PLANNED:  1. Posture education  2. Thermal and electric modalities, manual therapies for pain   3. Manual therapies, therapeutic exercises, HEP for ROM    4.  Therapeutic exercises and HEP for strength   TREATMENT PLAN:  Effective Dates: 9/18/17 TO 11/27/17. Frequency/Duration: 2-3 times a week for 10 weeks  GOALS: (Goals have been discussed and agreed upon with patient.)  Short-Term Functional Goals: Time Frame: 6 weeks  1. Report no more than mild intermittent pain to R shoulder with compensatory use during basic functional activities. Not consistently met  2. R shoulder PROM forward elevation greater than 100 degrees to progress into functional ranges. Not consistently met  3. Demonstrate good R shoulder isometric strength with manual testing to progress into strength phase. Progressing towards  4. Independent with initial HEP. ongoing  Discharge Goals: Time Frame: 12 weeks  1. Pt to demonstrate Good posture correction during exercises- being met  2. No more than 3/10 intermittent pain R shoulder with return to normalized household and work activities, and score less than 50% on the DASH. Ongoing and not consistently met  3. R shoulder AROM forward elevation greater than 110 degrees and strength to shoulder are grossly WFL's for safe use with normalized activities. ongoing   4. Independent with advanced shoulder HEP for continued self-management. Rehabilitation Potential For Stated Goals: Good    Regarding Cecile Hudson therapy, I certify that the treatment plan above will be carried out by a therapist or under their direction. Thank you for this referral,      Fannie Adler, PT,MSPT                           The information in this section was collected on 6/23/17 (except where otherwise noted). HISTORY:   History of Present Injury/Illness (Reason for Referral):  Mr Sima Zelaya initial injury was the result of a fall back in November of 2016 requiring surgical repair on 1/27/17. He came to therapy reporting the biceps muscle ruptured on February 21st. Therapy was OK'd by MD. He then came to the 2/28/17 therapy appointment reporting the [facility] joint had . MD ok\"d therapy to tolerance.  Pt opted for surgery on 3/23/17 to plate the clavicle when no progress could be made with treatment. He returned to therapy but complained that pain was limiting any functional use of the arm so he opted for shoulder replacement surgery. He received in patient therapy for hand/elbow ROM and then was discharged to home health PT for hand/elbow ROM only. Past Medical History/Comorbidities:   Mr. 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 Mr. Guero Ambrose  has a past surgical history that includes carpal tunnel release (2012); carpal tunnel release (1980); hernia repair (2010); hernia repair (Left, 2007); lumbar laminectomy; hip replacement (Left, 12/12/2012); knee arthroscopy (Right, 2006); shoulder arthroscopy (Left, 2/17/2012); corneal transplant (Right, 2004) and corneal transplant (Right, 06/14/2016). Social History/Living Environment:     Pt lives with his wife in a one story home. He cares for the yard and home repairs  Prior Level of Function/Work/Activity:  retired  Previous Treatment Approaches:          PT for the R shoulder since 2/14/17  Personal Factors:           Other factors that influence how disability is experienced by the patient:  See above PMH for back pain, hip replacement  Current Medications:     Current Outpatient Prescriptions:     temazepam (RESTORIL) 15 mg capsule, Take 15 mg by mouth nightly., Disp: , Rfl:     HYDROmorphone (DILAUDID) - out of medication but will get refill   rivaroxaban (XARELTO) 20 mg tab tablet, Take 1 Tab by mouth daily (with breakfast). , Disp: 30 Tab, Rfl: 11    atorvastatin (LIPITOR) 80 mg tablet, Take 40 mg by mouth daily. 1/2 tablet daily  Take DOS per anesthesia protocol. Indications: hyperlipidemia, Disp: , Rfl:     Brimonidine-Timolol (COMBIGAN) 0.2-0.5 % Drop      Sleeping medication   Date Last Reviewed:  10/3/2017     Number of Personal Factors/Comorbidities that affect the Plan of Care: 3+: HIGH COMPLEXITY   EXAMINATION:    10/3/2017  Observation/Orthostatic Postural Assessment: Atrophy to deltoid, biceps on R. . Shrugging, elbow flexion and trunk extension with attempts to reach    Palpation: tender over the back muscles, pect region, biceps region. ROM: n/t                          Strength:  n/t                      CLINICAL DECISION MAKING:   Outcome Measure: Tool Used: Disabilities of the Arm, Shoulder and Hand (DASH) Questionnaire - Quick Version  Score:  Initial: 97  Most Recent: 44/55 (Date: 7-26-17 )  57 (8/21/17) 39 ( 9/18/17)   Interpretation of Score: The DASH is designed to measure the activities of daily living in person's with upper extremity dysfunction or pain. Each section is scored on a 1-5 scale, 5 representing the greatest disability. The scores of each section are added together for a total score of 55. Score 11 12-19 20-28 29-37 38-45 46-54 55   Modifier CH CI CJ CK CL CM CN     ? Carrying, Moving, and Handling Objects:     - CURRENT STATUS: CL - 60%-79% impaired, limited or restricted    - GOAL STATUS: CK - 40%-59% impaired, limited or restricted    - D/C STATUS:  ---------------To be determined---------------    Medical Necessity:   · Skilled intervention continues to be required due to need for manual treatment and modalities within post-operative precautions. Reason for Services/Other Comments:  · Patient continues to require skilled intervention due to need for guided progression through rehab.    Use of outcome tool(s) and clinical judgement create a POC that gives a: Difficult prediction of patient's progress: HIGH COMPLEXITY            TREATMENT:   (In addition to Assessment/Re-Assessment sessions the following treatments were rendered)  10/3/2017  Pre-treatment Symptoms/Complaints:  Pt having tightness that gets worse when I move it. I haven't been doing my exercises, just using it more at home. Pain: Initial:   3/10 soreness Post Session:    unchanged following PT   Discussed at length the importance of  the HEP. He needs to demonstrate independence with the program.   Manual Therapy: (0 Minutes): Ethelene Gauss Therapeutic Exercise: (18 Minutes): for strengthening and AROM. Increased resistance as tolerated by patient.  Rhythmic stabilization at 45 deg ABD for ER/IR, at 80 and 90 deg forward elevation, during sidelying abd at 90 deg        AA = active assist   date 9-11-17 9-13-17 7-83-32 9/26/17 10/3/17   Activity/Exercise        midtrap   Prone, 2 x 15 w/o weight Prone 1# 2x10 Side x10   Shoulder extn  Green 1 x 20  Prone 2# 2x10  Standing with green tband x10    Scapular retraction Prone 2# 2 x 10 Prone 2# 2 x 10, standing green 1 x 20 Prone row 5# 2 x 15 Prone row 2# 2x10 Manual resist in side lying x10   Shoulder abd Side reclined 2# 2 x 10 Side reclined 2# 2 x 10 Side reclined 2# 2 x 10 --    FE Reclined 2 x 10 w/o weight, 1# x 10, 2# x 10 Reclined 1 x 10 w/o weight; 2# 2 x 10 Reclined 2# 2 x 10     triceps Reclined 5# 2 x 10 5# 3 x 10; reclined 5# 3 x 10     Bicep curls Reclined 5# 3 x 10 5# 3 x 10, reclined 5# 3x 10     diagonal Reclined \"Y\" 2 x 12; wall slide D2 and D1 x 10 ea Reclined \"Y\" 2 x 12; D2 and D1 wall slides 3 x 10 ea D1and D2 wall slides 3 x 10; reclined \"Y\"s 2 x 12 D1 and D2 wall slide 1# 2x10  Standing down and back with green tband x10  Standing \"Y\" x10    UE [place]  1 x 20 flexion 1 x 20 flexion          Modalities: ( min): none  HEP: continue HEP: prone midtrap, standing shoulder extn with tband, shoulder horizontal abd and diagonal ext, standing IR with tband, supine ER wand stretch and IR sleeper stretch as recommended. Treatment/Session Assessment:    · Response to Treatment: shoulder felt stiff but improved with treatment. Needs to build strength to overcome the stiffness and maintain ROM. Patient continues to need guided strengthening and manual treatment   · Compliance with Program/Exercises: no, per pt. · Recommendations/Intent for next treatment session: review strength progression for gym and home program. Recommend PT 2 x week with him coming in for guided gym program  Exercises and manual treatment as needed in preparation for discharge by certification date.    Total Treatment Duration:   18   minutes  PT Patient Time In/Time Out  Time In: 1200  Time Out: [address], PT

## 2017-10-04 ENCOUNTER — HOSPITAL ENCOUNTER (OUTPATIENT)
Dept: PHYSICAL THERAPY | Age: 72
Discharge: HOME | End: 2017-10-04
Payer: MEDICARE

## 2017-10-04 PROCEDURE — 97110 THERAPEUTIC EXERCISES: CPT

## 2017-10-06 ENCOUNTER — APPOINTMENT (OUTPATIENT)
Dept: PHYSICAL THERAPY | Age: 72
End: 2017-10-06
Payer: MEDICARE

## 2017-10-06 ENCOUNTER — HOSPITAL ENCOUNTER (OUTPATIENT)
Dept: PHYSICAL THERAPY | Age: 72
Discharge: HOME | End: 2017-10-06
Payer: MEDICARE

## 2017-10-06 PROCEDURE — 97110 THERAPEUTIC EXERCISES: CPT

## 2017-10-06 NOTE — PROGRESS NOTES
Palmira Olson  : 1945 [facility] at [facility] FARRUKH LUNA  [address] [facility], [address] [place] [address],[address], [facility] 91.  [phone]   [phone]       OUTPATIENT PHYSICAL THERAPY:Daily Note 10/6/2017      ICD-10: Treatment Diagnosis: Pain in right shoulder (M25.511),Presence of right artificial shoulder joint (Z96.611),Stiffness of right shoulder, not elsewhere classified (M25.611)  Precautions/Allergies:   Post-op/Epinephrine and Aspirin   Fall Risk Score: 1 (? 5 = High Risk)  MD Orders: PT- evaluate and treat, HEP, FULL MOTION/FULL STRENGTH MEDICAL/REFERRING DIAGNOSIS:  right shoulder hardware removed/reverse TSA, bicep tendon transfer- 17  DATE OF ONSET: initial injury- 17 with R shoulder scope, ASD/RCR/BT-17   [facility] joint separation with biceps rupture -2017 with surgical repair-3/23/17  REFERRING PHYSICIAN: Toña hSepard MD  RETURN PHYSICIAN APPOINTMENT: unsure     ASSESSMENT:  Mr. Alexandr Haskins is s/p R shoulder reverse TSA with latissimus and teres major tendon transfer. He has a very complex R shoulder surgical history. He had previous bicep repair 17 and R shoulder rotator cuff repair 17. He started physical therapy 17 and attended 8 physical therapy sessions and then was held out of therapy for 2 1/2 weeks due to shoulder pain and inflammation. He returned to therapy 17. Pt is making good progress with his R shoulder strength and ROM, but his progress is slow secondary to pre-existing complications per above. Patient will benefit from continued PT to facilitate improved R UE function. PROBLEM LIST (Impacting functional limitations):  1. Post-op R shoulder pain and swelling  2. Decreased R shoulder ROM   3. Weakness R shoulder INTERVENTIONS PLANNED:  1. Posture education  2. Thermal and electric modalities, manual therapies for pain   3. Manual therapies, therapeutic exercises, HEP for ROM    4.  Therapeutic exercises and HEP for strength   TREATMENT PLAN:  Effective Dates: 9/18/17 TO 11/27/17. Frequency/Duration: 2-3 times a week for 10 weeks  GOALS: (Goals have been discussed and agreed upon with patient.)  Short-Term Functional Goals: Time Frame: 6 weeks  1. Report no more than mild intermittent pain to R shoulder with compensatory use during basic functional activities. Not consistently met  2. R shoulder PROM forward elevation greater than 100 degrees to progress into functional ranges. Not consistently met  3. Demonstrate good R shoulder isometric strength with manual testing to progress into strength phase. Progressing towards  4. Independent with initial HEP. ongoing  Discharge Goals: Time Frame: 12 weeks  1. Pt to demonstrate Good posture correction during exercises- being met  2. No more than 3/10 intermittent pain R shoulder with return to normalized household and work activities, and score less than 50% on the DASH. Ongoing and not consistently met  3. R shoulder AROM forward elevation greater than 110 degrees and strength to shoulder are grossly WFL's for safe use with normalized activities. ongoing   4. Independent with advanced shoulder HEP for continued self-management. Rehabilitation Potential For Stated Goals: Good    Regarding Iesha Arizmendi therapy, I certify that the treatment plan above will be carried out by a therapist or under their direction. Thank you for this referral,      Lindsay Swann, PT,MSPT                           The information in this section was collected on 6/23/17 (except where otherwise noted). HISTORY:   History of Present Injury/Illness (Reason for Referral):  Mr Adilene Garner initial injury was the result of a fall back in November of 2016 requiring surgical repair on 1/27/17. He came to therapy reporting the biceps muscle ruptured on February 21st. Therapy was OK'd by MD. He then came to the 2/28/17 therapy appointment reporting the [facility] joint had . MD ok\"d therapy to tolerance.  Pt opted for surgery on 3/23/17 to plate the clavicle when no progress could be made with treatment. He returned to therapy but complained that pain was limiting any functional use of the arm so he opted for shoulder replacement surgery. He received in patient therapy for hand/elbow ROM and then was discharged to home health PT for hand/elbow ROM only. Past Medical History/Comorbidities:   Mr. 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 Mr. Ceasar Galan  has a past surgical history that includes carpal tunnel release (2012); carpal tunnel release (1980); hernia repair (2010); hernia repair (Left, 2007); lumbar laminectomy; hip replacement (Left, 12/12/2012); knee arthroscopy (Right, 2006); shoulder arthroscopy (Left, 2/17/2012); corneal transplant (Right, 2004) and corneal transplant (Right, 06/14/2016). Social History/Living Environment:     Pt lives with his wife in a one story home. He cares for the yard and home repairs  Prior Level of Function/Work/Activity:  retired  Previous Treatment Approaches:          PT for the R shoulder since 2/14/17  Personal Factors:           Other factors that influence how disability is experienced by the patient:  See above PMH for back pain, hip replacement  Current Medications:     Current Outpatient Prescriptions:     temazepam (RESTORIL) 15 mg capsule, Take 15 mg by mouth nightly., Disp: , Rfl:     HYDROmorphone (DILAUDID) - out of medication but will get refill   rivaroxaban (XARELTO) 20 mg tab tablet, Take 1 Tab by mouth daily (with breakfast). , Disp: 30 Tab, Rfl: 11    atorvastatin (LIPITOR) 80 mg tablet, Take 40 mg by mouth daily. 1/2 tablet daily  Take DOS per anesthesia protocol. Indications: hyperlipidemia, Disp: , Rfl:     Brimonidine-Timolol (COMBIGAN) 0.2-0.5 % Drop      Sleeping medication   Date Last Reviewed:  10/6/2017     Number of Personal Factors/Comorbidities that affect the Plan of Care: 3+: HIGH COMPLEXITY   EXAMINATION:    10/6/2017  Observation/Orthostatic Postural Assessment: Atrophy to deltoid, biceps on R. . Shrugging, elbow flexion and trunk extension with attempts to reach    Palpation: tender over the back muscles, pect region, biceps region. ROM: n/t                          Strength:  n/t                      CLINICAL DECISION MAKING:   Outcome Measure: Tool Used: Disabilities of the Arm, Shoulder and Hand (DASH) Questionnaire - Quick Version  Score:  Initial: 97  Most Recent: 44/55 (Date: 7-26-17 )  57 (8/21/17) 39 ( 9/18/17)   Interpretation of Score: The DASH is designed to measure the activities of daily living in person's with upper extremity dysfunction or pain. Each section is scored on a 1-5 scale, 5 representing the greatest disability. The scores of each section are added together for a total score of 55. Score 11 12-19 20-28 29-37 38-45 46-54 55   Modifier CH CI CJ CK CL CM CN     ? Carrying, Moving, and Handling Objects:     - CURRENT STATUS: CL - 60%-79% impaired, limited or restricted    - GOAL STATUS: CK - 40%-59% impaired, limited or restricted    - D/C STATUS:  ---------------To be determined---------------    Medical Necessity:   · Skilled intervention continues to be required due to need for manual treatment and modalities within post-operative precautions. Reason for Services/Other Comments:  · Patient continues to require skilled intervention due to need for guided progression through rehab.    Use of outcome tool(s) and clinical judgement create a POC that gives a: Difficult prediction of patient's progress: HIGH COMPLEXITY            TREATMENT:   (In addition to Assessment/Re-Assessment sessions the following treatments were rendered)  10/6/2017  Pre-treatment Symptoms/Complaints:  Pt with complaint of R bicep, mid and lower trap region muscle pain. I have been doing my exercises some but pushing it hurts  Pain: Initial:   2-5/10 soreness Post Session:   2-3   Discussed  the importance of not overusing the shoulder beyond available motion to the point of fatigue and compensation. Manual Therapy: (10 Minutes):for ROM  Grade 3 to 4- physiological mobilizations R shoulder ER/IR, abd and flexion. Therapeutic Exercise: (10 Minutes): for strengthening and AROM    AA = active assist   date 9/26/17 10/3/17 10/4/17 10/6/17   Activity/Exercise       midtrap Prone 1# 2x10 Side x10 Side 2x10 Propped 3# x10   Shoulder extn Prone 2# 2x10  Standing with green tband x10   Red tband x10   Scapular retraction Prone row 2# 2x10 Manual resist in side lying x10 Resisted retract/protract x 15 Bent row 8# x10   Shoulder abd --  Side 1# 2x10    FE   Reclined with controlled descent 2x8 Reacher x10   triceps    7# cable x10   Bicep curls   4# 2x10 8# x10   diagonal D1 and D2 wall slide 1# 2x10  Standing down and back with green tband x10  Standing \"Y\" x10  D1 and D2 wall slide 1# strap wt D1 and D2 x10   UE [place]   FE With step x 8 --   reclined bench press    3# x10                      Modalities: ( min): none  HEP: continue HEP for strengthening and stretching as recommended. Treatment/Session Assessment:    · Response to Treatment: endurance remains low so recommend pt start with light weight for warm up prior to attempting full movements. Cueing needed for exercise quality and technique. · Compliance with Program/Exercises: yes, per pt.   Recommendations/Intent for next treatment session: review strength progression for gym and home program. Recommend Patient continue guided strengthening and manual treatment.   Measure AROM next visit  Total Treatment Duration:   20   minutes  PT Patient Time In/Time Out  Time In: 0940  Time Out: [address], PT

## 2017-10-09 ENCOUNTER — HOSPITAL ENCOUNTER (OUTPATIENT)
Dept: PHYSICAL THERAPY | Age: 72
Discharge: HOME | End: 2017-10-09
Payer: MEDICARE

## 2017-10-09 PROCEDURE — 97140 MANUAL THERAPY 1/> REGIONS: CPT

## 2017-10-09 NOTE — PROGRESS NOTES
Eduardo Leblanc  : 1945 [facility] at [facility] [place]  [address] [facility], [address] [place] [address],[address], [place] [address].  [phone]   [phone]       OUTPATIENT PHYSICAL THERAPY:Daily Note 10/9/2017      ICD-10: Treatment Diagnosis: Pain in right shoulder (M25.511),Presence of right artificial shoulder joint (Z96.611),Stiffness of right shoulder, not elsewhere classified (M25.611)  Precautions/Allergies:   Post-op/Epinephrine and Aspirin   Fall Risk Score: 1 (? 5 = High Risk)  MD Orders: PT- evaluate and treat, HEP, FULL MOTION/FULL STRENGTH MEDICAL/REFERRING DIAGNOSIS:  right shoulder hardware removed/reverse TSA, bicep tendon transfer- 17  DATE OF ONSET: initial injury- 17 with R shoulder scope, ASD/RCR/BT-17   [facility], [place], operated by [facility] joint separation with biceps rupture -2017 with surgical repair-3/23/17  REFERRING PHYSICIAN: Britney Franco MD  RETURN PHYSICIAN APPOINTMENT: unsure     ASSESSMENT:  Mr. Liv Kirby is s/p R shoulder reverse TSA with latissimus and teres major tendon transfer. He has a very complex R shoulder surgical history. He had previous bicep repair 17 and R shoulder rotator cuff repair 17. He started physical therapy 17 and attended 8 physical therapy sessions and then was held out of therapy for 2 1/2 weeks due to shoulder pain and inflammation. He returned to therapy 17. Pt is making good progress with his R shoulder strength and ROM, but his progress is slow secondary to pre-existing complications per above. Patient will benefit from continued PT to facilitate improved R UE function. PROBLEM LIST (Impacting functional limitations):  1. Post-op R shoulder pain and swelling  2. Decreased R shoulder ROM   3. Weakness R shoulder INTERVENTIONS PLANNED:  1. Posture education  2. Thermal and electric modalities, manual therapies for pain   3. Manual therapies, therapeutic exercises, HEP for ROM    4.  Therapeutic exercises and HEP for strength   TREATMENT PLAN:  Effective Dates: 9/18/17 TO 11/27/17. Frequency/Duration: 2-3 times a week for 10 weeks  GOALS: (Goals have been discussed and agreed upon with patient.)  Short-Term Functional Goals: Time Frame: 6 weeks  1. Report no more than mild intermittent pain to R shoulder with compensatory use during basic functional activities. Not consistently met  2. R shoulder PROM forward elevation greater than 100 degrees to progress into functional ranges. Not consistently met  3. Demonstrate good R shoulder isometric strength with manual testing to progress into strength phase. Progressing towards  4. Independent with initial HEP. ongoing  Discharge Goals: Time Frame: 12 weeks  1. Pt to demonstrate Good posture correction during exercises- being met  2. No more than 3/10 intermittent pain R shoulder with return to normalized household and work activities, and score less than 50% on the DASH. Ongoing and not consistently met  3. R shoulder AROM forward elevation greater than 110 degrees and strength to shoulder are grossly WFL's for safe use with normalized activities. ongoing   4. Independent with advanced shoulder HEP for continued self-management. Rehabilitation Potential For Stated Goals: Good    Regarding Ruthy Mejiae therapy, I certify that the treatment plan above will be carried out by a therapist or under their direction. Thank you for this referral,      Karen Gar, PT,MSPT                           The information in this section was collected on 6/23/17 (except where otherwise noted). HISTORY:   History of Present Injury/Illness (Reason for Referral):  Mr Peg Gaspar initial injury was the result of a fall back in November of 2016 requiring surgical repair on 1/27/17. He came to therapy reporting the biceps muscle ruptured on February 21st. Therapy was OK'd by MD. He then came to the 2/28/17 therapy appointment reporting the [facility] joint had . MD ok\"d therapy to tolerance.  Pt opted for surgery on 3/23/17 to plate the clavicle when no progress could be made with treatment. He returned to therapy but complained that pain was limiting any functional use of the arm so he opted for shoulder replacement surgery. He received in patient therapy for hand/elbow ROM and then was discharged to home health PT for hand/elbow ROM only. Past Medical History/Comorbidities:   Mr. 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 Mr. Kallie Gunn  has a past surgical history that includes carpal tunnel release (2012); carpal tunnel release (1980); hernia repair (2010); hernia repair (Left, 2007); lumbar laminectomy; hip replacement (Left, 12/12/2012); knee arthroscopy (Right, 2006); shoulder arthroscopy (Left, 2/17/2012); corneal transplant (Right, 2004) and corneal transplant (Right, 06/14/2016). Social History/Living Environment:     Pt lives with his wife in a one story home. He cares for the yard and home repairs  Prior Level of Function/Work/Activity:  retired  Previous Treatment Approaches:          PT for the R shoulder since 2/14/17  Personal Factors:           Other factors that influence how disability is experienced by the patient:  See above PMH for back pain, hip replacement  Current Medications:     Current Outpatient Prescriptions:     temazepam (RESTORIL) 15 mg capsule, Take 15 mg by mouth nightly., Disp: , Rfl:     HYDROmorphone (DILAUDID) - out of medication but will get refill   rivaroxaban (XARELTO) 20 mg tab tablet, Take 1 Tab by mouth daily (with breakfast). , Disp: 30 Tab, Rfl: 11    atorvastatin (LIPITOR) 80 mg tablet, Take 40 mg by mouth daily. 1/2 tablet daily  Take DOS per anesthesia protocol. Indications: hyperlipidemia, Disp: , Rfl:     Brimonidine-Timolol (COMBIGAN) 0.2-0.5 % Drop      Sleeping medication   Date Last Reviewed:  10/9/2017     Number of Personal Factors/Comorbidities that affect the Plan of Care: 3+: HIGH COMPLEXITY   EXAMINATION:    10/9/2017  Observation/Orthostatic Postural Assessment: Atrophy to deltoid, biceps on R. Palpation: n/t.  ROM: n/t                RUE AROM  R Shoulder Flexion: 101 (forward elevation)  R Shoulder Internal Rotation:  (to R buttock)         Strength:  n/t                      CLINICAL DECISION MAKING:   Outcome Measure: Tool Used: Disabilities of the Arm, Shoulder and Hand (DASH) Questionnaire - Quick Version  Score:  Initial: 97  Most Recent: 44/55 (Date: 7-26-17 )  57 (8/21/17) 39 ( 9/18/17)   Interpretation of Score: The DASH is designed to measure the activities of daily living in person's with upper extremity dysfunction or pain. Each section is scored on a 1-5 scale, 5 representing the greatest disability. The scores of each section are added together for a total score of 55. Score 11 12-19 20-28 29-37 38-45 46-54 55   Modifier CH CI CJ CK CL CM CN     ? Carrying, Moving, and Handling Objects:     - CURRENT STATUS: CL - 60%-79% impaired, limited or restricted    - GOAL STATUS: CK - 40%-59% impaired, limited or restricted    - D/C STATUS:  ---------------To be determined---------------    Medical Necessity:   · Skilled intervention continues to be required due to need for manual treatment and modalities within post-operative precautions. Reason for Services/Other Comments:  · Patient continues to require skilled intervention due to need for guided progression through rehab.    Use of outcome tool(s) and clinical judgement create a POC that gives a: Difficult prediction of patient's progress: HIGH COMPLEXITY            TREATMENT:   (In addition to Assessment/Re-Assessment sessions the following treatments were rendered)  10/9/2017  Pre-treatment Symptoms/Complaints:  Pt with no new complaints. Using 8-10# to get stronger. Pain: Initial:   4/10 soreness Post Session:   2/10   Discussed  the importance of not overusing the shoulder beyond available motion and using heavy weight to the point of fatigue and compensation. Manual Therapy: (12 Minutes):for ROM  Grade 3 to 4- physiological mobilizations R shoulder ER/IR, abd and flexion; Therapeutic Exercise: ( ): for strengthening and AROM    AA = active assist   date 9/26/17 10/3/17 10/4/17 10/6/17 10/9/17    Activity/Exercise         midtrap Prone 1# 2x10 Side x10 Side 2x10 Propped 3# x10 Prone B 3# 2x10    Shoulder extn Prone 2# 2x10  Standing with green tband x10   Red tband x10 Red 2x10    Scapular retraction Prone row 2# 2x10 Manual resist in side lying x10 Resisted retract/protract x 15 Bent row 8# x10 8# 2x10    Shoulder abd --  Side 1# 2x10      FE   Reclined with controlled descent 2x8 Reacher x10 Supine red tband abd hold 2x10  Reacher x12    triceps    7# cable x10 7# 2x10    Bicep curls   4# 2x10 8# x10 8# 2x10    diagonal D1 and D2 wall slide 1# 2x10  Standing down and back with green tband x10  Standing \"Y\" x10  D1 and D2 wall slide 1# strap wt D1 and D2 x10     UE [place]   FE With step x 8 --     reclined bench press    3# x10 8# 2x8    Low trap     Prone 2# B 2x8                  Modalities: ( min): none  HEP: continue HEP for strengthening and stretching as recommended. Treatment/Session Assessment:    · Response to Treatment: encouraged starting with low weight and full motion. endurance remains low resulting in compensation when fatigued.  Cueing needed for posture correction  · Compliance with Program/Exercises: yes, per pt.  Recommendations/Intent for next treatment session: stretch the R shoulder and transition to pt performing exercises independently. Recommend continued guided strengthening.     Total Treatment Duration:   12   minutes  PT Patient Time In/Time Out  Time In: 0900  Time Out: Faisal Teague 1772, PT

## 2017-10-09 NOTE — PROGRESS NOTES
Bing Pabon  : 1945  [place] / [place] / Product Type: Medicare /  [address]  at [facility] [place] [place]  [facility], [address] [place] [address],[address], [facility] [address].  [phone]   [phone]    Outpatient PHYSICAL THERAPY: PHYSICIAN COMMUNICATION    REFERRING PHYSICIAN: Sahara Rai MD  Return Physician Appointment: 10/9/17  MEDICAL/REFERRING DIAGNOSIS:  · right shoulder  ATTENDANCE: Bing Pabon has attended 35 out of 36 visits. ASSESSMENT:  DATE: 10/9/2017    PROGRESS: Bing Pabon has made progress with AROM and strength. Therapy focus has progressed to manual treatment and pt performing guided strengthening in therapy gym. ROM:                 RUE AROM  R Shoulder Flexion: 101 (forward elevation)  R Shoulder Internal Rotation:  (to R buttock)  R Shoulder External Rotation: 50           Mr Dudley Royal has needed encouragement to perform only the exercises, reps and weight recommended. RECOMMENDATIONS: continue PT for guided strengthening and PRN manual treatment.   Thank you for this referral,  Sylvie Nurse, PT, MSPT

## 2017-10-13 ENCOUNTER — HOSPITAL ENCOUNTER (OUTPATIENT)
Dept: PHYSICAL THERAPY | Age: 72
Discharge: HOME | End: 2017-10-13
Payer: MEDICARE

## 2017-10-13 NOTE — PROGRESS NOTES
Dolph Eye  : 1945 Therapy Center at [facility] [place] [place]  [address] [facility], [address] [place] [address] 83[address], [address] [facility]  [phone]   [phone]       OUTPATIENT PHYSICAL THERAPY:cancellation note 10/13/2017      ICD-10: Treatment Diagnosis: Pain in right shoulder (M25.511),Presence of right artificial shoulder joint (Z96.611),Stiffness of right shoulder, not elsewhere classified (M25.611)  Precautions/Allergies:   Post-op/Epinephrine and Aspirin   Fall Risk Score: 1 (? 5 = High Risk)  MD Orders: PT- evaluate and treat, HEP, FULL MOTION/FULL STRENGTH MEDICAL/REFERRING DIAGNOSIS:  right shoulder hardware removed/reverse TSA, bicep tendon transfer- 17  DATE OF ONSET: initial injury- 17 with R shoulder scope, ASD/RCR/BT-17   [facility] joint separation with biceps rupture -2017 with surgical repair-3/23/17  REFERRING PHYSICIAN: Medina Melo MD  RETURN PHYSICIAN APPOINTMENT: unsure     ASSESSMENT:  Mr. Dorothy Bryson is s/p R shoulder reverse TSA with latissimus and teres major tendon transfer. He has a very complex R shoulder surgical history. He had previous bicep repair 17 and R shoulder rotator cuff repair 17. He started physical therapy 17 and attended 8 physical therapy sessions and then was held out of therapy for 2 1/2 weeks due to shoulder pain and inflammation. He returned to therapy 17. Pt is making good progress with his R shoulder strength and ROM, but his progress is slow secondary to pre-existing complications per above. Patient will benefit from continued PT to facilitate improved R UE function. PROBLEM LIST (Impacting functional limitations):  1. Post-op R shoulder pain and swelling  2. Decreased R shoulder ROM   3. Weakness R shoulder INTERVENTIONS PLANNED:  1. Posture education  2. Thermal and electric modalities, manual therapies for pain   3. Manual therapies, therapeutic exercises, HEP for ROM    4.  Therapeutic exercises and HEP for strength   TREATMENT PLAN:  Effective Dates: 9/18/17 TO 11/27/17. Frequency/Duration: 2-3 times a week for 10 weeks  GOALS: (Goals have been discussed and agreed upon with patient.)  Short-Term Functional Goals: Time Frame: 6 weeks  1. Report no more than mild intermittent pain to R shoulder with compensatory use during basic functional activities. Not consistently met  2. R shoulder PROM forward elevation greater than 100 degrees to progress into functional ranges. Not consistently met  3. Demonstrate good R shoulder isometric strength with manual testing to progress into strength phase. Progressing towards  4. Independent with initial HEP. ongoing  Discharge Goals: Time Frame: 12 weeks  1. Pt to demonstrate Good posture correction during exercises- being met  2. No more than 3/10 intermittent pain R shoulder with return to normalized household and work activities, and score less than 50% on the DASH. Ongoing and not consistently met  3. R shoulder AROM forward elevation greater than 110 degrees and strength to shoulder are grossly WFL's for safe use with normalized activities. ongoing   4. Independent with advanced shoulder HEP for continued self-management. Rehabilitation Potential For Stated Goals: Good    Regarding Arletta Favors therapy, I certify that the treatment plan above will be carried out by a therapist or under their direction. Thank you for this referral,      Brian Morales, PT,MSPT                  Pt arrived to exercise on his own and cancelled all appointments. The information in this section was collected on 6/23/17 (except where otherwise noted). HISTORY:   History of Present Injury/Illness (Reason for Referral):  Mr Wicho Limon initial injury was the result of a fall back in November of 2016 requiring surgical repair on 1/27/17.  He came to therapy reporting the biceps muscle ruptured on February 21st. Therapy was OK'd by MD. He then came to the 2/28/17 therapy appointment reporting the [facility] joint had . MD ok\"d therapy to tolerance. Pt opted for surgery on 3/23/17 to plate the clavicle when no progress could be made with treatment. He returned to therapy but complained that pain was limiting any functional use of the arm so he opted for shoulder replacement surgery. He received in patient therapy for hand/elbow ROM and then was discharged to home health PT for hand/elbow ROM only. Past Medical History/Comorbidities:   Mr. 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 Mr. Liv Kirby  has a past surgical history that includes carpal tunnel release (2012); carpal tunnel release (1980); hernia repair (2010); hernia repair (Left, 2007); lumbar laminectomy; hip replacement (Left, 12/12/2012); knee arthroscopy (Right, 2006); shoulder arthroscopy (Left, 2/17/2012); corneal transplant (Right, 2004) and corneal transplant (Right, 06/14/2016). Social History/Living Environment:     Pt lives with his wife in a one story home. He cares for the yard and home repairs  Prior Level of Function/Work/Activity:  retired  Previous Treatment Approaches:          PT for the R shoulder since 2/14/17  Personal Factors:           Other factors that influence how disability is experienced by the patient:  See above PMH for back pain, hip replacement  Current Medications:     Current Outpatient Prescriptions:     temazepam (RESTORIL) 15 mg capsule, Take 15 mg by mouth nightly., Disp: , Rfl:    HYDROmorphone (DILAUDID) - out of medication but will get refill     rivaroxaban (XARELTO) 20 mg tab tablet, Take 1 Tab by mouth daily (with breakfast). , Disp: 30 Tab, Rfl: 11    atorvastatin (LIPITOR) 80 mg tablet, Take 40 mg by mouth daily. 1/2 tablet daily  Take DOS per anesthesia protocol. Indications: hyperlipidemia, Disp: , Rfl:     Brimonidine-Timolol (COMBIGAN) 0.2-0.5 % Drop      Sleeping medication   Date Last Reviewed:  10/13/2017     Number of Personal Factors/Comorbidities that affect the Plan of Care: 3+: HIGH COMPLEXITY   EXAMINATION:    10/13/2017  Observation/Orthostatic Postural Assessment: Atrophy to deltoid, biceps on R. Palpation: n/t.  ROM: n/t                          Strength:  n/t                      CLINICAL DECISION MAKING:   Outcome Measure: Tool Used: Disabilities of the Arm, Shoulder and Hand (DASH) Questionnaire - Quick Version  Score:  Initial: 97  Most Recent: 44/55 (Date: 7-26-17 )  57 (8/21/17) 39 ( 9/18/17)   Interpretation of Score: The DASH is designed to measure the activities of daily living in person's with upper extremity dysfunction or pain. Each section is scored on a 1-5 scale, 5 representing the greatest disability. The scores of each section are added together for a total score of 55. Score 11 12-19 20-28 29-37 38-45 46-54 55   Modifier CH CI CJ CK CL CM CN     ? Carrying, Moving, and Handling Objects:     - CURRENT STATUS: CL - 60%-79% impaired, limited or restricted    - GOAL STATUS: CK - 40%-59% impaired, limited or restricted    - D/C STATUS:  ---------------To be determined---------------    Medical Necessity:   · Skilled intervention continues to be required due to need for manual treatment and modalities within post-operative precautions. Reason for Services/Other Comments:  · Patient continues to require skilled intervention due to need for guided progression through rehab.    Use of outcome tool(s) and clinical judgement create a POC that gives a: Difficult prediction of patient's progress: HIGH COMPLEXITY            TREATMENT:   (In addition to Assessment/Re-Assessment sessions the following treatments were rendered)  10/13/2017  Pre-treatment Symptoms/Complaints:  Pt with no new complaints. Using 8-10# to get stronger. Pain: Initial:   4/10 soreness Post Session:   2/10   Discussed  the importance of not overusing the shoulder beyond available motion and using heavy weight to the point of fatigue and compensation. Manual Therapy: (12 Minutes):for ROM  Grade 3 to 4- physiological mobilizations R shoulder ER/IR, abd and flexion; Therapeutic Exercise: ( ): for strengthening and AROM    AA = active assist   date 9/26/17 10/3/17 10/4/17 10/6/17 10/9/17    Activity/Exercise         midtrap Prone 1# 2x10 Side x10 Side 2x10 Propped 3# x10 Prone B 3# 2x10    Shoulder extn Prone 2# 2x10  Standing with green tband x10   Red tband x10 Red 2x10    Scapular retraction Prone row 2# 2x10 Manual resist in side lying x10 Resisted retract/protract x 15 Bent row 8# x10 8# 2x10    Shoulder abd --  Side 1# 2x10      FE   Reclined with controlled descent 2x8 Reacher x10 Supine red tband abd hold 2x10  Reacher x12    triceps    7# cable x10 7# 2x10    Bicep curls   4# 2x10 8# x10 8# 2x10    diagonal D1 and D2 wall slide 1# 2x10  Standing down and back with green tband x10  Standing \"Y\" x10  D1 and D2 wall slide 1# strap wt D1 and D2 x10     UE [place]   FE With step x 8 --     reclined bench press    3# x10 8# 2x8    Low trap     Prone 2# B 2x8                  Modalities: ( min): none  HEP: continue HEP for strengthening and stretching as recommended. Treatment/Session Assessment:    · Response to Treatment: encouraged starting with low weight and full motion. endurance remains low resulting in compensation when fatigued. Cueing needed for posture correction  · Compliance with Program/Exercises: yes, per pt.   Recommendations/Intent for next treatment session: stretch the R shoulder and transition to pt performing exercises independently. Recommend continued guided strengthening.     Total Treatment Duration:   12   minutes       Nehemiah Tinoco PT

## 2017-10-17 ENCOUNTER — HOSPITAL ENCOUNTER (OUTPATIENT)
Dept: PHYSICAL THERAPY | Age: 72
Discharge: HOME | End: 2017-10-17
Payer: MEDICARE

## 2017-10-17 PROCEDURE — 97140 MANUAL THERAPY 1/> REGIONS: CPT

## 2017-10-17 NOTE — PROGRESS NOTES
Dolph Eye  : 1945 [facility] at [facility] FARRUKH LUNA  [address] [facility], [address] [place] [address],[address], [facility] [address]  [phone]   [phone]       OUTPATIENT PHYSICAL THERAPY:Daily Note 10/17/2017      ICD-10: Treatment Diagnosis: Pain in right shoulder (M25.511),Presence of right artificial shoulder joint (Z96.611),Stiffness of right shoulder, not elsewhere classified (M25.611)  Precautions/Allergies:   Post-op/Epinephrine and Aspirin   Fall Risk Score: 1 (? 5 = High Risk)  MD Orders: PT- evaluate and treat, HEP, FULL MOTION/FULL STRENGTH MEDICAL/REFERRING DIAGNOSIS:  right shoulder hardware removed/reverse TSA, bicep tendon transfer- 17  DATE OF ONSET: initial injury- 17 with R shoulder scope, ASD/RCR/BT-17   [facility], [place], operated by [facility] joint separation with biceps rupture -2017 with surgical repair-3/23/17  REFERRING PHYSICIAN: Medina Melo MD  RETURN PHYSICIAN APPOINTMENT: 10/23/17     ASSESSMENT:  Mr. Dorothy Bryson is s/p R shoulder reverse TSA with latissimus and teres major tendon transfer. He has a very complex R shoulder surgical history. He had previous bicep repair 17 and R shoulder rotator cuff repair 17. He started physical therapy 17 and attended 8 physical therapy sessions and then was held out of therapy for 2 1/2 weeks due to shoulder pain and inflammation. He returned to therapy 17. Pt is making good progress with his R shoulder strength and ROM, but his progress is slow secondary to pre-existing complications per above. Patient will benefit from continued PT to facilitate improved R UE function. PROBLEM LIST (Impacting functional limitations):  1. Post-op R shoulder pain and swelling  2. Decreased R shoulder ROM   3. Weakness R shoulder INTERVENTIONS PLANNED:  1. Posture education  2. Thermal and electric modalities, manual therapies for pain   3. Manual therapies, therapeutic exercises, HEP for ROM    4.  Therapeutic exercises and HEP for strength   TREATMENT PLAN:  Effective Dates: 9/18/17 TO 11/27/17. Frequency/Duration: 2-3 times a week for 10 weeks  GOALS: (Goals have been discussed and agreed upon with patient.)  Short-Term Functional Goals: Time Frame: 6 weeks  1. Report no more than mild intermittent pain to R shoulder with compensatory use during basic functional activities. Not consistently met  2. R shoulder PROM forward elevation greater than 100 degrees to progress into functional ranges. Not consistently met  3. Demonstrate good R shoulder isometric strength with manual testing to progress into strength phase. Progressing towards  4. Independent with initial HEP. ongoing  Discharge Goals: Time Frame: 12 weeks  1. Pt to demonstrate Good posture correction during exercises- being met  2. No more than 3/10 intermittent pain R shoulder with return to normalized household and work activities, and score less than 50% on the DASH. Ongoing and not consistently met  3. R shoulder AROM forward elevation greater than 110 degrees and strength to shoulder are grossly WFL's for safe use with normalized activities. ongoing   4. Independent with advanced shoulder HEP for continued self-management. Rehabilitation Potential For Stated Goals: Good    Regarding Artist Pert therapy, I certify that the treatment plan above will be carried out by a therapist or under their direction. Thank you for this referral,      Raf Zayas, PT,MSPT                           The information in this section was collected on 6/23/17 (except where otherwise noted). HISTORY:   History of Present Injury/Illness (Reason for Referral):  Mr Camille Sanabria initial injury was the result of a fall back in November of 2016 requiring surgical repair on 1/27/17. He came to therapy reporting the biceps muscle ruptured on February 21st. Therapy was OK'd by MD. He then came to the 2/28/17 therapy appointment reporting the [facility] joint had . MD ok\"d therapy to tolerance.  Pt opted for surgery on 3/23/17 to plate the clavicle when no progress could be made with treatment. He returned to therapy but complained that pain was limiting any functional use of the arm so he opted for shoulder replacement surgery. He received in patient therapy for hand/elbow ROM and then was discharged to home health PT for hand/elbow ROM only. Past Medical History/Comorbidities:   Mr. 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 Mr. Gillian Hyman  has a past surgical history that includes carpal tunnel release (2012); carpal tunnel release (1980); hernia repair (2010); hernia repair (Left, 2007); lumbar laminectomy; hip replacement (Left, 12/12/2012); knee arthroscopy (Right, 2006); shoulder arthroscopy (Left, 2/17/2012); corneal transplant (Right, 2004) and corneal transplant (Right, 06/14/2016). Social History/Living Environment:     Pt lives with his wife in a one story home. He cares for the yard and home repairs  Prior Level of Function/Work/Activity:  retired  Previous Treatment Approaches:          PT for the R shoulder since 2/14/17  Personal Factors:           Other factors that influence how disability is experienced by the patient:  See above PMH for back pain, hip replacement  Current Medications:     Current Outpatient Prescriptions:     temazepam (RESTORIL) 15 mg capsule, Take 15 mg by mouth nightly., Disp: , Rfl:     HYDROmorphone (DILAUDID) - out of medication but will get refill   rivaroxaban (XARELTO) 20 mg tab tablet, Take 1 Tab by mouth daily (with breakfast). , Disp: 30 Tab, Rfl: 11    atorvastatin (LIPITOR) 80 mg tablet, Take 40 mg by mouth daily. 1/2 tablet daily  Take DOS per anesthesia protocol. Indications: hyperlipidemia, Disp: , Rfl:     Brimonidine-Timolol (COMBIGAN) 0.2-0.5 % Drop      Sleeping medication   Date Last Reviewed:  10/17/2017     Number of Personal Factors/Comorbidities that affect the Plan of Care: 3+: HIGH COMPLEXITY   EXAMINATION:    10/17/2017  Observation/Orthostatic Postural Assessment: Atrophy to deltoid, biceps and pectoralis on R. Palpation: no tenderness to palpate. Tight lats. Stiff IR and forward elevation  ROM:                 RUE AROM  R Shoulder Flexion: 111 (forward elevation)  R Shoulder ABduction: 100  R Shoulder Internal Rotation: 45  R Shoulder External Rotation: 55         Strength:  n/t                      CLINICAL DECISION MAKING:   Outcome Measure: Tool Used: Disabilities of the Arm, Shoulder and Hand (DASH) Questionnaire - Quick Version  Score:  Initial: 97  Most Recent: 44/55 (Date: 7-26-17 )  57 (8/21/17) 39 ( 9/18/17)   Interpretation of Score: The DASH is designed to measure the activities of daily living in person's with upper extremity dysfunction or pain. Each section is scored on a 1-5 scale, 5 representing the greatest disability. The scores of each section are added together for a total score of 55. Score 11 12-19 20-28 29-37 38-45 46-54 55   Modifier CH CI CJ CK CL CM CN     ? Carrying, Moving, and Handling Objects:     - CURRENT STATUS: CL - 60%-79% impaired, limited or restricted    - GOAL STATUS: CK - 40%-59% impaired, limited or restricted    - D/C STATUS:  ---------------To be determined---------------    Medical Necessity:   · Skilled intervention continues to be required due to need for manual treatment and modalities within post-operative precautions.   Reason for Services/Other Comments:  · Patient continues to require skilled intervention due to need for guided progression through rehab. Use of outcome tool(s) and clinical judgement create a POC that gives a: Difficult prediction of patient's progress: HIGH COMPLEXITY            TREATMENT:   (In addition to Assessment/Re-Assessment sessions the following treatments were rendered)  10/17/2017  Pre-treatment Symptoms/Complaints:  Pt with no complaints. Using less weight with exercises  Pain: Initial:   1/10 soreness Post Session:   0/10      Manual Therapy: (20 Minutes):for ROM  Grade 3 to 4- physiological mobilizations R shoulder ER at neutral, 45 deg and 80 deg abd, IR at 55 deg abd, abd and flexion; pect, biceps and lat MFR with stretch  Therapeutic Exercise: ( ): performed with supervision for strengthening and AROM    AA = active assist   date 9/26/17 10/3/17 10/4/17 10/6/17 10/9/17 10/17/17   Activity/Exercise         midtrap Prone 1# 2x10 Side x10 Side 2x10 Propped 3# x10 Prone B 3# 2x10 Prone B 5# 2x10   Shoulder extn Prone 2# 2x10  Standing with green tband x10   Red tband x10 Red 2x10 3# cable 2x10   Scapular retraction Prone row 2# 2x10 Manual resist in side lying x10 Resisted retract/protract x 15 Bent row 8# x10 8# 2x10 5# 2x10   Shoulder abd --  Side 1# 2x10      FE   Reclined with controlled descent 2x8 Reacher x10 Supine red tband abd hold 2x10  Reacher x12 Reacher x 15   triceps    7# cable x10 7# 2x10 7# cable 2x10   Bicep curls   4# 2x10 8# x10 8# 2x10 8# 2x10   diagonal D1 and D2 wall slide 1# 2x10  Standing down and back with green tband x10  Standing \"Y\" x10  D1 and D2 wall slide 1# strap wt D1 and D2 x10  D1 and D2 1# 2x10   UE [place]   FE With step x 8 --     reclined bench press    3# x10 8# 2x8 5#B 2x10   Low trap     Prone 2# B 2x8 Prone B 2# 2x10   flies      Reclined 5# B 2x10        Modalities: ( min): none  HEP: continue HEP for strengthening and stretching.   Treatment/Session Assessment:    · Response to Treatment: much better technique with exercises when using lighter weight. Improved AROM  · Compliance with Program/Exercises: yes, per pt. Recommendations/Intent for next treatment session: stretch the R shoulder and pt to perform exercises with supervision. Recommend continued exercises but can increase reps by 8-10.     Total Treatment Duration:   20   minutes  PT Patient Time In/Time Out  Time In: 1310  Time Out: [address] [place], [place]

## 2017-10-20 ENCOUNTER — APPOINTMENT (OUTPATIENT)
Dept: PHYSICAL THERAPY | Age: 72
End: 2017-10-20
Payer: MEDICARE

## 2017-10-24 NOTE — PROGRESS NOTES
Desiree Bell  : 1945 [facility] at [facility] FARRUKH LUNA  [address] [facility], [address] [place] [address],[address], [facility] [address]  [phone]   [phone]       OUTPATIENT PHYSICAL THERAPY:Daily Note and Discontinuation Summary 10/24/2017      ICD-10: Treatment Diagnosis: Pain in right shoulder (M25.511),Presence of right artificial shoulder joint (Z96.611),Stiffness of right shoulder, not elsewhere classified (M25.611)  Precautions/Allergies:   Post-op/Epinephrine and Aspirin   Fall Risk Score: 1 (? 5 = High Risk)  MD Orders: PT- evaluate and treat, HEP, FULL MOTION/FULL STRENGTH MEDICAL/REFERRING DIAGNOSIS:  right shoulder hardware removed/reverse TSA, bicep tendon transfer- 17  DATE OF ONSET: initial injury- 17 with R shoulder scope, ASD/RCR/BT-17   [facility] joint separation with biceps rupture -2017 with surgical repair-3/23/17  REFERRING PHYSICIAN: Rosalina Fraser MD  RETURN PHYSICIAN APPOINTMENT: 10/23/17     ASSESSMENT:  Mr. Marina Duncan is s/p R shoulder reverse TSA with latissimus and teres major tendon transfer. He had a very complex R shoulder surgical history. He had previous bicep repair 17 and R shoulder rotator cuff repair 17. He started physical therapy 17 and attended 8 physical therapy sessions and then was held out of therapy for 2 1/2 weeks due to shoulder pain and inflammation. He returned to therapy 17. Pt made good progress with his R shoulder strength and ROM although it has been slow secondary to pre-existing complications per above. Patient was instructed on maintenance home program and felt ready to be on his own. He discussed this with Dr Caleb Joseph at his follow up visit and was discharged from PT. PLAN: discontinue with HEP per MD montlavo. Thank you for this referral,  Alden Young PT,MSPT                The information in this section was collected on 17 (except where otherwise noted).   HISTORY:   History of Present Injury/Illness (Reason for Referral):  Mr Eladio Brown initial injury was the result of a fall back in November of 2016 requiring surgical repair on 1/27/17. He came to therapy reporting the biceps muscle ruptured on February 21st. Therapy was OK'd by MD. He then came to the 2/28/17 therapy appointment reporting the [facility] joint had . MD ok\"d therapy to tolerance. Pt opted for surgery on 3/23/17 to plate the clavicle when no progress could be made with treatment. He returned to therapy but complained that pain was limiting any functional use of the arm so he opted for shoulder replacement surgery. He received in patient therapy for hand/elbow ROM and then was discharged to home health PT for hand/elbow ROM only. Past Medical History/Comorbidities:   Mr. 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 Mr. Ritesh Calderon  has a past surgical history that includes carpal tunnel release (2012); carpal tunnel release (1980); hernia repair (2010); hernia repair (Left, 2007); lumbar laminectomy; hip replacement (Left, 12/12/2012); knee arthroscopy (Right, 2006); shoulder arthroscopy (Left, 2/17/2012); corneal transplant (Right, 2004) and corneal transplant (Right, 06/14/2016). Social History/Living Environment:     Pt lives with his wife in a one story home.  He cares for the yard and home repairs  Prior Level of Function/Work/Activity:  retired  Previous Treatment Approaches:          PT for the R shoulder since 2/14/17  Personal Factors: Other factors that influence how disability is experienced by the patient:  See above PMH for back pain, hip replacement  Current Medications:     Current Outpatient Prescriptions:     temazepam (RESTORIL) 15 mg capsule, Take 15 mg by mouth nightly., Disp: , Rfl:     HYDROmorphone (DILAUDID) - out of medication but will get refill     rivaroxaban (XARELTO) 20 mg tab tablet, Take 1 Tab by mouth daily (with breakfast). , Disp: 30 Tab, Rfl: 11    atorvastatin (LIPITOR) 80 mg tablet, Take 40 mg by mouth daily. 1/2 tablet daily  Take DOS per anesthesia protocol. Indications: hyperlipidemia, Disp: , Rfl:     Brimonidine-Timolol (COMBIGAN) 0.2-0.5 % Drop      Sleeping medication   Date Last Reviewed:  10/24/2017     Number of Personal Factors/Comorbidities that affect the Plan of Care: 3+: HIGH COMPLEXITY   EXAMINATION:  At 10/17/17 visit   10/24/2017  Observation/Orthostatic Postural Assessment: Atrophy to deltoid, biceps and pectoralis on R. Palpation: no tenderness to palpate. Tight lats. Stiff IR and forward elevation  ROM:                 RUE AROM  R Shoulder Flexion: 111 (forward elevation)  R Shoulder ABduction: 100  R Shoulder Internal Rotation: 45  R Shoulder External Rotation: 55         Strength:  n/t                      CLINICAL DECISION MAKING:   Outcome Measure: Tool Used: Disabilities of the Arm, Shoulder and Hand (DASH) Questionnaire - Quick Version  Score:  Initial: 97  Most Recent: 44/55 (Date: 7-26-17 )  57 (8/21/17) 39 ( 9/18/17)   Interpretation of Score: The DASH is designed to measure the activities of daily living in person's with upper extremity dysfunction or pain. Each section is scored on a 1-5 scale, 5 representing the greatest disability. The scores of each section are added together for a total score of 55. Score 11 12-19 20-28 29-37 38-45 46-54 55   Modifier CH CI CJ CK CL CM CN     ?  Carrying, Moving, and Handling Objects:     - CURRENT STATUS: CL - 60%-79% impaired, limited or restricted    - GOAL STATUS: CK - 40%-59% impaired, limited or restricted    - D/C STATUS:  Unable to assess     Use of outcome tool(s) and clinical judgement create a POC that gives a: Difficult prediction of patient's progress: HIGH COMPLEXITY            TREATMENT: as of 10/17/17 visit   (In addition to Assessment/Re-Assessment sessions the following treatments were rendered)  10/24/2017  Pre-treatment Symptoms/Complaints:  Pt with no complaints.  Using less weight with exercises  Pain: Initial:   1/10 soreness Post Session:   0/10      Manual Therapy: (20 Minutes):for ROM  Grade 3 to 4- physiological mobilizations R shoulder ER at neutral, 45 deg and 80 deg abd, IR at 55 deg abd, abd and flexion; pect, biceps and lat MFR with stretch  Therapeutic Exercise: ( ): performed with supervision for strengthening and AROM    AA = active assist   date 9/26/17 10/3/17 10/4/17 10/6/17 10/9/17 10/17/17   Activity/Exercise         midtrap Prone 1# 2x10 Side x10 Side 2x10 Propped 3# x10 Prone B 3# 2x10 Prone B 5# 2x10   Shoulder extn Prone 2# 2x10  Standing with green tband x10   Red tband x10 Red 2x10 3# cable 2x10   Scapular retraction Prone row 2# 2x10 Manual resist in side lying x10 Resisted retract/protract x 15 Bent row 8# x10 8# 2x10 5# 2x10   Shoulder abd --  Side 1# 2x10      FE   Reclined with controlled descent 2x8 Reacher x10 Supine red tband abd hold 2x10  Reacher x12 Reacher x 15   triceps    7# cable x10 7# 2x10 7# cable 2x10   Bicep curls   4# 2x10 8# x10 8# 2x10 8# 2x10   diagonal D1 and D2 wall slide 1# 2x10  Standing down and back with green tband x10  Standing \"Y\" x10  D1 and D2 wall slide 1# strap wt D1 and D2 x10  D1 and D2 1# 2x10   UE [place]   FE With step x 8 --     reclined bench press    3# x10 8# 2x8 5#B 2x10   Low trap     Prone 2# B 2x8 Prone B 2# 2x10   flies      Reclined 5# B 2x10        Modalities: ( min): none  HEP: continue HEP for strengthening and stretching. Treatment/Session Assessment:    · Response to Treatment: much better technique with exercises when using lighter weight. Improved AROM  · Compliance with Program/Exercises: yes, per pt. Recommendations : discontinue with HEP. Can increase reps by 8-10.        Fannie Adler, PT

## 2017-10-26 ENCOUNTER — APPOINTMENT (OUTPATIENT)
Dept: PHYSICAL THERAPY | Age: 72
End: 2017-10-26
Payer: MEDICARE

## 2018-03-12 ENCOUNTER — HOSPITAL ENCOUNTER (OUTPATIENT)
Dept: LAB | Age: 73
Discharge: HOME | End: 2018-03-12
Payer: MEDICARE

## 2018-03-12 DIAGNOSIS — D68.51 FACTOR V LEIDEN (HCC): Chronic | ICD-10-CM

## 2018-03-12 LAB
ALBUMIN SERPL-MCNC: 3.8 G/DL (ref 3.2–4.6)
ALBUMIN/GLOB SERPL: 1.1 {RATIO}
ALP SERPL-CCNC: 56 U/L (ref 50–136)
ALT SERPL-CCNC: 42 U/L (ref 12–65)
ANION GAP SERPL CALC-SCNC: 7 MMOL/L
AST SERPL-CCNC: 27 U/L (ref 15–37)
BASOPHILS # BLD: 0 K/UL (ref 0–0.2)
BASOPHILS NFR BLD: 1 % (ref 0–2)
BILIRUB SERPL-MCNC: 0.5 MG/DL (ref 0.2–1.1)
BUN SERPL-MCNC: 15 MG/DL (ref 8–23)
CALCIUM SERPL-MCNC: 9.3 MG/DL (ref 8.3–10.4)
CHLORIDE SERPL-SCNC: 106 MMOL/L (ref 98–107)
CO2 SERPL-SCNC: 29 MMOL/L (ref 21–32)
CREAT SERPL-MCNC: 1.1 MG/DL (ref 0.8–1.5)
D DIMER PPP FEU-MCNC: 0.39 UG/ML(FEU)
DIFFERENTIAL METHOD BLD: ABNORMAL
EOSINOPHIL # BLD: 0.2 K/UL (ref 0–0.8)
EOSINOPHIL NFR BLD: 4 % (ref 0.5–7.8)
ERYTHROCYTE [DISTWIDTH] IN BLOOD BY AUTOMATED COUNT: 12.8 % (ref 11.9–14.6)
GLOBULIN SER CALC-MCNC: 3.6 G/DL
GLUCOSE SERPL-MCNC: 169 MG/DL (ref 65–100)
HCT VFR BLD AUTO: 44 % (ref 41.1–50.3)
HGB BLD-MCNC: 14.8 G/DL (ref 13.6–17.2)
LYMPHOCYTES # BLD: 1.6 K/UL (ref 0.5–4.6)
LYMPHOCYTES NFR BLD: 27 % (ref 13–44)
MCH RBC QN AUTO: 32.2 PG (ref 26.1–32.9)
MCHC RBC AUTO-ENTMCNC: 33.6 G/DL (ref 31.4–35)
MCV RBC AUTO: 95.9 FL (ref 79.6–97.8)
MONOCYTES # BLD: 0.4 K/UL (ref 0.1–1.3)
MONOCYTES NFR BLD: 7 % (ref 4–12)
NEUTS SEG # BLD: 3.7 K/UL (ref 1.7–8.2)
NEUTS SEG NFR BLD: 62 % (ref 43–78)
NRBC # BLD: 0 K/UL (ref 0–0.2)
PLATELET # BLD AUTO: 164 K/UL (ref 150–450)
PMV BLD AUTO: 9.7 FL (ref 10.8–14.1)
POTASSIUM SERPL-SCNC: 4.2 MMOL/L (ref 3.5–5.1)
PROT SERPL-MCNC: 7.4 G/DL (ref 6.3–8.2)
RBC # BLD AUTO: 4.59 M/UL (ref 4.23–5.67)
SODIUM SERPL-SCNC: 142 MMOL/L (ref 136–145)
WBC # BLD AUTO: 5.9 K/UL (ref 4.3–11.1)

## 2018-03-12 PROCEDURE — 85379 FIBRIN DEGRADATION QUANT: CPT | Performed by: INTERNAL MEDICINE

## 2018-03-12 PROCEDURE — 36415 COLL VENOUS BLD VENIPUNCTURE: CPT | Performed by: INTERNAL MEDICINE

## 2018-03-12 PROCEDURE — 85025 COMPLETE CBC W/AUTO DIFF WBC: CPT | Performed by: INTERNAL MEDICINE

## 2018-03-12 PROCEDURE — 80053 COMPREHEN METABOLIC PANEL: CPT | Performed by: INTERNAL MEDICINE

## 2018-05-23 ENCOUNTER — APPOINTMENT (OUTPATIENT)
Dept: ULTRASOUND IMAGING | Age: 73
End: 2018-05-23
Payer: MEDICARE

## 2018-05-23 ENCOUNTER — APPOINTMENT (OUTPATIENT)
Dept: CT IMAGING | Age: 73
End: 2018-05-23
Payer: MEDICARE

## 2018-05-23 ENCOUNTER — HOSPITAL ENCOUNTER (EMERGENCY)
Age: 73
Discharge: HOME | End: 2018-05-23
Payer: MEDICARE

## 2018-05-23 ENCOUNTER — APPOINTMENT (OUTPATIENT)
Dept: GENERAL RADIOLOGY | Age: 73
End: 2018-05-23
Payer: MEDICARE

## 2018-05-23 VITALS
RESPIRATION RATE: 18 BRPM | DIASTOLIC BLOOD PRESSURE: 71 MMHG | TEMPERATURE: 98 F | WEIGHT: 190 LBS | OXYGEN SATURATION: 98 % | SYSTOLIC BLOOD PRESSURE: 134 MMHG | BODY MASS INDEX: 28.14 KG/M2 | HEIGHT: 69 IN | HEART RATE: 74 BPM

## 2018-05-23 DIAGNOSIS — R07.81 PLEURITIC CHEST PAIN: ICD-10-CM

## 2018-05-23 DIAGNOSIS — M25.562 ARTHRALGIA OF LEFT LOWER LEG: Primary | ICD-10-CM

## 2018-05-23 LAB
ALBUMIN SERPL-MCNC: 3.7 G/DL (ref 3.2–4.6)
ALBUMIN/GLOB SERPL: 1.2 {RATIO} (ref 1.2–3.5)
ALP SERPL-CCNC: 49 U/L (ref 50–136)
ALT SERPL-CCNC: 41 U/L (ref 12–65)
ANION GAP SERPL CALC-SCNC: 8 MMOL/L (ref 7–16)
AST SERPL-CCNC: 26 U/L (ref 15–37)
ATRIAL RATE: 64 BPM
BASOPHILS # BLD: 0 K/UL (ref 0–0.2)
BASOPHILS NFR BLD: 0 % (ref 0–2)
BILIRUB SERPL-MCNC: 0.8 MG/DL (ref 0.2–1.1)
BUN SERPL-MCNC: 18 MG/DL (ref 8–23)
CALCIUM SERPL-MCNC: 8.4 MG/DL (ref 8.3–10.4)
CALCULATED P AXIS, ECG09: 68 DEGREES
CALCULATED R AXIS, ECG10: 77 DEGREES
CALCULATED T AXIS, ECG11: 66 DEGREES
CHLORIDE SERPL-SCNC: 106 MMOL/L (ref 98–107)
CO2 SERPL-SCNC: 24 MMOL/L (ref 21–32)
CREAT SERPL-MCNC: 1.03 MG/DL (ref 0.8–1.5)
DIAGNOSIS, 93000: NORMAL
DIFFERENTIAL METHOD BLD: ABNORMAL
EOSINOPHIL # BLD: 0.1 K/UL (ref 0–0.8)
EOSINOPHIL NFR BLD: 2 % (ref 0.5–7.8)
ERYTHROCYTE [DISTWIDTH] IN BLOOD BY AUTOMATED COUNT: 13 % (ref 11.9–14.6)
GLOBULIN SER CALC-MCNC: 3 G/DL (ref 2.3–3.5)
GLUCOSE SERPL-MCNC: 124 MG/DL (ref 65–100)
HCT VFR BLD AUTO: 45.7 % (ref 41.1–50.3)
HGB BLD-MCNC: 15.1 G/DL (ref 13.6–17.2)
IMM GRANULOCYTES # BLD: 0 K/UL (ref 0–0.5)
IMM GRANULOCYTES NFR BLD AUTO: 0 % (ref 0–5)
LYMPHOCYTES # BLD: 0.4 K/UL (ref 0.5–4.6)
LYMPHOCYTES NFR BLD: 5 % (ref 13–44)
MCH RBC QN AUTO: 31.4 PG (ref 26.1–32.9)
MCHC RBC AUTO-ENTMCNC: 33 G/DL (ref 31.4–35)
MCV RBC AUTO: 95 FL (ref 79.6–97.8)
MONOCYTES # BLD: 0.3 K/UL (ref 0.1–1.3)
MONOCYTES NFR BLD: 4 % (ref 4–12)
NEUTS SEG # BLD: 6.6 K/UL (ref 1.7–8.2)
NEUTS SEG NFR BLD: 89 % (ref 43–78)
P-R INTERVAL, ECG05: 160 MS
PLATELET # BLD AUTO: 148 K/UL (ref 150–450)
PMV BLD AUTO: 10.1 FL (ref 10.8–14.1)
POTASSIUM SERPL-SCNC: 4.2 MMOL/L (ref 3.5–5.1)
PROT SERPL-MCNC: 6.7 G/DL (ref 6.3–8.2)
Q-T INTERVAL, ECG07: 414 MS
QRS DURATION, ECG06: 90 MS
QTC CALCULATION (BEZET), ECG08: 427 MS
RBC # BLD AUTO: 4.81 M/UL (ref 4.23–5.67)
SODIUM SERPL-SCNC: 138 MMOL/L (ref 136–145)
TROPONIN I BLD-MCNC: 0.01 NG/ML (ref 0.02–0.05)
TROPONIN I BLD-MCNC: 0.02 NG/ML (ref 0.02–0.05)
VENTRICULAR RATE, ECG03: 64 BPM
WBC # BLD AUTO: 7.4 K/UL (ref 4.3–11.1)

## 2018-05-23 PROCEDURE — 74011636320 HC RX REV CODE- 636/320

## 2018-05-23 PROCEDURE — 96374 THER/PROPH/DIAG INJ IV PUSH: CPT

## 2018-05-23 PROCEDURE — 96375 TX/PRO/DX INJ NEW DRUG ADDON: CPT

## 2018-05-23 PROCEDURE — 93005 ELECTROCARDIOGRAM TRACING: CPT

## 2018-05-23 PROCEDURE — 71260 CT THORAX DX C+: CPT

## 2018-05-23 PROCEDURE — 85025 COMPLETE CBC W/AUTO DIFF WBC: CPT

## 2018-05-23 PROCEDURE — 93971 EXTREMITY STUDY: CPT

## 2018-05-23 PROCEDURE — 84484 ASSAY OF TROPONIN QUANT: CPT

## 2018-05-23 PROCEDURE — 99284 EMERGENCY DEPT VISIT MOD MDM: CPT

## 2018-05-23 PROCEDURE — 71046 X-RAY EXAM CHEST 2 VIEWS: CPT

## 2018-05-23 PROCEDURE — 80053 COMPREHEN METABOLIC PANEL: CPT

## 2018-05-23 PROCEDURE — 74011250636 HC RX REV CODE- 250/636

## 2018-05-23 PROCEDURE — 74011000258 HC RX REV CODE- 258

## 2018-05-23 RX ORDER — LORAZEPAM 2 MG/ML
0.5 INJECTION INTRAMUSCULAR
Status: COMPLETED | OUTPATIENT
Start: 2018-05-23 | End: 2018-05-23

## 2018-05-23 RX ORDER — SODIUM CHLORIDE 0.9 % (FLUSH) 0.9 %
10 SYRINGE (ML) INJECTION
Status: COMPLETED | OUTPATIENT
Start: 2018-05-23 | End: 2018-05-23

## 2018-05-23 RX ORDER — METOCLOPRAMIDE HYDROCHLORIDE 5 MG/ML
10 INJECTION INTRAMUSCULAR; INTRAVENOUS
Status: COMPLETED | OUTPATIENT
Start: 2018-05-23 | End: 2018-05-23

## 2018-05-23 RX ORDER — DIPHENHYDRAMINE HYDROCHLORIDE 50 MG/ML
25 INJECTION, SOLUTION INTRAMUSCULAR; INTRAVENOUS
Status: COMPLETED | OUTPATIENT
Start: 2018-05-23 | End: 2018-05-23

## 2018-05-23 RX ADMIN — DIPHENHYDRAMINE HYDROCHLORIDE 25 MG: 50 INJECTION, SOLUTION INTRAMUSCULAR; INTRAVENOUS at 15:37

## 2018-05-23 RX ADMIN — IOPAMIDOL 100 ML: 755 INJECTION, SOLUTION INTRAVENOUS at 13:56

## 2018-05-23 RX ADMIN — Medication 10 ML: at 13:57

## 2018-05-23 RX ADMIN — METOCLOPRAMIDE 10 MG: 5 INJECTION, SOLUTION INTRAMUSCULAR; INTRAVENOUS at 15:37

## 2018-05-23 RX ADMIN — LORAZEPAM 0.5 MG: 2 INJECTION INTRAMUSCULAR; INTRAVENOUS at 15:37

## 2018-05-23 RX ADMIN — SODIUM CHLORIDE 100 ML: 900 INJECTION, SOLUTION INTRAVENOUS at 13:57

## 2018-05-23 NOTE — DISCHARGE INSTRUCTIONS
Chest Pain: Care Instructions  Your Care Instructions    There are many things that can cause chest pain. Some are not serious and will get better on their own in a few days. But some kinds of chest pain need more testing and treatment. Your doctor may have recommended a follow-up visit in the next 8 to 12 hours. If you are not getting better, you may need more tests or treatment. Even though your doctor has released you, you still need to watch for any problems. The doctor carefully checked you, but sometimes problems can develop later. If you have new symptoms or if your symptoms do not get better, get medical care right away. If you have worse or different chest pain or pressure that lasts more than 5 minutes or you passed out (lost consciousness), call 911 or seek other emergency help right away. A medical visit is only one step in your treatment. Even if you feel better, you still need to do what your doctor recommends, such as going to all suggested follow-up appointments and taking medicines exactly as directed. This will help you recover and help prevent future problems. How can you care for yourself at home? · Rest until you feel better. · Take your medicine exactly as prescribed. Call your doctor if you think you are having a problem with your medicine. · Do not drive after taking a prescription pain medicine. When should you call for help? Call 911 if:  ? · You passed out (lost consciousness). ? · You have severe difficulty breathing. ? · You have symptoms of a heart attack. These may include:  ¨ Chest pain or pressure, or a strange feeling in your chest.  ¨ Sweating. ¨ Shortness of breath. ¨ Nausea or vomiting. ¨ Pain, pressure, or a strange feeling in your back, neck, jaw, or upper belly or in one or both shoulders or arms. ¨ Lightheadedness or sudden weakness. ¨ A fast or irregular heartbeat.   After you call 911, the  may tell you to chew 1 adult-strength or 2 to 4 low-dose aspirin. Wait for an ambulance. Do not try to drive yourself. ?Call your doctor today if:  ? · You have any trouble breathing. ? · Your chest pain gets worse. ? · You are dizzy or lightheaded, or you feel like you may faint. ? · You are not getting better as expected. ? · You are having new or different chest pain. Where can you learn more? Go to http://emily-danette.info/. Enter A120 in the search box to learn more about \"Chest Pain: Care Instructions. \"  Current as of: March 20, 2017  Content Version: 11.4  © 1460-8983 [employer]. Care instructions adapted under license by [employer] (which disclaims liability or warranty for this information). If you have questions about a medical condition or this instruction, always ask your healthcare professional. [place] [address] any warranty or liability for your use of this information. Musculoskeletal Pain: Care Instructions  Your Care Instructions    Different problems with the bones, muscles, nerves, ligaments, and tendons in the body can cause pain. One or more areas of your body may ache or burn. Or they may feel tired, stiff, or sore. The medical term for this type of pain is musculoskeletal pain. It can have many different causes. Sometimes the pain is caused by an injury such as a strain or sprain. Or you might have pain from using one part of your body in the same way over and over again. This is called overuse. In some cases, the cause of the pain is another health problem such as arthritis or fibromyalgia. The doctor will examine you and ask you questions about your health to help find the cause of your pain. Blood tests or imaging tests like an X-ray may also be helpful. But sometimes doctors can't find a cause of the pain. Treatment depends on your symptoms and the cause of the pain, if known. The doctor has checked you carefully, but problems can develop later. If you notice any problems or new symptoms, get medical treatment right away. Follow-up care is a key part of your treatment and safety. Be sure to make and go to all appointments, and call your doctor if you are having problems. It's also a good idea to know your test results and keep a list of the medicines you take. How can you care for yourself at home? · Rest until you feel better. · Do not do anything that makes the pain worse. Return to exercise gradually if you feel better and your doctor says it's okay. · Be safe with medicines. Read and follow all instructions on the label. ¨ If the doctor gave you a prescription medicine for pain, take it as prescribed. ¨ If you are not taking a prescription pain medicine, ask your doctor if you can take an over-the-counter medicine. · Put ice or a cold pack on the area for 10 to 20 minutes at a time to ease pain. Put a thin cloth between the ice and your skin. When should you call for help? Call your doctor now or seek immediate medical care if:  ? · You have new pain, or your pain gets worse. ? · You have new symptoms such as a fever, a rash, or chills. ? Watch closely for changes in your health, and be sure to contact your doctor if:  ? · You do not get better as expected. Where can you learn more? Go to http://emily-danette.info/. Enter O552 in the search box to learn more about \"Musculoskeletal Pain: Care Instructions. \"  Current as of: October 14, 2016  Content Version: 11.4  © 4399-0849 [employer]. Care instructions adapted under license by [employer] (which disclaims liability or warranty for this information). If you have questions about a medical condition or this instruction, always ask your healthcare professional. [address] any warranty or liability for your use of this information.

## 2018-05-23 NOTE — ED PROVIDER NOTES
HPI Comments: 72-year-old man complaining of left leg pain and swelling and intermittent sharp chest pain shortness of breath. Patient has a history of DVTs is currently on Xarelto his last DVT was 3 years ago. Patient has had DVTs well on anticoagulation therapy in the past.  Patient also has a filter but it is felt on separate occasions to stop blood clot travel. Patient is a 72 y.o. male presenting with chest pain. The history is provided by the spouse and the patient. Chest Pain (Angina)    This is a recurrent problem. The current episode started more than 2 days ago. The problem has been gradually worsening. The pain is associated with normal activity. Associated symptoms include headaches. Pertinent negatives include no abdominal pain, no diaphoresis, no fever, no irregular heartbeat and no lower extremity edema. He has tried nothing for the symptoms. His past medical history is significant for DVT and PE. Past Medical History:   Diagnosis Date    Acute sinusitis     Anaphylactic reaction 2/1/2016    AR (allergic rhinitis) 7/27/2016    Arrhythmia     bradycardia    Bicipital tenosynovitis 2/17/2012    Bradycardia 02/02/2016    Seen by [facility]-no follow-up required.      Carpal tunnel syndrome 2/17/2012    Chronic back pain 12/23/2014    Chronic deep vein thrombosis of lower extremity (HCC) 3/21/2016    Chronic pain     right shoulder    Chronic pansinusitis 7/27/2016    Chronic sinusitis 7/27/2016    DNS (deviated nasal septum)     ARCE (dyspnea on exertion) 12/23/2014    Elevated serum creatinine 12/23/2014    Epistaxis     Essential hypertension, benign 8/16/2011    Factor V Leiden ([facility] [address]     managed with medication     Glaucoma     right eye    H/O echocardiogram 02/02/2016    EF 70-75%    Hereditary deficiency of other clotting factors ([facility] [address] 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 Mary Jamison. Takes Xarelto daily.  Leukocytosis 12/23/2014    Lumbar spinal stenosis     Nasal obstruction     Nasal polyp 7/27/2016    Nausea & vomiting     Osteoarthritis     right knee    Osteoarthritis of left hip 12/12/2012    Primary localized osteoarthrosis, shoulder region 2/17/2012    Pulmonary embolism ([address] 1981    right lung 1981, 1994,right lung 1997, right lung 2000 - [place] filter placed 2000. Followed by Dr. Mary Jamison.     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    Spinal stenosis, lumbar region, with neurogenic claudication 3/24/2014    Superior glenoid labrum lesion 2/17/2012    Supraspinatus (muscle) (tendon) sprain 2/17/2012    Thromboembolus ([address] 9/1/1997    left thigh DVT     Unspecified adverse effect of anesthesia     very difficult IV stick per patient d/t Lovenox, has needed anesthesiologist to start several times/comes in a day ahead for PICC line        Past Surgical History:   Procedure Laterality Date    HX APPENDECTOMY  1980    HX BACK SURGERY      spinal stenosis    HX BLEPHAROPLASTY Bilateral 2009    HX CARPAL TUNNEL RELEASE  2012    left     [address]    right    HX CATARACT REMOVAL Bilateral     left eye 1994 & right eye 2002    HX COLONOSCOPY  00,05,10    HX CORNEAL TRANSPLANT Right 2004    HX CORNEAL TRANSPLANT Right 06/14/2016    sutures still present     HX HEENT Right 11/08     glaucoma surgery    HX HEENT      nasal reconstruction    HX HEENT      nasal polypectomy    HX HERNIA REPAIR  2010    abdominal hernia repair    HX HERNIA REPAIR Left 2007    inguinal hernia repair    HX HIP REPLACEMENT Left 12/12/2012    HX KNEE ARTHROSCOPY Right 2006     knee meniscus repair    HX LAP CHOLECYSTECTOMY 2001    HX LUMBAR LAMINECTOMY      HX ORTHOPAEDIC Bilateral     open shoulder AC reconstruction    HX ORTHOPAEDIC Right 2006    neuroma fromfoot    HX ORTHOPAEDIC Bilateral     right elbow 1979 & left elbow 2007    HX ORTHOPAEDIC Right     index finger    HX OTHER SURGICAL  2000    mango filter placed    HX SHOULDER ARTHROSCOPY Left 2/17/2012    HX TONSILLECTOMY  1950    HX VASCULAR ACCESS  2008    PICC line right arm placed & then removed     HX VASCULAR ACCESS  2012    PICC- removed    HX VITRECTOMY Left 6/15/15    SINUS SURGERY PROC UNLISTED      numerous         Family History:   Problem Relation Age of Onset    Cancer Mother      lymphoma    Diabetes Mother      type II    Heart Disease Father     Diabetes Father      type II    Cancer Father      pancreatic ca    Other Father      Coronary Disease    Malignant Hyperthermia Neg Hx     Pseudocholinesterase Deficiency Neg Hx     Delayed Awakening Neg Hx     Post-op Nausea/Vomiting Neg Hx     Emergence Delirium Neg Hx     Post-op Cognitive Dysfunction Neg Hx        Social History     Social History    Marital status:      Spouse name: N/A    Number of children: N/A    Years of education: N/A     Occupational History    Not on file. Social History Main Topics    Smoking status: Never Smoker    Smokeless tobacco: Never Used    Alcohol use No    Drug use: No    Sexual activity: Not on file     Other Topics Concern    Not on file     Social History Narrative         ALLERGIES: Epinephrine and Aspirin    Review of Systems   Constitutional: Negative. Negative for activity change, diaphoresis and fever. HENT: Negative. Eyes: Negative. Respiratory: Negative. Cardiovascular: Positive for chest pain. Gastrointestinal: Negative. Negative for abdominal pain. Genitourinary: Negative. Musculoskeletal: Negative. Skin: Negative. Neurological: Positive for headaches. Psychiatric/Behavioral: Negative. All other systems reviewed and are negative. Vitals:    05/23/18 1143   BP: 142/75   Pulse: 70   Resp: 19   Temp: 98 °F (36.7 °C)   SpO2: 97%   Weight: 86.2 kg (190 lb)   Height: 5' 9\" (1.753 m)            Physical Exam   Constitutional: He is oriented to person, place, and time. He appears well-developed and well-nourished. No distress. HENT:   Head: Normocephalic and atraumatic. Right Ear: External ear normal.   Left Ear: External ear normal.   Nose: Nose normal.   Mouth/Throat: Oropharynx is clear and moist. No oropharyngeal exudate. Eyes: Conjunctivae and EOM are normal. Pupils are equal, round, and reactive to light. Right eye exhibits no discharge. Left eye exhibits no discharge. No scleral icterus. Neck: Normal range of motion. Neck supple. No JVD present. No tracheal deviation present. Cardiovascular: Normal rate, regular rhythm and intact distal pulses. Pulmonary/Chest: Effort normal and breath sounds normal. No stridor. No respiratory distress. He has no wheezes. He exhibits no tenderness. Abdominal: Soft. Bowel sounds are normal. He exhibits no distension and no mass. There is no tenderness. Musculoskeletal: Normal range of motion. He exhibits no edema. Left upper leg: He exhibits tenderness. Left lower leg: He exhibits tenderness. Neurological: He is alert and oriented to person, place, and time. No cranial nerve deficit. Skin: Skin is warm and dry. No rash noted. He is not diaphoretic. No erythema. No pallor. Psychiatric: He has a normal mood and affect. His behavior is normal. Thought content normal.   Nursing note and vitals reviewed. MDM  Number of Diagnoses or Management Options  Arthralgia of left lower leg: new and requires workup  Pleuritic chest pain: new and requires workup  Diagnosis management comments: Patient started with leg pain and then had chest pain as a complaint.   Doppler of his leg was negativeCT chest for PE protocol was also negative. Patient is on anticoagulation therapy  XARELTO. Patient also complaining of a headache Benadryl and Reglan. Cardiac workup was also negative. Patient is to follow closely with his primary care doctor.        Amount and/or Complexity of Data Reviewed  Clinical lab tests: ordered and reviewed  Tests in the radiology section of CPT®: ordered and reviewed  Tests in the medicine section of CPT®: ordered and reviewed    Risk of Complications, Morbidity, and/or Mortality  Presenting problems: high  Diagnostic procedures: high  Management options: high          ED Course       Procedures

## 2018-05-23 NOTE — ED TRIAGE NOTES
Pt in states left leg pain from thigh to calf. Pt also states chest pain for \"a couple of days\". States history of DVT. States on xarelto. States no n/v/d/sob.

## 2018-05-23 NOTE — ED NOTES
I have reviewed discharge instructions with the patient. The patient verbalized understanding. Patient left ED via Discharge Method: ambulatory to Home with (spouse). Opportunity for questions and clarification provided. Patient given 0 scripts. To continue your aftercare when you leave the hospital, you may receive an automated call from our care team to check in on how you are doing. This is a free service and part of our promise to provide the best care and service to meet your aftercare needs.  If you have questions, or wish to unsubscribe from this service please call [phone]. Thank you for Choosing our [facility] Emergency Department.

## 2018-07-10 ENCOUNTER — HOSPITAL ENCOUNTER (OUTPATIENT)
Dept: PHYSICAL THERAPY | Age: 73
Discharge: HOME | End: 2018-07-10
Payer: MEDICARE

## 2018-07-10 PROCEDURE — 97110 THERAPEUTIC EXERCISES: CPT

## 2018-07-10 PROCEDURE — G8978 MOBILITY CURRENT STATUS: HCPCS

## 2018-07-10 PROCEDURE — G8979 MOBILITY GOAL STATUS: HCPCS

## 2018-07-10 PROCEDURE — 97162 PT EVAL MOD COMPLEX 30 MIN: CPT

## 2018-07-10 NOTE — THERAPY EVALUATION
Eduardo Leblanc  : 1945  [place]  Secondary: Mikal Reynolds at [facility]ARR[facility] LUNA  [facility] [facility] [address], [address], [place] [address].  [phone]   [phone]       OUTPATIENT PHYSICAL THERAPY:Initial Assessment 7/10/2018   ICD-10: Treatment Diagnosis: Pain in left hip (M25.552)  Precautions/Allergies: none/Epinephrine and Aspirin   Fall Risk Score: 6 (? 5 = High Risk)  MD Orders: PT- evaluate and treat, exercises, HEP, heat, ultrasound MEDICAL/REFERRING DIAGNOSIS:  Other bursitis of hip, right hip [M70.71]   DATE OF ONSET: 6 months ago  REFERRING PHYSICIAN: Elsy Bradley., *  RETURN PHYSICIAN APPOINTMENT: 10/12/18     INITIAL ASSESSMENT:  Mr. Liv Kirby comes to therapy with complaints of right hip pain that becomes severe to the point he cannot sleep or move the leg without self assisting. He presents with right hip joint AROM limited by pain, B hip weakness; gait antalgia and a positive right hip scour test. He has a complicated past medical surgical history including a left ASHLEIGH in  and R knee scope  both with residual pain. He needs to be able to care for his home, tolerate driving long distances and perform yard maintenance. He will benefit from skilled therapy to address his deficits and assist in the functional return of ambulation with decreased pain. PROBLEM LIST (Impacting functional limitations):  1. Decreased Strength  2. Decreased ADL/Functional Activities  3. Decreased Ambulation Ability/Technique  4. Increased Pain  5. Decreased Activity Tolerance INTERVENTIONS PLANNED:  1. Cold  2. Gait Training  3. Heat  4. Home Exercise Program (HEP)  5. Manual Therapy  6. Neuromuscular Re-education/Strengthening  7. Range of Motion (ROM)  8. Therapeutic Exercise/Strengthening  9. Ultrasound (US)   TREATMENT PLAN:  Effective Dates: 7/10/2018 TO 2018 (60 days).   Frequency/Duration: 1- 2 times a week for 60 Days  GOALS: (Goals have been discussed and agreed upon with patient.)  Short-Term Functional Goals: Time Frame: 2 weeks     1. Independent with HEP     2.R hip AROM to WNL with less pain to allow gait without antalgia     3.B hip Strength improved to at least 4+/5 to allow safe transfers   Discharge Goals: Time Frame: 60 days     1. Tolerate gait > 30 minutes for community integration     2. ADL's with pain < 3/10     3. Pt to demonstrate normalized gait  Rehabilitation Potential For Stated Goals: Good  Regarding Enid Lacks therapy, I certify that the treatment plan above will be carried out by a therapist or under their direction. Thank you for this referral,  Jin Brink, PT,MSPT       Referring Physician Signature: Kaiden Olmstead, *              Date                    The information in this section was collected on 7/10/18 (except where otherwise noted). HISTORY:   History of Present Injury/Illness (Reason for Referral):  Pt states that his right hip pain just began to increase gradually over the past 6 months for no known reason. The pain began to limit his ability to walk, sleep and move the leg at night. He opted for cortisone injections without relief. 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 Past Medical History/Comorbidities:   Mr. Nai Huber  has a past medical history of  Bradycardia (02/02/2016); Chronic back pain (12/23/2014); Chronic deep vein thrombosis of lower extremity ([address] (3/21/2016); 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); Left leg DVT ([address] (12/23/2014); Pulmonary embolism ([address] (1981); Saddle embolism of pulmonary artery ([address] (3/21/2016); Spinal stenosis, lumbar region, with neurogenic claudication (3/24/2014); Superior glenoid labrum lesion (2/17/2012); Supraspinatus (muscle) (tendon) sprain (2/17/2012);  Thromboembolus ([address] (9/1/1997)  hernia repair (2010, 2007); lumbar laminectomy; left hip replacement (12/12/2012); knee arthroscopy (Right, 2006); shoulder arthroscopy (Left, 2/17/2012), R shoulder scope 1/2017, R biceps rupture and AC joint separation with surgical repair 3/2017, R reverse TSA 6/2017  Social History/Living Environment:     pt lives with wife in one story home. 2 step front and rear entry  Prior Level of Function/Work/Activity:  Independent with compensation and pain due to multiple orthopedic surgeries  Previous Treatment Approaches:          PT after left total hip surgery and right knee scope   Personal Factors: Other factors that influence how disability is experienced by the patient:  BUE weakness, back pain with core weakness, LLE weakness  Current Medications:       Current Outpatient Prescriptions:     HYDROcodone-acetaminophen (NORCO)  mg tablet, Take 1 Tab by mouth every six (6) hours as needed for Pain. Max Daily Amount: 4 Tabs., Disp: 120 Tab, Rfl: 0    rivaroxaban (XARELTO) 20 mg tab tablet, Take 1 Tab by mouth daily (with breakfast). , Disp: 30 Tab, Rfl: 11    cyanocobalamin (VITAMIN B-12) 1,000 mcg tablet, Take 1,000 mcg by mouth daily. , Disp: , Rfl:     carboxymethylcellulose sodium (REFRESH LIQUIGEL) 1 % dlgl, Apply 1 Drop to eye every four (4) hours. Take / use AM day of surgery  per anesthesia protocols. Indications: DRY EYE, Disp: , Rfl:     Brimonidine-Timolol (COMBIGAN) 0.2-0.5 % Drop, Administer 1 Drop to both eyes two (2) times a day. Take / use AM day of surgery  per anesthesia protocols. Indications: Open Angle Glaucoma, Disp: , Rfl:     PRED FORTE 1 % DrpS, Administer 1 Drop to right eye two (2) times a day. Take / use AM day of surgery  per anesthesia protocols.   Indications: pt states prevents infection, Disp: , Rfl:    Date Last Reviewed:  7/10/18   Number of Personal Factors/Comorbidities that affect the Plan of Care: 1-2: MODERATE COMPLEXITY   EXAMINATION:   Observation/Orthostatic Postural Assessment:          Right trunk lean during gait  Palpation:          Tender to palpate over the left greater trochanteric bursa. ROM: L hip abductor and IT band tightness                   RLE AROM  R Hip Flexion: 90 (limited by pain)  R Hip Internal Rotation: 20 (limited by pain)                  Strength: core 4-     LLE Strength  L Hip Extension: 4+  L Hip ABduction: 4    RLE Strength  R Hip Flexion: 4  R Hip ABduction: 4-  R Hip Internal Rotation: 4      Special tests: positive left hip scour with pain during passive hip flexion, IR and adduction     Body Structures Involved:  1. Bones  2. Joints  3. Muscles  4. Ligaments Body Functions Affected:  1. Sensory/Pain  2. Neuromusculoskeletal  3. Movement Related Activities and Participation Affected:  1. General Tasks and Demands  2. Mobility  3. Self Care  4. Domestic Life  5. Community, Social and [place]   Number of elements (examined above) that affect the Plan of Care: 3: MODERATE COMPLEXITY   CLINICAL PRESENTATION:   Presentation: Evolving clinical presentation with changing clinical characteristics: MODERATE COMPLEXITY   CLINICAL DECISION MAKING:   Outcome Measure: Tool Used: Lower Extremity Functional Scale (LEFS)  Score:  Initial: 25/80 Most Recent: X/80 (Date: -- )   Interpretation of Score: 20 questions each scored on a 5 point scale with 0 representing \"extreme difficulty or unable to perform\" and 4 representing \"no difficulty\". The lower the score, the greater the functional disability. 80/80 represents no disability. Minimal detectable change is 9 points. Score 80 79-63 62-48 47-32 31-16 15-1 0   Modifier CH CI CJ CK CL CM CN     ? Mobility - Walking and Moving Around:     - CURRENT STATUS: CL - 60%-79% impaired, limited or restricted    - GOAL STATUS: CK - 40%-59% impaired, limited or restricted    - D/C STATUS:  ---------------To be determined---------------    Medical Necessity:   · Skilled intervention continues to be required due to need for manual treatment and modalities.   Reason for Services/Other Comments:  · Patient continues to require skilled intervention due to need for guided progression into exercises. Use of outcome tool(s) and clinical judgement create a POC that gives a: Questionable prediction of patient's progress: MODERATE COMPLEXITY            TREATMENT:   (In addition to Assessment/Re-Assessment sessions the following treatments were rendered)  Pre-treatment Symptoms/Complaints:  Pt complains of constant pain that becomes severe with movement to the point he has to manually assist the leg at night. Pain: Initial:     4-10/10 Post Session:  4-5     THERAPEUTIC EXERCISE: (12 minutes):  Exercises per grid below to improve strength. Required moderate verbal and manual cues to promote proper body alignment, promote proper body breathing techniques and exercise technique. Reviewed and issued HEP: B side hip abd, prone alternating hip extn, B unilateral bridge with contralateral SLR. MANUAL THERAPY: (5 minutes): for ROM. Left hip abductor stretch  Treatment/Session Assessment:    · Response to Treatment:  Good return demonstration of exercises. Right hip weakness creating gait antalgia that is likely contributing to left hip pain. · Compliance with Program/Exercises: Will assess as treatment progresses. · Recommendations/Intent for next treatment session: \"Next visit will focus on Manual treatment, Modalities and exercise modification as needed  · \".   Total Treatment Duration: 55 minutes  PT Patient Time In/Time Out  Time In: 1115  Time Out: [address] [facility], PT

## 2018-07-10 NOTE — PROGRESS NOTES
Ambulatory/Rehab Services H2 Model Falls Risk Assessment    Risk Factor Pts. ·   Confusion/Disorientation/Impulsivity  []    4 ·   Symptomatic Depression  []   2 ·   Altered Elimination  []   1 ·   Dizziness/Vertigo  []   1 ·   Gender (Male)  [x]   1 ·   Any administered antiepileptics (anticonvulsants):  []   2 ·   Any administered benzodiazepines:  []   1 ·   Visual Impairment (specify):  []   1 ·   Portable Oxygen Use  []   1 ·   Orthostatic ? BP  []   1 ·   History of Recent Falls (within 3 mos.)  [x]   5     Ability to Rise from Chair (choose one) Pts. ·   Ability to rise in a single movement  [x]   0 ·   Pushes up, successful in one attempt  []   1 ·   Multiple attempts, but successful  []   3 ·   Unable to rise without assistance  []   4   Total: (5 or greater = High Risk) 6     Falls Prevention Plan:   []                Physical Limitations to Exercise (specify):   []                Mobility Assistance Device (type):   []                Exercise/Equipment Adaptation (specify):    ©2010 [facility] [address] Patent #[id number].  Federal Law prohibits the replication, distribution or use without written permission from [facility] [employer]

## 2018-07-16 ENCOUNTER — HOSPITAL ENCOUNTER (OUTPATIENT)
Dept: PHYSICAL THERAPY | Age: 73
Discharge: HOME | End: 2018-07-16
Payer: MEDICARE

## 2018-07-16 PROCEDURE — 97140 MANUAL THERAPY 1/> REGIONS: CPT | Performed by: PHYSICAL THERAPIST

## 2018-07-16 PROCEDURE — 97110 THERAPEUTIC EXERCISES: CPT | Performed by: PHYSICAL THERAPIST

## 2018-07-16 PROCEDURE — 97035 APP MDLTY 1+ULTRASOUND EA 15: CPT | Performed by: PHYSICAL THERAPIST

## 2018-07-16 NOTE — PROGRESS NOTES
Margo Gist  : 1945  [place]  [facility]: [facility]  [address] [facility], [address] [place] [address],[address], [facility] [address]  [phone]   [phone]       OUTPATIENT PHYSICAL THERAPY:Daily Note 2018   ICD-10: Treatment Diagnosis: Pain in left hip (M25.552)  Precautions/Allergies: none/Epinephrine and Aspirin   Fall Risk Score: 6 (? 5 = High Risk)  MD Orders: PT- evaluate and treat, exercises, HEP, heat, ultrasound MEDICAL/REFERRING DIAGNOSIS:  Other bursitis of hip, right hip [M70.71]   DATE OF ONSET: 6 months ago  REFERRING PHYSICIAN: Kaiden Dejesus., *  RETURN PHYSICIAN APPOINTMENT: 10/12/18     INITIAL ASSESSMENT:  Mr. Nai Huber comes to therapy with complaints of right hip pain that becomes severe to the point he cannot sleep or move the leg without self assisting. He presents with right hip joint AROM limited by pain, B hip weakness; gait antalgia and a positive right hip scour test. He has a complicated past medical surgical history including a left ASHLEIGH in  and R knee scope  both with residual pain. He needs to be able to care for his home, tolerate driving long distances and perform yard maintenance. He will benefit from skilled therapy to address his deficits and assist in the functional return of ambulation with decreased pain. PROBLEM LIST (Impacting functional limitations):  1. Decreased Strength  2. Decreased ADL/Functional Activities  3. Decreased Ambulation Ability/Technique  4. Increased Pain  5. Decreased Activity Tolerance INTERVENTIONS PLANNED:  1. Cold  2. Gait Training  3. Heat  4. Home Exercise Program (HEP)  5. Manual Therapy  6. Neuromuscular Re-education/Strengthening  7. Range of Motion (ROM)  8. Therapeutic Exercise/Strengthening  9. Ultrasound (US)   TREATMENT PLAN:  Effective Dates: 7/10/2018 TO 2018 (60 days).   Frequency/Duration: 1- 2 times a week for 60 Days  GOALS: (Goals have been discussed and agreed upon with patient.)  Short-Term Functional Goals: Time Frame: 2 weeks     1. Independent with HEP     2.R hip AROM to WNL with less pain to allow gait without antalgia     3.B hip Strength improved to at least 4+/5 to allow safe transfers   Discharge Goals: Time Frame: 60 days     1. Tolerate gait > 30 minutes for community integration     2. ADL's with pain < 3/10     3. Pt to demonstrate normalized gait  Rehabilitation Potential For Stated Goals: Good  Regarding Margaret Backbone therapy, I certify that the treatment plan above will be carried out by a therapist or under their direction. Thank you for this referral,  Poly Mann, PT,MSPT       Referring Physician Signature: Wilner Hendricks, *              Date                    The information in this section was collected on 7/10/18 (except where otherwise noted). HISTORY:   History of Present Injury/Illness (Reason for Referral):  Pt states that his right hip pain just began to increase gradually over the past 6 months for no known reason. The pain began to limit his ability to walk, sleep and move the leg at night. He opted for cortisone injections without relief. 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 Past Medical History/Comorbidities:   Mr. 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 2 step front and rear entry  Prior Level of Function/Work/Activity:  Independent with compensation and pain due to multiple orthopedic surgeries  Previous Treatment Approaches:          PT after left total hip surgery and right knee scope   Personal Factors: Other factors that influence how disability is experienced by the patient:  BUE weakness, back pain with core weakness, LLE weakness  Current Medications:       Current Outpatient Prescriptions:     HYDROcodone-acetaminophen (NORCO)  mg tablet, Take 1 Tab by mouth every six (6) hours as needed for Pain. Max Daily Amount: 4 Tabs., Disp: 120 Tab, Rfl: 0    rivaroxaban (XARELTO) 20 mg tab tablet, Take 1 Tab by mouth daily (with breakfast). , Disp: 30 Tab, Rfl: 11    cyanocobalamin (VITAMIN B-12) 1,000 mcg tablet, Take 1,000 mcg by mouth daily. , Disp: , Rfl:     carboxymethylcellulose sodium (REFRESH LIQUIGEL) 1 % dlgl, Apply 1 Drop to eye every four (4) hours. Take / use AM day of surgery  per anesthesia protocols. Indications: DRY EYE, Disp: , Rfl:     Brimonidine-Timolol (COMBIGAN) 0.2-0.5 % Drop, Administer 1 Drop to both eyes two (2) times a day. Take / use AM day of surgery  per anesthesia protocols. Indications: Open Angle Glaucoma, Disp: , Rfl:     PRED FORTE 1 % DrpS, Administer 1 Drop to right eye two (2) times a day. Take / use AM day of surgery  per anesthesia protocols.   Indications: pt states prevents infection, Disp: , Rfl:    Date Last Reviewed:  7/10/18   Number of Personal Factors/Comorbidities that affect the Plan of Care: 1-2: MODERATE COMPLEXITY   EXAMINATION:   Observation/Orthostatic Postural Assessment:          Right trunk lean during gait  Palpation:          Tender to palpate over the left greater trochanteric bursa. ROM: L hip abductor and IT band tightness                                      Strength: core 4-                 Special tests: positive left hip scour with pain during passive hip flexion, IR and adduction     Body Structures Involved:  1. Bones  2. Joints  3. Muscles  4. Ligaments Body Functions Affected:  1. Sensory/Pain  2. Neuromusculoskeletal  3. Movement Related Activities and Participation Affected:  1. General Tasks and Demands  2. Mobility  3. Self Care  4. Domestic Life  5. Community, Social and [place]   Number of elements (examined above) that affect the Plan of Care: 3: MODERATE COMPLEXITY   CLINICAL PRESENTATION:   Presentation: Evolving clinical presentation with changing clinical characteristics: MODERATE COMPLEXITY   CLINICAL DECISION MAKING:   Outcome Measure: Tool Used: Lower Extremity Functional Scale (LEFS)  Score:  Initial: 25/80 Most Recent: X/80 (Date: -- )   Interpretation of Score: 20 questions each scored on a 5 point scale with 0 representing \"extreme difficulty or unable to perform\" and 4 representing \"no difficulty\". The lower the score, the greater the functional disability. 80/80 represents no disability. Minimal detectable change is 9 points. Score 80 79-63 62-48 47-32 31-16 15-1 0   Modifier CH CI CJ CK CL CM CN     ? Mobility - Walking and Moving Around:     - CURRENT STATUS: CL - 60%-79% impaired, limited or restricted    - GOAL STATUS: CK - 40%-59% impaired, limited or restricted    - D/C STATUS:  ---------------To be determined---------------    Medical Necessity:   · Skilled intervention continues to be required due to need for manual treatment and modalities. Reason for Services/Other Comments:  · Patient continues to require skilled intervention due to need for guided progression into exercises.    Use of outcome tool(s) and clinical judgement create a POC that gives a: Questionable prediction of patient's progress: MODERATE COMPLEXITY            TREATMENT:   (In addition to Assessment/Re-Assessment sessions the following treatments were rendered)  Pre-treatment Symptoms/Complaints:  Pt complains that L hip is hurting and R hurts when he walks. He states that he is leaving for out of state next week. Pain: Initial:   Pain Intensity 1: 8 in L hip  Min complaints in R hip Post Session:  1/10     Findings:   Marked atrophy noted in R gluteals  R ASIS sup PSIS inf  L MEGHAN post and inf  SB sacrum  Mod tenderness to bilat piriformis    THERAPEUTIC EXERCISE: (15 minutes):  to improve strength for functional activities. Moderate verbal and manual cues to promote proper body alignment, promote proper body breathing techniques and exercise techniqueReuired   Passive stretch to R piriformis  Passive stretch to R hamstring  SDLY hip abd bilat  SDLY clamshells  Single leg bridge  Recommended to pt that he see a massage therapist to help manage tightness in his bilat piriformis musculature    MANUAL THERAPY: (20minutes): to improve soft tissue and joint mobility  Prone thoracic mobilizations p>a  unilat, bilat and cross sectional  Manual correction for L post ilial for   Manual correction for sacral dysfunction  STM to R piriformis in L SDLY    US x 10 min to dec tightness and pain to R Piriformis in L SDLY @ 1.5 w/cm2 1MHz    Treatment/Session Assessment:    · Response to Treatment:  Pt has R glut muscle atrophy, as well as, pelvic asymmetry contributing to R hip pain. He responded well to treatment with improved alignment and dec subjective complaints of pain. He would benefit from cont therapy to address strength issues, as well as, pelvic alignment. Massage therapy would also benefit pt to dec tightness in bilat piriformis. · Compliance with Program/Exercises: Will assess as treatment progresses. · Recommendations/Intent for next treatment session: \"Next visit will focus on Manual treatment, Reassess pelvic alignment  · \".   Total Treatment Duration: 45 minutes  PT Patient Time In/Time Out  Time In: 0830  Time Out: [address], PT

## 2018-07-17 ENCOUNTER — HOSPITAL ENCOUNTER (OUTPATIENT)
Dept: PHYSICAL THERAPY | Age: 73
Discharge: HOME | End: 2018-07-17
Payer: MEDICARE

## 2018-07-17 PROCEDURE — 97140 MANUAL THERAPY 1/> REGIONS: CPT | Performed by: PHYSICAL THERAPIST

## 2018-07-17 PROCEDURE — 97035 APP MDLTY 1+ULTRASOUND EA 15: CPT | Performed by: PHYSICAL THERAPIST

## 2018-07-17 PROCEDURE — 97110 THERAPEUTIC EXERCISES: CPT | Performed by: PHYSICAL THERAPIST

## 2018-07-25 ENCOUNTER — HOSPITAL ENCOUNTER (OUTPATIENT)
Dept: PHYSICAL THERAPY | Age: 73
Discharge: HOME | End: 2018-07-25
Payer: MEDICARE

## 2018-07-25 PROCEDURE — 97110 THERAPEUTIC EXERCISES: CPT

## 2018-07-25 NOTE — PROGRESS NOTES
Yaneth Wright  : 1945  [place]  [facility]: [facility]  [address] [facility], [address] [place] [address] [address], [address] [facility]  [phone]   [phone]       OUTPATIENT PHYSICAL THERAPY:Daily Note 2018      ICD-10: Treatment Diagnosis: Pain in left hip (M25.552)  Precautions/Allergies: none/Epinephrine and Aspirin   Fall Risk Score: 6 (? 5 = High Risk)  MD Orders: PT- evaluate and treat, exercises, HEP, heat, ultrasound MEDICAL/REFERRING DIAGNOSIS:  Other bursitis of hip, right hip [M70.71]   DATE OF ONSET: 6 months ago  REFERRING PHYSICIAN: Niki Abdalla., *  RETURN PHYSICIAN APPOINTMENT: 10/12/18     INITIAL ASSESSMENT:  Mr. Jennifer Su comes to therapy with complaints of right hip pain that becomes severe to the point he cannot sleep or move the leg without self assisting. He presents with right hip joint AROM limited by pain, B hip weakness; gait antalgia and a positive right hip scour test. He has a complicated past medical surgical history including a left ASHLEIGH in  and R knee scope  both with residual pain. He needs to be able to care for his home, tolerate driving long distances and perform yard maintenance. He will benefit from skilled therapy to address his deficits and assist in the functional return of ambulation with decreased pain. PROBLEM LIST (Impacting functional limitations):  1. Decreased Strength  2. Decreased ADL/Functional Activities  3. Decreased Ambulation Ability/Technique  4. Increased Pain  5. Decreased Activity Tolerance INTERVENTIONS PLANNED:  1. Cold  2. Gait Training  3. Heat  4. Home Exercise Program (HEP)  5. Manual Therapy  6. Neuromuscular Re-education/Strengthening  7. Range of Motion (ROM)  8. Therapeutic Exercise/Strengthening  9. Ultrasound (US)   TREATMENT PLAN:  Effective Dates: 7/10/2018 TO 2018 (60 days).   Frequency/Duration: 1- 2 times a week for 60 Days  GOALS: (Goals have been discussed and agreed upon with patient.)  Short-Term Functional Goals: Time Frame: 2 weeks     1. Independent with HEP     2.R hip AROM to WNL with less pain to allow gait without antalgia     3.B hip Strength improved to at least 4+/5 to allow safe transfers   Discharge Goals: Time Frame: 60 days     1. Tolerate gait > 30 minutes for community integration     2. ADL's with pain < 3/10     3. Pt to demonstrate normalized gait  Rehabilitation Potential For Stated Goals: Good              The information in this section was collected on 7/10/18 (except where otherwise noted). HISTORY:   History of Present Injury/Illness (Reason for Referral):  Pt states that his right hip pain just began to increase gradually over the past 6 months for no known reason. The pain began to limit his ability to walk, sleep and move the leg at night. He opted for cortisone injections without relief. 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 Past Medical History/Comorbidities:   Mr. 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  2 step front and rear entry  Prior Level of Function/Work/Activity:  Independent with compensation and pain due to multiple orthopedic surgeries  Previous Treatment Approaches:          PT after left total hip surgery and right knee scope   Personal Factors: Other factors that influence how disability is experienced by the patient:  BUE weakness, back pain with core weakness, LLE weakness  Current Medications:       Current Outpatient Prescriptions:     HYDROcodone-acetaminophen (NORCO)  mg tablet, Take 1 Tab by mouth every six (6) hours as needed for Pain. Max Daily Amount: 4 Tabs., Disp: 120 Tab, Rfl: 0    rivaroxaban (XARELTO) 20 mg tab tablet, Take 1 Tab by mouth daily (with breakfast). , Disp: 30 Tab, Rfl: 11    cyanocobalamin (VITAMIN B-12) 1,000 mcg tablet, Take 1,000 mcg by mouth daily. , Disp: , Rfl:     carboxymethylcellulose sodium (REFRESH LIQUIGEL) 1 % dlgl, Apply 1 Drop to eye every four (4) hours. Take / use AM day of surgery  per anesthesia protocols. Indications: DRY EYE, Disp: , Rfl:     Brimonidine-Timolol (COMBIGAN) 0.2-0.5 % Drop, Administer 1 Drop to both eyes two (2) times a day. Take / use AM day of surgery  per anesthesia protocols. Indications: Open Angle Glaucoma, Disp: , Rfl:     PRED FORTE 1 % DrpS, Administer 1 Drop to right eye two (2) times a day. Take / use AM day of surgery  per anesthesia protocols. Indications: pt states prevents infection, Disp: , Rfl:    Date Last Reviewed:  7/2518   Number of Personal Factors/Comorbidities that affect the Plan of Care: 1-2: MODERATE COMPLEXITY   EXAMINATION:   7/25/2018  Observation/Orthostatic Postural Assessment: Walks into clinic with limp on L. Supine alignment: LE length appears symmetric, L ASIS high vs R. Prone alignment: LE length appears  Palpation: Tender to R lateral hip at greater trochanteric, glut med, piriformis, PSIS; L lateral and posterior hip, PSIS.    ROM: Not measured.                                 Strength:    L Hip Abd: 3+     R Hip Abd: 4+ Special tests: None tested. Body Structures Involved:  1. Bones  2. Joints  3. Muscles  4. Ligaments Body Functions Affected:  1. Sensory/Pain  2. Neuromusculoskeletal  3. Movement Related Activities and Participation Affected:  1. General Tasks and Demands  2. Mobility  3. Self Care  4. Domestic Life  5. Community, Social and [place]   Number of elements (examined above) that affect the Plan of Care: 3: MODERATE COMPLEXITY   CLINICAL PRESENTATION:   Presentation: Evolving clinical presentation with changing clinical characteristics: MODERATE COMPLEXITY   CLINICAL DECISION MAKING:   Outcome Measure: Tool Used: Lower Extremity Functional Scale (LEFS)  Score:  Initial: 25/80 Most Recent: X/80 (Date: -- )   Interpretation of Score: 20 questions each scored on a 5 point scale with 0 representing \"extreme difficulty or unable to perform\" and 4 representing \"no difficulty\". The lower the score, the greater the functional disability. 80/80 represents no disability. Minimal detectable change is 9 points. Score 80 79-63 62-48 47-32 31-16 15-1 0   Modifier CH CI CJ CK CL CM CN     ? Mobility - Walking and Moving Around:     - CURRENT STATUS: CL - 60%-79% impaired, limited or restricted    - GOAL STATUS: CK - 40%-59% impaired, limited or restricted    - D/C STATUS:  ---------------To be determined---------------    Medical Necessity:   · Skilled intervention continues to be required due to need for manual treatment and modalities. Reason for Services/Other Comments:  · Patient continues to require skilled intervention due to need for guided progression into exercises.    Use of outcome tool(s) and clinical judgement create a POC that gives a: Questionable prediction of patient's progress: MODERATE COMPLEXITY          TREATMENT:   (In addition to Assessment/Re-Assessment sessions the following treatments were rendered)  7/25/2018  Pre-treatment Symptoms/Complaints: No pain to the R hip to report right now. Says it is worse when he rolls in bed. L hip \"always hurts\". Attributes this to the weakness after ASHLEIGH and L sciatica. Pain: Initial: Pain Intensity 1: 0 (/10 R hip; 8/10 L posterior hip.)  Post Session:  2/10       MANUAL THERAPY: (5 Minutes): Positional Release technique to R and L glut med and piriformis for muscle restrictions. THERAPEUTIC EXERCISE: (40 Minutes): Exercises for R and L hip mobility and flexibility with assisted Active Isolated Stretching to astroc., posterior hip, hip adductors, lateral hip, hamstrings in 90/90 and SLR, and hip flexors and quads in prone and side-lying, 3\"x10 each. Instruction and performance in lumbopelvic mobility with hook-lying pelvic tilts and pelvic/hip shifts. Core and hip strength with bridging, bridge to nguyễn-bridge, side-lying hip abduction; and added myokinematic repositioning 90/90 hip lift/hip shift L and standing hip shift L with R UE reach. Verbal, visual, tactile, cues for alignment and correct exercise execution. HEP: He is to work on the bridging, side-lying hip abduction, and the new 90/90 hip lift/hip shift and standing hip shift L drills done today. He is to work on equal weight loading vs R hip stance. He verbalizes understanding. Treatment/Session Assessment:    · Response to Treatment:  Appears to have less R hip pain. Marked weakness on L hip and tends to weight load more on R, which is most likely contributing to his R hip pain problem. · Compliance with Program/Exercises:    · Recommendations/Intent for next treatment session: Will continue with manual treatment for pelvic alignment as needed; review and progress B hip strength, especially L for improved stance phase. Update HEP. He will be going out of town starting next week.    Total Treatment Duration: 45 minutes  PT Patient Time In/Time Out  Time In: 1450  Time Out: [address], PT, MSPT, OCS

## 2018-07-26 ENCOUNTER — HOSPITAL ENCOUNTER (OUTPATIENT)
Dept: PHYSICAL THERAPY | Age: 73
Discharge: HOME | End: 2018-07-26
Payer: MEDICARE

## 2018-07-26 PROCEDURE — 97110 THERAPEUTIC EXERCISES: CPT

## 2018-07-26 NOTE — PROGRESS NOTES
Cecil Listen  : 1945  [place]  Secondary: Mikal Reynolds at [facility] FARRUKH LUNA  [address] [facility], [address] [place] [address] [address], [address] [facility]  [phone]   [phone]       OUTPATIENT PHYSICAL THERAPY:Daily Note 2018      ICD-10: Treatment Diagnosis: Pain in left hip (M25.552)  Precautions/Allergies: none/Epinephrine and Aspirin   Fall Risk Score: 6 (? 5 = High Risk)  MD Orders: PT- evaluate and treat, exercises, HEP, heat, ultrasound MEDICAL/REFERRING DIAGNOSIS:  Other bursitis of hip, right hip [M70.71]   DATE OF ONSET: 6 months ago  REFERRING PHYSICIAN: Aristeo Kaplan., *  RETURN PHYSICIAN APPOINTMENT: 10/12/18     INITIAL ASSESSMENT:  Mr. Pedrito Harley comes to therapy with complaints of right hip pain that becomes severe to the point he cannot sleep or move the leg without self assisting. He presents with right hip joint AROM limited by pain, B hip weakness; gait antalgia and a positive right hip scour test. He has a complicated past medical surgical history including a left ASHLEIGH in  and R knee scope  both with residual pain. He needs to be able to care for his home, tolerate driving long distances and perform yard maintenance. He will benefit from skilled therapy to address his deficits and assist in the functional return of ambulation with decreased pain. PROBLEM LIST (Impacting functional limitations):  1. Decreased Strength  2. Decreased ADL/Functional Activities  3. Decreased Ambulation Ability/Technique  4. Increased Pain  5. Decreased Activity Tolerance INTERVENTIONS PLANNED:  1. Cold  2. Gait Training  3. Heat  4. Home Exercise Program (HEP)  5. Manual Therapy  6. Neuromuscular Re-education/Strengthening  7. Range of Motion (ROM)  8. Therapeutic Exercise/Strengthening  9. Ultrasound (US)   TREATMENT PLAN:  Effective Dates: 7/10/2018 TO 2018 (60 days).   Frequency/Duration: 1- 2 times a week for 60 Days  GOALS: (Goals have been discussed and agreed upon with patient.)  Short-Term Functional Goals: Time Frame: 2 weeks     1. Independent with HEP     2.R hip AROM to WNL with less pain to allow gait without antalgia     3.B hip Strength improved to at least 4+/5 to allow safe transfers   Discharge Goals: Time Frame: 60 days     1. Tolerate gait > 30 minutes for community integration     2. ADL's with pain < 3/10     3. Pt to demonstrate normalized gait  Rehabilitation Potential For Stated Goals: Good              The information in this section was collected on 7/10/18 (except where otherwise noted). HISTORY:   History of Present Injury/Illness (Reason for Referral):  Pt states that his right hip pain just began to increase gradually over the past 6 months for no known reason. The pain began to limit his ability to walk, sleep and move the leg at night. He opted for cortisone injections without relief. 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 Past Medical History/Comorbidities:   Mr. 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  2 step front and rear entry  Prior Level of Function/Work/Activity:  Independent with compensation and pain due to multiple orthopedic surgeries  Previous Treatment Approaches:          PT after left total hip surgery and right knee scope   Personal Factors: Other factors that influence how disability is experienced by the patient:  BUE weakness, back pain with core weakness, LLE weakness  Current Medications:       Current Outpatient Prescriptions:     HYDROcodone-acetaminophen (NORCO)  mg tablet, Take 1 Tab by mouth every six (6) hours as needed for Pain. Max Daily Amount: 4 Tabs., Disp: 120 Tab, Rfl: 0    rivaroxaban (XARELTO) 20 mg tab tablet, Take 1 Tab by mouth daily (with breakfast). , Disp: 30 Tab, Rfl: 11    cyanocobalamin (VITAMIN B-12) 1,000 mcg tablet, Take 1,000 mcg by mouth daily. , Disp: , Rfl:     carboxymethylcellulose sodium (REFRESH LIQUIGEL) 1 % dlgl, Apply 1 Drop to eye every four (4) hours. Take / use AM day of surgery  per anesthesia protocols. Indications: DRY EYE, Disp: , Rfl:     Brimonidine-Timolol (COMBIGAN) 0.2-0.5 % Drop, Administer 1 Drop to both eyes two (2) times a day. Take / use AM day of surgery  per anesthesia protocols. Indications: Open Angle Glaucoma, Disp: , Rfl:     PRED FORTE 1 % DrpS, Administer 1 Drop to right eye two (2) times a day. Take / use AM day of surgery  per anesthesia protocols. Indications: pt states prevents infection, Disp: , Rfl:    Date Last Reviewed:  7/2518   Number of Personal Factors/Comorbidities that affect the Plan of Care: 1-2: MODERATE COMPLEXITY   EXAMINATION:   7/26/2018  Observation/Orthostatic Postural Assessment: Walks into clinic with limp on L. Supine alignment: L LE length appears mildly long, L ASIS high vs R  Palpation: Tender to R lateral hip at greater trochanteric, glut med, piriformis, PSIS; L lateral and posterior hip, PSIS.    ROM: Not measured.                                  Strength:    L Hip Abd: 3+     R Hip Abd: 4+         Special tests: None tested. Body Structures Involved:  1. Bones  2. Joints  3. Muscles  4. Ligaments Body Functions Affected:  1. Sensory/Pain  2. Neuromusculoskeletal  3. Movement Related Activities and Participation Affected:  1. General Tasks and Demands  2. Mobility  3. Self Care  4. Domestic Life  5. Community, Social and [place]   Number of elements (examined above) that affect the Plan of Care: 3: MODERATE COMPLEXITY   CLINICAL PRESENTATION:   Presentation: Evolving clinical presentation with changing clinical characteristics: MODERATE COMPLEXITY   CLINICAL DECISION MAKING:   Outcome Measure: Tool Used: Lower Extremity Functional Scale (LEFS)  Score:  Initial: 25/80 Most Recent: X/80 (Date: -- )   Interpretation of Score: 20 questions each scored on a 5 point scale with 0 representing \"extreme difficulty or unable to perform\" and 4 representing \"no difficulty\". The lower the score, the greater the functional disability. 80/80 represents no disability. Minimal detectable change is 9 points. Score 80 79-63 62-48 47-32 31-16 15-1 0   Modifier CH CI CJ CK CL CM CN     ? Mobility - Walking and Moving Around:     - CURRENT STATUS: CL - 60%-79% impaired, limited or restricted    - GOAL STATUS: CK - 40%-59% impaired, limited or restricted    - D/C STATUS:  ---------------To be determined---------------    Medical Necessity:   · Skilled intervention continues to be required due to need for manual treatment and modalities. Reason for Services/Other Comments:  · Patient continues to require skilled intervention due to need for guided progression into exercises.    Use of outcome tool(s) and clinical judgement create a POC that gives a: Questionable prediction of patient's progress: MODERATE COMPLEXITY          TREATMENT:   (In addition to Assessment/Re-Assessment sessions the following treatments were rendered)  7/26/2018  Pre-treatment Symptoms/Complaints: Still no pain to the R hip, but very sore to the L hip after yesterday. Had trouble sleeping secondary to the discomfort. Pain: Initial: Pain Intensity 1: 0 (/10 R hip, \"10/10\" L posterior hip)  Post Session:  2/10     THERAPEUTIC EXERCISE: (45 Minutes): Muscle Energy for L anterior rotated innominate, and instruction and performance in self-correction technique. Exercises for R and L hip mobility and flexibility with assisted Active Isolated Stretching to gastroc., posterior hip, hip adductors, lateral hip, hamstrings in 90/90 and SLR, and hip flexors and quads in side-lying, 3\"x10 each. Exercise for lumbopelvic mobility with hook-lying pelvic tilts and pelvic/hip shifts; and added quadruped respiratory belly lift. Core and hip strength with additional of myokinematic knee toward knee on L side 3x 5 breath holds; reviewed and performed myokinematic repositioning 90/90 hip lift/hip shift L 1x 5 breaths, and 90/90 hip lift with nguyễn-bridge 3x10; standing hip shift L trunk around's with blue tubing resisted R UE reach, 3x 5 breath holds; and added retrograde step up on 4-in step, 2x5 each. Verbal, visual, tactile, cues for alignment and correct exercise execution, especially for HEP sequence. HEP: He is to work on the lumbar mobility, quadruped respiratory belly lift, new L side-lying knee toward knee, 90/90 hip lift with nguyễn-bridge, and standing hip shift trunk arounds. Verbal instruction with these. He demonstrates and verbalizes good understanding of these. Treatment/Session Assessment:    · Response to Treatment:  Continues with little R hip pain symptoms. L hip is sore and weak. Good effort and performance of the exercises done. Again, it appears the L hip weakness and muscle dysfunction is contributing to the R hip issue. · Compliance with Program/Exercises:    · Recommendations/Intent for next treatment session: He is going out of town for the next 3 weeks. He is to continue with his HEP through this time.  He is to set up a follow up appointment on his return for re-check.     Total Treatment Duration: 45 minutes  PT Patient Time In/Time Out  Time In: 1120  Time Out: [address], PT, MSPT, OCS

## 2018-09-12 ENCOUNTER — HOSPITAL ENCOUNTER (OUTPATIENT)
Dept: LAB | Age: 73
Discharge: HOME | End: 2018-09-12
Payer: MEDICARE

## 2018-09-12 DIAGNOSIS — I82.4Y9 DEEP VEIN THROMBOSIS (DVT) OF PROXIMAL LOWER EXTREMITY, UNSPECIFIED CHRONICITY, UNSPECIFIED LATERALITY (HCC): ICD-10-CM

## 2018-09-12 LAB
ALBUMIN SERPL-MCNC: 3.9 G/DL (ref 3.2–4.6)
ALBUMIN/GLOB SERPL: 1.1 {RATIO} (ref 1.2–3.5)
ALP SERPL-CCNC: 50 U/L (ref 50–136)
ALT SERPL-CCNC: 46 U/L (ref 12–65)
ANION GAP SERPL CALC-SCNC: 8 MMOL/L (ref 7–16)
AST SERPL-CCNC: 24 U/L (ref 15–37)
BASOPHILS # BLD: 0 K/UL (ref 0–0.2)
BASOPHILS NFR BLD: 1 % (ref 0–2)
BILIRUB SERPL-MCNC: 0.5 MG/DL (ref 0.2–1.1)
BUN SERPL-MCNC: 22 MG/DL (ref 8–23)
CALCIUM SERPL-MCNC: 8.6 MG/DL (ref 8.3–10.4)
CHLORIDE SERPL-SCNC: 108 MMOL/L (ref 98–107)
CO2 SERPL-SCNC: 27 MMOL/L (ref 21–32)
CREAT SERPL-MCNC: 1.21 MG/DL (ref 0.8–1.5)
D DIMER PPP FEU-MCNC: 0.37 UG/ML(FEU)
DIFFERENTIAL METHOD BLD: NORMAL
EOSINOPHIL # BLD: 0.1 K/UL (ref 0–0.8)
EOSINOPHIL NFR BLD: 2 % (ref 0.5–7.8)
ERYTHROCYTE [DISTWIDTH] IN BLOOD BY AUTOMATED COUNT: 12.5 % (ref 11.9–14.6)
GLOBULIN SER CALC-MCNC: 3.5 G/DL (ref 2.3–3.5)
GLUCOSE SERPL-MCNC: 182 MG/DL (ref 65–100)
HCT VFR BLD AUTO: 46.5 % (ref 41.1–50.3)
HGB BLD-MCNC: 15.4 G/DL (ref 13.6–17.2)
IMM GRANULOCYTES # BLD: 0 K/UL (ref 0–0.5)
IMM GRANULOCYTES NFR BLD AUTO: 1 % (ref 0–5)
LYMPHOCYTES # BLD: 1.6 K/UL (ref 0.5–4.6)
LYMPHOCYTES NFR BLD: 26 % (ref 13–44)
MCH RBC QN AUTO: 31.7 PG (ref 26.1–32.9)
MCHC RBC AUTO-ENTMCNC: 33.1 G/DL (ref 31.4–35)
MCV RBC AUTO: 95.7 FL (ref 79.6–97.8)
MONOCYTES # BLD: 0.3 K/UL (ref 0.1–1.3)
MONOCYTES NFR BLD: 5 % (ref 4–12)
NEUTS SEG # BLD: 4.1 K/UL (ref 1.7–8.2)
NEUTS SEG NFR BLD: 65 % (ref 43–78)
NRBC # BLD: 0.01 K/UL (ref 0–0.2)
PLATELET # BLD AUTO: 176 K/UL (ref 150–450)
PMV BLD AUTO: 9.9 FL (ref 9.4–12.3)
POTASSIUM SERPL-SCNC: 3.7 MMOL/L (ref 3.5–5.1)
PROT SERPL-MCNC: 7.4 G/DL (ref 6.3–8.2)
RBC # BLD AUTO: 4.86 M/UL (ref 4.23–5.67)
SODIUM SERPL-SCNC: 143 MMOL/L (ref 136–145)
WBC # BLD AUTO: 6.2 K/UL (ref 4.3–11.1)

## 2018-09-12 PROCEDURE — 36415 COLL VENOUS BLD VENIPUNCTURE: CPT

## 2018-09-12 PROCEDURE — 85025 COMPLETE CBC W/AUTO DIFF WBC: CPT

## 2018-09-12 PROCEDURE — 85379 FIBRIN DEGRADATION QUANT: CPT

## 2018-09-12 PROCEDURE — 80053 COMPREHEN METABOLIC PANEL: CPT

## 2019-03-26 ENCOUNTER — HOSPITAL ENCOUNTER (OUTPATIENT)
Dept: LAB | Age: 74
Discharge: HOME | End: 2019-03-26
Payer: MEDICARE

## 2019-03-26 DIAGNOSIS — D68.51 FACTOR V LEIDEN (HCC): Chronic | ICD-10-CM

## 2019-03-26 LAB
ALBUMIN SERPL-MCNC: 4 G/DL (ref 3.2–4.6)
ALBUMIN/GLOB SERPL: 1.2 {RATIO} (ref 1.2–3.5)
ALP SERPL-CCNC: 56 U/L (ref 50–136)
ALT SERPL-CCNC: 39 U/L (ref 12–65)
ANION GAP SERPL CALC-SCNC: 7 MMOL/L (ref 7–16)
AST SERPL-CCNC: 27 U/L (ref 15–37)
BASOPHILS # BLD: 0 K/UL (ref 0–0.2)
BASOPHILS NFR BLD: 0 % (ref 0–2)
BILIRUB SERPL-MCNC: 0.9 MG/DL (ref 0.2–1.1)
BUN SERPL-MCNC: 16 MG/DL (ref 8–23)
CALCIUM SERPL-MCNC: 9 MG/DL (ref 8.3–10.4)
CHLORIDE SERPL-SCNC: 109 MMOL/L (ref 98–107)
CO2 SERPL-SCNC: 25 MMOL/L (ref 21–32)
CREAT SERPL-MCNC: 1.18 MG/DL (ref 0.8–1.5)
DIFFERENTIAL METHOD BLD: NORMAL
EOSINOPHIL # BLD: 0.2 K/UL (ref 0–0.8)
EOSINOPHIL NFR BLD: 4 % (ref 0.5–7.8)
ERYTHROCYTE [DISTWIDTH] IN BLOOD BY AUTOMATED COUNT: 12.5 % (ref 11.9–14.6)
GLOBULIN SER CALC-MCNC: 3.3 G/DL (ref 2.3–3.5)
GLUCOSE SERPL-MCNC: 124 MG/DL (ref 65–100)
HCT VFR BLD AUTO: 42.6 % (ref 41.1–50.3)
HGB BLD-MCNC: 13.9 G/DL (ref 13.6–17.2)
IMM GRANULOCYTES # BLD AUTO: 0 K/UL (ref 0–0.5)
IMM GRANULOCYTES NFR BLD AUTO: 0 % (ref 0–5)
LYMPHOCYTES # BLD: 1.4 K/UL (ref 0.5–4.6)
LYMPHOCYTES NFR BLD: 27 % (ref 13–44)
MCH RBC QN AUTO: 31.4 PG (ref 26.1–32.9)
MCHC RBC AUTO-ENTMCNC: 32.6 G/DL (ref 31.4–35)
MCV RBC AUTO: 96.2 FL (ref 79.6–97.8)
MONOCYTES # BLD: 0.4 K/UL (ref 0.1–1.3)
MONOCYTES NFR BLD: 8 % (ref 4–12)
NEUTS SEG # BLD: 3.1 K/UL (ref 1.7–8.2)
NEUTS SEG NFR BLD: 60 % (ref 43–78)
NRBC # BLD: 0 K/UL (ref 0–0.2)
PLATELET # BLD AUTO: 187 K/UL (ref 150–450)
PMV BLD AUTO: 9.9 FL (ref 9.4–12.3)
POTASSIUM SERPL-SCNC: 3.9 MMOL/L (ref 3.5–5.1)
PROT SERPL-MCNC: 7.3 G/DL (ref 6.3–8.2)
RBC # BLD AUTO: 4.43 M/UL (ref 4.23–5.67)
SODIUM SERPL-SCNC: 141 MMOL/L (ref 136–145)
WBC # BLD AUTO: 5.2 K/UL (ref 4.3–11.1)

## 2019-03-26 PROCEDURE — 36415 COLL VENOUS BLD VENIPUNCTURE: CPT

## 2019-03-26 PROCEDURE — 80053 COMPREHEN METABOLIC PANEL: CPT

## 2019-03-26 PROCEDURE — 85025 COMPLETE CBC W/AUTO DIFF WBC: CPT

## 2019-06-24 ENCOUNTER — HOSPITAL ENCOUNTER (OUTPATIENT)
Dept: LAB | Age: 74
Discharge: HOME | End: 2019-06-24
Payer: MEDICARE

## 2019-06-24 LAB
BASOPHILS # BLD: 0 K/UL (ref 0–0.2)
BASOPHILS NFR BLD: 1 % (ref 0–2)
CRP SERPL-MCNC: <0.3 MG/DL (ref 0–0.9)
DIFFERENTIAL METHOD BLD: ABNORMAL
EOSINOPHIL # BLD: 0.2 K/UL (ref 0–0.8)
EOSINOPHIL NFR BLD: 3 % (ref 0.5–7.8)
ERYTHROCYTE [DISTWIDTH] IN BLOOD BY AUTOMATED COUNT: 13.1 % (ref 11.9–14.6)
ERYTHROCYTE [SEDIMENTATION RATE] IN BLOOD: 13 MM/HR (ref 0–20)
HCT VFR BLD AUTO: 42.2 % (ref 41.1–50.3)
HGB BLD-MCNC: 13.7 G/DL (ref 13.6–17.2)
IMM GRANULOCYTES # BLD AUTO: 0 K/UL (ref 0–0.5)
IMM GRANULOCYTES NFR BLD AUTO: 0 % (ref 0–5)
LYMPHOCYTES # BLD: 1.7 K/UL (ref 0.5–4.6)
LYMPHOCYTES NFR BLD: 25 % (ref 13–44)
MCH RBC QN AUTO: 32 PG (ref 26.1–32.9)
MCHC RBC AUTO-ENTMCNC: 32.5 G/DL (ref 31.4–35)
MCV RBC AUTO: 98.6 FL (ref 79.6–97.8)
MONOCYTES # BLD: 0.6 K/UL (ref 0.1–1.3)
MONOCYTES NFR BLD: 9 % (ref 4–12)
NEUTS SEG # BLD: 4.2 K/UL (ref 1.7–8.2)
NEUTS SEG NFR BLD: 62 % (ref 43–78)
NRBC # BLD: 0 K/UL (ref 0–0.2)
PLATELET # BLD AUTO: 184 K/UL (ref 150–450)
PMV BLD AUTO: 9.7 FL (ref 9.4–12.3)
RBC # BLD AUTO: 4.28 M/UL (ref 4.23–5.6)
WBC # BLD AUTO: 6.7 K/UL (ref 4.3–11.1)

## 2019-06-24 PROCEDURE — 86140 C-REACTIVE PROTEIN: CPT

## 2019-06-24 PROCEDURE — 85652 RBC SED RATE AUTOMATED: CPT

## 2019-06-24 PROCEDURE — 36415 COLL VENOUS BLD VENIPUNCTURE: CPT

## 2019-06-24 PROCEDURE — 85025 COMPLETE CBC W/AUTO DIFF WBC: CPT

## 2019-09-26 ENCOUNTER — HOSPITAL ENCOUNTER (OUTPATIENT)
Dept: LAB | Age: 74
Discharge: HOME | End: 2019-09-26
Payer: MEDICARE

## 2019-09-26 DIAGNOSIS — I74.9 THROMBOEMBOLUS (HCC): ICD-10-CM

## 2019-09-26 LAB
ALBUMIN SERPL-MCNC: 3.7 G/DL (ref 3.2–4.6)
ALBUMIN/GLOB SERPL: 1.1 {RATIO} (ref 1.2–3.5)
ALP SERPL-CCNC: 67 U/L (ref 50–136)
ALT SERPL-CCNC: 39 U/L (ref 12–65)
ANION GAP SERPL CALC-SCNC: 5 MMOL/L (ref 7–16)
AST SERPL-CCNC: 21 U/L (ref 15–37)
BASOPHILS # BLD: 0 K/UL (ref 0–0.2)
BASOPHILS NFR BLD: 1 % (ref 0–2)
BILIRUB SERPL-MCNC: 0.8 MG/DL (ref 0.2–1.1)
BUN SERPL-MCNC: 34 MG/DL (ref 8–23)
CALCIUM SERPL-MCNC: 9.1 MG/DL (ref 8.3–10.4)
CHLORIDE SERPL-SCNC: 108 MMOL/L (ref 98–107)
CO2 SERPL-SCNC: 30 MMOL/L (ref 21–32)
CREAT SERPL-MCNC: 1.15 MG/DL (ref 0.8–1.5)
DIFFERENTIAL METHOD BLD: ABNORMAL
EOSINOPHIL # BLD: 0.2 K/UL (ref 0–0.8)
EOSINOPHIL NFR BLD: 3 % (ref 0.5–7.8)
ERYTHROCYTE [DISTWIDTH] IN BLOOD BY AUTOMATED COUNT: 12.5 % (ref 11.9–14.6)
GLOBULIN SER CALC-MCNC: 3.4 G/DL (ref 2.3–3.5)
GLUCOSE SERPL-MCNC: 127 MG/DL (ref 65–100)
HCT VFR BLD AUTO: 43.5 % (ref 41.1–50.3)
HGB BLD-MCNC: 14.4 G/DL (ref 13.6–17.2)
IMM GRANULOCYTES # BLD AUTO: 0 K/UL (ref 0–0.5)
IMM GRANULOCYTES NFR BLD AUTO: 0 % (ref 0–5)
LYMPHOCYTES # BLD: 1.2 K/UL (ref 0.5–4.6)
LYMPHOCYTES NFR BLD: 21 % (ref 13–44)
MCH RBC QN AUTO: 32.4 PG (ref 26.1–32.9)
MCHC RBC AUTO-ENTMCNC: 33.1 G/DL (ref 31.4–35)
MCV RBC AUTO: 98 FL (ref 79.6–97.8)
MONOCYTES # BLD: 0.4 K/UL (ref 0.1–1.3)
MONOCYTES NFR BLD: 7 % (ref 4–12)
NEUTS SEG # BLD: 4.2 K/UL (ref 1.7–8.2)
NEUTS SEG NFR BLD: 69 % (ref 43–78)
NRBC # BLD: 0 K/UL (ref 0–0.2)
PLATELET # BLD AUTO: 173 K/UL (ref 150–450)
PMV BLD AUTO: 9 FL (ref 9.4–12.3)
POTASSIUM SERPL-SCNC: 4.1 MMOL/L (ref 3.5–5.1)
PROT SERPL-MCNC: 7.1 G/DL (ref 6.3–8.2)
RBC # BLD AUTO: 4.44 M/UL (ref 4.23–5.67)
SODIUM SERPL-SCNC: 143 MMOL/L (ref 136–145)
WBC # BLD AUTO: 6 K/UL (ref 4.3–11.1)

## 2019-09-26 PROCEDURE — 36415 COLL VENOUS BLD VENIPUNCTURE: CPT

## 2019-09-26 PROCEDURE — 80053 COMPREHEN METABOLIC PANEL: CPT

## 2019-09-26 PROCEDURE — 85025 COMPLETE CBC W/AUTO DIFF WBC: CPT

## 2020-03-08 NOTE — PROGRESS NOTES
Lashaun Farley  : 1945 [facility] FARRUKH LUNA  [address] [facility], [address] [place] [address],[address], [address] [facility]  [phone]   [phone]       OUTPATIENT PHYSICAL THERAPY:Daily Note and Progress Report 2017      ICD-10: Treatment Diagnosis: Pain in right shoulder (M25.511),Presence of right artificial shoulder joint (Z96.611),Stiffness of right shoulder, not elsewhere classified (M25.611)  Precautions/Allergies:   Post-op/Epinephrine and Aspirin   Fall Risk Score: 1 (? 5 = High Risk)  MD Orders: PT- evaluate and treat, HEP, FULL MOTION/FULL STRENGTH MEDICAL/REFERRING DIAGNOSIS:  right shoulder hardware removed/reverse TSA, bicep tendon transfer- 17  DATE OF ONSET: initial injury- 17 with R shoulder scope, ASD/RCR/BT-17   [facility] joint separation with biceps rupture -2017 with surgical repair-3/23/17  REFERRING PHYSICIAN: Rosie Muir MD  RETURN PHYSICIAN APPOINTMENT: 17     INITIAL ASSESSMENT:  Mr. Janiya Enriquez is s/p R shoulder reverse TSA with latissimus and teres major tendon transfer. He has a very complex R shoulder surgical history. He had previous bicep repair 17 and R shoulder rotator cuff repair 17. He started physical therapy 17 and attended 8 physical therapy sessions and then was held out of therapy for 2 1/2 weeks due to shoulder pain and inflammation. He returned to therapy 17. Progress is slow due to the complicated history. The R shoulder remains stiff weak limiting functional use. Therapy focus has progressed to AROM and strengthening but requires significant manual guidance. Goals are being addressed. He will benefit from continued skilled physical therapy to progress ROM, strength, and functional mobility. PROBLEM LIST (Impacting functional limitations):  1. Post-op R shoulder pain and swelling  2. Decreased R shoulder ROM   3. Weakness R shoulder INTERVENTIONS PLANNED:  1. Posture education  2.  Thermal and electric modalities, manual therapies for pain   3. Manual therapies, therapeutic exercises, HEP for ROM    4. Therapeutic exercises and HEP for strength   TREATMENT PLAN:  Effective Dates: 6/23/17 TO 9/15/17. Frequency/Duration: 2-3 times a week for 12 weeks  GOALS: (Goals have been discussed and agreed upon with patient.)  Short-Term Functional Goals: Time Frame: 6 weeks  1. Report no more than mild intermittent pain to R shoulder with compensatory use during basic functional activities. Not consistently met  2. R shoulder PROM forward elevation greater than 100 degrees to progress into functional ranges. Not consistently met  3. Demonstrate good R shoulder isometric strength with manual testing to progress into strength phase. Progressing towards  4. Independent with initial HEP. ongoing  Discharge Goals: Time Frame: 12 weeks  1. Pt to demonstrate Good posture correction during exercises- being met  2. No more than 3/10 intermittent pain R shoulder with return to normalized household and work activities, and score less than 50% on the DASH. Ongoing and not consistently met  3. R shoulder AROM forward elevation greater than 110 degrees and strength to shoulder are grossly WFL's for safe use with normalized activities. ongoing   4. Independent with advanced shoulder HEP for continued self-management. Rehabilitation Potential For Stated Goals: Good              The information in this section was collected on 6/23/17 (except where otherwise noted). HISTORY:   History of Present Injury/Illness (Reason for Referral):  Mr Camilo Fine initial injury was the result of a fall back in November of 2016 requiring surgical repair on 1/27/17. He came to therapy reporting the biceps muscle ruptured on February 21st. Therapy was OK'd by MD. He then came to the 2/28/17 therapy appointment reporting the [facility] joint had . MD ok\"d therapy to tolerance.  Pt opted for surgery on 3/23/17 to plate the clavicle when no progress could be made with treatment. He returned to therapy but complained that pain was limiting any functional use of the arm so he opted for shoulder replacement surgery. He received in patient therapy for hand/elbow ROM and then was discharged to home health PT for hand/elbow ROM only. Past Medical History/Comorbidities:   Mr. 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 Mr. Jacque Hernandez  has a past surgical history that includes carpal tunnel release (2012); carpal tunnel release (1980); hernia repair (2010); hernia repair (Left, 2007); lumbar laminectomy; hip replacement (Left, 12/12/2012); knee arthroscopy (Right, 2006); shoulder arthroscopy (Left, 2/17/2012); corneal transplant (Right, 2004) and corneal transplant (Right, 06/14/2016). Social History/Living Environment:     Pt lives with his wife in a one story home. He cares for the yard and home repairs  Prior Level of Function/Work/Activity:  retired  Previous Treatment Approaches:          PT for the R shoulder since 2/14/17  Personal Factors:           Other factors that influence how disability is experienced by the patient:  See above PMH for back pain, hip replacement  Current Medications:     Current Outpatient Prescriptions:     temazepam (RESTORIL) 15 mg capsule, Take 15 mg by mouth nightly., Disp: , Rfl:     HYDROmorphone (DILAUDID) - out of medication but will get refill     rivaroxaban (XARELTO) 20 mg tab tablet, Take 1 Tab by mouth daily (with breakfast). , Disp: 30 Tab, Rfl: 11    atorvastatin (LIPITOR) 80 mg tablet, Take 40 mg by mouth daily. 1/2 tablet daily  Take DOS per anesthesia protocol. Indications: hyperlipidemia, Disp: , Rfl:     Brimonidine-Timolol (COMBIGAN) 0.2-0.5 % Drop      Sleeping medication   Date Last Reviewed:  8/21/17   Number of Personal Factors/Comorbidities that affect the Plan of Care: 3+: HIGH COMPLEXITY   EXAMINATION:    8/21/2017  Observation/Orthostatic Postural Assessment: Atrophy to deltoid, biceps on R. Arm held in more relaxed position. Shrugging, elbow flexion and trunk extension with attempts to reach    Palpation: tender over the anterior shoulder, bicep and upper arm region. ROM:                 RUE AROM  R Shoulder Flexion: 95  RUE PROM  R Shoulder ABduction: 74  R Shoulder Internal Rotation: 50  R Shoulder External Rotation: 45         Strength:         RUE Strength  R Shoulder Flexion: 3+  R Shoulder Extension: 4+  R Shoulder Horizontal ABduction: 4  R Shoulder Internal Rotation: 4  R Shoulder External Rotation: 3  R Elbow Flexion: 4  R Elbow Extension: 4+              CLINICAL DECISION MAKING:   Outcome Measure: Tool Used: Disabilities of the Arm, Shoulder and Hand (DASH) Questionnaire - Quick Version  Score:  Initial: 97  Most Recent: 44/55 (Date: 7-26-17 )  57 (8/21/17)   Interpretation of Score: The DASH is designed to measure the activities of daily living in person's with upper extremity dysfunction or pain. Each section is scored on a 1-5 scale, 5 representing the greatest disability. The scores of each section are added together for a total score of 55. Score 11 12-19 20-28 29-37 38-45 46-54 55   Modifier CH CI CJ CK CL CM CN     ?  Carrying, Moving, and Handling Objects:     - CURRENT STATUS: CK - 40%-59% impaired, limited or restricted    - GOAL STATUS: CK - 40%-59% impaired, limited or restricted    - D/C STATUS:  ---------------To be determined---------------    Medical Necessity:   · Skilled intervention continues to be required due to need for manual treatment and modalities within post-operative precautions. Reason for Services/Other Comments:  · Patient continues to require skilled intervention due to need for guided progression through rehab. Use of outcome tool(s) and clinical judgement create a POC that gives a: Difficult prediction of patient's progress: HIGH COMPLEXITY            TREATMENT:   (In addition to Assessment/Re-Assessment sessions the following treatments were rendered)  8/21/2017  Pre-treatment Symptoms/Complaints:  Says his shoulder is just a little sore. did the HEP on Saturday as recommended  Pain: Initial: Pain Intensity 1: 3   Post Session: 1/10     Manual Therapy: (10 Minutes): Positional Release technique and trigger point massage to R pect, R lat and subscap for muscle restrictions to motion. grade 3-- to 4-- physiologic mobilizations to R shoulder ER at 10 deg abd, 75 deg abd, abduction, IR stabilized at 50 deg abduction, and forward elevation. Therapeutic Exercise: (33 Minutes): for strengthening and AROM. date 8/14/17 8/15/17 8/18/18 8/21/17     Activity/Exercise         midtrap Side 1# 2x10 Side  1# 2 x10 Side 1# x10 2x10 Side manual guidance at scapula     Shoulder extn Standing yellow x10 Standing yellow 2x10 -- Red x10     Scapular retraction Standing yellow 2x10 --  Red x10  Supine manual resist x 12     Shoulder abd Side 1# 2x8 Side  2# 2x10 Side 2# 2x10 Side x10, 1# x10     FE reacher at waist height 2x10   .  Supine 1# 2x10 Supine elbow bent 2# 2x10  Supine resisted 75 deg to 90 deg x10  Reacher at waist height Supine elbow bent 3# 2x8  Supine resisted 75 deg to 90 deg x 12  Reacher at waist height with forward step 2x 10 Supine 3# 2x10  Elbow bent  Supine resisted 75 to 90 deg flexion x 10  Reacher with step 2x12     triceps Supine 3# 2x10 Supine 3# 2x12 Supine 4# x8,x10 Supine 4# 2x10     Bicep curls Supine 2# 2x10 Standing 3# x10 Supine 2# x10  Standing 3# x10 Supine 3# 2x10     diagonal  Back and down yellow 2x10 Yellow 2x10 Red x12                   Modalities: (  Minutes): moist heat to right shoulder x 10 minutes after treatment session for soreness relief  HEP: continue HEP with stretching daily and strengthening every other day. He verbalizes understanding. Treatment/Session Assessment:    · Response to Treatment:  Tolerated increased reps or weight with exercises with improved control. · Compliance with Program/Exercises: some, per pt. My working was exercise  · Recommendations/Intent for next treatment session: Continue with R shoulder motion treatment; shoulder girdle and whole arm active phase strength progression; and review and update HEP with written instruction as ready.   Total Treatment Duration:        43         minutes  PT Patient Time In/Time Out  Time In: 0945  Time Out: Debbie [id number], PT, [place] Grossly Intact

## 2020-04-13 NOTE — PROGRESS NOTES
Roya Martinez  : 1945  [place]  Secondary: [facility] FARRUKH LUNA  [address] [facility], [address] [place] [address],[address], [facility].  [phone]   [phone]       OUTPATIENT PHYSICAL THERAPY:Daily Note 2018   ICD-10: Treatment Diagnosis: Pain in left hip (M25.552)  Precautions/Allergies: none/Epinephrine and Aspirin   Fall Risk Score: 6 (? 5 = High Risk)  MD Orders: PT- evaluate and treat, exercises, HEP, heat, ultrasound MEDICAL/REFERRING DIAGNOSIS:  Other bursitis of hip, right hip [M70.71]   DATE OF ONSET: 6 months ago  REFERRING PHYSICIAN: Simon Cabello., *  RETURN PHYSICIAN APPOINTMENT: 10/12/18     INITIAL ASSESSMENT:  Mr. Erin Freedman comes to therapy with complaints of right hip pain that becomes severe to the point he cannot sleep or move the leg without self assisting. He presents with right hip joint AROM limited by pain, B hip weakness; gait antalgia and a positive right hip scour test. He has a complicated past medical surgical history including a left ASHLEIGH in  and R knee scope  both with residual pain. He needs to be able to care for his home, tolerate driving long distances and perform yard maintenance. He will benefit from skilled therapy to address his deficits and assist in the functional return of ambulation with decreased pain. PROBLEM LIST (Impacting functional limitations):  1. Decreased Strength  2. Decreased ADL/Functional Activities  3. Decreased Ambulation Ability/Technique  4. Increased Pain  5. Decreased Activity Tolerance INTERVENTIONS PLANNED:  1. Cold  2. Gait Training  3. Heat  4. Home Exercise Program (HEP)  5. Manual Therapy  6. Neuromuscular Re-education/Strengthening  7. Range of Motion (ROM)  8. Therapeutic Exercise/Strengthening  9. Ultrasound (US)   TREATMENT PLAN:  Effective Dates: 7/10/2018 TO 2018 (60 days).   Frequency/Duration: 1- 2 times a week for 60 Days  GOALS: (Goals [Initial Visit] : initial GYN visit have been discussed and agreed upon with patient.)  Short-Term Functional Goals: Time Frame: 2 weeks     1. Independent with HEP     2.R hip AROM to WNL with less pain to allow gait without antalgia     3.B hip Strength improved to at least 4+/5 to allow safe transfers   Discharge Goals: Time Frame: 60 days     1. Tolerate gait > 30 minutes for community integration     2. ADL's with pain < 3/10     3. Pt to demonstrate normalized gait  Rehabilitation Potential For Stated Goals: Good  Regarding Gely Primes therapy, I certify that the treatment plan above will be carried out by a therapist or under their direction. Thank you for this referral,  Lady Bernabe, PT,MSPT       Referring Physician Signature: Carey Antonio, *              Date                    The information in this section was collected on 7/10/18 (except where otherwise noted). HISTORY:   History of Present Injury/Illness (Reason for Referral):  Pt states that his right hip pain just began to increase gradually over the past 6 months for no known reason. The pain began to limit his ability to walk, sleep and move the leg at night. He opted for cortisone injections without relief. 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 Past Medical History/Comorbidities:   Mr. 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 arthroscopy (Left, 2/17/2012), R shoulder scope 1/2017, R biceps rupture and AC joint separation with surgical repair 3/2017, R reverse TSA 6/2017  Social History/Living Environment:     pt lives with wife in one story home. 2 step front and rear entry  Prior Level of Function/Work/Activity:  Independent with compensation and pain due to multiple orthopedic surgeries  Previous Treatment Approaches:          PT after left total hip surgery and right knee scope   Personal Factors: Other factors that influence how disability is experienced by the patient:  BUE weakness, back pain with core weakness, LLE weakness  Current Medications:       Current Outpatient Prescriptions:     HYDROcodone-acetaminophen (NORCO)  mg tablet, Take 1 Tab by mouth every six (6) hours as needed for Pain. Max Daily Amount: 4 Tabs., Disp: 120 Tab, Rfl: 0    rivaroxaban (XARELTO) 20 mg tab tablet, Take 1 Tab by mouth daily (with breakfast). , Disp: 30 Tab, Rfl: 11    cyanocobalamin (VITAMIN B-12) 1,000 mcg tablet, Take 1,000 mcg by mouth daily. , Disp: , Rfl:     carboxymethylcellulose sodium (REFRESH LIQUIGEL) 1 % dlgl, Apply 1 Drop to eye every four (4) hours. Take / use AM day of surgery  per anesthesia protocols. Indications: DRY EYE, Disp: , Rfl:     Brimonidine-Timolol (COMBIGAN) 0.2-0.5 % Drop, Administer 1 Drop to both eyes two (2) times a day. Take / use AM day of surgery  per anesthesia protocols. Indications: Open Angle Glaucoma, Disp: , Rfl:     PRED FORTE 1 % DrpS, Administer 1 Drop to right eye two (2) times a day. Take / use AM day of surgery  per anesthesia protocols.   Indications: pt states prevents infection, Disp: , Rfl:    Date Last Reviewed:  7/10/18   Number of Personal Factors/Comorbidities that affect the Plan of Care: 1-2: MODERATE COMPLEXITY   EXAMINATION:   Observation/Orthostatic Postural Assessment:          Right trunk lean during gait  Palpation:          Tender to palpate over the left greater trochanteric bursa. ROM: L hip abductor and IT band tightness                                      Strength: core 4-                 Special tests: positive left hip scour with pain during passive hip flexion, IR and adduction     Body Structures Involved:  1. Bones  2. Joints  3. Muscles  4. Ligaments Body Functions Affected:  1. Sensory/Pain  2. Neuromusculoskeletal  3. Movement Related Activities and Participation Affected:  1. General Tasks and Demands  2. Mobility  3. Self Care  4. Domestic Life  5. Community, Social and [place]   Number of elements (examined above) that affect the Plan of Care: 3: MODERATE COMPLEXITY   CLINICAL PRESENTATION:   Presentation: Evolving clinical presentation with changing clinical characteristics: MODERATE COMPLEXITY   CLINICAL DECISION MAKING:   Outcome Measure: Tool Used: Lower Extremity Functional Scale (LEFS)  Score:  Initial: 25/80 Most Recent: X/80 (Date: -- )   Interpretation of Score: 20 questions each scored on a 5 point scale with 0 representing \"extreme difficulty or unable to perform\" and 4 representing \"no difficulty\". The lower the score, the greater the functional disability. 80/80 represents no disability. Minimal detectable change is 9 points. Score 80 79-63 62-48 47-32 31-16 15-1 0   Modifier CH CI CJ CK CL CM CN     ? Mobility - Walking and Moving Around:     - CURRENT STATUS: CL - 60%-79% impaired, limited or restricted    - GOAL STATUS: CK - 40%-59% impaired, limited or restricted    - D/C STATUS:  ---------------To be determined---------------    Medical Necessity:   · Skilled intervention continues to be required due to need for manual treatment and modalities. Reason for Services/Other Comments:  · Patient continues to require skilled intervention due to need for guided progression into exercises.    Use of outcome tool(s) and clinical judgement create a POC that gives a: Questionable prediction of patient's progress: MODERATE COMPLEXITY            TREATMENT:   (In addition to Assessment/Re-Assessment sessions the following treatments were rendered)  Pre-treatment Symptoms/Complaints:  Pt reports that the pain in his R hip is \"better and is not as tender. \" When asked, he reports that he is walking a \" little bit better than what I was. \"  Pain: Initial: t  Pain Intensity 1: 4 in L hip  Min complaints in R hip Post Session:  2/10     Findings:   Marked atrophy noted in R gluteals  L MEGHAN post and inf  SB sacrum  Mod tenderness to bilat piriformis    THERAPEUTIC EXERCISE: (20 minutes):  to improve strength for functional activities. Min verbal and manual cues to promote proper body alignment, promote proper body breathing techniques and exercise technique. Passive stretch to R piriformis  Passive stretch to R hamstring  SDLY hip abd bilat  SDLY clamshells  Single leg bridge x10 bilat  hooklying hip add isometric with ball and glut squeeze x 10  hooklying hip abd with green TB x 15    MANUAL THERAPY: (15minutes): to improve soft tissue and joint mobility  Prone thoracic mobilizations p>a  unilat, bilat and cross sectional  Manual correction for sacral dysfunction  STM to R piriformis in L SDLY    US x 10 min to dec tightness and pain to R Piriformis in L SDLY @ 1.5 w/cm2 1MHz    Treatment/Session Assessment:    · Response to Treatment:  Pt responded well to treatment with dec subjective complaints of pain. His pelvic landmarks were more symmetrical and he was able to perform exercises and transition between positions with less difficulty. · Compliance with Program/Exercises: variable compliance  · Recommendations/Intent for next treatment session: \"Next visit will focus on Manual treatment,reassessing pelvic alignment and making adjustments as needed, modalities prn, therex to improve hip strength and stability.   Total Treatment Duration: 45 minutes  PT Patient Time In/Time Out  Time In: 1115  Time Out: [facility], PT

## 2020-06-12 ENCOUNTER — HOSPITAL ENCOUNTER (OUTPATIENT)
Dept: LAB | Age: 75
Discharge: HOME | End: 2020-06-12
Payer: MEDICARE

## 2020-06-12 DIAGNOSIS — I82.4Y2 ACUTE DEEP VEIN THROMBOSIS (DVT) OF PROXIMAL VEIN OF LEFT LOWER EXTREMITY (HCC): ICD-10-CM

## 2020-06-12 LAB
ALBUMIN SERPL-MCNC: 4.1 G/DL (ref 3.2–4.6)
ALBUMIN/GLOB SERPL: 1.2 {RATIO} (ref 1.2–3.5)
ALP SERPL-CCNC: 55 U/L (ref 50–136)
ALT SERPL-CCNC: 44 U/L (ref 12–65)
ANION GAP SERPL CALC-SCNC: 4 MMOL/L (ref 7–16)
AST SERPL-CCNC: 18 U/L (ref 15–37)
BASOPHILS # BLD: 0 K/UL (ref 0–0.2)
BASOPHILS NFR BLD: 0 % (ref 0–2)
BILIRUB SERPL-MCNC: 0.4 MG/DL (ref 0.2–1.1)
BUN SERPL-MCNC: 23 MG/DL (ref 8–23)
CALCIUM SERPL-MCNC: 9.2 MG/DL (ref 8.3–10.4)
CHLORIDE SERPL-SCNC: 107 MMOL/L (ref 98–107)
CO2 SERPL-SCNC: 28 MMOL/L (ref 21–32)
CREAT SERPL-MCNC: 1.1 MG/DL (ref 0.8–1.5)
DIFFERENTIAL METHOD BLD: ABNORMAL
EOSINOPHIL # BLD: 0 K/UL (ref 0–0.8)
EOSINOPHIL NFR BLD: 0 % (ref 0.5–7.8)
ERYTHROCYTE [DISTWIDTH] IN BLOOD BY AUTOMATED COUNT: 12.6 % (ref 11.9–14.6)
GLOBULIN SER CALC-MCNC: 3.5 G/DL (ref 2.3–3.5)
GLUCOSE SERPL-MCNC: 105 MG/DL (ref 65–100)
HCT VFR BLD AUTO: 44.7 % (ref 41.1–50.3)
HGB BLD-MCNC: 14.8 G/DL (ref 13.6–17.2)
IMM GRANULOCYTES # BLD AUTO: 0.1 K/UL (ref 0–0.5)
IMM GRANULOCYTES NFR BLD AUTO: 1 % (ref 0–5)
LYMPHOCYTES # BLD: 1.6 K/UL (ref 0.5–4.6)
LYMPHOCYTES NFR BLD: 15 % (ref 13–44)
MCH RBC QN AUTO: 31.7 PG (ref 26.1–32.9)
MCHC RBC AUTO-ENTMCNC: 33.1 G/DL (ref 31.4–35)
MCV RBC AUTO: 95.7 FL (ref 79.6–97.8)
MONOCYTES # BLD: 0.9 K/UL (ref 0.1–1.3)
MONOCYTES NFR BLD: 8 % (ref 4–12)
NEUTS SEG # BLD: 8.2 K/UL (ref 1.7–8.2)
NEUTS SEG NFR BLD: 76 % (ref 43–78)
NRBC # BLD: 0 K/UL (ref 0–0.2)
PLATELET # BLD AUTO: 210 K/UL (ref 150–450)
PMV BLD AUTO: 9.6 FL (ref 9.4–12.3)
POTASSIUM SERPL-SCNC: 4.3 MMOL/L (ref 3.5–5.1)
PROT SERPL-MCNC: 7.6 G/DL (ref 6.3–8.2)
RBC # BLD AUTO: 4.67 M/UL (ref 4.23–5.67)
SODIUM SERPL-SCNC: 139 MMOL/L (ref 136–145)
WBC # BLD AUTO: 10.7 K/UL (ref 4.3–11.1)

## 2020-06-12 PROCEDURE — 85025 COMPLETE CBC W/AUTO DIFF WBC: CPT

## 2020-06-12 PROCEDURE — 36415 COLL VENOUS BLD VENIPUNCTURE: CPT

## 2020-06-12 PROCEDURE — 80053 COMPREHEN METABOLIC PANEL: CPT

## 2020-07-17 PROBLEM — S43.491S: Status: RESOLVED | Noted: 2017-01-26 | Resolved: 2020-07-17

## 2020-09-30 PROBLEM — G89.4 CHRONIC PAIN SYNDROME: Status: ACTIVE | Noted: 2017-05-30

## 2020-09-30 PROBLEM — E55.9 VITAMIN D DEFICIENCY: Status: ACTIVE | Noted: 2020-09-30

## 2020-09-30 PROBLEM — E78.49 OTHER HYPERLIPIDEMIA: Status: ACTIVE | Noted: 2020-09-30

## 2020-09-30 PROBLEM — Z86.711 HISTORY OF PULMONARY EMBOLISM: Status: ACTIVE | Noted: 2020-09-30

## 2020-09-30 PROBLEM — Z86.718 HISTORY OF RECURRENT DEEP VEIN THROMBOSIS (DVT): Status: ACTIVE | Noted: 2020-09-30

## 2020-09-30 PROBLEM — R73.03 PREDIABETES: Status: ACTIVE | Noted: 2020-09-30

## 2020-11-19 NOTE — PROGRESS NOTES
Katherine Safe  : 1945 [facility] FARRUKH LUNA  [address] [facility], [address] [place] [address],[address], [facility] [address].  [phone]   [phone]       OUTPATIENT PHYSICAL THERAPY:Daily Note 10/4/2017      ICD-10: Treatment Diagnosis: Pain in right shoulder (M25.511),Presence of right artificial shoulder joint (Z96.611),Stiffness of right shoulder, not elsewhere classified (M25.611)  Precautions/Allergies:   Post-op/Epinephrine and Aspirin   Fall Risk Score: 1 (? 5 = High Risk)  MD Orders: PT- evaluate and treat, HEP, FULL MOTION/FULL STRENGTH MEDICAL/REFERRING DIAGNOSIS:  right shoulder hardware removed/reverse TSA, bicep tendon transfer- 17  DATE OF ONSET: initial injury- 17 with R shoulder scope, ASD/RCR/BT-17   [facility] joint separation with biceps rupture -2017 with surgical repair-3/23/17  REFERRING PHYSICIAN: Bobo Martin MD  RETURN PHYSICIAN APPOINTMENT: unsure     ASSESSMENT:  Mr. Awad Overall is s/p R shoulder reverse TSA with latissimus and teres major tendon transfer. He has a very complex R shoulder surgical history. He had previous bicep repair 17 and R shoulder rotator cuff repair 17. He started physical therapy 17 and attended 8 physical therapy sessions and then was held out of therapy for 2 1/2 weeks due to shoulder pain and inflammation. He returned to therapy 17. Pt is making good progress with his R shoulder strength and ROM, but his progress is slow secondary to pre-existing complications per above. Patient will benefit from continued PT to facilitate improved R UE function. PROBLEM LIST (Impacting functional limitations):  1. Post-op R shoulder pain and swelling  2. Decreased R shoulder ROM   3. Weakness R shoulder INTERVENTIONS PLANNED:  1. Posture education  2. Thermal and electric modalities, manual therapies for pain   3. Manual therapies, therapeutic exercises, HEP for ROM    4.  Therapeutic exercises and HEP for strength   TREATMENT PLAN:  Effective Dates: 9/18/17 TO 11/27/17. Frequency/Duration: 2-3 times a week for 10 weeks  GOALS: (Goals have been discussed and agreed upon with patient.)  Short-Term Functional Goals: Time Frame: 6 weeks  1. Report no more than mild intermittent pain to R shoulder with compensatory use during basic functional activities. Not consistently met  2. R shoulder PROM forward elevation greater than 100 degrees to progress into functional ranges. Not consistently met  3. Demonstrate good R shoulder isometric strength with manual testing to progress into strength phase. Progressing towards  4. Independent with initial HEP. ongoing  Discharge Goals: Time Frame: 12 weeks  1. Pt to demonstrate Good posture correction during exercises- being met  2. No more than 3/10 intermittent pain R shoulder with return to normalized household and work activities, and score less than 50% on the DASH. Ongoing and not consistently met  3. R shoulder AROM forward elevation greater than 110 degrees and strength to shoulder are grossly WFL's for safe use with normalized activities. ongoing   4. Independent with advanced shoulder HEP for continued self-management. Rehabilitation Potential For Stated Goals: Good    Regarding Deloras Najjar therapy, I certify that the treatment plan above will be carried out by a therapist or under their direction. Thank you for this referral,      Christal Tidwell, PT,MSPT                           The information in this section was collected on 6/23/17 (except where otherwise noted). HISTORY:   History of Present Injury/Illness (Reason for Referral):  Mr Yumiko Rehman initial injury was the result of a fall back in November of 2016 requiring surgical repair on 1/27/17. He came to therapy reporting the biceps muscle ruptured on February 21st. Therapy was OK'd by MD. He then came to the 2/28/17 therapy appointment reporting the [facility] joint had . MD ok\"d therapy to tolerance.  Pt opted for surgery on 3/23/17 to plate the clavicle when no progress could be made with treatment. He returned to therapy but complained that pain was limiting any functional use of the arm so he opted for shoulder replacement surgery. He received in patient therapy for hand/elbow ROM and then was discharged to home health PT for hand/elbow ROM only. Past Medical History/Comorbidities:   Mr. 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 Mr. Kallie Gunn  has a past surgical history that includes carpal tunnel release (2012); carpal tunnel release (1980); hernia repair (2010); hernia repair (Left, 2007); lumbar laminectomy; hip replacement (Left, 12/12/2012); knee arthroscopy (Right, 2006); shoulder arthroscopy (Left, 2/17/2012); corneal transplant (Right, 2004) and corneal transplant (Right, 06/14/2016). Social History/Living Environment:     Pt lives with his wife in a one story home. He cares for the yard and home repairs  Prior Level of Function/Work/Activity:  retired  Previous Treatment Approaches:          PT for the R shoulder since 2/14/17  Personal Factors:           Other factors that influence how disability is experienced by the patient:  See above PMH for back pain, hip replacement  Current Medications:     Current Outpatient Prescriptions:     temazepam (RESTORIL) 15 mg capsule, Take 15 mg by mouth nightly., Disp: , Rfl:     HYDROmorphone (DILAUDID) - out of medication but will get refill   rivaroxaban (XARELTO) 20 mg tab tablet, Take 1 Tab by mouth daily (with breakfast). , Disp: 30 Tab, Rfl: 11    atorvastatin (LIPITOR) 80 mg tablet, Take 40 mg by mouth daily. 1/2 tablet daily  Take DOS per anesthesia protocol. Indications: hyperlipidemia, Disp: , Rfl:     Brimonidine-Timolol (COMBIGAN) 0.2-0.5 % Drop      Sleeping medication   Date Last Reviewed:  10/4/2017     Number of Personal Factors/Comorbidities that affect the Plan of Care: 3+: HIGH COMPLEXITY   EXAMINATION:    10/4/2017  Observation/Orthostatic Postural Assessment: Atrophy to deltoid, biceps on R. . Shrugging, elbow flexion and trunk extension with attempts to reach    Palpation: tender over the back muscles, pect region, biceps region. ROM: n/t                          Strength:  n/t                      CLINICAL DECISION MAKING:   Outcome Measure: Tool Used: Disabilities of the Arm, Shoulder and Hand (DASH) Questionnaire - Quick Version  Score:  Initial: 97  Most Recent: 44/55 (Date: 7-26-17 )  57 (8/21/17) 39 ( 9/18/17)   Interpretation of Score: The DASH is designed to measure the activities of daily living in person's with upper extremity dysfunction or pain. Each section is scored on a 1-5 scale, 5 representing the greatest disability. The scores of each section are added together for a total score of 55. Score 11 12-19 20-28 29-37 38-45 46-54 55   Modifier CH CI CJ CK CL CM CN     ? Carrying, Moving, and Handling Objects:     - CURRENT STATUS: CL - 60%-79% impaired, limited or restricted    - GOAL STATUS: CK - 40%-59% impaired, limited or restricted    - D/C STATUS:  ---------------To be determined---------------    Medical Necessity:   · Skilled intervention continues to be required due to need for manual treatment and modalities within post-operative precautions. Reason for Services/Other Comments:  · Patient continues to require skilled intervention due to need for guided progression through rehab.    Use of outcome tool(s) and clinical judgement create a POC that gives a: Difficult prediction of patient's progress: HIGH COMPLEXITY            TREATMENT:   (In addition to Assessment/Re-Assessment sessions the following treatments were rendered)  10/4/2017  Pre-treatment Symptoms/Complaints:  Pt with complaint of R bicep, mid and lower trap region muscle pain. I have been doing my exercises some but pushing it hurts  Pain: Initial:   2-5/10 soreness Post Session:   2-3   Discussed  the importance of not overusing the shoulder beyond available motion to the point of fatigue and compensation. Manual Therapy: (0 Minutes): Mabeline Sink Therapeutic Exercise: (32 Minutes): for strengthening and AROM. Increased resistance as tolerated by patient. Rhythmic stabilization at 45 deg ABD for ER/IR, at 90 deg forward elevation, during sidelying abd at 90 deg; AIS for ER/IR at 45 deg abd and 75 deg abd, AIS forward elevation - all in preparation for dynamic strengthening    AA = active assist   date 9/26/17 10/3/17 10/4/17    Activity/Exercise       midtrap Prone 1# 2x10 Side x10 Side 2x10    Shoulder extn Prone 2# 2x10  Standing with green tband x10      Scapular retraction Prone row 2# 2x10 Manual resist in side lying x10 Resisted retract/protract x 15    Shoulder abd --  Side 1# 2x10    FE   Reclined with controlled descent 2x8    triceps       Bicep curls   4# 2x10    diagonal D1 and D2 wall slide 1# 2x10  Standing down and back with green tband x10  Standing \"Y\" x10  D1 and D2 wall slide 1# strap wt    UE [place]   FE With step x 8         Modalities: ( min): none  HEP: continue HEP for strengthening and stretching as recommended. Treatment/Session Assessment:    · Response to Treatment: shoulder moves more fluently after treatment but endurance remains low putting pt at risk for compensatory movements.  Cueing needed for exercise quality Needs to build strength and improve ROM  · Compliance with Program/Exercises: no, per pt.  Recommendations/Intent for next treatment session: review strength progression for gym and home program. Recommend Patient continue guided strengthening and manual treatment.   Measure AROM   Total Treatment Duration:   32   minutes  PT Patient Time In/Time Out  Time In: 1230  Time Out: [address], [address] [facility] Laurel Watson, MS RD CDN [facility],  #[phone]

## 2020-12-15 ENCOUNTER — HOSPITAL ENCOUNTER (OUTPATIENT)
Dept: LAB | Age: 75
Discharge: HOME | End: 2020-12-15
Payer: MEDICARE

## 2020-12-15 DIAGNOSIS — I82.4Y2 ACUTE DEEP VEIN THROMBOSIS (DVT) OF PROXIMAL VEIN OF LEFT LOWER EXTREMITY (HCC): ICD-10-CM

## 2020-12-15 LAB
ALBUMIN SERPL-MCNC: 3.9 G/DL (ref 3.2–4.6)
ALBUMIN/GLOB SERPL: 1.2 {RATIO} (ref 1.2–3.5)
ALP SERPL-CCNC: 58 U/L (ref 50–136)
ALT SERPL-CCNC: 36 U/L (ref 12–65)
ANION GAP SERPL CALC-SCNC: 6 MMOL/L (ref 7–16)
AST SERPL-CCNC: 20 U/L (ref 15–37)
BASOPHILS # BLD: 0 K/UL (ref 0–0.2)
BASOPHILS NFR BLD: 1 % (ref 0–2)
BILIRUB SERPL-MCNC: 0.6 MG/DL (ref 0.2–1.1)
BUN SERPL-MCNC: 16 MG/DL (ref 8–23)
CALCIUM SERPL-MCNC: 9.8 MG/DL (ref 8.3–10.4)
CHLORIDE SERPL-SCNC: 106 MMOL/L (ref 98–107)
CO2 SERPL-SCNC: 29 MMOL/L (ref 21–32)
CREAT SERPL-MCNC: 1.1 MG/DL (ref 0.8–1.5)
DIFFERENTIAL METHOD BLD: ABNORMAL
EOSINOPHIL # BLD: 0.1 K/UL (ref 0–0.8)
EOSINOPHIL NFR BLD: 2 % (ref 0.5–7.8)
ERYTHROCYTE [DISTWIDTH] IN BLOOD BY AUTOMATED COUNT: 12.3 % (ref 11.9–14.6)
GLOBULIN SER CALC-MCNC: 3.3 G/DL (ref 2.3–3.5)
GLUCOSE SERPL-MCNC: 160 MG/DL (ref 65–100)
HCT VFR BLD AUTO: 45.3 % (ref 41.1–50.3)
HGB BLD-MCNC: 14.6 G/DL (ref 13.6–17.2)
IMM GRANULOCYTES # BLD AUTO: 0 K/UL (ref 0–0.5)
IMM GRANULOCYTES NFR BLD AUTO: 1 % (ref 0–5)
LYMPHOCYTES # BLD: 1.3 K/UL (ref 0.5–4.6)
LYMPHOCYTES NFR BLD: 20 % (ref 13–44)
MCH RBC QN AUTO: 31.9 PG (ref 26.1–32.9)
MCHC RBC AUTO-ENTMCNC: 32.2 G/DL (ref 31.4–35)
MCV RBC AUTO: 99.1 FL (ref 79.6–97.8)
MONOCYTES # BLD: 0.5 K/UL (ref 0.1–1.3)
MONOCYTES NFR BLD: 7 % (ref 4–12)
NEUTS SEG # BLD: 4.6 K/UL (ref 1.7–8.2)
NEUTS SEG NFR BLD: 70 % (ref 43–78)
NRBC # BLD: 0 K/UL (ref 0–0.2)
PLATELET # BLD AUTO: 195 K/UL (ref 150–450)
PMV BLD AUTO: 10.1 FL (ref 9.4–12.3)
POTASSIUM SERPL-SCNC: 4.4 MMOL/L (ref 3.5–5.1)
PROT SERPL-MCNC: 7.2 G/DL (ref 6.3–8.2)
RBC # BLD AUTO: 4.57 M/UL (ref 4.23–5.67)
SODIUM SERPL-SCNC: 141 MMOL/L (ref 136–145)
WBC # BLD AUTO: 6.6 K/UL (ref 4.3–11.1)

## 2020-12-15 PROCEDURE — 80053 COMPREHEN METABOLIC PANEL: CPT

## 2020-12-15 PROCEDURE — 85025 COMPLETE CBC W/AUTO DIFF WBC: CPT

## 2020-12-15 PROCEDURE — 36415 COLL VENOUS BLD VENIPUNCTURE: CPT

## 2020-12-28 PROBLEM — E78.2 MIXED HYPERLIPIDEMIA: Status: ACTIVE | Noted: 2020-09-30

## 2020-12-28 PROBLEM — Z79.01 CURRENT USE OF LONG TERM ANTICOAGULATION: Status: ACTIVE | Noted: 2020-12-28

## 2020-12-28 PROBLEM — Z94.7 HISTORY OF CORNEA TRANSPLANT: Status: ACTIVE | Noted: 2020-12-28

## 2021-01-25 ENCOUNTER — APPOINTMENT (OUTPATIENT)
Dept: ULTRASOUND IMAGING | Age: 76
End: 2021-01-25
Attending: EMERGENCY MEDICINE
Payer: MEDICARE

## 2021-01-25 ENCOUNTER — HOSPITAL ENCOUNTER (EMERGENCY)
Age: 76
Discharge: HOME | End: 2021-01-25
Attending: EMERGENCY MEDICINE
Payer: MEDICARE

## 2021-01-25 VITALS
BODY MASS INDEX: 27.25 KG/M2 | OXYGEN SATURATION: 97 % | HEART RATE: 60 BPM | TEMPERATURE: 98 F | WEIGHT: 184 LBS | DIASTOLIC BLOOD PRESSURE: 81 MMHG | HEIGHT: 69 IN | SYSTOLIC BLOOD PRESSURE: 175 MMHG | RESPIRATION RATE: 18 BRPM

## 2021-01-25 DIAGNOSIS — M54.32 SCIATICA OF LEFT SIDE: Primary | ICD-10-CM

## 2021-01-25 LAB
ANION GAP SERPL CALC-SCNC: 7 MMOL/L (ref 7–16)
BUN SERPL-MCNC: 18 MG/DL (ref 8–23)
CALCIUM SERPL-MCNC: 9.3 MG/DL (ref 8.3–10.4)
CHLORIDE SERPL-SCNC: 107 MMOL/L (ref 98–107)
CO2 SERPL-SCNC: 26 MMOL/L (ref 21–32)
CREAT SERPL-MCNC: 1.04 MG/DL (ref 0.8–1.5)
GLUCOSE SERPL-MCNC: 134 MG/DL (ref 65–100)
POTASSIUM SERPL-SCNC: 4.1 MMOL/L (ref 3.5–5.1)
SODIUM SERPL-SCNC: 140 MMOL/L (ref 136–145)

## 2021-01-25 PROCEDURE — 93971 EXTREMITY STUDY: CPT

## 2021-01-25 PROCEDURE — 74011250636 HC RX REV CODE- 250/636: Performed by: EMERGENCY MEDICINE

## 2021-01-25 PROCEDURE — 99283 EMERGENCY DEPT VISIT LOW MDM: CPT

## 2021-01-25 PROCEDURE — 96374 THER/PROPH/DIAG INJ IV PUSH: CPT

## 2021-01-25 PROCEDURE — 96375 TX/PRO/DX INJ NEW DRUG ADDON: CPT

## 2021-01-25 PROCEDURE — 80048 BASIC METABOLIC PNL TOTAL CA: CPT

## 2021-01-25 PROCEDURE — 74011250637 HC RX REV CODE- 250/637: Performed by: EMERGENCY MEDICINE

## 2021-01-25 RX ORDER — HYDROMORPHONE HYDROCHLORIDE 1 MG/ML
0.5 INJECTION, SOLUTION INTRAMUSCULAR; INTRAVENOUS; SUBCUTANEOUS ONCE
Status: COMPLETED | OUTPATIENT
Start: 2021-01-25 | End: 2021-01-25

## 2021-01-25 RX ORDER — LISINOPRIL 20 MG/1
10-40 TABLET ORAL DAILY
Qty: 31 TAB | Refills: 3 | Status: SHIPPED | OUTPATIENT
Start: 2021-01-25 | End: 2021-01-28 | Stop reason: DRUGHIGH

## 2021-01-25 RX ORDER — PREDNISONE 50 MG/1
50 TABLET ORAL DAILY
Qty: 7 TAB | Refills: 0 | Status: SHIPPED | OUTPATIENT
Start: 2021-01-25 | End: 2021-02-01

## 2021-01-25 RX ORDER — LISINOPRIL 20 MG/1
40 TABLET ORAL ONCE
Status: COMPLETED | OUTPATIENT
Start: 2021-01-25 | End: 2021-01-25

## 2021-01-25 RX ADMIN — LISINOPRIL 40 MG: 20 TABLET ORAL at 14:47

## 2021-01-25 RX ADMIN — METHYLPREDNISOLONE SODIUM SUCCINATE 125 MG: 125 INJECTION, POWDER, FOR SOLUTION INTRAMUSCULAR; INTRAVENOUS at 12:23

## 2021-01-25 RX ADMIN — HYDROMORPHONE HYDROCHLORIDE 0.5 MG: 1 INJECTION, SOLUTION INTRAMUSCULAR; INTRAVENOUS; SUBCUTANEOUS at 12:23

## 2021-01-25 NOTE — ED NOTES
I have reviewed discharge instructions with the patient. The patient verbalized understanding. Patient left ED via Discharge Method: ambulatory to Home with self  . Opportunity for questions and clarification provided. Patient given 1 scripts. To continue your aftercare when you leave the hospital, you may receive an automated call from our care team to check in on how you are doing. This is a free service and part of our promise to provide the best care and service to meet your aftercare needs.  If you have questions, or wish to unsubscribe from this service please call [phone]. Thank you for Choosing our [employer] [facility] Emergency Department.

## 2021-01-25 NOTE — ED PROVIDER NOTES
Patient presents emerged from complaining of left-sided lower extremity pain and numbness extending from the left sacroiliac area down to the foot. Pain has been present for the past 2 weeks. Pain is worse with standing and bending. Patient is concerned about possibility of DVT due to his history of thromboembolic disease he has a [place] filter and is currently on Xarelto after having required a thrombectomy of the left lower extremity venous system due to DVT. He reports some swelling to the left lower extremity. He denies any chest pain or shortness of breath, fever or chills and denies any trauma. Pain is unrelieved with Norco at home    The history is provided by the patient. Hip Injury   This is a new problem. The current episode started more than 1 week ago. The problem occurs constantly. The problem has not changed since onset. The pain is present in the left hip, left upper leg, left lower leg and left foot. The quality of the pain is described as aching and constant. The pain is at a severity of 8/10. The pain is severe. Associated symptoms include numbness. Pertinent negatives include no back pain. He has tried nothing for the symptoms. The treatment provided no relief. There has been no history of extremity trauma. Past Medical History:   Diagnosis Date    Acute sinusitis     Anaphylactic reaction 2/1/2016    AR (allergic rhinitis) 7/27/2016    Arrhythmia     bradycardia    Bicipital tenosynovitis 2/17/2012    Bradycardia 02/02/2016    Seen by [facility]-no follow-up required.      Carpal tunnel syndrome 2/17/2012    Chronic back pain 12/23/2014    Chronic deep vein thrombosis of lower extremity (HCC) 3/21/2016    Chronic pain     right shoulder    Chronic pansinusitis 7/27/2016    Chronic sinusitis 7/27/2016    DNS (deviated nasal septum)     ARCE (dyspnea on exertion) 12/23/2014    Elevated serum creatinine 12/23/2014    Epistaxis     Factor V Leiden ([facility] [facility] managed with medication     Glaucoma     bilateral     H/O echocardiogram 02/02/2016    EF 70-75%    Hereditary deficiency of other clotting factors ([address] 3/21/2016    Hypercholesteremia     Hyperkalemia 12/23/2014    Hypertension     Hypertrophy of nasal turbinates 7/27/2016    Left leg DVT ([address] 12/23/2014 1997, 2014, 8/2015-Followed by Dr. Paulo Nieves. Takes Xarelto daily.     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    Sinus problem     Spinal stenosis, lumbar region, with neurogenic claudication 3/24/2014    Superior glenoid labrum lesion 2/17/2012    Supraspinatus (muscle) (tendon) sprain 2/17/2012    Thromboembolus ([address] 9/1/1997    left thigh DVT     Unspecified adverse effect of anesthesia     very difficult IV stick per patient d/t Lovenox, has needed anesthesiologist to start several times/comes in a day ahead for PICC line        Past Surgical History:   Procedure Laterality Date    HX APPENDECTOMY  1980    HX BACK SURGERY      spinal stenosis    HX BLEPHAROPLASTY Bilateral 2009    HX CARPAL TUNNEL RELEASE  2012    left     [address]    right    HX CATARACT REMOVAL Bilateral     left eye 1994 & right eye 2002    HX CHOLECYSTECTOMY  2001    HX COLONOSCOPY  2000, 2005, 2010    Dr. Higinio Simon Right 2004    HX CORNEAL TRANSPLANT Right 06/14/2016    sutures still present     HX HEENT Right 11/08     glaucoma surgery    HX HEENT      nasal reconstruction    HX HEENT      nasal polypectomy    HX HERNIA REPAIR  2010    abdominal hernia repair    HX HERNIA REPAIR Left 2007    inguinal hernia repair    HX HIP REPLACEMENT Left 12/12/2012    HX KNEE ARTHROSCOPY Right 2006     knee meniscus repair    HX LAP CHOLECYSTECTOMY  2001    HX LUMBAR LAMINECTOMY      HX ORTHOPAEDIC Bilateral     open shoulder AC reconstruction    HX ORTHOPAEDIC Right 2006    neuroma fromfoot    HX ORTHOPAEDIC Bilateral     right elbow 1979 & left elbow 2007    HX ORTHOPAEDIC Right     index finger    HX OTHER SURGICAL  2000    mango filter placed    HX OTHER SURGICAL Bilateral     left in 1994, right in 2002     HX OTHER SURGICAL Right     cornea transplant     HX OTHER SURGICAL Right     glaucoma implant     HX OTHER SURGICAL Right 2006    neuroma of foot     HX SHOULDER ARTHROSCOPY Left 2/17/2012    HX TONSILLECTOMY  1950    HX TONSILLECTOMY      HX VASCULAR ACCESS  2008    PICC line right arm placed & then removed     HX VASCULAR ACCESS  2012    PICC- removed    HX VITRECTOMY Left 6/15/15    [place] SINUS SURGERY PROC UNLISTED      numerous         Family History:   Problem Relation Age of Onset    Cancer Mother         lymphoma    Diabetes Mother         type II    Heart Disease Father         CAD, PPM     Diabetes Father         type II    Pancreatic Cancer Father     Breast Cancer Sister     Other Brother         AAA     Malignant Hyperthermia Neg Hx     Pseudocholinesterase Deficiency Neg Hx     Delayed Awakening Neg Hx     Post-op Nausea/Vomiting Neg Hx     Emergence Delirium Neg Hx     Post-op Cognitive Dysfunction Neg Hx        Social History     Socioeconomic History    Marital status:      Spouse name: Not on file    Number of children: 2    Years of education: Not on file    Highest education level: Not on file   Occupational History    Occupation: Retired,    Social Needs    Financial resource strain: Not on file    Food insecurity     Worry: Not on file     Inability: Not on file   [employer] needs     Medical: Not on file     Non-medical: Not on file   Tobacco Use  Smoking status: Never Smoker    Smokeless tobacco: Never Used   Substance and Sexual Activity    Alcohol use: No     Alcohol/week: 0.0 standard drinks    Drug use: No    Sexual activity: Yes     Partners: Female   Lifestyle    Physical activity     Days per week: Not on file     Minutes per session: Not on file    Stress: Not on file   Relationships    Social connections     Talks on phone: Not on file     Gets together: Not on file     Attends Sabianism service: Not on file     Active member of club or organization: Not on file     Attends meetings of clubs or organizations: Not on file     Relationship status: Not on file    Intimate partner violence     Fear of current or ex partner: Not on file     Emotionally abused: Not on file     Physically abused: Not on file     Forced sexual activity: Not on file   Other Topics Concern    Not on file   Social History Narrative    Not on file         ALLERGIES: Epi e-z pen, Epinephrine, and Aspirin    Review of Systems   Constitutional: Negative for chills and fever. Respiratory: Negative for chest tightness and shortness of breath. Musculoskeletal: Negative for back pain. Neurological: Positive for numbness. All other systems reviewed and are negative. Vitals:    01/25/21 1125   BP: (!) 152/80   Pulse: 66   Resp: 16   Temp: 97.9 °F (36.6 °C)   SpO2: 99%   Weight: 83.5 kg (184 lb)   Height: 5' 9\" (1.753 m)            Physical Exam  Vitals signs and nursing note reviewed. Constitutional:       General: He is not in acute distress. Appearance: Normal appearance. He is not ill-appearing, toxic-appearing or diaphoretic. HENT:      Head: Normocephalic and atraumatic. Eyes:      Extraocular Movements: Extraocular movements intact. Conjunctiva/sclera: Conjunctivae normal.      Pupils: Pupils are equal, round, and reactive to light. Cardiovascular:      Rate and Rhythm: Normal rate. Pulses: Normal pulses.    Pulmonary:      Effort: Pulmonary effort is normal.   Musculoskeletal: Normal range of motion. General: Tenderness present. Right lower leg: No edema. Left lower leg: No edema. Comments: Tenderness noted in the left sacroiliac and left gluteal area. Straight leg raise is equivocal.  Homans' sign is negative. No no pretibial edema noted in the left lower extremity and distal pulses present and symmetric with the right. Skin:     General: Skin is warm and dry. Capillary Refill: Capillary refill takes less than 2 seconds. Coloration: Skin is not pale. Findings: No erythema or rash. Neurological:      General: No focal deficit present. Mental Status: He is alert and oriented to person, place, and time. Mental status is at baseline. Psychiatric:         Mood and Affect: Mood normal.         Behavior: Behavior normal.         Thought Content: Thought content normal.          MDM  Number of Diagnoses or Management Options  Sciatica of left side  Diagnosis management comments: Currently favor sciatica as diagnosis, as the patient is already on Xarelto. .  Given patient's history ultrasound be obtained to rule out DVT.    ==========================  No dvt  htn noted  Usually normotensive  Check bmp for erika, and resume titrated ACE-I  -digna  ===================       Amount and/or Complexity of Data Reviewed  Tests in the radiology section of CPT®: ordered and reviewed    Risk of Complications, Morbidity, and/or Mortality  Presenting problems: low  Diagnostic procedures: low  Management options: low    Patient Progress  Patient progress: stable         Procedures

## 2021-01-25 NOTE — ED TRIAGE NOTES
Patient advises that he started with pain to left hip and buttocks area x 2 weeks, states numbness present down back of leg as well. Patient advises history of sciatica as well as DVT to left side. Patient advises always with swelling to left leg with swelling about the same as normal. Mask on during triage. Patient advises appointment with family doctor the end of this week. Denies any shortness of breath in triage. Hurts worse when he gets up and starts moving.

## 2021-01-25 NOTE — ED NOTES
I have reviewed discharge instructions with the patient. The patient verbalized understanding. Patient left ED via Discharge Method: ambulatory to Home with wife waiting in parking lot  . Opportunity for questions and clarification provided. Patient given 2 scripts. To continue your aftercare when you leave the hospital, you may receive an automated call from our care team to check in on how you are doing. This is a free service and part of our promise to provide the best care and service to meet your aftercare needs.  If you have questions, or wish to unsubscribe from this service please call [phone]. Thank you for Choosing our [facility] Emergency Department.

## 2021-06-15 ENCOUNTER — HOSPITAL ENCOUNTER (OUTPATIENT)
Dept: LAB | Age: 76
Discharge: HOME | End: 2021-06-15
Payer: MEDICARE

## 2021-06-15 DIAGNOSIS — Z86.718 HISTORY OF RECURRENT DEEP VEIN THROMBOSIS (DVT): ICD-10-CM

## 2021-06-15 DIAGNOSIS — D64.9 ANEMIA, UNSPECIFIED TYPE: ICD-10-CM

## 2021-06-15 DIAGNOSIS — Z79.01 CURRENT USE OF LONG TERM ANTICOAGULATION: ICD-10-CM

## 2021-06-15 LAB
ALBUMIN SERPL-MCNC: 4.1 G/DL (ref 3.2–4.6)
ALBUMIN/GLOB SERPL: 1.3 {RATIO} (ref 1.2–3.5)
ALP SERPL-CCNC: 52 U/L (ref 50–136)
ALT SERPL-CCNC: 24 U/L (ref 12–65)
ANION GAP SERPL CALC-SCNC: 4 MMOL/L (ref 7–16)
AST SERPL-CCNC: 18 U/L (ref 15–37)
BASOPHILS # BLD: 0 K/UL (ref 0–0.2)
BASOPHILS NFR BLD: 1 % (ref 0–2)
BILIRUB SERPL-MCNC: 0.7 MG/DL (ref 0.2–1.1)
BUN SERPL-MCNC: 39 MG/DL (ref 8–23)
CALCIUM SERPL-MCNC: 9.3 MG/DL (ref 8.3–10.4)
CHLORIDE SERPL-SCNC: 110 MMOL/L (ref 98–107)
CO2 SERPL-SCNC: 27 MMOL/L (ref 21–32)
CREAT SERPL-MCNC: 1.5 MG/DL (ref 0.8–1.5)
DIFFERENTIAL METHOD BLD: ABNORMAL
EOSINOPHIL # BLD: 0.2 K/UL (ref 0–0.8)
EOSINOPHIL NFR BLD: 2 % (ref 0.5–7.8)
ERYTHROCYTE [DISTWIDTH] IN BLOOD BY AUTOMATED COUNT: 12.5 % (ref 11.9–14.6)
FERRITIN SERPL-MCNC: 303 NG/ML (ref 8–388)
GLOBULIN SER CALC-MCNC: 3.2 G/DL (ref 2.3–3.5)
GLUCOSE SERPL-MCNC: 118 MG/DL (ref 65–100)
HCT VFR BLD AUTO: 40 %
HGB BLD-MCNC: 12.8 G/DL (ref 13.6–17.2)
IMM GRANULOCYTES # BLD AUTO: 0 K/UL (ref 0–0.5)
IMM GRANULOCYTES NFR BLD AUTO: 0 % (ref 0–5)
IRON SATN MFR SERPL: 37 %
IRON SERPL-MCNC: 100 UG/DL (ref 35–150)
LYMPHOCYTES # BLD: 2 K/UL (ref 0.5–4.6)
LYMPHOCYTES NFR BLD: 24 % (ref 13–44)
MCH RBC QN AUTO: 32.2 PG (ref 26.1–32.9)
MCHC RBC AUTO-ENTMCNC: 32 G/DL (ref 31.4–35)
MCV RBC AUTO: 100.5 FL (ref 79.6–97.8)
MONOCYTES # BLD: 0.6 K/UL (ref 0.1–1.3)
MONOCYTES NFR BLD: 7 % (ref 4–12)
NEUTS SEG # BLD: 5.6 K/UL (ref 1.7–8.2)
NEUTS SEG NFR BLD: 67 % (ref 43–78)
NRBC # BLD: 0 K/UL (ref 0–0.2)
PLATELET # BLD AUTO: 181 K/UL (ref 150–450)
PMV BLD AUTO: 10.5 FL (ref 9.4–12.3)
POTASSIUM SERPL-SCNC: 4.5 MMOL/L (ref 3.5–5.1)
PROT SERPL-MCNC: 7.3 G/DL (ref 6.3–8.2)
RBC # BLD AUTO: 3.98 M/UL (ref 4.23–5.6)
SODIUM SERPL-SCNC: 141 MMOL/L (ref 136–145)
TIBC SERPL-MCNC: 272 UG/DL (ref 250–450)
WBC # BLD AUTO: 8.4 K/UL (ref 4.3–11.1)

## 2021-06-15 PROCEDURE — 80053 COMPREHEN METABOLIC PANEL: CPT

## 2021-06-15 PROCEDURE — 82728 ASSAY OF FERRITIN: CPT

## 2021-06-15 PROCEDURE — 85025 COMPLETE CBC W/AUTO DIFF WBC: CPT

## 2021-06-15 PROCEDURE — 36415 COLL VENOUS BLD VENIPUNCTURE: CPT

## 2021-06-15 PROCEDURE — 83540 ASSAY OF IRON: CPT

## 2021-07-06 ENCOUNTER — HOSPITAL ENCOUNTER (OUTPATIENT)
Dept: MRI IMAGING | Age: 76
Discharge: HOME | End: 2021-07-06
Attending: STUDENT IN AN ORGANIZED HEALTH CARE EDUCATION/TRAINING PROGRAM
Payer: MEDICARE

## 2021-07-06 DIAGNOSIS — Z96.611 HISTORY OF RIGHT SHOULDER REPLACEMENT: ICD-10-CM

## 2021-07-06 DIAGNOSIS — G89.29 CHRONIC RIGHT SHOULDER PAIN: ICD-10-CM

## 2021-07-06 DIAGNOSIS — M25.511 CHRONIC RIGHT SHOULDER PAIN: ICD-10-CM

## 2021-07-06 PROCEDURE — 73221 MRI JOINT UPR EXTREM W/O DYE: CPT

## 2021-07-08 NOTE — PROGRESS NOTES
Pt notified thru My Chart   MRI of your shoulder is limited secondary to artifact from your surgical hardware. However no obvious signs of rotator cuff tear.  Would recommend follow up with orthopedic surgery and pain management regarding other options to treat your pain if still present.

## 2021-09-02 ENCOUNTER — HOSPITAL ENCOUNTER (OUTPATIENT)
Dept: LAB | Age: 76
Discharge: HOME | End: 2021-09-02
Attending: ORTHOPAEDIC SURGERY
Payer: MEDICARE

## 2021-09-02 DIAGNOSIS — Z96.642 STATUS POST TOTAL REPLACEMENT OF LEFT HIP: ICD-10-CM

## 2021-09-02 LAB
BASOPHILS # BLD: 0 K/UL (ref 0–0.2)
BASOPHILS NFR BLD: 0 % (ref 0–2)
CRP SERPL-MCNC: 5.8 MG/DL (ref 0–0.9)
DIFFERENTIAL METHOD BLD: ABNORMAL
EOSINOPHIL # BLD: 0.2 K/UL (ref 0–0.8)
EOSINOPHIL NFR BLD: 3 % (ref 0.5–7.8)
ERYTHROCYTE [DISTWIDTH] IN BLOOD BY AUTOMATED COUNT: 12.4 % (ref 11.9–14.6)
ERYTHROCYTE [SEDIMENTATION RATE] IN BLOOD: 26 MM/HR (ref 0–20)
HCT VFR BLD AUTO: 43.6 % (ref 41.1–50.3)
HGB BLD-MCNC: 14.1 G/DL (ref 13.6–17.2)
IMM GRANULOCYTES # BLD AUTO: 0 K/UL (ref 0–0.5)
IMM GRANULOCYTES NFR BLD AUTO: 0 % (ref 0–5)
LYMPHOCYTES # BLD: 1.8 K/UL (ref 0.5–4.6)
LYMPHOCYTES NFR BLD: 26 % (ref 13–44)
MCH RBC QN AUTO: 32.1 PG (ref 26.1–32.9)
MCHC RBC AUTO-ENTMCNC: 32.3 G/DL (ref 31.4–35)
MCV RBC AUTO: 99.3 FL (ref 79.6–97.8)
MONOCYTES # BLD: 0.5 K/UL (ref 0.1–1.3)
MONOCYTES NFR BLD: 7 % (ref 4–12)
NEUTS SEG # BLD: 4.3 K/UL (ref 1.7–8.2)
NEUTS SEG NFR BLD: 62 % (ref 43–78)
NRBC # BLD: 0 K/UL (ref 0–0.2)
PLATELET # BLD AUTO: 184 K/UL (ref 150–450)
PMV BLD AUTO: 10.3 FL (ref 9.4–12.3)
RBC # BLD AUTO: 4.39 M/UL (ref 4.23–5.6)
WBC # BLD AUTO: 6.8 K/UL (ref 4.3–11.1)

## 2021-09-02 PROCEDURE — 85652 RBC SED RATE AUTOMATED: CPT

## 2021-09-02 PROCEDURE — 36415 COLL VENOUS BLD VENIPUNCTURE: CPT

## 2021-09-02 PROCEDURE — 86140 C-REACTIVE PROTEIN: CPT

## 2021-09-02 PROCEDURE — 85025 COMPLETE CBC W/AUTO DIFF WBC: CPT

## 2021-09-16 ENCOUNTER — HOSPITAL ENCOUNTER (OUTPATIENT)
Dept: GENERAL RADIOLOGY | Age: 76
Discharge: HOME | End: 2021-09-16
Attending: ORTHOPAEDIC SURGERY
Payer: MEDICARE

## 2021-09-16 VITALS
RESPIRATION RATE: 14 BRPM | TEMPERATURE: 98.4 F | SYSTOLIC BLOOD PRESSURE: 142 MMHG | OXYGEN SATURATION: 99 % | HEART RATE: 55 BPM | DIASTOLIC BLOOD PRESSURE: 81 MMHG

## 2021-09-16 DIAGNOSIS — Z96.642 HISTORY OF TOTAL LEFT HIP ARTHROPLASTY: ICD-10-CM

## 2021-09-16 DIAGNOSIS — B99.9 INFECTION: ICD-10-CM

## 2021-09-16 DIAGNOSIS — Z96.652 HX OF TOTAL KNEE ARTHROPLASTY, LEFT: ICD-10-CM

## 2021-09-16 DIAGNOSIS — R52 PAIN: ICD-10-CM

## 2021-09-16 PROCEDURE — 74011000250 HC RX REV CODE- 250: Performed by: ORTHOPAEDIC SURGERY

## 2021-09-16 PROCEDURE — 20610 DRAIN/INJ JOINT/BURSA W/O US: CPT

## 2021-09-16 RX ORDER — LIDOCAINE HYDROCHLORIDE 10 MG/ML
10 INJECTION, SOLUTION EPIDURAL; INFILTRATION; INTRACAUDAL; PERINEURAL ONCE
Status: ACTIVE | OUTPATIENT
Start: 2021-09-16 | End: 2021-09-16

## 2021-09-16 RX ORDER — LIDOCAINE HYDROCHLORIDE 10 MG/ML
10 INJECTION, SOLUTION EPIDURAL; INFILTRATION; INTRACAUDAL; PERINEURAL ONCE
Status: COMPLETED | OUTPATIENT
Start: 2021-09-16 | End: 2021-09-16

## 2021-09-16 RX ORDER — LIDOCAINE HYDROCHLORIDE 10 MG/ML
10 INJECTION INFILTRATION; PERINEURAL
Status: ACTIVE | OUTPATIENT
Start: 2021-09-16 | End: 2021-09-16

## 2021-09-16 RX ORDER — LIDOCAINE HYDROCHLORIDE 20 MG/ML
10 INJECTION, SOLUTION INFILTRATION; PERINEURAL
Status: ACTIVE | OUTPATIENT
Start: 2021-09-16 | End: 2021-09-16

## 2021-09-16 RX ORDER — LIDOCAINE HYDROCHLORIDE 10 MG/ML
10 INJECTION INFILTRATION; PERINEURAL
Status: DISPENSED | OUTPATIENT
Start: 2021-09-16 | End: 2021-09-16

## 2021-09-16 RX ADMIN — LIDOCAINE HYDROCHLORIDE 10 ML: 10 INJECTION, SOLUTION EPIDURAL; INFILTRATION; INTRACAUDAL; PERINEURAL at 10:40

## 2021-10-08 ENCOUNTER — HOSPITAL ENCOUNTER (OUTPATIENT)
Dept: CT IMAGING | Age: 76
Discharge: HOME | End: 2021-10-08
Attending: STUDENT IN AN ORGANIZED HEALTH CARE EDUCATION/TRAINING PROGRAM
Payer: MEDICARE

## 2021-10-08 DIAGNOSIS — G89.29 CHRONIC LEFT HIP PAIN: ICD-10-CM

## 2021-10-08 DIAGNOSIS — M25.552 CHRONIC LEFT HIP PAIN: ICD-10-CM

## 2021-10-08 LAB — CREAT BLD-MCNC: 1.02 MG/DL (ref 0.8–1.5)

## 2021-10-08 PROCEDURE — 73702 CT LWR EXTREMITY W/O&W/DYE: CPT

## 2021-10-08 PROCEDURE — 74011000636 HC RX REV CODE- 636: Performed by: STUDENT IN AN ORGANIZED HEALTH CARE EDUCATION/TRAINING PROGRAM

## 2021-10-08 PROCEDURE — 74011000258 HC RX REV CODE- 258: Performed by: STUDENT IN AN ORGANIZED HEALTH CARE EDUCATION/TRAINING PROGRAM

## 2021-10-08 PROCEDURE — 82565 ASSAY OF CREATININE: CPT

## 2021-10-08 RX ORDER — SODIUM CHLORIDE 0.9 % (FLUSH) 0.9 %
10 SYRINGE (ML) INJECTION
Status: COMPLETED | OUTPATIENT
Start: 2021-10-08 | End: 2021-10-08

## 2021-10-08 RX ADMIN — Medication 10 ML: at 13:39

## 2021-10-08 RX ADMIN — IOPAMIDOL 100 ML: 755 INJECTION, SOLUTION INTRAVENOUS at 13:39

## 2021-10-08 RX ADMIN — SODIUM CHLORIDE 100 ML: 900 INJECTION, SOLUTION INTRAVENOUS at 13:39

## 2021-12-21 ENCOUNTER — HOSPITAL ENCOUNTER (OUTPATIENT)
Dept: LAB | Age: 76
Discharge: HOME | End: 2021-12-21
Payer: MEDICARE

## 2021-12-21 DIAGNOSIS — Z79.01 ANTICOAGULANT LONG-TERM USE: ICD-10-CM

## 2021-12-21 DIAGNOSIS — D64.9 ANEMIA, UNSPECIFIED TYPE: ICD-10-CM

## 2021-12-21 LAB
ALBUMIN SERPL-MCNC: 4 G/DL (ref 3.2–4.6)
ALBUMIN/GLOB SERPL: 1.1 {RATIO} (ref 1.2–3.5)
ALP SERPL-CCNC: 59 U/L (ref 50–136)
ALT SERPL-CCNC: 78 U/L (ref 12–65)
ANION GAP SERPL CALC-SCNC: 6 MMOL/L (ref 7–16)
AST SERPL-CCNC: 29 U/L (ref 15–37)
BASOPHILS # BLD: 0 K/UL (ref 0–0.2)
BASOPHILS NFR BLD: 0 % (ref 0–2)
BILIRUB SERPL-MCNC: 0.6 MG/DL (ref 0.2–1.1)
BUN SERPL-MCNC: 42 MG/DL (ref 8–23)
CALCIUM SERPL-MCNC: 9.5 MG/DL (ref 8.3–10.4)
CHLORIDE SERPL-SCNC: 107 MMOL/L (ref 98–107)
CO2 SERPL-SCNC: 27 MMOL/L (ref 21–32)
CREAT SERPL-MCNC: 1.4 MG/DL (ref 0.8–1.5)
DIFFERENTIAL METHOD BLD: NORMAL
EOSINOPHIL # BLD: 0.1 K/UL (ref 0–0.8)
EOSINOPHIL NFR BLD: 2 % (ref 0.5–7.8)
ERYTHROCYTE [DISTWIDTH] IN BLOOD BY AUTOMATED COUNT: 12.8 % (ref 11.9–14.6)
FERRITIN SERPL-MCNC: 210 NG/ML (ref 8–388)
GLOBULIN SER CALC-MCNC: 3.5 G/DL (ref 2.3–3.5)
GLUCOSE SERPL-MCNC: 87 MG/DL (ref 65–100)
HCT VFR BLD AUTO: 47.4 %
HGB BLD-MCNC: 15.1 G/DL (ref 13.6–17.2)
IMM GRANULOCYTES # BLD AUTO: 0 K/UL (ref 0–0.5)
IMM GRANULOCYTES NFR BLD AUTO: 0 % (ref 0–5)
IRON SATN MFR SERPL: 48 %
IRON SERPL-MCNC: 150 UG/DL (ref 35–150)
LYMPHOCYTES # BLD: 2 K/UL (ref 0.5–4.6)
LYMPHOCYTES NFR BLD: 21 % (ref 13–44)
MCH RBC QN AUTO: 30.8 PG (ref 26.1–32.9)
MCHC RBC AUTO-ENTMCNC: 31.9 G/DL (ref 31.4–35)
MCV RBC AUTO: 96.5 FL (ref 79.6–97.8)
MONOCYTES # BLD: 0.7 K/UL (ref 0.1–1.3)
MONOCYTES NFR BLD: 7 % (ref 4–12)
NEUTS SEG # BLD: 6.3 K/UL (ref 1.7–8.2)
NEUTS SEG NFR BLD: 70 % (ref 43–78)
NRBC # BLD: 0 K/UL (ref 0–0.2)
PLATELET # BLD AUTO: 193 K/UL (ref 150–450)
PMV BLD AUTO: 10 FL (ref 9.4–12.3)
POTASSIUM SERPL-SCNC: 4.3 MMOL/L (ref 3.5–5.1)
PROT SERPL-MCNC: 7.5 G/DL (ref 6.3–8.2)
RBC # BLD AUTO: 4.91 M/UL (ref 4.23–5.6)
SODIUM SERPL-SCNC: 140 MMOL/L (ref 136–145)
TIBC SERPL-MCNC: 310 UG/DL (ref 250–450)
WBC # BLD AUTO: 9.1 K/UL (ref 4.3–11.1)

## 2021-12-21 PROCEDURE — 85025 COMPLETE CBC W/AUTO DIFF WBC: CPT

## 2021-12-21 PROCEDURE — 82728 ASSAY OF FERRITIN: CPT

## 2021-12-21 PROCEDURE — 80053 COMPREHEN METABOLIC PANEL: CPT

## 2021-12-21 PROCEDURE — 83540 ASSAY OF IRON: CPT

## 2021-12-21 PROCEDURE — 36415 COLL VENOUS BLD VENIPUNCTURE: CPT

## 2022-03-18 PROBLEM — M75.21 BICIPITAL TENDINITIS OF RIGHT SHOULDER: Status: ACTIVE | Noted: 2017-05-26

## 2022-03-18 PROBLEM — E78.2 MIXED HYPERLIPIDEMIA: Status: ACTIVE | Noted: 2020-09-30

## 2022-03-18 PROBLEM — R73.03 PREDIABETES: Status: ACTIVE | Noted: 2020-09-30

## 2022-03-19 PROBLEM — M94.211 CHONDROMALACIA, RIGHT SHOULDER: Status: ACTIVE | Noted: 2017-01-26

## 2022-03-19 PROBLEM — S43.151A: Status: ACTIVE | Noted: 2017-03-23

## 2022-03-19 PROBLEM — Z94.7 HISTORY OF CORNEA TRANSPLANT: Status: ACTIVE | Noted: 2020-12-28

## 2022-03-19 PROBLEM — E55.9 VITAMIN D DEFICIENCY: Status: ACTIVE | Noted: 2020-09-30

## 2022-03-19 PROBLEM — G89.4 CHRONIC PAIN SYNDROME: Status: ACTIVE | Noted: 2017-05-30

## 2022-03-19 PROBLEM — Z86.711 HISTORY OF PULMONARY EMBOLISM: Status: ACTIVE | Noted: 2020-09-30

## 2022-03-19 PROBLEM — M75.101 ROTATOR CUFF TEAR ARTHROPATHY OF RIGHT SHOULDER: Status: ACTIVE | Noted: 2017-05-26

## 2022-03-19 PROBLEM — Z79.01 CURRENT USE OF LONG TERM ANTICOAGULATION: Status: ACTIVE | Noted: 2020-12-28

## 2022-03-19 PROBLEM — M12.811 ROTATOR CUFF TEAR ARTHROPATHY OF RIGHT SHOULDER: Status: ACTIVE | Noted: 2017-05-26

## 2022-03-19 PROBLEM — S46.211A STRAIN OF RIGHT BICEPS: Status: ACTIVE | Noted: 2017-01-26

## 2022-03-20 PROBLEM — Z86.718 HISTORY OF RECURRENT DEEP VEIN THROMBOSIS (DVT): Status: ACTIVE | Noted: 2020-09-30

## 2022-06-16 DIAGNOSIS — I82.5Z9 CHRONIC DEEP VEIN THROMBOSIS (DVT) OF DISTAL VEIN OF LOWER EXTREMITY, UNSPECIFIED LATERALITY (HCC): Primary | ICD-10-CM

## 2022-06-16 NOTE — TELEPHONE ENCOUNTER
I have reviewed the patients controlled substance prescription history, as maintained in the [place] prescription monitoring program, so that the prescription(s) for a  controlled substance can be given. [facility] last filled 5/16/22    Script signed by Dr Corie Trujillo and call to the patient and he is aware it is at the pharmacy. He thanked me for the call.

## 2022-06-17 RX ORDER — HYDROCODONE BITARTRATE AND ACETAMINOPHEN 10; 325 MG/1; MG/1
1 TABLET ORAL EVERY 6 HOURS PRN
Qty: 120 TABLET | Refills: 0 | Status: SHIPPED | OUTPATIENT
Start: 2022-06-17 | End: 2022-07-15 | Stop reason: SDUPTHER

## 2022-06-27 ENCOUNTER — TELEPHONE (OUTPATIENT)
Dept: ONCOLOGY | Age: 77
End: 2022-06-27

## 2022-07-08 DIAGNOSIS — I10 ESSENTIAL HYPERTENSION: ICD-10-CM

## 2022-07-08 DIAGNOSIS — E55.9 VITAMIN D DEFICIENCY: ICD-10-CM

## 2022-07-08 DIAGNOSIS — Z11.59 NEED FOR HEPATITIS C SCREENING TEST: ICD-10-CM

## 2022-07-08 DIAGNOSIS — R73.03 PREDIABETES: Primary | ICD-10-CM

## 2022-07-08 DIAGNOSIS — E78.2 MIXED HYPERLIPIDEMIA: ICD-10-CM

## 2022-07-12 ENCOUNTER — NURSE ONLY (OUTPATIENT)
Dept: INTERNAL MEDICINE CLINIC | Facility: CLINIC | Age: 77
End: 2022-07-12

## 2022-07-12 DIAGNOSIS — R73.03 PREDIABETES: ICD-10-CM

## 2022-07-12 DIAGNOSIS — I10 ESSENTIAL HYPERTENSION: ICD-10-CM

## 2022-07-12 DIAGNOSIS — E55.9 VITAMIN D DEFICIENCY: ICD-10-CM

## 2022-07-12 DIAGNOSIS — Z11.59 NEED FOR HEPATITIS C SCREENING TEST: ICD-10-CM

## 2022-07-12 DIAGNOSIS — E78.2 MIXED HYPERLIPIDEMIA: ICD-10-CM

## 2022-07-12 LAB
BASOPHILS # BLD: 0 K/UL (ref 0–0.2)
BASOPHILS NFR BLD: 1 % (ref 0–2)
DIFFERENTIAL METHOD BLD: ABNORMAL
EOSINOPHIL # BLD: 0.2 K/UL (ref 0–0.8)
EOSINOPHIL NFR BLD: 4 % (ref 0.5–7.8)
ERYTHROCYTE [DISTWIDTH] IN BLOOD BY AUTOMATED COUNT: 13.2 % (ref 11.9–14.6)
HCT VFR BLD AUTO: 44.9 % (ref 41.1–50.3)
HGB BLD-MCNC: 13.9 G/DL (ref 13.6–17.2)
IMM GRANULOCYTES # BLD AUTO: 0 K/UL (ref 0–0.5)
IMM GRANULOCYTES NFR BLD AUTO: 0 % (ref 0–5)
LYMPHOCYTES # BLD: 1.8 K/UL (ref 0.5–4.6)
LYMPHOCYTES NFR BLD: 28 % (ref 13–44)
MCH RBC QN AUTO: 31.6 PG (ref 26.1–32.9)
MCHC RBC AUTO-ENTMCNC: 31 G/DL (ref 31.4–35)
MCV RBC AUTO: 102 FL (ref 79.6–97.8)
MONOCYTES # BLD: 0.5 K/UL (ref 0.1–1.3)
MONOCYTES NFR BLD: 7 % (ref 4–12)
NEUTS SEG # BLD: 3.8 K/UL (ref 1.7–8.2)
NEUTS SEG NFR BLD: 60 % (ref 43–78)
NRBC # BLD: 0 K/UL (ref 0–0.2)
PLATELET # BLD AUTO: 205 K/UL (ref 150–450)
PMV BLD AUTO: 10.9 FL (ref 9.4–12.3)
RBC # BLD AUTO: 4.4 M/UL (ref 4.23–5.6)
WBC # BLD AUTO: 6.3 K/UL (ref 4.3–11.1)

## 2022-07-13 LAB
25(OH)D3 SERPL-MCNC: 28.1 NG/ML (ref 30–100)
ALBUMIN SERPL-MCNC: 4.1 G/DL (ref 3.2–4.6)
ALBUMIN/GLOB SERPL: 1.4 {RATIO} (ref 1.2–3.5)
ALP SERPL-CCNC: 61 U/L (ref 50–136)
ALT SERPL-CCNC: 43 U/L (ref 12–65)
ANION GAP SERPL CALC-SCNC: 2 MMOL/L (ref 7–16)
AST SERPL-CCNC: 20 U/L (ref 15–37)
BILIRUB SERPL-MCNC: 0.6 MG/DL (ref 0.2–1.1)
BUN SERPL-MCNC: 31 MG/DL (ref 8–23)
CALCIUM SERPL-MCNC: 9.7 MG/DL (ref 8.3–10.4)
CHLORIDE SERPL-SCNC: 109 MMOL/L (ref 98–107)
CHOLEST SERPL-MCNC: 167 MG/DL
CO2 SERPL-SCNC: 29 MMOL/L (ref 21–32)
CREAT SERPL-MCNC: 1.4 MG/DL (ref 0.8–1.5)
EST. AVERAGE GLUCOSE BLD GHB EST-MCNC: 128 MG/DL
GLOBULIN SER CALC-MCNC: 3 G/DL (ref 2.3–3.5)
GLUCOSE SERPL-MCNC: 123 MG/DL (ref 65–100)
HBA1C MFR BLD: 6.1 % (ref 4.8–5.6)
HCV AB SER QL: NONREACTIVE
HDLC SERPL-MCNC: 43 MG/DL (ref 40–60)
HDLC SERPL: 3.9 {RATIO}
LDLC SERPL CALC-MCNC: 90.8 MG/DL
POTASSIUM SERPL-SCNC: 4.6 MMOL/L (ref 3.5–5.1)
PROT SERPL-MCNC: 7.1 G/DL (ref 6.3–8.2)
SODIUM SERPL-SCNC: 140 MMOL/L (ref 138–145)
TRIGL SERPL-MCNC: 166 MG/DL (ref 35–150)
TSH W FREE THYROID IF ABNORMAL: 2.39 UIU/ML (ref 0.36–3.74)
VLDLC SERPL CALC-MCNC: 33.2 MG/DL (ref 6–23)

## 2022-07-15 DIAGNOSIS — I82.5Z9 CHRONIC DEEP VEIN THROMBOSIS (DVT) OF DISTAL VEIN OF LOWER EXTREMITY, UNSPECIFIED LATERALITY (HCC): ICD-10-CM

## 2022-07-15 RX ORDER — HYDROCODONE BITARTRATE AND ACETAMINOPHEN 10; 325 MG/1; MG/1
1 TABLET ORAL EVERY 6 HOURS PRN
Qty: 120 TABLET | Refills: 0 | Status: SHIPPED | OUTPATIENT
Start: 2022-07-15 | End: 2022-08-17 | Stop reason: HOSPADM

## 2022-07-15 NOTE — TELEPHONE ENCOUNTER
I have reviewed the patients controlled substance prescription history, as maintained in the [place] prescription monitoring program, so that the prescription(s) for a  controlled substance can be given. [address] [facility] last filled on 6/17/22. Patient has upcoming appointment on 8/2/22. Will review with Dr. Megan Whipple and will pend prescription. Prescription signed. Call to patient to make aware.  He appreciated it

## 2022-07-17 NOTE — PROGRESS NOTES
Amberly Garrido (:  1945) is a 78 y.o. male,Established patient, here for evaluation of the following chief complaint(s):  Follow-up (6 mth fu)         ASSESSMENT/PLAN:  1. Essential hypertension  Continue lisinopril    2. Factor V Leiden ([facility] [address]  3. Current use of long term anticoagulation  Continue Xarelto. Patient currently taking suboptimally   Patient counseled multiple times in the past that he is not on therapeutic dose of anticoagulation and given history of factor V he is at increased risk for recurrent DVT and PE. Patient stating that given financial strain he will likely not be taking Xarelto anymore after his current prescription is complete. Discussed risk versus benefit of medication, clarifying that he is at high risk for recurrent VTE while off anticoagulation. Previously on Pradaxa but experienced recurrent DVT, previously on injections (?  Heparin or Lovenox), stated in the past Eliquis would be just as financially straining, having confirmed with insurance  Refuses to go back on Coumadin  Patient verbalizes understanding of risk associated with not continuing anticoagulation  Follow-up with hematology. 4. Vitamin D deficiency  Vitamin D level mildly low at 28.1 on 2022  Reduce vitamin D supplementation to 4000 units/day    5. Other specified glaucoma, unspecified laterality  6. History of cornea transplant  Continue management and monitoring per ophthalmology    7. Chronic pain syndrome  Continue analgesics per pain management    8. Dyslipidemia  Lipid panel from 2022 reviewed  Continue atorvastatin and lifestyle modifications    9. Type 2 diabetes mellitus without complication, without long-term current use of insulin ([facility])  A1c 6.1% on 2022  Check urine microalbumin to creatinine ratio in the future to evaluate for proteinuria  Check fasting blood sugar regularly, continue lifestyle modifications    Return in about 3 months (around 10/19/2022) for eov . Subjective   SUBJECTIVE/OBJECTIVE:  Patient is a 79-year-old  male who presents to the office today for follow-up and lab review. Patient still has chronic pain involving his right shoulder, recently evaluated by his orthopedic surgeon with appropriate healing. Still has chronic left hip pain from bursitis, due to follow-up with a different specialist and his pain management office later this week. No prior relief with injections into said bursa. Patient denies pain radiating down the legs, saddle anesthesia or incontinence. Intermittently checks fasting blood sugars with values in the 100s to 120s. No hypoglycemic episodes. Does have occasional lightheadedness when he gets up too quickly. States he has checked his blood sugar during these episodes and has been in the 100s. Maintains adequate hydration. Underwent left corneal transplant earlier this year. Denies double vision, loss of vision or bright flashes of light. No chest pain, shortness of breath, syncope or palpitations. Does have chronic issues with straining with urination and altered urinary stream with nocturia. No dysuria or hematuria. Denies history of prostatic enlargement on prior exams. Review of Systems   Respiratory:  Negative for shortness of breath. Cardiovascular:  Negative for chest pain and palpitations. Gastrointestinal:  Negative for anal bleeding and blood in stool. Genitourinary:  Positive for difficulty urinating. Negative for dysuria and hematuria. Musculoskeletal:  Positive for arthralgias and myalgias. Neurological:  Positive for light-headedness. Negative for syncope. Objective   Physical Exam  Vitals reviewed. Constitutional:       General: He is not in acute distress. Appearance: Normal appearance. He is not ill-appearing, toxic-appearing or diaphoretic. HENT:      Head: Normocephalic and atraumatic. Eyes:      General:         Right eye: No discharge.          Left eye: No discharge. Extraocular Movements: Extraocular movements intact. Neck:      Vascular: No carotid bruit. Cardiovascular:      Rate and Rhythm: Normal rate and regular rhythm. Heart sounds: No murmur heard. No friction rub. No gallop. Pulmonary:      Effort: Pulmonary effort is normal. No respiratory distress. Breath sounds: No wheezing, rhonchi or rales. Abdominal:      General: Bowel sounds are normal.      Palpations: Abdomen is soft. Tenderness: There is no abdominal tenderness. There is no guarding. Musculoskeletal:      Right lower leg: No edema. Left lower leg: No edema. Skin:     General: Skin is warm and dry. Coloration: Skin is not jaundiced. Neurological:      Mental Status: He is alert. Mental status is at baseline. Psychiatric:         Attention and Perception: Attention normal.         Mood and Affect: Mood and affect normal. Mood is not anxious or depressed. Affect is not tearful. Speech: Speech normal. Speech is not rapid and pressured or slurred. Behavior: Behavior normal. Behavior is not agitated, aggressive or combative. Behavior is cooperative. Thought Content: Thought content normal.         Cognition and Memory: Cognition normal.                An electronic signature was used to authenticate this note.     --Sharee Ernst, DO

## 2022-07-18 ENCOUNTER — OFFICE VISIT (OUTPATIENT)
Dept: ORTHOPEDIC SURGERY | Age: 77
End: 2022-07-18
Payer: MEDICARE

## 2022-07-18 DIAGNOSIS — Z09 FOLLOW-UP EXAMINATION AFTER ORTHOPEDIC SURGERY: ICD-10-CM

## 2022-07-18 DIAGNOSIS — Z96.611 PRESENCE OF RIGHT ARTIFICIAL SHOULDER JOINT: Primary | ICD-10-CM

## 2022-07-18 PROCEDURE — 1123F ACP DISCUSS/DSCN MKR DOCD: CPT | Performed by: ORTHOPAEDIC SURGERY

## 2022-07-18 PROCEDURE — 99212 OFFICE O/P EST SF 10 MIN: CPT | Performed by: ORTHOPAEDIC SURGERY

## 2022-07-18 NOTE — PROGRESS NOTES
Progress Notes by Chuck Casillas MD at 08/18/21 [address] [facility]                    Author: Chuck Casillas MD  Service: --  Author Type: Physician       Filed: 08/18/21 1233  Encounter Date: 8/18/2021  Status: Signed          : Chuck Casillas MD (Physician)                            Name: Ami Fallon   YOB: 1945   Gender: male   MRN: [id number]            HPI: Ami Fallon is a  78 y.o. male right-hand-dominant male seen for right shoulder problems he is 5 years  status post hardware removal right shoulder and reverse right total shoulder arthroplasty with a delta extend prosthesis biceps tenodesis latissimus dorsi and teres major tendon transfer. He has a history of chronic pain in the right shoulder. He is doing well         ROS/Meds/PSH/PMH/FH/SH: A ten system review of systems was performed and is negative other than what is in the HPI. Tobacco:  reports that he has never smoked. He has never used smokeless tobacco.   There were no vitals taken for this visit. Physical Examination:   He is an awake alert pleasant gentleman ambulating without difficulty. He has a marked restriction in cervical spine range of motion without radicular findings today. His right shoulder has a well-healed deltopectoral incision and multiple other incisions. Active and passive forward elevation right shoulder 0-160   ER to 40 degrees   IR to T12   Biceps has good cosmetic appearance   No erythema   He is neurovascular intact         Data Reviewed:    XR: AP Y axillary views right shoulder   Clinical Indication    ICD-10-CM    1.  Presence of right artificial shoulder joint  Z96.611 XR SHOULDER RIGHT (MIN 2 VIEWS)      2. Follow-up examination after orthopedic surgery  Z09 XR SHOULDER RIGHT (MIN 2 VIEWS)         Report: AP Y axillary views right shoulder demonstrate reverse right total shoulder arthroplasty in excellent position    Impression: Status post reverse right total shoulder arthroplasty   Krystle Valenzuela MD                Impression:                ICD-10-CM  ICD-9-CM             1.  Orthopedic aftercare   Z47.89  V54.9       2. Status post reverse total shoulder replacement, right   Z96.611  V43.61       3. Pain due to right shoulder joint prosthesis (HCC)   T84.84XA  996.77              Z96.611  719.41                V43.61              Status post heart removal right shoulder and reverse right total shoulder arthroplasty with a delta extend prosthesis biceps tenodesis latissimus  dorsi and teres major tendon transfer 5 years    Status post ORIF of a type IV AC separation right shoulder and revision biceps tenodesis    Status post arthroscopy right shoulder ASD a DCR extensor treatment SLAP tear glenohumeral joint and subacromial space mini open rotator cuff repair and biceps tenodesis    Open distal clavicle resection right shoulder   Status post arthroscopy right shoulder ASD a DCR debridement of SLAP tear mini open motor cuff. Biceps tenodesis   Cervical spondylosis at multiple levels most marked at C6-7   C7 radiculopathy on the right side   Status post carpal tunnel releases bilaterally   Factor V Leiden deficiency with a hypercoagulable state history of for emboli and a [place] filter on Xarelto   History of multiple rib fractureS   Hypertension   Hypercholesterolemia   Diabetes mellitus   Peripheral neuropathy of unknown etiology   Chronic pain      Plan:   The patient continues to do well. I will recheck him back in 1 year with new AP, Y and axillary views right shoulder    2. Self-limited problem   Follow up:         Follow-up and Dispositions       Return in 1 year                  Xray at next follow up:   AP Y and axillary views right shoulder         Higinio Fry MD

## 2022-07-19 ENCOUNTER — OFFICE VISIT (OUTPATIENT)
Dept: INTERNAL MEDICINE CLINIC | Facility: CLINIC | Age: 77
End: 2022-07-19
Payer: MEDICARE

## 2022-07-19 VITALS
WEIGHT: 172.4 LBS | SYSTOLIC BLOOD PRESSURE: 136 MMHG | HEIGHT: 69 IN | DIASTOLIC BLOOD PRESSURE: 72 MMHG | TEMPERATURE: 97.8 F | BODY MASS INDEX: 25.53 KG/M2 | HEART RATE: 45 BPM | OXYGEN SATURATION: 98 %

## 2022-07-19 DIAGNOSIS — E11.9 TYPE 2 DIABETES MELLITUS WITHOUT COMPLICATION, WITHOUT LONG-TERM CURRENT USE OF INSULIN (HCC): ICD-10-CM

## 2022-07-19 DIAGNOSIS — Z79.01 CURRENT USE OF LONG TERM ANTICOAGULATION: ICD-10-CM

## 2022-07-19 DIAGNOSIS — E78.5 DYSLIPIDEMIA: ICD-10-CM

## 2022-07-19 DIAGNOSIS — E55.9 VITAMIN D DEFICIENCY: ICD-10-CM

## 2022-07-19 DIAGNOSIS — H40.89 OTHER SPECIFIED GLAUCOMA, UNSPECIFIED LATERALITY: ICD-10-CM

## 2022-07-19 DIAGNOSIS — D68.51 FACTOR V LEIDEN (HCC): ICD-10-CM

## 2022-07-19 DIAGNOSIS — E11.9 TYPE 2 DIABETES MELLITUS WITHOUT COMPLICATION, WITHOUT LONG-TERM CURRENT USE OF INSULIN (HCC): Primary | ICD-10-CM

## 2022-07-19 DIAGNOSIS — Z94.7 HISTORY OF CORNEA TRANSPLANT: ICD-10-CM

## 2022-07-19 DIAGNOSIS — I10 ESSENTIAL HYPERTENSION: ICD-10-CM

## 2022-07-19 DIAGNOSIS — I10 ESSENTIAL HYPERTENSION: Primary | ICD-10-CM

## 2022-07-19 DIAGNOSIS — G89.4 CHRONIC PAIN SYNDROME: ICD-10-CM

## 2022-07-19 PROCEDURE — 3044F HG A1C LEVEL LT 7.0%: CPT | Performed by: STUDENT IN AN ORGANIZED HEALTH CARE EDUCATION/TRAINING PROGRAM

## 2022-07-19 PROCEDURE — 1123F ACP DISCUSS/DSCN MKR DOCD: CPT | Performed by: STUDENT IN AN ORGANIZED HEALTH CARE EDUCATION/TRAINING PROGRAM

## 2022-07-19 PROCEDURE — 99214 OFFICE O/P EST MOD 30 MIN: CPT | Performed by: STUDENT IN AN ORGANIZED HEALTH CARE EDUCATION/TRAINING PROGRAM

## 2022-07-19 ASSESSMENT — PATIENT HEALTH QUESTIONNAIRE - PHQ9
SUM OF ALL RESPONSES TO PHQ QUESTIONS 1-9: 0
SUM OF ALL RESPONSES TO PHQ QUESTIONS 1-9: 0
2. FEELING DOWN, DEPRESSED OR HOPELESS: 0
SUM OF ALL RESPONSES TO PHQ QUESTIONS 1-9: 0
SUM OF ALL RESPONSES TO PHQ QUESTIONS 1-9: 0
SUM OF ALL RESPONSES TO PHQ9 QUESTIONS 1 & 2: 0
1. LITTLE INTEREST OR PLEASURE IN DOING THINGS: 0

## 2022-07-19 ASSESSMENT — ENCOUNTER SYMPTOMS
BLOOD IN STOOL: 0
SHORTNESS OF BREATH: 0
ANAL BLEEDING: 0

## 2022-07-20 LAB
APPEARANCE UR: CLEAR
BACTERIA URNS QL MICRO: NEGATIVE /HPF
BILIRUB UR QL: NEGATIVE
CASTS URNS QL MICRO: ABNORMAL /LPF
COLOR UR: ABNORMAL
EPI CELLS #/AREA URNS HPF: ABNORMAL /HPF
GLUCOSE UR STRIP.AUTO-MCNC: NEGATIVE MG/DL
HGB UR QL STRIP: NEGATIVE
KETONES UR QL STRIP.AUTO: NEGATIVE MG/DL
LEUKOCYTE ESTERASE UR QL STRIP.AUTO: NEGATIVE
MUCOUS THREADS URNS QL MICRO: 0 /LPF
NITRITE UR QL STRIP.AUTO: NEGATIVE
PH UR STRIP: 5 [PH] (ref 5–9)
PROT UR STRIP-MCNC: NEGATIVE MG/DL
RBC #/AREA URNS HPF: ABNORMAL /HPF
SP GR UR REFRACTOMETRY: 1.02 (ref 1–1.02)
URINE CULTURE IF INDICATED: ABNORMAL
UROBILINOGEN UR QL STRIP.AUTO: 0.2 EU/DL (ref 0.2–1)
WBC URNS QL MICRO: ABNORMAL /HPF

## 2022-07-27 NOTE — PROGRESS NOTES
TRANSFER - IN REPORT:    Verbal report received from [facility]LA CARMEN RN(name) on Yaneth Wright  being received from PACU(unit) for routine post - op      Report consisted of patients Situation, Background, Assessment and   Recommendations(SBAR). Information from the following report(s) SBAR, Kardex, OR Summary, Procedure Summary, Intake/Output and MAR was reviewed with the receiving nurse. Opportunity for questions and clarification was provided. Assessment completed upon patients arrival to unit and care assumed. [Consultation] : a consultation visit [FreeTextEntry1] : Left ankle wound

## 2022-07-28 DIAGNOSIS — I82.5Z9 CHRONIC DEEP VEIN THROMBOSIS (DVT) OF DISTAL VEIN OF LOWER EXTREMITY, UNSPECIFIED LATERALITY (HCC): Primary | ICD-10-CM

## 2022-07-28 DIAGNOSIS — D64.9 ANEMIA, UNSPECIFIED TYPE: ICD-10-CM

## 2022-07-28 DIAGNOSIS — Z79.01 LONG TERM CURRENT USE OF ANTICOAGULANT: ICD-10-CM

## 2022-08-02 ENCOUNTER — HOSPITAL ENCOUNTER (OUTPATIENT)
Dept: LAB | Age: 77
Discharge: HOME | End: 2022-08-05
Payer: MEDICARE

## 2022-08-02 ENCOUNTER — OFFICE VISIT (OUTPATIENT)
Dept: ONCOLOGY | Age: 77
End: 2022-08-02
Payer: MEDICARE

## 2022-08-02 VITALS
SYSTOLIC BLOOD PRESSURE: 108 MMHG | DIASTOLIC BLOOD PRESSURE: 52 MMHG | BODY MASS INDEX: 25.62 KG/M2 | HEIGHT: 69 IN | HEART RATE: 48 BPM | OXYGEN SATURATION: 98 % | WEIGHT: 173 LBS | RESPIRATION RATE: 19 BRPM

## 2022-08-02 DIAGNOSIS — Z79.01 LONG TERM CURRENT USE OF ANTICOAGULANT: ICD-10-CM

## 2022-08-02 DIAGNOSIS — D64.9 ANEMIA, UNSPECIFIED TYPE: ICD-10-CM

## 2022-08-02 DIAGNOSIS — I82.5Z9 CHRONIC DEEP VEIN THROMBOSIS (DVT) OF DISTAL VEIN OF LOWER EXTREMITY, UNSPECIFIED LATERALITY (HCC): ICD-10-CM

## 2022-08-02 DIAGNOSIS — I82.509 CHRONIC DEEP VEIN THROMBOSIS (DVT) OF LOWER EXTREMITY, UNSPECIFIED LATERALITY, UNSPECIFIED VEIN (HCC): Primary | ICD-10-CM

## 2022-08-02 LAB
ALBUMIN SERPL-MCNC: 3.8 G/DL (ref 3.2–4.6)
ALBUMIN/GLOB SERPL: 1.2 {RATIO} (ref 1.2–3.5)
ALP SERPL-CCNC: 57 U/L (ref 50–136)
ALT SERPL-CCNC: 41 U/L (ref 12–65)
ANION GAP SERPL CALC-SCNC: 7 MMOL/L (ref 7–16)
AST SERPL-CCNC: 24 U/L (ref 15–37)
BASOPHILS # BLD: 0 K/UL (ref 0–0.2)
BASOPHILS NFR BLD: 1 % (ref 0–2)
BILIRUB SERPL-MCNC: 0.6 MG/DL (ref 0.2–1.1)
BUN SERPL-MCNC: 29 MG/DL (ref 8–23)
CALCIUM SERPL-MCNC: 9.8 MG/DL (ref 8.3–10.4)
CHLORIDE SERPL-SCNC: 108 MMOL/L (ref 98–107)
CO2 SERPL-SCNC: 25 MMOL/L (ref 21–32)
CREAT SERPL-MCNC: 1.5 MG/DL (ref 0.8–1.5)
DIFFERENTIAL METHOD BLD: ABNORMAL
EOSINOPHIL # BLD: 0.2 K/UL (ref 0–0.8)
EOSINOPHIL NFR BLD: 4 % (ref 0.5–7.8)
ERYTHROCYTE [DISTWIDTH] IN BLOOD BY AUTOMATED COUNT: 12.5 % (ref 11.9–14.6)
FERRITIN SERPL-MCNC: 263 NG/ML (ref 8–388)
GLOBULIN SER CALC-MCNC: 3.3 G/DL (ref 2.3–3.5)
GLUCOSE SERPL-MCNC: 187 MG/DL (ref 65–100)
HCT VFR BLD AUTO: 43.5 %
HGB BLD-MCNC: 13.9 G/DL (ref 13.6–17.2)
IMM GRANULOCYTES # BLD AUTO: 0 K/UL (ref 0–0.5)
IMM GRANULOCYTES NFR BLD AUTO: 0 % (ref 0–5)
IRON SATN MFR SERPL: 54 %
IRON SERPL-MCNC: 158 UG/DL (ref 35–150)
LYMPHOCYTES # BLD: 1.4 K/UL (ref 0.5–4.6)
LYMPHOCYTES NFR BLD: 22 % (ref 13–44)
MCH RBC QN AUTO: 31.4 PG (ref 26.1–32.9)
MCHC RBC AUTO-ENTMCNC: 32 G/DL (ref 31.4–35)
MCV RBC AUTO: 98.4 FL (ref 79.6–97.8)
MONOCYTES # BLD: 0.3 K/UL (ref 0.1–1.3)
MONOCYTES NFR BLD: 5 % (ref 4–12)
NEUTS SEG # BLD: 4.5 K/UL (ref 1.7–8.2)
NEUTS SEG NFR BLD: 69 % (ref 43–78)
NRBC # BLD: 0 K/UL (ref 0–0.2)
PLATELET # BLD AUTO: 185 K/UL (ref 150–450)
PMV BLD AUTO: 10.4 FL (ref 9.4–12.3)
POTASSIUM SERPL-SCNC: 4.4 MMOL/L (ref 3.5–5.1)
PROT SERPL-MCNC: 7.1 G/DL (ref 6.3–8.2)
RBC # BLD AUTO: 4.42 M/UL (ref 4.23–5.6)
SODIUM SERPL-SCNC: 140 MMOL/L (ref 136–145)
TIBC SERPL-MCNC: 294 UG/DL (ref 250–450)
WBC # BLD AUTO: 6.6 K/UL (ref 4.3–11.1)

## 2022-08-02 PROCEDURE — 83540 ASSAY OF IRON: CPT

## 2022-08-02 PROCEDURE — 1036F TOBACCO NON-USER: CPT | Performed by: INTERNAL MEDICINE

## 2022-08-02 PROCEDURE — 82728 ASSAY OF FERRITIN: CPT

## 2022-08-02 PROCEDURE — 1123F ACP DISCUSS/DSCN MKR DOCD: CPT | Performed by: INTERNAL MEDICINE

## 2022-08-02 PROCEDURE — 85025 COMPLETE CBC W/AUTO DIFF WBC: CPT

## 2022-08-02 PROCEDURE — 36415 COLL VENOUS BLD VENIPUNCTURE: CPT

## 2022-08-02 PROCEDURE — 3017F COLORECTAL CA SCREEN DOC REV: CPT | Performed by: INTERNAL MEDICINE

## 2022-08-02 PROCEDURE — G8417 CALC BMI ABV UP PARAM F/U: HCPCS | Performed by: INTERNAL MEDICINE

## 2022-08-02 PROCEDURE — 99214 OFFICE O/P EST MOD 30 MIN: CPT | Performed by: INTERNAL MEDICINE

## 2022-08-02 PROCEDURE — G8428 CUR MEDS NOT DOCUMENT: HCPCS | Performed by: INTERNAL MEDICINE

## 2022-08-02 PROCEDURE — 80053 COMPREHEN METABOLIC PANEL: CPT

## 2022-08-02 RX ORDER — MOXIFLOXACIN 5 MG/ML
SOLUTION/ DROPS OPHTHALMIC
COMMUNITY

## 2022-08-02 RX ORDER — PREDNISONE 50 MG/1
TABLET ORAL
COMMUNITY
End: 2022-10-19

## 2022-08-02 RX ORDER — OFLOXACIN 3 MG/ML
SOLUTION/ DROPS OPHTHALMIC
COMMUNITY

## 2022-08-02 RX ORDER — AMOXICILLIN AND CLAVULANATE POTASSIUM 875; 125 MG/1; MG/1
TABLET, FILM COATED ORAL
COMMUNITY
End: 2022-10-19 | Stop reason: ALTCHOICE

## 2022-08-02 RX ORDER — CYCLOSPORINE 0.5 MG/ML
1 EMULSION OPHTHALMIC 2 TIMES DAILY
COMMUNITY

## 2022-08-02 RX ORDER — CARBOXYMETHYLCELLULOSE SODIUM 5 MG/ML
1-2 SOLUTION/ DROPS OPHTHALMIC 4 TIMES DAILY PRN
COMMUNITY

## 2022-08-02 ASSESSMENT — PATIENT HEALTH QUESTIONNAIRE - PHQ9
SUM OF ALL RESPONSES TO PHQ QUESTIONS 1-9: 0
1. LITTLE INTEREST OR PLEASURE IN DOING THINGS: 0
SUM OF ALL RESPONSES TO PHQ QUESTIONS 1-9: 0
2. FEELING DOWN, DEPRESSED OR HOPELESS: 0
SUM OF ALL RESPONSES TO PHQ9 QUESTIONS 1 & 2: 0
SUM OF ALL RESPONSES TO PHQ QUESTIONS 1-9: 0
SUM OF ALL RESPONSES TO PHQ QUESTIONS 1-9: 0

## 2022-08-02 NOTE — PROGRESS NOTES
[facility] Hematology and Oncology: Office Visit Established Patient    Chief Complaint:    Chief Complaint   Patient presents with    Follow-up         History of Present Illness:  Mr. Ita Stevenson is a 78 y.o. male who returns today for management of chronic DVT. He has a history of a Factor  V Leiden mutation with recurrent thrombosis x 4 episodes, previously under the care of Dr. Filemon Scanlon then transitioning to Dr. Naman Cuellar. He continues on chronic Xarelto after failing Coumadin and Pradaxa. He returns today for 6 month follow up. Overall, he has been well since last seen. He continues under the care of pain management for his chronic hip and neck pain that requires frequent injections. However, he would not consent to them assuming his opioid prescriptions because he refused UDS. He admits that due to high drug cost of Xarelto that he only takes it twice a week. He continues on Norco 10 Q6H for pain related to chronic DVT, but this is not helping his hip pain and he is unsure that it is helping his leg pain either. No shortness of breath or chest pain. Review of Systems:  Constitutional: Negative. HENT: Negative. Eyes: Negative. Respiratory: Negative. Cardiovascular: Negative. Gastrointestinal: Negative. Genitourinary: Negative. Musculoskeletal: Positive for left hip and bilateral leg pain. Skin: Negative. Neurological: Negative. Endo/Heme/Allergies: Negative. Psychiatric/Behavioral: Negative. All other systems reviewed and are negative. Allergies   Allergen Reactions    Epinephrine Anaphylaxis     Blindness in right eye     Aspirin Other (See Comments)     Pt states can't take aspirin with his blood thinning medication.        Past Medical History:   Diagnosis Date    Acute sinusitis     Anaphylactic reaction 2/1/2016    AR (allergic rhinitis) 7/27/2016    Arrhythmia     bradycardia    Bicipital tenosynovitis 2/17/2012    Bradycardia 02/02/2016    Seen by [facility]    COLONOSCOPY  2000, 2005, 2010    Dr. Gloria Colunga Right 06/14/2016    sutures still present     CORNEAL TRANSPLANT Right 2004    HEENT      nasal reconstruction    HEENT      nasal polypectomy    HEENT Right 11/08     glaucoma surgery    HERNIA REPAIR  2010    abdominal hernia repair    HERNIA REPAIR Left 2007    inguinal hernia repair    KNEE ARTHROSCOPY Right 2006     knee meniscus repair    LUMBAR LAMINECTOMY      ORTHOPEDIC SURGERY Bilateral     open shoulder AC reconstruction    ORTHOPEDIC SURGERY Right 2006    neuroma fromfoot    ORTHOPEDIC SURGERY Bilateral     right elbow 1979 & left elbow 2007    ORTHOPEDIC SURGERY Right     index finger    OTHER SURGICAL HISTORY  2000    odalys filter placed    OTHER SURGICAL HISTORY Bilateral     left in 1994, right in 2002     OTHER SURGICAL HISTORY Right     cornea transplant     OTHER SURGICAL HISTORY Right     glaucoma implant     OTHER SURGICAL HISTORY Right 2006    neuroma of foot     SHOULDER ARTHROSCOPY Left 2/17/2012    SINUS SURGERY PROC UNLISTED      numerous    [address]    TOTAL HIP ARTHROPLASTY Left 12/12/2012    VASCULAR SURGERY  2012    PICC- removed    VITRECTOMY Left 6/15/15     Family History   Problem Relation Age of Onset    Cancer Mother         lymphoma    Diabetes Mother         type II    Heart Disease Father         CAD, PPM     Diabetes Father         type II    Pancreatic Cancer Father     Breast Cancer Sister     Other Brother         AAA     Malig Hypertherm Neg Hx     Pseudochol.  Deficiency Neg Hx     Delayed Awakening Neg Hx     Post-op Nausea/Vomiting Neg Hx     Emergence Delirium Neg Hx     Post-op Cognitive Dysfunction Neg Hx      Social History     Socioeconomic History    Marital status:      Spouse name: Not on file    Number of children: Not on file    Years of education: Not on file    Highest education level: Not on file   Occupational History    Not on file   Tobacco Use    Smoking status: Never    Smokeless tobacco: Never   Substance and Sexual Activity    Alcohol use: No     Alcohol/week: 0.0 standard drinks    Drug use: No    Sexual activity: Not on file   Other Topics Concern    Not on file   Social History Narrative    Not on file     Social Determinants of Health     Financial Resource Strain: Not on file   Food Insecurity: Not on file   Transportation Needs: Not on file   Physical Activity: Not on file   Stress: Not on file   Social Connections: Not on file   Intimate Partner Violence: Not on file   Housing Stability: Not on file     Current Outpatient Medications   Medication Sig Dispense Refill    amoxicillin-clavulanate (AUGMENTIN) 875-125 MG per tablet amoxicillin 875 mg-potassium clavulanate 125 mg tablet   TAKE 1 TABLET BY MOUTH EVERY 12 HOURS FOR 7 DAYS      carboxymethylcellulose (REFRESH PLUS) 0.5 % SOLN ophthalmic solution Apply 1-2 drops to eye 4 times daily as needed      cycloSPORINE (RESTASIS) 0.05 % ophthalmic emulsion Apply 1 drop to eye in the morning and 1 drop before bedtime. moxifloxacin (VIGAMOX) 0.5 % ophthalmic solution moxifloxacin 0.5 % eye drops   INSTILL 1 DROP IN LEFT EYE FOUR TIMES DAILY FOR 4 DAYS ONLY      ofloxacin (OCUFLOX) 0.3 % solution ofloxacin 0.3 % eye drops   INSTILL 1 DROP IN LEFT EYE FOUR TIMES DAILY      predniSONE (DELTASONE) 50 MG tablet prednisone 50 mg tablet   TAKE 1 TABLET BY MOUTH DAILY X 7 DAYS      HYDROcodone-acetaminophen (NORCO)  MG per tablet Take 1 tablet by mouth every 6 hours as needed for Pain for up to 30 days.  120 tablet 0    atorvastatin (LIPITOR) 80 MG tablet Take 80 mg by mouth daily      brimonidine (ALPHAGAN) 0.2 % ophthalmic solution Apply 1 drop to eye 2 times daily      ergocalciferol (ERGOCALCIFEROL) 1.25 MG (11144 UT) capsule TAKE 1 CAPSULE BY MOUTH EVERY 7 DAYS      lisinopril (PRINIVIL;ZESTRIL) 40 MG tablet Take 40 mg by mouth daily      prednisoLONE acetate (PRED FORTE) 1 % ophthalmic suspension Apply 1 drop to eye 2 times daily      rivaroxaban (XARELTO) 20 MG TABS tablet Take 20 mg by mouth daily (with breakfast)      timolol (TIMOPTIC) 0.5 % ophthalmic solution Apply 1 drop to eye 2 times daily       No current facility-administered medications for this visit. OBJECTIVE:  BP (!) 108/52 (Site: Left Upper Arm, Position: Standing, Cuff Size: Medium Adult)   Pulse (!) 48   Resp 19   Ht 5' 9\" (1.753 m)   Wt 173 lb (78.5 kg)   SpO2 98%   BMI 25.55 kg/m²     Physical Exam:  Constitutional: Well developed, well nourished male in no acute distress, sitting comfortably in the exam room chair. HEENT: Normocephalic and atraumatic. Sclerae anicteric. Neck supple without JVD. No thyromegaly present. Lymph node   No palpable submandibular, cervical, supraclavicular lymph nodes. Skin Warm and dry. No bruising and no rash noted. No erythema. No pallor. Respiratory Lungs are clear to auscultation bilaterally without wheezes, rales or rhonchi, normal air exchange without accessory muscle use. CVS Normal rate, regular rhythm and normal S1 and S2. No murmurs, gallops, or rubs. Abdomen Soft, nontender and nondistended, normoactive bowel sounds. No palpable mass. No hepatosplenomegaly. Neuro Grossly nonfocal with no obvious sensory or motor deficits. MSK Normal range of motion in general.  No edema and no tenderness. Psych Appropriate mood and affect.       Labs:  Recent Results (from the past 96 hour(s))   CBC with Auto Differential    Collection Time: 08/02/22  8:54 AM   Result Value Ref Range    WBC 6.6 4.3 - 11.1 K/uL    RBC 4.42 4.23 - 5.6 M/uL    Hemoglobin 13.9 13.6 - 17.2 g/dL    Hematocrit 43.5 %    MCV 98.4 (H) 79.6 - 97.8 FL    MCH 31.4 26.1 - 32.9 PG    MCHC 32.0 31.4 - 35.0 g/dL    RDW 12.5 11.9 - 14.6 %    Platelets 908 765 - 738 K/uL    MPV 10.4 9.4 - 12.3 FL    nRBC 0.00 0.0 - 0.2 K/uL    Differential Type AUTOMATED      Seg Neutrophils 69 43 - 78 % Lymphocytes 22 13 - 44 %    Monocytes 5 4.0 - 12.0 %    Eosinophils % 4 0.5 - 7.8 %    Basophils 1 0.0 - 2.0 %    Immature Granulocytes 0 0.0 - 5.0 %    Segs Absolute 4.5 1.7 - 8.2 K/UL    Absolute Lymph # 1.4 0.5 - 4.6 K/UL    Absolute Mono # 0.3 0.1 - 1.3 K/UL    Absolute Eos # 0.2 0.0 - 0.8 K/UL    Basophils Absolute 0.0 0.0 - 0.2 K/UL    Absolute Immature Granulocyte 0.0 0.0 - 0.5 K/UL   Comprehensive Metabolic Panel    Collection Time: 08/02/22  8:54 AM   Result Value Ref Range    Sodium 140 136 - 145 mmol/L    Potassium 4.4 3.5 - 5.1 mmol/L    Chloride 108 (H) 98 - 107 mmol/L    CO2 25 21 - 32 mmol/L    Anion Gap 7 7 - 16 mmol/L    Glucose 187 (H) 65 - 100 mg/dL    BUN 29 (H) 8 - 23 MG/DL    Creatinine 1.50 0.8 - 1.5 MG/DL    GFR  59 (L) >60 ml/min/1.73m2    GFR Non- 48 (L) >60 ml/min/1.73m2    Calcium 9.8 8.3 - 10.4 MG/DL    Total Bilirubin 0.6 0.2 - 1.1 MG/DL    ALT 41 12 - 65 U/L    AST 24 15 - 37 U/L    Alk Phosphatase 57 50 - 136 U/L    Total Protein 7.1 6.3 - 8.2 g/dL    Albumin 3.8 3.2 - 4.6 g/dL    Globulin 3.3 2.3 - 3.5 g/dL    Albumin/Globulin Ratio 1.2 1.2 - 3.5     Ferritin    Collection Time: 08/02/22  8:54 AM   Result Value Ref Range    Ferritin 263 8 - 388 NG/ML   Transferrin Saturation    Collection Time: 08/02/22  8:54 AM   Result Value Ref Range    Iron 158 (H) 35 - 150 ug/dL    TIBC 294 250 - 450 ug/dL    TRANSFERRIN SATURATION 54 >20 %       Imaging:  XR SHOULDER RIGHT (MIN 2 VIEWS)    Result Date: 7/18/2022  See Progress note in the chart. ASSESSMENT:   Diagnosis Orders   1. Anemia, unspecified type  CBC with Auto Differential    Comprehensive Metabolic Panel    Ferritin    Transferrin Saturation    CBC with Auto Differential    Comprehensive Metabolic Panel    Ferritin    Transferrin Saturation      2.  Long term current use of anticoagulant  CBC with Auto Differential    Comprehensive Metabolic Panel    Ferritin    Transferrin Saturation    CBC with Auto Differential    Comprehensive Metabolic Panel    Ferritin    Transferrin Saturation            PLAN:  Lab studies were personally reviewed. VTE: recurrent x4 episodes, +FVL mutation, currently on Xarelto after failure of Coumadin and Pradaxa (Lovenox was also effective). IVC filter in place, could not be removed. He returns today for 6 month follow up. Overall, he has been well since last seen. He continues under the care of pain management for his chronic hip and neck pain that requires frequent injections. However, he would not consent to them assuming his opioid prescriptions because he refused UDS. He admits that due to high drug cost of Xarelto that he only takes it twice a week. He continues on Norco 10 Q6H for pain related to chronic DVT, but this is not helping his hip pain and he is unsure that it is helping his leg pain either. No shortness of breath or chest pain. Labs reviewed and unremarkable. We will have our financial team reach out to see if we can get some  assistance, he is aware that his risk of recurrent DVT is much higher with the intermittent dosing of Xarelto. Thankfully he does have a filter in place so his risk of massive PE is less. I also insisted that he discuss pain management assuming his Norco prescription, it is not appropriate for us to continue prescribing these if they are willing to assume this and he refuses only out of convenience. He expresses interest in weaning the [address] [facility] anyway due to futility and I support this. He will return in 6 months for follow up of his anticoagulation and chronic DVT.              Renelda Pallas, MD, MD  [facility] Hematology and Oncology  [address] [facility], [address] [place] [address]  Office : [phone]  Fax : [phone]

## 2022-08-02 NOTE — PATIENT INSTRUCTIONS
Patient Instructions from Today's Visit    Reason for Visit:  Follow up - DVT        Plan:  - message sent to  to assist with xarelto prescription.      Follow Up:  - 6 months    Recent Lab Results:  Hospital Outpatient Visit on 08/02/2022   Component Date Value Ref Range Status    WBC 08/02/2022 6.6  4.3 - 11.1 K/uL Final    RBC 08/02/2022 4.42  4.23 - 5.6 M/uL Final    Hemoglobin 08/02/2022 13.9  13.6 - 17.2 g/dL Final    Hematocrit 08/02/2022 43.5  % Final    MCV 08/02/2022 98.4 (A) 79.6 - 97.8 FL Final    MCH 08/02/2022 31.4  26.1 - 32.9 PG Final    MCHC 08/02/2022 32.0  31.4 - 35.0 g/dL Final    RDW 08/02/2022 12.5  11.9 - 14.6 % Final    Platelets 28/75/1912 185  150 - 450 K/uL Final    MPV 08/02/2022 10.4  9.4 - 12.3 FL Final    nRBC 08/02/2022 0.00  0.0 - 0.2 K/uL Final    **Note: Absolute NRBC parameter is now reported with Hemogram**    Differential Type 08/02/2022 AUTOMATED    Final    Seg Neutrophils 08/02/2022 69  43 - 78 % Final    Lymphocytes 08/02/2022 22  13 - 44 % Final    Monocytes 08/02/2022 5  4.0 - 12.0 % Final    Eosinophils % 08/02/2022 4  0.5 - 7.8 % Final    Basophils 08/02/2022 1  0.0 - 2.0 % Final    Immature Granulocytes 08/02/2022 0  0.0 - 5.0 % Final    Segs Absolute 08/02/2022 4.5  1.7 - 8.2 K/UL Final    Absolute Lymph # 08/02/2022 1.4  0.5 - 4.6 K/UL Final    Absolute Mono # 08/02/2022 0.3  0.1 - 1.3 K/UL Final    Absolute Eos # 08/02/2022 0.2  0.0 - 0.8 K/UL Final    Basophils Absolute 08/02/2022 0.0  0.0 - 0.2 K/UL Final    Absolute Immature Granulocyte 08/02/2022 0.0  0.0 - 0.5 K/UL Final    Sodium 08/02/2022 140  136 - 145 mmol/L Final    Potassium 08/02/2022 4.4  3.5 - 5.1 mmol/L Final    Chloride 08/02/2022 108 (A) 98 - 107 mmol/L Final    CO2 08/02/2022 25  21 - 32 mmol/L Final    Anion Gap 08/02/2022 7  7 - 16 mmol/L Final    Glucose 08/02/2022 187 (A) 65 - 100 mg/dL Final    BUN 08/02/2022 29 (A) 8 - 23 MG/DL Final    Creatinine 08/02/2022 1.50  0.8 - 1.5 MG/DL Final    GFR  08/02/2022 59 (A) >60 ml/min/1.73m2 Final    GFR Non- 08/02/2022 48 (A) >60 ml/min/1.73m2 Final    Comment:      Estimated GFR is calculated using the Modification of Diet in Renal Disease (MDRD) Study equation, reported for both  Americans (GFRAA) and non- Americans (GFRNA), and normalized to 1.73m2 body surface area. The physician must decide which value applies to the patient. The MDRD study equation should only be used in individuals age 25 or older. It has not been validated for the following: pregnant women, patients with serious comorbid conditions,or on certain medications, or persons with extremes of body size, muscle mass, or nutritional status. Calcium 08/02/2022 9.8  8.3 - 10.4 MG/DL Final    Total Bilirubin 08/02/2022 0.6  0.2 - 1.1 MG/DL Final    ALT 08/02/2022 41  12 - 65 U/L Final    AST 08/02/2022 24  15 - 37 U/L Final    Alk Phosphatase 08/02/2022 57  50 - 136 U/L Final    Total Protein 08/02/2022 7.1  6.3 - 8.2 g/dL Final    Albumin 08/02/2022 3.8  3.2 - 4.6 g/dL Final    Globulin 08/02/2022 3.3  2.3 - 3.5 g/dL Final    Albumin/Globulin Ratio 08/02/2022 1.2  1.2 - 3.5   Final    Ferritin 08/02/2022 263  8 - 388 NG/ML Final    Iron 08/02/2022 158 (A) 35 - 150 ug/dL Final    Comment: Known Interfering Substances section:  \"Iron values may be falsely elevated in  serum samples from patients with  anticoagulants (e.g., hemodialysis patients). \"  Limitations of Procedure section:  \"Turbidity resulting from precipitation of  fibrinogen in the serum of patients treated  with anticoagulants (e.g. hemodialysis  patients) may cause spuriously elevated  iron results. \"      TIBC 08/02/2022 294  250 - 450 ug/dL Final    TRANSFERRIN SATURATION 08/02/2022 54  >20 % Final         Treatment Summary has been discussed and given to patient: n/a        -------------------------------------------------------------------------------------------------------------------  Please call our office at [phone] if you have any  of the following symptoms:   Fever of 100.5 or greater  Chills  Shortness of breath  Swelling or pain in one leg    After office hours an answering service is available and will contact a provider for emergencies or if you are experiencing any of the above symptoms. Patient has My Chart. My Chart log in information explained on the after visit summary printout at the [facility] desk.     Karen Lund RN

## 2022-08-03 ENCOUNTER — TELEPHONE (OUTPATIENT)
Dept: ONCOLOGY | Age: 77
End: 2022-08-03

## 2022-08-03 NOTE — TELEPHONE ENCOUNTER
Received message from nurse stating: \"Pt is on xarelto. He is not able to afford the medication so he is getting prescription filled but only taking it 2 days of the week to make it lastlonger. Daria De \"    Called and spoke to patient about the options available to him. He agreed to initiate application process thru [employer] patient assistance foundation. I obtained required information and will be submitting today. I told him I will let him know if OOP expense statement will be needed but if not then we will wait on determination. He voiced understanding and was very appreciative of my help.

## 2022-08-04 DIAGNOSIS — I82.509 CHRONIC DEEP VEIN THROMBOSIS (DVT) OF LOWER EXTREMITY, UNSPECIFIED LATERALITY, UNSPECIFIED VEIN (HCC): Primary | ICD-10-CM

## 2022-08-17 ENCOUNTER — TELEPHONE (OUTPATIENT)
Dept: ONCOLOGY | Age: 77
End: 2022-08-17

## 2022-08-17 NOTE — TELEPHONE ENCOUNTER
Pt called requesting a refill for HYDROcodone-acetaminophen (Karen Willett)  MG per tablet    Leighann Jewell #[id number] - SHAN[address]I [id number]

## 2022-08-17 NOTE — TELEPHONE ENCOUNTER
I have reviewed the patients controlled substance prescription history, as maintained in the [place] prescription monitoring program, so that the prescription(s) for a  controlled substance can be given. [facility] last filled 7/16/22. I spoke with Dr Tommy Gutiérrez and I am to call the patient and ask if he spoke with Pain Management. Call to the patient and he has not been to the pain management except the consult for the new procedure they are planning to do. Mr Lisa Darling states he will call his [facility] and get the refill. I explained to him it needs to be Pain Management. We will no Longer fill his Norco per Dr Tommy Gutiérrez.  The patient Verbalizes understanding of instructions

## 2022-09-14 ENCOUNTER — TELEPHONE (OUTPATIENT)
Dept: INTERNAL MEDICINE CLINIC | Facility: CLINIC | Age: 77
End: 2022-09-14

## 2022-09-14 NOTE — TELEPHONE ENCOUNTER
----- Message from Vamshi Thompson DO sent at 9/12/2022  9:17 AM EDT -----  Regarding: Pain Management  Please call patient's pain management specialist Dr. Robinson Rajput (not sure of number, may have to check [employer] or contact patient for contact number). According to patient he was told that his pain management doctor would not fill his hydrocodone/apap (previously was getting through his oncologist but his oncologist is no longer able to prescribe it). Please see if pain management will write for this as I do not write for narcotic pain medication. Thanks!     Georges Grant

## 2022-09-14 NOTE — TELEPHONE ENCOUNTER
LVM with Dr Wells's office  Per Dr Lin Read   According to patient he was told that his pain management doctor would not fill his hydrocodone/apap (previously was getting through his oncologist but his oncologist is no longer able to prescribe it). Please see if pain management will write for this as I do not write for narcotic pain medication. Advised to contact office and let us know if they will write med.

## 2022-09-23 NOTE — TELEPHONE ENCOUNTER
----- Message from Ole Akbar DO sent at 9/12/2022  9:17 AM EDT -----  Regarding: Pain Management  Please call patient's pain management specialist Dr. Alireza Hernandez (not sure of number, may have to check [employer] or contact patient for contact number). According to patient he was told that his pain management doctor would not fill his hydrocodone/apap (previously was getting through his oncologist but his oncologist is no longer able to prescribe it). Please see if pain management will write for this as I do not write for narcotic pain medication. Thanks!     Sandhya Medrano

## 2022-09-23 NOTE — TELEPHONE ENCOUNTER
LM with pain mgmt regarding Dr Svetlana Jones writing pt Hydrocodone. Pt has an appt next Fri and she will send message to doctor and he can discuss with pt at visit.

## 2022-10-16 NOTE — PROGRESS NOTES
Tyler Crabtree (:  1945) is a 78 y.o. male,Established patient, here for evaluation of the following chief complaint(s):  Follow-up (3 mth fu) and Hypotension         ASSESSMENT/PLAN:  1. Essential hypertension  Continue lisinopril  Episode of lightheadedness and fatigue likely due to low blood pressure. Advised patient to decrease lisinopril to 1/2 tablet (equivalent of 20 mg) daily and to monitor blood pressure and heart rate regularly  Maintain adequate hydration    2. Factor V Leiden ([address]  3. Current use of long term anticoagulation  Continue Xarelto. Patient currently taking suboptimally   Patient counseled multiple times in the past that he is not on therapeutic dose of anticoagulation and given history of factor V he is at increased risk for recurrent DVT and PE. Previously on Pradaxa but experienced recurrent DVT, previously on injections (?  Heparin or Lovenox), stated in the past Eliquis would be just as financially straining, having confirmed with insurance  Refuses to go back on Coumadin  Recent hematology notes reviewed with specialist currently looking into  assistance  Xarelto samples provided in office today    4. Vitamin D deficiency  Vitamin D level mildly low at 28.1 on 2022  Continue over-the-counter vitamin D supplementation    5. Other specified glaucoma, unspecified laterality  6. History of cornea transplant  Continue management and monitoring per ophthalmology    7. Chronic pain syndrome  Previously receiving pain medication from hematologist however per recent notes they are no longer comfortable prescribing this medication and have deferred to his pain management clinician as have I  Pain management notes reviewed with successful left superior and medial cluneal nerve PNS trial on 2022 with lead pull on 10/5/2022. Pending device implantation later this year    8. Dyslipidemia  Continue atorvastatin and lifestyle modifications    9.  Type 2 diabetes mellitus without complication, without long-term current use of insulin ([facility])  A1c 6.1% on 7/12/2022, recheck in office today 5.8%  Check urine microalbumin to creatinine ratio in the future to evaluate for proteinuria  Not currently checking blood sugars however advised patient if he should feel lightheaded or dizzy to check his blood sugar to ensure it is not hypoglycemia induced  Continue lifestyle modifications. Monitor off medication at this time    - AMB POC HEMOGLOBIN A1C      Return in about 3 months (around 1/19/2023) for EOV; fasting labs prior . Subjective   SUBJECTIVE/OBJECTIVE:  Patient is a 56-year-old  male who presents to the office today for follow-up. 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 This was followed by 1 episode of diarrhea. Patient checked his blood pressure finding it to be 90/60 with heart rate of 51. Of note patient has chronic bradycardia with his heart rate not unusual for him. Went home and slept with symptoms improved. Does suffer from chronic intermittent lightheadedness. Patient is not sure if he had much to eat that morning. Remains on his same dose of lisinopril 40 mg daily. Blood pressure check in the days before that ranged from low 100s up to the 773 systolic and 83K to 37H diastolic with heart rate as low as 40 and as high as 57. Patient denied vomiting or diarrhea in the days leading up to this event. No melena. Has occasional bright red blood with wiping after having a large hard bowel movement. No chest pain, shortness of breath associated with this episode. Does have some chronic blurred vision in his left eye from history of cataracts as well as glaucoma and corneal transplant but due to follow-up with his ophthalmologist in the upcoming weeks.   Remains on Xarelto but still taking suboptimally only a few days a week secondary to cost.  However hopes this will be rect[facility] Due to have surgery later this year for permanent spinal stimulator placement. Patient reports weight has remained relatively stable recently but over the past year he has lost around 50 pounds. Also admits to cutting back on his portions. Only eating once a day typically. Unsure when his last colonoscopy was but believes he was told he did not require any further screening after then. Review of Systems   Constitutional:  Negative for fever. Eyes:  Positive for visual disturbance (Chronic blurry vision of left eye). Respiratory:  Negative for shortness of breath. Cardiovascular:  Positive for leg swelling (Chronic involving left lower extremity). Negative for chest pain. Gastrointestinal:  Negative for abdominal pain. Endocrine: Positive for cold intolerance. Neurological:  Positive for light-headedness and numbness (Transient involving both legs). Objective   Physical Exam  Vitals reviewed. Constitutional:       General: He is not in acute distress. Appearance: Normal appearance. He is not ill-appearing, toxic-appearing or diaphoretic. HENT:      Head: Normocephalic and atraumatic. Right Ear: Tympanic membrane, ear canal and external ear normal. There is no impacted cerumen. Left Ear: Tympanic membrane, ear canal and external ear normal. There is no impacted cerumen. Eyes:      General:         Right eye: No discharge. Left eye: No discharge. Extraocular Movements: Extraocular movements intact. Neck:      Vascular: No carotid bruit. Cardiovascular:      Rate and Rhythm: Normal rate and regular rhythm. Heart sounds: No murmur heard. No friction rub. No gallop. Pulmonary:      Effort: Pulmonary effort is normal. No respiratory distress. Breath sounds: No wheezing, rhonchi or rales.    Abdominal:      General: Bowel sounds are normal. Palpations: Abdomen is soft. Tenderness: There is no abdominal tenderness. There is no guarding. Skin:     General: Skin is warm and dry. Coloration: Skin is not jaundiced. Neurological:      Mental Status: He is alert. Cranial Nerves: No cranial nerve deficit, dysarthria or facial asymmetry. Sensory: Sensory deficit (Mildly diminished sensation along forehead, chronic from prior surgeries) present. Motor: No weakness (Muscle strength 5/5 in all 4 extremities, slightly diminished in right arm compared to left secondary to prior shoulder surgery), tremor or pronator drift. Coordination: Finger-Nose-Finger Test normal.   Psychiatric:         Attention and Perception: Attention normal.         Mood and Affect: Mood and affect normal. Mood is not anxious or depressed. Affect is not tearful. Speech: Speech normal. Speech is not rapid and pressured or slurred. Behavior: Behavior normal. Behavior is not agitated, aggressive or combative. Behavior is cooperative. Thought Content: Thought content normal.                An electronic signature was used to authenticate this note.     --Yesenia Gonzalez, DO

## 2022-10-19 ENCOUNTER — OFFICE VISIT (OUTPATIENT)
Dept: INTERNAL MEDICINE CLINIC | Facility: CLINIC | Age: 77
End: 2022-10-19
Payer: MEDICARE

## 2022-10-19 VITALS
TEMPERATURE: 97.8 F | HEIGHT: 69 IN | WEIGHT: 173.4 LBS | DIASTOLIC BLOOD PRESSURE: 68 MMHG | HEART RATE: 49 BPM | BODY MASS INDEX: 25.68 KG/M2 | OXYGEN SATURATION: 100 % | SYSTOLIC BLOOD PRESSURE: 128 MMHG

## 2022-10-19 DIAGNOSIS — Z94.7 HISTORY OF CORNEA TRANSPLANT: ICD-10-CM

## 2022-10-19 DIAGNOSIS — Z79.01 CURRENT USE OF LONG TERM ANTICOAGULATION: ICD-10-CM

## 2022-10-19 DIAGNOSIS — H40.89 OTHER SPECIFIED GLAUCOMA, UNSPECIFIED LATERALITY: ICD-10-CM

## 2022-10-19 DIAGNOSIS — G89.4 CHRONIC PAIN SYNDROME: ICD-10-CM

## 2022-10-19 DIAGNOSIS — E55.9 VITAMIN D DEFICIENCY: ICD-10-CM

## 2022-10-19 DIAGNOSIS — E11.9 TYPE 2 DIABETES MELLITUS WITHOUT COMPLICATION, WITHOUT LONG-TERM CURRENT USE OF INSULIN (HCC): ICD-10-CM

## 2022-10-19 DIAGNOSIS — E78.5 DYSLIPIDEMIA: ICD-10-CM

## 2022-10-19 DIAGNOSIS — I10 ESSENTIAL HYPERTENSION: Primary | ICD-10-CM

## 2022-10-19 DIAGNOSIS — D68.51 FACTOR V LEIDEN (HCC): ICD-10-CM

## 2022-10-19 LAB — HBA1C MFR BLD: 5.8 %

## 2022-10-19 PROCEDURE — 3017F COLORECTAL CA SCREEN DOC REV: CPT | Performed by: STUDENT IN AN ORGANIZED HEALTH CARE EDUCATION/TRAINING PROGRAM

## 2022-10-19 PROCEDURE — G8427 DOCREV CUR MEDS BY ELIG CLIN: HCPCS | Performed by: STUDENT IN AN ORGANIZED HEALTH CARE EDUCATION/TRAINING PROGRAM

## 2022-10-19 PROCEDURE — 1123F ACP DISCUSS/DSCN MKR DOCD: CPT | Performed by: STUDENT IN AN ORGANIZED HEALTH CARE EDUCATION/TRAINING PROGRAM

## 2022-10-19 PROCEDURE — G8484 FLU IMMUNIZE NO ADMIN: HCPCS | Performed by: STUDENT IN AN ORGANIZED HEALTH CARE EDUCATION/TRAINING PROGRAM

## 2022-10-19 PROCEDURE — G8417 CALC BMI ABV UP PARAM F/U: HCPCS | Performed by: STUDENT IN AN ORGANIZED HEALTH CARE EDUCATION/TRAINING PROGRAM

## 2022-10-19 PROCEDURE — 3044F HG A1C LEVEL LT 7.0%: CPT | Performed by: STUDENT IN AN ORGANIZED HEALTH CARE EDUCATION/TRAINING PROGRAM

## 2022-10-19 PROCEDURE — 2022F DILAT RTA XM EVC RTNOPTHY: CPT | Performed by: STUDENT IN AN ORGANIZED HEALTH CARE EDUCATION/TRAINING PROGRAM

## 2022-10-19 PROCEDURE — 1036F TOBACCO NON-USER: CPT | Performed by: STUDENT IN AN ORGANIZED HEALTH CARE EDUCATION/TRAINING PROGRAM

## 2022-10-19 PROCEDURE — 83036 HEMOGLOBIN GLYCOSYLATED A1C: CPT | Performed by: STUDENT IN AN ORGANIZED HEALTH CARE EDUCATION/TRAINING PROGRAM

## 2022-10-19 PROCEDURE — 99214 OFFICE O/P EST MOD 30 MIN: CPT | Performed by: STUDENT IN AN ORGANIZED HEALTH CARE EDUCATION/TRAINING PROGRAM

## 2022-10-19 ASSESSMENT — PATIENT HEALTH QUESTIONNAIRE - PHQ9
SUM OF ALL RESPONSES TO PHQ QUESTIONS 1-9: 0
2. FEELING DOWN, DEPRESSED OR HOPELESS: 0
1. LITTLE INTEREST OR PLEASURE IN DOING THINGS: 0
SUM OF ALL RESPONSES TO PHQ QUESTIONS 1-9: 0
SUM OF ALL RESPONSES TO PHQ9 QUESTIONS 1 & 2: 0

## 2022-10-19 ASSESSMENT — ENCOUNTER SYMPTOMS
ABDOMINAL PAIN: 0
SHORTNESS OF BREATH: 0

## 2022-11-15 ENCOUNTER — HOSPITAL ENCOUNTER (OUTPATIENT)
Dept: CT IMAGING | Age: 77
Discharge: HOME | End: 2022-11-18
Payer: MEDICARE

## 2022-11-15 ENCOUNTER — OFFICE VISIT (OUTPATIENT)
Dept: INTERNAL MEDICINE CLINIC | Facility: CLINIC | Age: 77
End: 2022-11-15
Payer: MEDICARE

## 2022-11-15 VITALS
HEIGHT: 69 IN | RESPIRATION RATE: 16 BRPM | WEIGHT: 169 LBS | HEART RATE: 49 BPM | BODY MASS INDEX: 25.03 KG/M2 | SYSTOLIC BLOOD PRESSURE: 115 MMHG | DIASTOLIC BLOOD PRESSURE: 70 MMHG | OXYGEN SATURATION: 96 % | TEMPERATURE: 97.2 F

## 2022-11-15 DIAGNOSIS — K57.92 ACUTE DIVERTICULITIS: Primary | ICD-10-CM

## 2022-11-15 DIAGNOSIS — R10.84 GENERALIZED ABDOMINAL PAIN: ICD-10-CM

## 2022-11-15 DIAGNOSIS — R10.9 RIGHT FLANK PAIN: ICD-10-CM

## 2022-11-15 DIAGNOSIS — K86.89 DILATION OF PANCREATIC DUCT: ICD-10-CM

## 2022-11-15 DIAGNOSIS — I82.509 CHRONIC DEEP VEIN THROMBOSIS (DVT) OF LOWER EXTREMITY, UNSPECIFIED LATERALITY, UNSPECIFIED VEIN (HCC): ICD-10-CM

## 2022-11-15 LAB
ALBUMIN SERPL-MCNC: 4 G/DL (ref 3.2–4.6)
ALBUMIN/GLOB SERPL: 1.3 {RATIO} (ref 0.4–1.6)
ALP SERPL-CCNC: 64 U/L (ref 50–136)
ALT SERPL-CCNC: 32 U/L (ref 12–65)
ANION GAP SERPL CALC-SCNC: 6 MMOL/L (ref 2–11)
AST SERPL-CCNC: 20 U/L (ref 15–37)
BASOPHILS # BLD: 0 K/UL (ref 0–0.2)
BASOPHILS NFR BLD: 0 % (ref 0–2)
BILIRUB SERPL-MCNC: 0.9 MG/DL (ref 0.2–1.1)
BILIRUBIN, URINE, POC: NEGATIVE
BLOOD URINE, POC: NEGATIVE
BUN SERPL-MCNC: 21 MG/DL (ref 8–23)
CALCIUM SERPL-MCNC: 9.2 MG/DL (ref 8.3–10.4)
CHLORIDE SERPL-SCNC: 103 MMOL/L (ref 101–110)
CO2 SERPL-SCNC: 27 MMOL/L (ref 21–32)
CREAT BLD-MCNC: 1.29 MG/DL (ref 0.8–1.5)
CREAT SERPL-MCNC: 1.3 MG/DL (ref 0.8–1.5)
DIFFERENTIAL METHOD BLD: NORMAL
EOSINOPHIL # BLD: 0.2 K/UL (ref 0–0.8)
EOSINOPHIL NFR BLD: 2 % (ref 0.5–7.8)
ERYTHROCYTE [DISTWIDTH] IN BLOOD BY AUTOMATED COUNT: 12.3 % (ref 11.9–14.6)
GLOBULIN SER CALC-MCNC: 3.2 G/DL (ref 2.8–4.5)
GLUCOSE SERPL-MCNC: 107 MG/DL (ref 65–100)
GLUCOSE URINE, POC: NEGATIVE
HCT VFR BLD AUTO: 42.9 % (ref 41.1–50.3)
HGB BLD-MCNC: 13.7 G/DL (ref 13.6–17.2)
IMM GRANULOCYTES # BLD AUTO: 0 K/UL (ref 0–0.5)
IMM GRANULOCYTES NFR BLD AUTO: 0 % (ref 0–5)
KETONES, URINE, POC: NEGATIVE
LEUKOCYTE ESTERASE, URINE, POC: NEGATIVE
LIPASE SERPL-CCNC: 45 U/L (ref 73–393)
LYMPHOCYTES # BLD: 1.8 K/UL (ref 0.5–4.6)
LYMPHOCYTES NFR BLD: 19 % (ref 13–44)
MCH RBC QN AUTO: 32.2 PG (ref 26.1–32.9)
MCHC RBC AUTO-ENTMCNC: 31.9 G/DL (ref 31.4–35)
MCV RBC AUTO: 100.9 FL (ref 82–102)
MONOCYTES # BLD: 0.7 K/UL (ref 0.1–1.3)
MONOCYTES NFR BLD: 7 % (ref 4–12)
NEUTS SEG # BLD: 6.9 K/UL (ref 1.7–8.2)
NEUTS SEG NFR BLD: 72 % (ref 43–78)
NITRITE, URINE, POC: NEGATIVE
NRBC # BLD: 0 K/UL (ref 0–0.2)
PH, URINE, POC: 5.5 (ref 4.6–8)
PLATELET # BLD AUTO: 192 K/UL (ref 150–450)
PMV BLD AUTO: 10.6 FL (ref 9.4–12.3)
POTASSIUM SERPL-SCNC: 4.2 MMOL/L (ref 3.5–5.1)
PROT SERPL-MCNC: 7.2 G/DL (ref 6.3–8.2)
PROTEIN,URINE, POC: NEGATIVE
RBC # BLD AUTO: 4.25 M/UL (ref 4.23–5.6)
SODIUM SERPL-SCNC: 136 MMOL/L (ref 133–143)
SPECIFIC GRAVITY, URINE, POC: 1.01 (ref 1–1.03)
URINALYSIS CLARITY, POC: CLEAR
URINALYSIS COLOR, POC: YELLOW
UROBILINOGEN, POC: 0.2
WBC # BLD AUTO: 9.6 K/UL (ref 4.3–11.1)

## 2022-11-15 PROCEDURE — G8484 FLU IMMUNIZE NO ADMIN: HCPCS | Performed by: NURSE PRACTITIONER

## 2022-11-15 PROCEDURE — 3017F COLORECTAL CA SCREEN DOC REV: CPT | Performed by: NURSE PRACTITIONER

## 2022-11-15 PROCEDURE — 82565 ASSAY OF CREATININE: CPT

## 2022-11-15 PROCEDURE — G8427 DOCREV CUR MEDS BY ELIG CLIN: HCPCS | Performed by: NURSE PRACTITIONER

## 2022-11-15 PROCEDURE — 1036F TOBACCO NON-USER: CPT | Performed by: NURSE PRACTITIONER

## 2022-11-15 PROCEDURE — G8420 CALC BMI NORM PARAMETERS: HCPCS | Performed by: NURSE PRACTITIONER

## 2022-11-15 PROCEDURE — 74177 CT ABD & PELVIS W/CONTRAST: CPT

## 2022-11-15 PROCEDURE — 3074F SYST BP LT 130 MM HG: CPT | Performed by: NURSE PRACTITIONER

## 2022-11-15 PROCEDURE — 99214 OFFICE O/P EST MOD 30 MIN: CPT | Performed by: NURSE PRACTITIONER

## 2022-11-15 PROCEDURE — 1123F ACP DISCUSS/DSCN MKR DOCD: CPT | Performed by: NURSE PRACTITIONER

## 2022-11-15 PROCEDURE — 3078F DIAST BP <80 MM HG: CPT | Performed by: NURSE PRACTITIONER

## 2022-11-15 PROCEDURE — 81003 URINALYSIS AUTO W/O SCOPE: CPT | Performed by: NURSE PRACTITIONER

## 2022-11-15 PROCEDURE — 6360000004 HC RX CONTRAST MEDICATION: Performed by: NURSE PRACTITIONER

## 2022-11-15 RX ORDER — CIPROFLOXACIN 500 MG/1
500 TABLET, FILM COATED ORAL 2 TIMES DAILY
Qty: 20 TABLET | Refills: 0 | Status: SHIPPED | OUTPATIENT
Start: 2022-11-15 | End: 2022-11-22 | Stop reason: SINTOL

## 2022-11-15 RX ORDER — ATORVASTATIN CALCIUM 80 MG/1
40 TABLET, FILM COATED ORAL DAILY
Qty: 90 TABLET | Refills: 1 | Status: SHIPPED | OUTPATIENT
Start: 2022-11-15

## 2022-11-15 RX ORDER — METRONIDAZOLE 500 MG/1
500 TABLET ORAL 3 TIMES DAILY
Qty: 30 TABLET | Refills: 0 | Status: SHIPPED | OUTPATIENT
Start: 2022-11-15 | End: 2022-11-22 | Stop reason: SINTOL

## 2022-11-15 RX ORDER — ERGOCALCIFEROL 1.25 MG/1
50000 CAPSULE ORAL WEEKLY
COMMUNITY

## 2022-11-15 RX ADMIN — DIATRIZOATE MEGLUMINE AND DIATRIZOATE SODIUM 15 ML: 660; 100 LIQUID ORAL; RECTAL at 13:26

## 2022-11-15 RX ADMIN — IOPAMIDOL 100 ML: 755 INJECTION, SOLUTION INTRAVENOUS at 13:27

## 2022-11-15 ASSESSMENT — ENCOUNTER SYMPTOMS
COUGH: 0
SHORTNESS OF BREATH: 0
CONSTIPATION: 1
VOMITING: 0
WHEEZING: 0
NAUSEA: 0
DIARRHEA: 1
ABDOMINAL PAIN: 1
BLOOD IN STOOL: 0

## 2022-11-15 ASSESSMENT — PATIENT HEALTH QUESTIONNAIRE - PHQ9
SUM OF ALL RESPONSES TO PHQ QUESTIONS 1-9: 0
2. FEELING DOWN, DEPRESSED OR HOPELESS: 0
SUM OF ALL RESPONSES TO PHQ QUESTIONS 1-9: 0
1. LITTLE INTEREST OR PLEASURE IN DOING THINGS: 0
SUM OF ALL RESPONSES TO PHQ QUESTIONS 1-9: 0
SUM OF ALL RESPONSES TO PHQ9 QUESTIONS 1 & 2: 0
SUM OF ALL RESPONSES TO PHQ QUESTIONS 1-9: 0

## 2022-11-15 NOTE — PROGRESS NOTES
[facility]      11/15/2022    Patient Name: Ehsan Estrella  :  1945      Chief Complaint:  Chief Complaint   Patient presents with    Back Pain    Abdominal Pain         HPI  Patient presents today with complaint of abdominal and right flank pain. Symptoms started about 5 days ago. Symptoms are fairly constant but are sometimes more severe than at other times. Associated symptoms include subjective fever, poor appetite, urinary frequency, and a small amount of diarrhea yesterday. He also reports trouble for the last few weeks with constipation. Of note, previous abdominal surgery includes appendectomy, cholecystectomy and hernia repair. Past Medical History:   Diagnosis Date    Acute sinusitis     Anaphylactic reaction 2016    AR (allergic rhinitis) 2016    Arrhythmia     bradycardia    Bicipital tenosynovitis 2012    Bradycardia 2016    Seen by [facility]-no follow-up required. 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 Berta Aguirre. Takes Xarelto daily.      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    Sinus problem     Spinal stenosis, lumbar region, with neurogenic claudication 3/24/2014    Superior glenoid labrum lesion 2/17/2012    Supraspinatus (muscle) (tendon) sprain 2/17/2012    Thromboembolus ([address] 9/1/1997    left thigh DVT     Unspecified adverse effect of anesthesia     very difficult IV stick per patient d/t Lovenox, has needed anesthesiologist to start several times/comes in a day ahead for PICC line        Past Surgical History:   Procedure Laterality Date    APPENDECTOMY  1980    BACK SURGERY      spinal stenosis    BLEPHAROPLASTY Bilateral 2009    CARPAL TUNNEL RELEASE  2012    left    [address]    right    CATARACT REMOVAL Bilateral     left eye 1994 & right eye 2002    CHOLECYSTECTOMY  2001    CHOLECYSTECTOMY, LAPAROSCOPIC  2001    COLONOSCOPY  2000, 2005, 2010    Dr. Franco Garcia Right 06/14/2016    sutures still present     CORNEAL TRANSPLANT Right 2004    HEENT      nasal reconstruction    HEENT      nasal polypectomy    HEENT Right 11/08     glaucoma surgery    HERNIA REPAIR  2010    abdominal hernia repair    HERNIA REPAIR Left 2007    inguinal hernia repair    KNEE ARTHROSCOPY Right 2006     knee meniscus repair    LUMBAR LAMINECTOMY      ORTHOPEDIC SURGERY Bilateral     open shoulder AC reconstruction    ORTHOPEDIC SURGERY Right 2006    neuroma fromfoot    ORTHOPEDIC SURGERY Bilateral     right elbow 1979 & left elbow 2007    ORTHOPEDIC SURGERY Right     index finger    OTHER SURGICAL HISTORY  2000    odalys filter placed    OTHER SURGICAL HISTORY Bilateral     left in 1994, right in 2002     OTHER SURGICAL HISTORY Right     cornea transplant     OTHER SURGICAL HISTORY Right     glaucoma implant     OTHER SURGICAL HISTORY Right 2006    neuroma of foot     SHOULDER ARTHROSCOPY Left 2/17/2012 SINUS SURGERY PROC UNLISTED      numerous    TONSILLECTOMY      TONSILLECTOMY  1950    TOTAL HIP ARTHROPLASTY Left 12/12/2012    VASCULAR SURGERY  2012    PICC- removed    VITRECTOMY Left 6/15/15       Family History   Problem Relation Age of Onset    Cancer Mother         lymphoma    Diabetes Mother         type II    Heart Disease Father         CAD, PPM     Diabetes Father         type II    Pancreatic Cancer Father     Breast Cancer Sister     Other Brother         AAA     Malig Hypertherm Neg Hx     Pseudochol. Deficiency Neg Hx     Delayed Awakening Neg Hx     Post-op Nausea/Vomiting Neg Hx     Emergence Delirium Neg Hx     Post-op Cognitive Dysfunction Neg Hx        Social History     Tobacco Use    Smoking status: Never    Smokeless tobacco: Never   Substance Use Topics    Alcohol use: No     Alcohol/week: 0.0 standard drinks    Drug use: No       Current Outpatient Medications:     vitamin D (ERGOCALCIFEROL) 1.25 MG (67071 UT) CAPS capsule, Take 50,000 Units by mouth once a week, Disp: , Rfl:     rivaroxaban (XARELTO) 20 MG TABS tablet, Take 1 tablet by mouth daily (with breakfast), Disp: 90 tablet, Rfl: 1    atorvastatin (LIPITOR) 80 MG tablet, Take 0.5 tablets by mouth daily, Disp: 90 tablet, Rfl: 1    brimonidine (ALPHAGAN) 0.2 % ophthalmic solution, Apply 1 drop to eye 2 times daily, Disp: , Rfl:     lisinopril (PRINIVIL;ZESTRIL) 40 MG tablet, Take 20 mg by mouth daily, Disp: , Rfl:     prednisoLONE acetate (PRED FORTE) 1 % ophthalmic suspension, Apply 1 drop to eye 2 times daily, Disp: , Rfl:     timolol (TIMOPTIC) 0.5 % ophthalmic solution, Apply 1 drop to eye 2 times daily, Disp: , Rfl:     carboxymethylcellulose (REFRESH PLUS) 0.5 % SOLN ophthalmic solution, Apply 1-2 drops to eye 4 times daily as needed (Patient not taking: Reported on 11/15/2022), Disp: , Rfl:     cycloSPORINE (RESTASIS) 0.05 % ophthalmic emulsion, Apply 1 drop to eye in the morning and 1 drop before bedtime.  (Patient not taking: Reported on 11/15/2022), Disp: , Rfl:     moxifloxacin (VIGAMOX) 0.5 % ophthalmic solution, moxifloxacin 0.5 % eye drops  INSTILL 1 DROP IN LEFT EYE FOUR TIMES DAILY FOR 4 DAYS ONLY (Patient not taking: Reported on 11/15/2022), Disp: , Rfl:     ofloxacin (OCUFLOX) 0.3 % solution, ofloxacin 0.3 % eye drops  INSTILL 1 DROP IN LEFT EYE FOUR TIMES DAILY (Patient not taking: Reported on 11/15/2022), Disp: , Rfl:     Allergies   Allergen Reactions    Epinephrine Anaphylaxis     Blindness in right eye        Review of Systems   Constitutional:  Positive for appetite change, chills and fever (subjective). Respiratory:  Negative for cough, shortness of breath and wheezing. Cardiovascular:  Negative for chest pain, palpitations and leg swelling. Gastrointestinal:  Positive for abdominal pain, constipation and diarrhea. Negative for blood in stool, nausea and vomiting. Genitourinary:  Positive for flank pain and frequency. Negative for dysuria, hematuria and urgency. Neurological:  Negative for dizziness, light-headedness and headaches. Objective:  /70 (Site: Left Upper Arm, Position: Sitting, Cuff Size: Medium Adult)   Pulse (!) 49   Temp 97.2 °F (36.2 °C)   Resp 16   Ht 5' 9\" (1.753 m)   Wt 169 lb (76.7 kg)   SpO2 96%   BMI 24.96 kg/m²     Examination:  Physical Exam  Vitals and nursing note reviewed. Constitutional:       General: He is not in acute distress. Appearance: Normal appearance. He is not ill-appearing. HENT:      Right Ear: Tympanic membrane, ear canal and external ear normal.      Left Ear: Tympanic membrane, ear canal and external ear normal.      Nose: Nose normal.      Mouth/Throat:      Mouth: Mucous membranes are moist.      Pharynx: Oropharynx is clear. Cardiovascular:      Rate and Rhythm: Regular rhythm. Bradycardia present. Pulmonary:      Effort: Pulmonary effort is normal. No respiratory distress. Breath sounds: Normal breath sounds.  No wheezing or rales.   Abdominal:      General: Bowel sounds are normal.      Palpations: Abdomen is soft. Tenderness: There is abdominal tenderness. There is guarding. There is no right CVA tenderness or left CVA tenderness. Musculoskeletal:      Right lower leg: No edema. Left lower leg: No edema. Skin:     General: Skin is warm and dry. Neurological:      Mental Status: He is alert and oriented to person, place, and time. Assessment/Plan:    Dwight Collins was seen today for back pain and abdominal pain. Diagnoses and all orders for this visit:    Generalized abdominal pain  -     CT ABDOMEN PELVIS W IV CONTRAST Additional Contrast? Radiologist Recommendation; Future  -     AMB POC URINALYSIS DIP STICK AUTO W/O MICRO  -     CBC with Auto Differential; Future  -     Comprehensive Metabolic Panel; Future  -     Lipase; Future  Urine and blood work today. STAT CT abdomen/pelvis. Plan pending results. Right flank pain  -     CT ABDOMEN PELVIS W IV CONTRAST Additional Contrast? Radiologist Recommendation; Future  -     AMB POC URINALYSIS DIP STICK AUTO W/O MICRO  -     CBC with Auto Differential; Future  -     Comprehensive Metabolic Panel; Future          Follow-up and Dispositions    Return if symptoms worsen or fail to improve. On this date, 11/15/22, I have spent 30 minutes reviewing previous notes, test results and face to face with the patient discussing the diagnosis and importance of compliance with the treatment plan as well as documenting on the day of the visit. An electronic signature was used to authenticate this note.   MYAH Mckeon

## 2022-11-15 NOTE — PROGRESS NOTES
CT results as below. Acute diverticulitis. RX for Flagyl and Cipro sent to patient's pharmacy. Dilation of pancreatic duct- MRI abdomen W/WO contrast MRCP ordered as recommended by radiology. Will have patient follow-up in office in about 2 weeks. CT Result (most recent):  CT ABDOMEN PELVIS W IV CONTRAST 11/15/2022    Narrative  EXAMINATION: CT  ABDOMEN / PELVIS   11/15/2022 1:32 PM    ACCESSION NUMBER:  [id number]    INDICATION:  Generalized abdominal pain; Unspecified abdominal pain    COMPARISON: CT abdomen and pelvis, 3/5/2010    TECHNIQUE: Contiguous axial computed tomographic images were obtained from the  domes of the diaphragm to the symphysis pubis after the intravenous  administration of 100 mL of Isovue 370. Coronal and sagittal reconstructions  were also performed. Radiation dose reduction techniques were used for this study:  Our CT scanners  use one or all of the following: Automated exposure control, adjustment of the  mA and/or kVp according to patient's size, iterative reconstruction. FINDINGS:    LOWER THORAX: Bibasilar subsegmental atelectasis. Mild cardiomegaly. No  pericardial effusion. LIVER: Normal    BILIARY: Status post cholecystectomy with prominence of the biliary tree likely  related to reservoir effect and age. SPLEEN: Scattered subcentimeter hypodensities are nonspecific but statistically  benign. PANCREAS: There is pancreatic duct dilation measuring up to 10 mm within the  head. Diffuse parenchymal atrophy. ADRENALS: No adrenal nodules or hypertrophy. URINARY: Kidneys are symmetric in size and enhancement with multifocal cortical  scarring bilaterally. Simple cyst on the left kidney measures up to 3.5 cm. 1.9  cm simple cyst on the right inferior pole. Additional subcentimeter  hypodensities bilaterally are too small to characterize but statistically cysts. No hydronephrosis or urinary tract calculi.  Ureters and urinary bladder are  within normal limits when Kifer streak artifact limiting evaluation of the  bladder. BOWEL: No evidence of bowel obstruction. Colonic diverticulosis, most advanced  within the sigmoid colon where there is a long segment of wall thickening with  pericolonic fat stranding. No evidence of free air or pericolonic abscess. The  appendix is not definitively visualized, however, no secondary signs of  appendicitis. VASCULAR: The abdominal aorta and iliac arterial system are nonaneurysmal with  moderate atherosclerotic calcifications. Infrarenal IVC filter is unchanged in  positioning. NODES: No lymphadenopathy. FLUID: No free intraperitoneal fluid. REPRODUCTIVE: Prostatomegaly. BONES/SOFT TISSUES: Regional soft tissues are within normal limits. Postsurgical  changes of left total hip arthroplasty with resultant streak artifact. No acute  or aggressive osseous abnormality. Impression  1. Acute sigmoid diverticulitis without complication. 2. Focal dilation of the pancreatic duct measuring up to 10 mm. MRI/MRCP with  and without contrast is recommended. 3. Chronic findings as described.

## 2022-11-22 ENCOUNTER — TELEPHONE (OUTPATIENT)
Dept: INTERNAL MEDICINE CLINIC | Facility: CLINIC | Age: 77
End: 2022-11-22

## 2022-11-22 DIAGNOSIS — K57.92 ACUTE DIVERTICULITIS: Primary | ICD-10-CM

## 2022-11-22 RX ORDER — AMOXICILLIN AND CLAVULANATE POTASSIUM 875; 125 MG/1; MG/1
1 TABLET, FILM COATED ORAL 2 TIMES DAILY
Qty: 8 TABLET | Refills: 0 | Status: SHIPPED | OUTPATIENT
Start: 2022-11-22 | End: 2022-11-26

## 2022-11-22 NOTE — TELEPHONE ENCOUNTER
Pt said since taking Metronidazole and Ciprofloxacin  he has been having diarrhea since Tuesday of last week. No amy and haven't been eating. Needs to know is he should stop taking the medication.  Please advise    Thank you    Kyle Saldivar

## 2022-12-01 ENCOUNTER — OFFICE VISIT (OUTPATIENT)
Dept: INTERNAL MEDICINE CLINIC | Facility: CLINIC | Age: 77
End: 2022-12-01
Payer: MEDICARE

## 2022-12-01 VITALS
WEIGHT: 176 LBS | RESPIRATION RATE: 16 BRPM | HEART RATE: 54 BPM | SYSTOLIC BLOOD PRESSURE: 117 MMHG | HEIGHT: 69 IN | BODY MASS INDEX: 26.07 KG/M2 | TEMPERATURE: 98.2 F | OXYGEN SATURATION: 98 % | DIASTOLIC BLOOD PRESSURE: 60 MMHG

## 2022-12-01 DIAGNOSIS — K86.89 DILATION OF PANCREATIC DUCT: ICD-10-CM

## 2022-12-01 DIAGNOSIS — K57.32 DIVERTICULITIS OF SIGMOID COLON: Primary | ICD-10-CM

## 2022-12-01 PROCEDURE — 1123F ACP DISCUSS/DSCN MKR DOCD: CPT | Performed by: NURSE PRACTITIONER

## 2022-12-01 PROCEDURE — G8427 DOCREV CUR MEDS BY ELIG CLIN: HCPCS | Performed by: NURSE PRACTITIONER

## 2022-12-01 PROCEDURE — 99214 OFFICE O/P EST MOD 30 MIN: CPT | Performed by: NURSE PRACTITIONER

## 2022-12-01 PROCEDURE — 3074F SYST BP LT 130 MM HG: CPT | Performed by: NURSE PRACTITIONER

## 2022-12-01 PROCEDURE — G8484 FLU IMMUNIZE NO ADMIN: HCPCS | Performed by: NURSE PRACTITIONER

## 2022-12-01 PROCEDURE — 1036F TOBACCO NON-USER: CPT | Performed by: NURSE PRACTITIONER

## 2022-12-01 PROCEDURE — 3017F COLORECTAL CA SCREEN DOC REV: CPT | Performed by: NURSE PRACTITIONER

## 2022-12-01 PROCEDURE — G8417 CALC BMI ABV UP PARAM F/U: HCPCS | Performed by: NURSE PRACTITIONER

## 2022-12-01 PROCEDURE — 3078F DIAST BP <80 MM HG: CPT | Performed by: NURSE PRACTITIONER

## 2022-12-01 RX ORDER — HYDROCODONE BITARTRATE AND ACETAMINOPHEN 10; 325 MG/1; MG/1
TABLET ORAL
COMMUNITY

## 2022-12-01 ASSESSMENT — PATIENT HEALTH QUESTIONNAIRE - PHQ9
SUM OF ALL RESPONSES TO PHQ QUESTIONS 1-9: 0
SUM OF ALL RESPONSES TO PHQ9 QUESTIONS 1 & 2: 0
SUM OF ALL RESPONSES TO PHQ QUESTIONS 1-9: 0
2. FEELING DOWN, DEPRESSED OR HOPELESS: 0
SUM OF ALL RESPONSES TO PHQ QUESTIONS 1-9: 0
SUM OF ALL RESPONSES TO PHQ QUESTIONS 1-9: 0
1. LITTLE INTEREST OR PLEASURE IN DOING THINGS: 0

## 2022-12-01 ASSESSMENT — ENCOUNTER SYMPTOMS
DIARRHEA: 1
VOMITING: 0
COUGH: 0
SHORTNESS OF BREATH: 0
ABDOMINAL PAIN: 0
CONSTIPATION: 0
NAUSEA: 0

## 2022-12-01 NOTE — PROGRESS NOTES
[facility]      2022    Patient Name: Tania Gamez  :  1945      Chief Complaint:  Chief Complaint   Patient presents with    Follow-up     2 week         HPI  Patient presents today accompanied by his wife for follow-up on recent diverticulitis. He was seen in our office on 11/15/22 for complaint of abdominal pain and subjective fever. CT ordered which revealed  acute sigmoid diverticulitis. Prescription for Cipro and Flagyl were sent to the pharmacy. After being on the medications for about 7 days, he called our office complaining of diarrhea which he attributed to the antibiotics. He requested a medication change. RX for Augmentin was sent to his pharmacy to complete treatment. Diarrhea is still present but has improved significantly since stopping the antibiotics. He denies any further fever or abdominal pain. CT also showed dilation of the pancreatic duct measuring up to 10 mm. MRI/MRCP was recommended for further evaluation. Order placed on 11/15/22. Patient reports today that he has not heard from anyone about scheduling the study but that he had a nerve stimulator placed yesterday and can no longer undergo an MRI. Past Medical History:   Diagnosis Date    Acute sinusitis     Anaphylactic reaction 2016    AR (allergic rhinitis) 2016    Arrhythmia     bradycardia    Bicipital tenosynovitis 2012    Bradycardia 2016    Seen by [facility]-no follow-up required.      Carpal tunnel syndrome 2012    Chronic back pain 2014    Chronic deep vein thrombosis of lower extremity (HCC) 3/21/2016    Chronic pain     right shoulder    Chronic pansinusitis 2016    Chronic sinusitis 2016    DNS (deviated nasal septum)     EARLY (dyspnea on exertion) 2014    Elevated serum creatinine 2014    Epistaxis     Factor V Leiden ([facility] [address]     managed with medication     Glaucoma     bilateral     H/O echocardiogram 2016    EF 70-75% Hereditary deficiency of other clotting factors ([address] 3/21/2016    Hypercholesteremia     Hyperkalemia 12/23/2014    Hypertension     Hypertrophy of nasal turbinates 7/27/2016    Left leg DVT ([address] 12/23/2014 1997, 2014, 8/2015-Followed by Dr. Clotilde Mendoza. Takes Xarelto daily.      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    Sinus problem     Spinal stenosis, lumbar region, with neurogenic claudication 3/24/2014    Superior glenoid labrum lesion 2/17/2012    Supraspinatus (muscle) (tendon) sprain 2/17/2012    Thromboembolus ([address] 9/1/1997    left thigh DVT     Unspecified adverse effect of anesthesia     very difficult IV stick per patient d/t Lovenox, has needed anesthesiologist to start several times/comes in a day ahead for PICC line        Past Surgical History:   Procedure Laterality Date    APPENDECTOMY  1980    BACK SURGERY      spinal stenosis    BLEPHAROPLASTY Bilateral 2009    CARPAL TUNNEL RELEASE  2012    left    [address]    right    CATARACT REMOVAL Bilateral     left eye 1994 & right eye 2002    CHOLECYSTECTOMY  2001    CHOLECYSTECTOMY, LAPAROSCOPIC  2001    COLONOSCOPY  2000, 2005, 2010    Dr. Emily Meredith Right 06/14/2016    sutures still present     CORNEAL TRANSPLANT Right 2004    HEENT      nasal reconstruction    HEENT      nasal polypectomy    HEENT Right 11/08     glaucoma surgery    HERNIA REPAIR  2010    abdominal hernia repair    HERNIA REPAIR Left 2007    inguinal hernia repair    KNEE ARTHROSCOPY Right 2006     knee meniscus repair    LUMBAR LAMINECTOMY      ORTHOPEDIC SURGERY Bilateral     open shoulder AC reconstruction ORTHOPEDIC SURGERY Right 2006    neuroma fromfoot    ORTHOPEDIC SURGERY Bilateral     right elbow 1979 & left elbow 2007    ORTHOPEDIC SURGERY Right     index finger    OTHER SURGICAL HISTORY  2000    odalys filter placed    OTHER SURGICAL HISTORY Bilateral     left in 1994, right in 2002     OTHER SURGICAL HISTORY Right     cornea transplant     OTHER SURGICAL HISTORY Right     glaucoma implant     OTHER SURGICAL HISTORY Right 2006    neuroma of foot     SHOULDER ARTHROSCOPY Left 2/17/2012    SINUS SURGERY PROC UNLISTED      numerous    TONSILLECTOMY      TONSILLECTOMY  1950    TOTAL HIP ARTHROPLASTY Left 12/12/2012    VASCULAR SURGERY  2012    PICC- removed    VITRECTOMY Left 6/15/15       Family History   Problem Relation Age of Onset    Cancer Mother         lymphoma    Diabetes Mother         type II    Heart Disease Father         CAD, PPM     Diabetes Father         type II    Pancreatic Cancer Father     Breast Cancer Sister     Other Brother         AAA     Malig Hypertherm Neg Hx     Pseudochol. Deficiency Neg Hx     Delayed Awakening Neg Hx     Post-op Nausea/Vomiting Neg Hx     Emergence Delirium Neg Hx     Post-op Cognitive Dysfunction Neg Hx        Social History     Tobacco Use    Smoking status: Never    Smokeless tobacco: Never   Substance Use Topics    Alcohol use: No     Alcohol/week: 0.0 standard drinks    Drug use: No       Current Outpatient Medications:     HYDROcodone-acetaminophen (NORCO)  MG per tablet, hydrocodone 10 mg-acetaminophen 325 mg tablet  TAKE 1 TABLET BY MOUTH EVERY 6 HOURS AS NEEDED FOR PAIN.  REDUCE DOSE TAKEN AS PAIN BECOMES MANAGEABLE, Disp: , Rfl:     vitamin D (ERGOCALCIFEROL) 1.25 MG (69689 UT) CAPS capsule, Take 50,000 Units by mouth once a week, Disp: , Rfl:     rivaroxaban (XARELTO) 20 MG TABS tablet, Take 1 tablet by mouth daily (with breakfast), Disp: 90 tablet, Rfl: 1    atorvastatin (LIPITOR) 80 MG tablet, Take 0.5 tablets by mouth daily, Disp: 90 tablet, Rfl: 1    brimonidine (ALPHAGAN) 0.2 % ophthalmic solution, Apply 1 drop to eye 2 times daily, Disp: , Rfl:     lisinopril (PRINIVIL;ZESTRIL) 40 MG tablet, Take 20 mg by mouth daily, Disp: , Rfl:     prednisoLONE acetate (PRED FORTE) 1 % ophthalmic suspension, Apply 1 drop to eye 2 times daily, Disp: , Rfl:     timolol (TIMOPTIC) 0.5 % ophthalmic solution, Apply 1 drop to eye 2 times daily, Disp: , Rfl:     carboxymethylcellulose (REFRESH PLUS) 0.5 % SOLN ophthalmic solution, Apply 1-2 drops to eye 4 times daily as needed (Patient not taking: No sig reported), Disp: , Rfl:     cycloSPORINE (RESTASIS) 0.05 % ophthalmic emulsion, Apply 1 drop to eye in the morning and 1 drop before bedtime. (Patient not taking: No sig reported), Disp: , Rfl:     moxifloxacin (VIGAMOX) 0.5 % ophthalmic solution, moxifloxacin 0.5 % eye drops  INSTILL 1 DROP IN LEFT EYE FOUR TIMES DAILY FOR 4 DAYS ONLY (Patient not taking: No sig reported), Disp: , Rfl:     ofloxacin (OCUFLOX) 0.3 % solution, ofloxacin 0.3 % eye drops  INSTILL 1 DROP IN LEFT EYE FOUR TIMES DAILY (Patient not taking: No sig reported), Disp: , Rfl:     Allergies   Allergen Reactions    Epinephrine Anaphylaxis     Blindness in right eye        Review of Systems   Constitutional:  Negative for chills, fever and unexpected weight change. Respiratory:  Negative for cough and shortness of breath. Cardiovascular:  Negative for chest pain and palpitations. Gastrointestinal:  Positive for diarrhea (improving). Negative for abdominal pain, constipation, nausea and vomiting. Genitourinary:  Negative for dysuria. Neurological:  Negative for dizziness, light-headedness and headaches.        Objective:  /60 (Site: Left Upper Arm, Position: Sitting, Cuff Size: Large Adult)   Pulse 54   Temp 98.2 °F (36.8 °C) (Temporal)   Resp 16   Ht 5' 9\" (1.753 m)   Wt 176 lb (79.8 kg)   SpO2 98%   BMI 25.99 kg/m²     Examination:  Physical Exam  Vitals and nursing note reviewed. Constitutional:       General: He is not in acute distress. Appearance: Normal appearance. HENT:      Mouth/Throat:      Mouth: Mucous membranes are moist.      Pharynx: Oropharynx is clear. Cardiovascular:      Rate and Rhythm: Normal rate and regular rhythm. Pulmonary:      Effort: Pulmonary effort is normal. No respiratory distress. Breath sounds: Normal breath sounds. Abdominal:      General: Bowel sounds are normal. There is no distension. Palpations: Abdomen is soft. Tenderness: There is no abdominal tenderness. There is no guarding or rebound. Musculoskeletal:      Right lower leg: No edema. Left lower leg: No edema. Skin:     General: Skin is warm and dry. Neurological:      Mental Status: He is alert and oriented to person, place, and time. Psychiatric:         Mood and Affect: Mood normal.         Assessment/Plan:    Nunu Dolan was seen today for follow-up. Diagnoses and all orders for this visit:    Diverticulitis of sigmoid colon  No further abdominal pain or fever. Still with diarrhea but improving. Recommended daily probiotic. Advance diet slowly. Dilation of pancreatic duct  Patient reports that he had a nerve stimulator placed yesterday and is not able to undergo an MRI. Will consult with Dr. Joceline Walters regarding next steps in evaluation. On this date, 12/1/22, I have spent 35 minutes reviewing previous notes, test results and face to face with the patient discussing the diagnosis and importance of compliance with the treatment plan as well as documenting on the day of the visit. An electronic signature was used to authenticate this note.   MYAH Villanueva

## 2022-12-03 DIAGNOSIS — K57.92 ACUTE DIVERTICULITIS: ICD-10-CM

## 2022-12-03 DIAGNOSIS — K86.89 DILATION OF PANCREATIC DUCT: Primary | ICD-10-CM

## 2022-12-08 ENCOUNTER — TRANSCRIBE ORDERS (OUTPATIENT)
Dept: SCHEDULING | Age: 77
End: 2022-12-08

## 2022-12-08 DIAGNOSIS — R19.7 DIARRHEA, UNSPECIFIED TYPE: ICD-10-CM

## 2022-12-08 DIAGNOSIS — R10.84 GENERALIZED ABDOMINAL PAIN: Primary | ICD-10-CM

## 2022-12-08 DIAGNOSIS — Z96.89 PRESENCE OF OTHER SPECIFIED FUNCTIONAL IMPLANTS: ICD-10-CM

## 2022-12-08 DIAGNOSIS — R93.5 ABNORMAL FINDINGS ON DIAGNOSTIC IMAGING OF ABDOMEN: ICD-10-CM

## 2022-12-14 ENCOUNTER — HOSPITAL ENCOUNTER (OUTPATIENT)
Dept: CT IMAGING | Age: 77
Discharge: HOME | End: 2022-12-17
Payer: MEDICARE

## 2022-12-14 DIAGNOSIS — R93.5 ABNORMAL FINDINGS ON DIAGNOSTIC IMAGING OF ABDOMEN: ICD-10-CM

## 2022-12-14 DIAGNOSIS — R19.7 DIARRHEA, UNSPECIFIED TYPE: ICD-10-CM

## 2022-12-14 DIAGNOSIS — Z96.89 PRESENCE OF OTHER SPECIFIED FUNCTIONAL IMPLANTS: ICD-10-CM

## 2022-12-14 DIAGNOSIS — R10.84 GENERALIZED ABDOMINAL PAIN: ICD-10-CM

## 2022-12-14 PROCEDURE — 6360000004 HC RX CONTRAST MEDICATION: Performed by: INTERNAL MEDICINE

## 2022-12-14 PROCEDURE — 74160 CT ABDOMEN W/CONTRAST: CPT

## 2022-12-14 PROCEDURE — 2580000003 HC RX 258: Performed by: INTERNAL MEDICINE

## 2022-12-14 RX ORDER — 0.9 % SODIUM CHLORIDE 0.9 %
100 INTRAVENOUS SOLUTION INTRAVENOUS
Status: COMPLETED | OUTPATIENT
Start: 2022-12-14 | End: 2022-12-14

## 2022-12-14 RX ORDER — SODIUM CHLORIDE 0.9 % (FLUSH) 0.9 %
10 SYRINGE (ML) INJECTION
Status: DISCONTINUED | OUTPATIENT
Start: 2022-12-14 | End: 2022-12-18 | Stop reason: HOSPADM

## 2022-12-14 RX ADMIN — IOHEXOL 100 ML: 350 INJECTION, SOLUTION INTRAVENOUS at 11:33

## 2022-12-14 RX ADMIN — SODIUM CHLORIDE 100 ML: 9 INJECTION, SOLUTION INTRAVENOUS at 11:35

## 2022-12-22 ENCOUNTER — PREP FOR PROCEDURE (OUTPATIENT)
Dept: ADMINISTRATIVE | Age: 77
End: 2022-12-22

## 2022-12-23 RX ORDER — SODIUM CHLORIDE 0.9 % (FLUSH) 0.9 %
5-40 SYRINGE (ML) INJECTION EVERY 12 HOURS SCHEDULED
Status: CANCELLED | OUTPATIENT
Start: 2022-12-23

## 2022-12-23 RX ORDER — SODIUM CHLORIDE 9 MG/ML
INJECTION, SOLUTION INTRAVENOUS PRN
Status: CANCELLED | OUTPATIENT
Start: 2022-12-23

## 2022-12-23 RX ORDER — SODIUM CHLORIDE 0.9 % (FLUSH) 0.9 %
5-40 SYRINGE (ML) INJECTION PRN
Status: CANCELLED | OUTPATIENT
Start: 2022-12-23

## 2023-01-11 ENCOUNTER — CLINICAL DOCUMENTATION (OUTPATIENT)
Dept: ONCOLOGY | Age: 78
End: 2023-01-11

## 2023-01-11 RX ORDER — DOXYCYCLINE HYCLATE 100 MG/1
CAPSULE ORAL 3 TIMES DAILY
COMMUNITY
Start: 2022-11-30

## 2023-01-11 NOTE — PROGRESS NOTES
Anti coag hold form again received from [facility]. Form given to Dr. Amina Sun nurse for signature.

## 2023-01-11 NOTE — PERIOP NOTE
Patient verified name, , and procedure. Type: 1a; abbreviated assessment per anesthesia guidelines  Labs per surgeon: none ordered  Labs per anesthesia: not indicated      Instructed pt that they will be notified by the Gi [facility] for time of arrival. If any questions please call the GI lab at [phone]. Follow diet and prep instructions per office. May have clear liquids until 4 hours prior to time of arrival.    [place] R[place] or shower the night before and the am of surgery with antibacterial soap. No lotions, oils, powders, cologne on skin. No make up, eye make up or jewelry. Wear loose fitting comfortable, clean clothing. Must have adult present in building the entire time . Medications for the day of procedure:  hydrocodone-acetaminophen if needed,  atorvastatin, use eye drops as directed, doxycycline. Hold vitamins 7 days prior to procedure and NSAIDS 5 days prior to procedure. Hold xarelto as instructed by surgeon and prescribing doctor. The patient stated that he did not receive any instructions to hold the xarelto. Instructed the patient to call [facility] for instructions. SAFEMAIL sent to Jaylen Baltazar at [employer] notifying that the patient stated he did not receive any instructions to hold xarelto. The following discharge instructions reviewed with patient: medication given during procedure may cause drowsiness for several hours, therefore, do not drive or operate machinery for remainder of the day, no alcohol on the day of your procedure, resume regular diet and activity unless otherwise directed, for mild sore throat you may use Cepacol throat lozenges or warm salt water gargles as needed, call your physician for any problems or questions. Patient verbalizes understanding.

## 2023-01-16 ENCOUNTER — TELEPHONE (OUTPATIENT)
Dept: ONCOLOGY | Age: 78
End: 2023-01-16

## 2023-01-16 NOTE — TELEPHONE ENCOUNTER
Spoke with [facility] with [facility]. States they have not yet received the form back about his xearelto hold. It is a 48 hour hold request.  Per Dr. Fraga it is ok to hold the xarelto. Cata stated she can take that as a verbal order.she appreciated the follow up today

## 2023-01-18 ENCOUNTER — HOSPITAL ENCOUNTER (OUTPATIENT)
Age: 78
Setting detail: OUTPATIENT SURGERY
Discharge: HOME | End: 2023-01-18
Attending: INTERNAL MEDICINE | Admitting: INTERNAL MEDICINE
Payer: MEDICARE

## 2023-01-18 ENCOUNTER — ANESTHESIA EVENT (OUTPATIENT)
Dept: ENDOSCOPY | Age: 78
End: 2023-01-18
Payer: MEDICARE

## 2023-01-18 ENCOUNTER — ANESTHESIA (OUTPATIENT)
Dept: ENDOSCOPY | Age: 78
End: 2023-01-18
Payer: MEDICARE

## 2023-01-18 VITALS
HEIGHT: 69 IN | HEART RATE: 52 BPM | RESPIRATION RATE: 14 BRPM | TEMPERATURE: 98.1 F | BODY MASS INDEX: 24.88 KG/M2 | SYSTOLIC BLOOD PRESSURE: 117 MMHG | DIASTOLIC BLOOD PRESSURE: 58 MMHG | WEIGHT: 168 LBS | OXYGEN SATURATION: 97 %

## 2023-01-18 DIAGNOSIS — K86.89 DILATED PANCREATIC DUCT: ICD-10-CM

## 2023-01-18 PROCEDURE — 7100000010 HC PHASE II RECOVERY - FIRST 15 MIN: Performed by: INTERNAL MEDICINE

## 2023-01-18 PROCEDURE — 2580000003 HC RX 258: Performed by: ANESTHESIOLOGY

## 2023-01-18 PROCEDURE — 2500000003 HC RX 250 WO HCPCS: Performed by: NURSE ANESTHETIST, CERTIFIED REGISTERED

## 2023-01-18 PROCEDURE — 3700000000 HC ANESTHESIA ATTENDED CARE: Performed by: INTERNAL MEDICINE

## 2023-01-18 PROCEDURE — 6370000000 HC RX 637 (ALT 250 FOR IP): Performed by: NURSE ANESTHETIST, CERTIFIED REGISTERED

## 2023-01-18 PROCEDURE — 6360000002 HC RX W HCPCS: Performed by: NURSE ANESTHETIST, CERTIFIED REGISTERED

## 2023-01-18 PROCEDURE — 2709999900 HC NON-CHARGEABLE SUPPLY: Performed by: INTERNAL MEDICINE

## 2023-01-18 PROCEDURE — 7100000011 HC PHASE II RECOVERY - ADDTL 15 MIN: Performed by: INTERNAL MEDICINE

## 2023-01-18 PROCEDURE — 82378 CARCINOEMBRYONIC ANTIGEN: CPT

## 2023-01-18 PROCEDURE — 82150 ASSAY OF AMYLASE: CPT

## 2023-01-18 PROCEDURE — 3609018500 HC EGD US SCOPE W/ADJACENT STRUCTURES: Performed by: INTERNAL MEDICINE

## 2023-01-18 PROCEDURE — 2720000010 HC SURG SUPPLY STERILE: Performed by: INTERNAL MEDICINE

## 2023-01-18 PROCEDURE — 6360000002 HC RX W HCPCS: Performed by: INTERNAL MEDICINE

## 2023-01-18 PROCEDURE — 88173 CYTOPATH EVAL FNA REPORT: CPT

## 2023-01-18 PROCEDURE — 3700000001 HC ADD 15 MINUTES (ANESTHESIA): Performed by: INTERNAL MEDICINE

## 2023-01-18 RX ORDER — GLYCOPYRROLATE 0.2 MG/ML
INJECTION INTRAMUSCULAR; INTRAVENOUS PRN
Status: DISCONTINUED | OUTPATIENT
Start: 2023-01-18 | End: 2023-01-18 | Stop reason: SDUPTHER

## 2023-01-18 RX ORDER — SODIUM CHLORIDE 0.9 % (FLUSH) 0.9 %
5-40 SYRINGE (ML) INJECTION EVERY 12 HOURS SCHEDULED
Status: DISCONTINUED | OUTPATIENT
Start: 2023-01-18 | End: 2023-01-18 | Stop reason: HOSPADM

## 2023-01-18 RX ORDER — LIDOCAINE HYDROCHLORIDE 20 MG/ML
INJECTION, SOLUTION EPIDURAL; INFILTRATION; INTRACAUDAL; PERINEURAL PRN
Status: DISCONTINUED | OUTPATIENT
Start: 2023-01-18 | End: 2023-01-18 | Stop reason: SDUPTHER

## 2023-01-18 RX ORDER — SODIUM CHLORIDE 0.9 % (FLUSH) 0.9 %
5-40 SYRINGE (ML) INJECTION PRN
Status: DISCONTINUED | OUTPATIENT
Start: 2023-01-18 | End: 2023-01-18 | Stop reason: HOSPADM

## 2023-01-18 RX ORDER — SODIUM CHLORIDE 9 MG/ML
INJECTION, SOLUTION INTRAVENOUS PRN
Status: DISCONTINUED | OUTPATIENT
Start: 2023-01-18 | End: 2023-01-18 | Stop reason: HOSPADM

## 2023-01-18 RX ORDER — PROPOFOL 10 MG/ML
INJECTION, EMULSION INTRAVENOUS PRN
Status: DISCONTINUED | OUTPATIENT
Start: 2023-01-18 | End: 2023-01-18 | Stop reason: SDUPTHER

## 2023-01-18 RX ORDER — LEVOFLOXACIN 5 MG/ML
500 INJECTION, SOLUTION INTRAVENOUS ONCE
Status: COMPLETED | OUTPATIENT
Start: 2023-01-18 | End: 2023-01-18

## 2023-01-18 RX ORDER — EPHEDRINE SULFATE/0.9% NACL/PF 50 MG/5 ML
SYRINGE (ML) INTRAVENOUS PRN
Status: DISCONTINUED | OUTPATIENT
Start: 2023-01-18 | End: 2023-01-18 | Stop reason: SDUPTHER

## 2023-01-18 RX ORDER — SODIUM CHLORIDE, SODIUM LACTATE, POTASSIUM CHLORIDE, CALCIUM CHLORIDE 600; 310; 30; 20 MG/100ML; MG/100ML; MG/100ML; MG/100ML
INJECTION, SOLUTION INTRAVENOUS CONTINUOUS
Status: DISCONTINUED | OUTPATIENT
Start: 2023-01-18 | End: 2023-01-18 | Stop reason: HOSPADM

## 2023-01-18 RX ADMIN — PROPOFOL 50 MG: 10 INJECTION, EMULSION INTRAVENOUS at 14:32

## 2023-01-18 RX ADMIN — GLYCOPYRROLATE 0.1 MG: 0.2 INJECTION INTRAMUSCULAR; INTRAVENOUS at 14:32

## 2023-01-18 RX ADMIN — BENZOCAINE 1 EACH: 220 SPRAY, METERED PERIODONTAL at 14:32

## 2023-01-18 RX ADMIN — Medication 10 MG: at 14:40

## 2023-01-18 RX ADMIN — SODIUM CHLORIDE, SODIUM LACTATE, POTASSIUM CHLORIDE, AND CALCIUM CHLORIDE: 600; 310; 30; 20 INJECTION, SOLUTION INTRAVENOUS at 14:26

## 2023-01-18 RX ADMIN — LEVOFLOXACIN 500 MG: 5 INJECTION, SOLUTION INTRAVENOUS at 15:22

## 2023-01-18 RX ADMIN — PROPOFOL 180 MCG/KG/MIN: 10 INJECTION, EMULSION INTRAVENOUS at 14:33

## 2023-01-18 RX ADMIN — LIDOCAINE HYDROCHLORIDE 100 MG: 20 INJECTION, SOLUTION EPIDURAL; INFILTRATION; INTRACAUDAL; PERINEURAL at 14:32

## 2023-01-18 ASSESSMENT — PAIN DESCRIPTION - DESCRIPTORS: DESCRIPTORS: ACHING

## 2023-01-18 ASSESSMENT — PAIN - FUNCTIONAL ASSESSMENT: PAIN_FUNCTIONAL_ASSESSMENT: 0-10

## 2023-01-18 ASSESSMENT — PAIN SCALES - GENERAL
PAINLEVEL_OUTOF10: 0

## 2023-01-18 ASSESSMENT — ENCOUNTER SYMPTOMS: SHORTNESS OF BREATH: 1

## 2023-01-18 NOTE — ANESTHESIA POSTPROCEDURE EVALUATION
Department of Anesthesiology  Postprocedure Note    Patient: Belle Mitchell  MRN: [id number]  YOB: 1945  Date of evaluation: 1/18/2023      Procedure Summary     Date: 01/18/23 Room / Location: [facility] ENDO 05 / [facility] ENDOSCOPY    Anesthesia Start: 0111 Anesthesia Stop: 0091    Procedure: ENDOSCOPIC ULTRASOUND, fna (Upper GI Region) Diagnosis:       Dilated pancreatic duct      (Dilated pancreatic duct [K86.89])    Surgeons: Aneta Knutson MD Responsible Provider: Deejay Camacho MD    Anesthesia Type: TIVA ASA Status: 3          Anesthesia Type: TIVA    Brandee Phase I: Brandee Score: 10    Brandee Phase II:        Anesthesia Post Evaluation    Patient location during evaluation: PACU  Patient participation: complete - patient participated  Level of consciousness: awake and alert  Airway patency: patent  Nausea & Vomiting: no nausea and no vomiting  Complications: no  Cardiovascular status: hemodynamically stable  Respiratory status: acceptable, nonlabored ventilation and spontaneous ventilation  Hydration status: euvolemic  Comments: /63   Pulse 57   Temp 98.1 °F (36.7 °C) (Temporal)   Resp 14   Ht 5' 9\" (1.753 m)   Wt 168 lb (76.2 kg)   SpO2 98%   BMI 24.81 kg/m²     Multimodal analgesia pain management approach

## 2023-01-18 NOTE — PROGRESS NOTES
's pre-procedure visit requested by patient. Conveyed care and concern for patient and family. Offered prayer as requested for patient, family, and staff.     Betty Balderas MDiv, BS  Board Certified

## 2023-01-18 NOTE — OP NOTE
Procedure:  Endoscopic ultrasound with Doppler and Fine needle aspiration    Date of Procedure:  1/18/2023    Patient:  Natalia Valenzuela     1945    Indication:  Pancreatic cyst    Sedation:  MAC    Pre-Procedure Physical Exam:    Mental status:  alert and oriented  Airway:  normal oropharyngeal airway and neck mobility  CV:  regular rate and rhythm  Respiratory:  clear to auscultation    Procedure:  A History and Physical has been performed, and patient medication allergies have been reviewed. Risks of perforation, hemorrhage, adverse drug reaction, and aspiration were discussed. Informed consent was obtained for the procedure, including sedation. The patient was placed in the left lateral decubitus position. The heart rate, oxygen saturations, blood pressure, and response to care were monitored throughout the procedure. The linear echoendoscope was passed through the mouth and advanced under direct vision to the distal duodenum. As the scope was slowly withdrawn, detailed endoscopic images were obtained from the surrounding organs. The patient tolerated the procedure well. Findings:     ENDOSCOPIC FINDINGS:  Limited views of the mucosa with the echoendoscope reveals no gross abnormalities of the stomach or proximal duodenum. The ampulla is well-visualized endoscopically and appears normal.    PANCREAS:  The pancreas is well-visualized from head to tail. There was diffuse pancreatic parenchymal atrophy. At the level of the neck of the pancreas there is a 22 x 17 mm complex cyst.  This is bilobed, with single internal septation and no mural nodules. This communicates with the main pancreatic duct. After IV Levaquin was given, fine needle was performed using a 22-gauge [employer] needle. A single pass was performed, yielding 1.5 ml thick clear fluid. In the tail of the pancreas there are 3 cysts that communicated with the main pancreatic duct of which the largest measures 9 mm. The main pancreatic duct is dilated throughout its course measuring up to 5.1 mm in the head, 3.7 mm in the body and 2.3 mm in the tail. Pancreas divisum is not seen. BILIARY TREE: The gallbladder is absent. The common bile duct is well-visualized from its insertion in the ampulla to the bifurcation of right and left hepatic ducts. It is mildly dilated measuring up to 9 mm maximally with a gradual taper down to the level of the ampulla. There are no intraductal stones, sludge or debris. The ampulla appears normal endosonographically. OTHER ORGANS:  Views of the left lobe of the liver demonstrate no solid mass lesions. There is no ascites in the upper abdomen. The left adrenal gland appears normal. The major vascular structures including the portal vein, splenic vessels and celiac artery appear unremarkable. There are no pathologically enlarged posterior mediastinal or upper abdominal lymph nodes. Specimen:  Yes    Estimated Blood Loss:  2 ml    Implant:  None           Impression:    1. Pancreatic cysts. 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 No worrisome features are seen. FNA was performed. 2.  Dilated common bile duct. In the absence of biliary obstruction, this likely reflects benign post-cholecystectomy biliary dilatation. Plan:  1. Follow up results of cytology and biochemical analysis. 2. Avoid NSAIDs for 48 hours. 3. Further recommendations will be based on pathology results.     Signed:  Anais Spence MD  1/18/2023  2:52 PM

## 2023-01-18 NOTE — H&P
History and Physical for Endoscopic Ultrasound             Date: 1/18/2023     History of Present Illness:  Patient presents to undergo endoscopic ultrasound. Past Medical History:   Diagnosis Date    Acute sinusitis     Anaphylactic reaction 2/1/2016    AR (allergic rhinitis) 7/27/2016    Arrhythmia     bradycardia    Bicipital tenosynovitis 2/17/2012    Bradycardia 02/02/2016    Seen by [facility]-no follow-up required. 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 Rico Oquendo. Takes Xarelto daily.      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    Seizure ([address]     patient reported having a seizure with anaphlactic shock due to epinephrine    Sinus problem     Spinal stenosis, lumbar region, with neurogenic claudication 3/24/2014    Superior glenoid labrum lesion 2/17/2012    Supraspinatus (muscle) (tendon) sprain 2/17/2012    Thromboembolus ([address] 9/1/1997 left thigh DVT     Unspecified adverse effect of anesthesia     very difficult IV stick per patient d/t Lovenox, has needed anesthesiologist to start     Past Surgical History:   Procedure Laterality Date    APPENDECTOMY  1980    needed blood transfusion    BACK SURGERY      spinal stenosis    BLEPHAROPLASTY Bilateral 2009    CARPAL TUNNEL RELEASE  2012    left    [address]    right    CATARACT REMOVAL Bilateral     left eye 1994 & right eye 2002    CHOLECYSTECTOMY, LAPAROSCOPIC  2001    COLONOSCOPY  2000, 2005, 2010    Dr. Alondra Barnes Right 06/14/2016    sutures still present     CORNEAL TRANSPLANT Right 2004    HEENT      nasal reconstruction    HEENT      nasal polypectomy    HEENT Right 11/08     glaucoma surgery    HERNIA REPAIR  2010    abdominal hernia repair    HERNIA REPAIR Left 2007    inguinal hernia repair    KNEE ARTHROSCOPY Right 2006     knee meniscus repair    LUMBAR LAMINECTOMY      ORTHOPEDIC SURGERY Bilateral     open shoulder AC reconstruction    ORTHOPEDIC SURGERY Right 2006    neuroma fromfoot    ORTHOPEDIC SURGERY Bilateral     right elbow 1979 & left elbow 2007    ORTHOPEDIC SURGERY Right     index finger    OTHER SURGICAL HISTORY  2000    odalys filter placed    OTHER SURGICAL HISTORY Bilateral     left in 1994, right in 2002     OTHER SURGICAL HISTORY Right     cornea transplant     OTHER SURGICAL HISTORY Right     glaucoma implant     OTHER SURGICAL HISTORY Right 2006    neuroma of foot     SHOULDER ARTHROSCOPY Left 2/17/2012    SINUS SURGERY PROC UNLISTED      numerous    TONSILLECTOMY  1950    TOTAL HIP ARTHROPLASTY Left 12/12/2012    VASCULAR SURGERY  2012    PICC- removed    VITRECTOMY Left 6/15/15      Family History   Problem Relation Age of Onset    Cancer Mother         lymphoma    Diabetes Mother         type II    Heart Disease Father         CAD, PPM     Diabetes Father         type II    Pancreatic Cancer Father     Breast Cancer Sister Other Brother         AAA     Brandon Hypertherm Neg Hx     Pseudochol. Deficiency Neg Hx     Delayed Awakening Neg Hx     Post-op Nausea/Vomiting Neg Hx     Emergence Delirium Neg Hx     Post-op Cognitive Dysfunction Neg Hx      Social History     Tobacco Use    Smoking status: Never    Smokeless tobacco: Never   Substance Use Topics    Alcohol use: No     Alcohol/week: 0.0 standard drinks        Allergies   Allergen Reactions    Epinephrine Anaphylaxis     Blindness in right eye      No current facility-administered medications for this encounter. Current Outpatient Medications   Medication Sig    Cholecalciferol (VITAMIN D3 PO) Take by mouth daily    doxycycline hyclate (VIBRAMYCIN) 100 MG capsule in the morning, at noon, and at bedtime Prescribed for infection/rejection of cornea transplant    HYDROcodone-acetaminophen (NORCO)  MG per tablet hydrocodone 10 mg-acetaminophen 325 mg tablet   TAKE 1 TABLET BY MOUTH EVERY 6 HOURS AS NEEDED FOR PAIN. REDUCE DOSE TAKEN AS PAIN BECOMES MANAGEABLE    rivaroxaban (XARELTO) 20 MG TABS tablet Take 1 tablet by mouth daily (with breakfast)    atorvastatin (LIPITOR) 80 MG tablet Take 0.5 tablets by mouth daily    carboxymethylcellulose (REFRESH PLUS) 0.5 % SOLN ophthalmic solution Apply 1-2 drops to eye 4 times daily as needed    brimonidine (ALPHAGAN) 0.2 % ophthalmic solution Apply 1 drop to eye 2 times daily    lisinopril (PRINIVIL;ZESTRIL) 40 MG tablet Take 20 mg by mouth nightly    prednisoLONE acetate (PRED FORTE) 1 % ophthalmic suspension Apply 1 drop to eye 2 times daily    timolol (TIMOPTIC) 0.5 % ophthalmic solution Apply 1 drop to eye 2 times daily        Review of Systems:  A detailed 10 organ review of systems is obtained with pertinent positives as listed in the History of Present Illness. All others are negative. Objective:     Physical Exam:  Ht 5' 9\" (1.753 m)   Wt 168 lb (76.2 kg)   BMI 24.81 kg/m²    General:  Alert and oriented.   Heart: Regular rate and rhythm  Lungs:  Clear to auscultation bilaterally  Abdomen: Soft, nontender, nondistended    Impression/Plan:     Proceed with EUS as planned. I have discussed with the patient the technique, benefits, alternatives, and risks of the procedure, including medication reaction, immediate or delayed bleeding, or perforation of the gastrointestinal tract.     Signed By: Dori Pearson MD     January 18, 2023

## 2023-01-18 NOTE — ANESTHESIA PRE PROCEDURE
Department of Anesthesiology  Preprocedure Note       Name:  Melody Kearns   Age:  70 y.o.  :  1945                                          MRN:  [id number]         Date:  2023      Surgeon: Sherri George):  Dulce Dunn MD    Procedure: Procedure(s):  EGD/ENDOSCOPIC [facility] Peterson    Medications prior to admission:   Prior to Admission medications    Medication Sig Start Date End Date Taking? Authorizing Provider   Cholecalciferol (VITAMIN D3 PO) Take by mouth daily   Yes Historical Provider, MD   doxycycline hyclate (VIBRAMYCIN) 100 MG capsule in the morning, at noon, and at bedtime Prescribed for infection/rejection of cornea transplant 22   Historical Provider, MD   HYDROcodone-acetaminophen (NORCO)  MG per tablet hydrocodone 10 mg-acetaminophen 325 mg tablet   TAKE 1 TABLET BY MOUTH EVERY 6 HOURS AS NEEDED FOR PAIN.  REDUCE DOSE TAKEN AS PAIN BECOMES MANAGEABLE    Historical Provider, MD   rivaroxaban (XARELTO) 20 MG TABS tablet Take 1 tablet by mouth daily (with breakfast) 11/15/22   JOESPH Vera CNP   atorvastatin (LIPITOR) 80 MG tablet Take 0.5 tablets by mouth daily 11/15/22   JOESPH Vera CNP   carboxymethylcellulose (REFRESH PLUS) 0.5 % SOLN ophthalmic solution Apply 1-2 drops to eye 4 times daily as needed    Historical Provider, MD   brimonidine (ALPHAGAN) 0.2 % ophthalmic solution Apply 1 drop to eye 2 times daily    Ar Automatic Reconciliation   lisinopril (PRINIVIL;ZESTRIL) 40 MG tablet Take 20 mg by mouth nightly 22   Ar Automatic Reconciliation   prednisoLONE acetate (PRED FORTE) 1 % ophthalmic suspension Apply 1 drop to eye 2 times daily    Ar Automatic Reconciliation   timolol (TIMOPTIC) 0.5 % ophthalmic solution Apply 1 drop to eye 2 times daily 20   Ar Automatic Reconciliation       Current medications:    Current Facility-Administered Medications   Medication Dose Route Frequency Provider Last Rate Last Admin    sodium chloride flush 0.9 % injection 5-40 mL  5-40 mL IntraVENous 2 times per day Prasad Barrios MD        sodium chloride flush 0.9 % injection 5-40 mL  5-40 mL IntraVENous PRN Prasad Barrios MD        0.9 % sodium chloride infusion   IntraVENous PRN Prasad Barrios MD        lactated ringers infusion   IntraVENous Continuous Jose Rosas MD           Allergies: Allergies   Allergen Reactions    Epinephrine Anaphylaxis     Blindness in right eye        Problem List:    Patient Active Problem List   Diagnosis Code    Prediabetes R73.03    Chronic sinusitis J32.9    Hypertrophy of nasal turbinates J34.3    Superior glenoid labrum lesion S43.439A    Chronic back pain M54.9, G89.29    Mixed hyperlipidemia E78.2    Bicipital tendinitis of right shoulder M75.21    Chronic deep vein thrombosis of lower extremity (HCC) I82.509    Current use of long term anticoagulation Z79.01    Bicipital tenosynovitis M75.20    Chronic pain syndrome G89.4    Vitamin D deficiency E55.9    Factor V Leiden ([facility] [address] D68.51    Spinal stenosis, lumbar region, with neurogenic claudication M48.062    Nasal polyp J33.9    History of cornea transplant Z94.7    History of pulmonary embolism Z86.711    Rotator cuff tear arthropathy of right shoulder M75.101, M12.811    Hypertension I10    Strain of right biceps S46.211A    Glaucoma H40.9    Carpal tunnel syndrome G56.00    Posterior dislocation of right acromioclavicular joint S43.151A    Chondromalacia, right shoulder M94.211    Osteoarthritis of left hip M16.12    AR (allergic rhinitis) J30.9    History of recurrent deep vein thrombosis (DVT) Z86.718       Past Medical History:        Diagnosis Date    Acute sinusitis     Anaphylactic reaction 2/1/2016    AR (allergic rhinitis) 7/27/2016    Arrhythmia     bradycardia    Bicipital tenosynovitis 2/17/2012    Bradycardia 02/02/2016    [facility]-no follow-up required.      Carpal tunnel syndrome 2/17/2012    Chronic back pain 12/23/2014    Chronic deep vein thrombosis of lower extremity (HCC) 3/21/2016    Chronic pain     right shoulder    Chronic pansinusitis 7/27/2016    Chronic sinusitis 7/27/2016    DNS (deviated nasal septum)     EARLY (dyspnea on exertion) 12/23/2014    Elevated serum creatinine 12/23/2014    Epistaxis     Factor V Leiden ([address]     managed with medication     Glaucoma     bilateral     H/O echocardiogram 02/02/2016    EF 70-75%    Hereditary deficiency of other clotting factors ([address] 3/21/2016    History of blood transfusion 1981    Hypercholesteremia     Hyperkalemia 12/23/2014    Hypertension     medication    Hypertrophy of nasal turbinates 7/27/2016    Left leg DVT ([address] 12/23/2014 1997, 2014, 8/2015-Followed by Dr. Melvin Hernandez. Takes Xarelto daily.      Leukocytosis 12/23/2014    Lumbar spinal stenosis     Nasal obstruction     Nasal polyp 7/27/2016    Nausea & vomiting     Osteoarthritis     right knee    Osteoarthritis of left hip 12/12/2012    Primary localized osteoarthrosis, shoulder region 2/17/2012    S/P prosthetic total arthroplasty of the hip 12/12/2012    Saddle embolism of pulmonary artery ([address] 3/21/2016    Seizure ([address]     patient reported having a seizure with anaphlactic shock due to epinephrine    Sinus problem     Spinal stenosis, lumbar region, with neurogenic claudication 3/24/2014    Superior glenoid labrum lesion 2/17/2012    Supraspinatus (muscle) (tendon) sprain 2/17/2012    Thromboembolus ([address] 9/1/1997    left thigh DVT     Unspecified adverse effect of anesthesia     very difficult IV stick per patient d/t Lovenox, has needed anesthesiologist to start       Past Surgical History:        Procedure Laterality Date   [address]    needed blood transfusion    BACK SURGERY      spinal stenosis    BLEPHAROPLASTY Bilateral 2009    CARPAL TUNNEL RELEASE  2012    left   [place]    right  CATARACT REMOVAL Bilateral     left eye 1994 & right eye 2002    CHOLECYSTECTOMY, LAPAROSCOPIC  2001    COLONOSCOPY  2000, 2005, 2010    Dr. Howard Nettles Right 06/14/2016    sutures still present     CORNEAL TRANSPLANT Right 2004    HEENT      nasal reconstruction    HEENT      nasal polypectomy    HEENT Right 11/08     glaucoma surgery    HERNIA REPAIR  2010    abdominal hernia repair    HERNIA REPAIR Left 2007    inguinal hernia repair    KNEE ARTHROSCOPY Right 2006     knee meniscus repair    LUMBAR LAMINECTOMY      ORTHOPEDIC SURGERY Bilateral     open shoulder AC reconstruction    ORTHOPEDIC SURGERY Right 2006    neuroma fromfoot    ORTHOPEDIC SURGERY Bilateral     right elbow 1979 & left elbow 2007    ORTHOPEDIC SURGERY Right     index finger    OTHER SURGICAL HISTORY  2000    odalys filter placed    OTHER SURGICAL HISTORY Bilateral     left in 1994, right in 2002     OTHER SURGICAL HISTORY Right     cornea transplant     OTHER SURGICAL HISTORY Right     glaucoma implant     OTHER SURGICAL HISTORY Right 2006    neuroma of foot     SHOULDER ARTHROSCOPY Left 2/17/2012    SINUS SURGERY PROC UNLISTED      numerous    TONSILLECTOMY  1950    TOTAL HIP ARTHROPLASTY Left 12/12/2012    VASCULAR SURGERY  2012    PICC- removed    VITRECTOMY Left 6/15/15       Social History:    Social History     Tobacco Use    Smoking status: Never    Smokeless tobacco: Never   Substance Use Topics    Alcohol use: No     Alcohol/week: 0.0 standard drinks                                Counseling given: Not Answered      Vital Signs (Current):   Vitals:    01/11/23 1046 01/18/23 1254   BP:  (!) 186/81   Pulse:  56   Resp:  18   Temp:  97 °F (36.1 °C)   TempSrc:  Tympanic   SpO2:  100%   Weight: 168 lb (76.2 kg) 168 lb (76.2 kg)   Height: 5' 9\" (1.753 m) 5' 9\" (1.753 m)                                              BP Readings from Last 3 Encounters:   01/18/23 (!) 186/81   12/01/22 117/60   11/15/22 115/70       NPO Status: Time of last liquid consumption: 2100                        Time of last solid consumption: 1200                        Date of last liquid consumption: 01/17/23                        Date of last solid food consumption: 01/17/23    BMI:   Wt Readings from Last 3 Encounters:   01/18/23 168 lb (76.2 kg)   12/01/22 176 lb (79.8 kg)   11/15/22 169 lb (76.7 kg)     Body mass index is 24.81 kg/m². CBC:   Lab Results   Component Value Date/Time    WBC 9.6 11/15/2022 02:04 PM    RBC 4.25 11/15/2022 02:04 PM    HGB 13.7 11/15/2022 02:04 PM    HCT 42.9 11/15/2022 02:04 PM    .9 11/15/2022 02:04 PM    RDW 12.3 11/15/2022 02:04 PM     11/15/2022 02:04 PM       CMP:   Lab Results   Component Value Date/Time     11/15/2022 02:04 PM    K 4.2 11/15/2022 02:04 PM     11/15/2022 02:04 PM    CO2 27 11/15/2022 02:04 PM    BUN 21 11/15/2022 02:04 PM    CREATININE 1.30 11/15/2022 02:04 PM    CREATININE 1.29 11/15/2022 01:23 PM    GFRAA 59 08/02/2022 08:54 AM    AGRATIO 1.1 12/21/2021 09:38 AM    LABGLOM 57 11/15/2022 02:04 PM    GLUCOSE 107 11/15/2022 02:04 PM    PROT 7.2 11/15/2022 02:04 PM    CALCIUM 9.2 11/15/2022 02:04 PM    BILITOT 0.9 11/15/2022 02:04 PM    ALKPHOS 64 11/15/2022 02:04 PM    ALKPHOS 59 12/21/2021 09:38 AM    AST 20 11/15/2022 02:04 PM    ALT 32 11/15/2022 02:04 PM       POC Tests: No results for input(s): POCGLU, POCNA, POCK, POCCL, POCBUN, POCHEMO, POCHCT in the last 72 hours.     Coags: No results found for: PROTIME, INR, APTT    HCG (If Applicable): No results found for: PREGTESTUR, PREGSERUM, HCG, HCGQUANT     ABGs: No results found for: PHART, PO2ART, IVD7KMV, BCA1ASO, BEART, V7JLFWIK     Type & Screen (If Applicable):  No results found for: LABABO, LABRH    Drug/Infectious Status (If Applicable):  Lab Results   Component Value Date/Time    HEPCAB NONREACTIVE 07/12/2022 02:46 PM       COVID-19 Screening (If Applicable): No results found for: COVID19        Anesthesia Evaluation  Patient summary reviewed history of anesthetic complications:   Airway: Mallampati: II  TM distance: >3 FB   Neck ROM: full  Mouth opening: > = 3 FB   Dental: normal exam         Pulmonary:normal exam    (+) shortness of breath:                            ROS comment: History of pulmonary embolism   Cardiovascular:    (+) hypertension:, EARLY:, hyperlipidemia                  Neuro/Psych:   (+) neuromuscular disease:,             GI/Hepatic/Renal:             Endo/Other:    (+) Diabetes (pre), .                  ROS comment: Chronic deep vein thrombosis of lower extremity  Factor V Leiden  Glaucoma Abdominal:             Vascular: Other Findings:           Anesthesia Plan      TIVA     ASA 3     (Patient apparently experienced anaphylaxis after an allergy shot at PCP office. Received epinephrine injection twice in ER with resulting severe HTN and bradycardia.)        Anesthetic plan and risks discussed with patient and spouse.                         Lelia Tomlinson MD   1/18/2023

## 2023-01-25 LAB
Lab: NORMAL
Lab: NORMAL
REFERENCE LAB: NORMAL

## 2023-02-07 ENCOUNTER — TELEPHONE (OUTPATIENT)
Dept: INTERNAL MEDICINE CLINIC | Facility: CLINIC | Age: 78
End: 2023-02-07

## 2023-02-07 NOTE — TELEPHONE ENCOUNTER
Fredis/[facility]  [phone]  st pt has an appt to remove stimulator and pt sts he had labs done recently for our office. LVM advising Vitaliy Koch  the last labs we were done back in Nov 2022 unless pt had labs drawn somewhere else and we never rec'd a copy.    Fax [phone] yes

## 2023-02-26 NOTE — PROGRESS NOTES
George Aldana (:  1945) is a 70 y.o. male,Established patient, here for evaluation of the following chief complaint(s):  Follow-up (3 month Follow Up ), Abnormal Test Results, and Weight Loss         ASSESSMENT/PLAN:  1. Vitamin D deficiency  Vitamin D level mildly low at 28.1 on 2022  Recheck vitamin D level with dose adjustment pending lab results    - Vitamin D 25 Hydroxy; Future    2. Dyslipidemia  Check lipid panel  Continue atorvastatin    - CBC with Auto Differential; Future  - Comprehensive Metabolic Panel; Future  - Lipid Panel; Future  - T4, Free; Future  - TSH; Future    3. Type 2 diabetes mellitus without complication, without long-term current use of insulin ([facility])  A1c 5.8% on 10/19/2022, patient declined point-of-care A1c check in the office therefore will check at upcoming labs  Check urine microalbumin to creatinine ratio to evaluate for proteinuria  Continue lifestyle modifications, monitor off medication at this time    - Microalbumin / Creatinine Urine Ratio; Future  - Hemoglobin A1C; Future    4.  IPMN (intraductal papillary mucinous neoplasm)  CT abdomen/pelvis with IV contrast on 11/15/2022 as follows:  -Pancreatic duct dilation measuring up to 10 mm within the head, diffuse parenchymal atrophy  CT abdomen with contrast on 2022 as follows:  -Diffuse atrophy of the pancreas, suggested pancreatic duct dilation suboptimally assessed by CT  -Focal to find low attenuation in the pancreatic head measuring 2.2 x 1 cm (could represent cystic lesion with neoplasm not excluded or focal dilation of pancreatic duct which could be a secondary indicator of neoplasm)  -Suggested small bowel lesion off the second portion of the duodenum with a component extending into the duodenal lumen, neoplasm not excluded  -Additional low-attenuation hepatic and splenic lesions which are incompletely characterized  EUS on 2023 as follows:  -Pancreatic cysts: Based on communication with a mildly dilated main pancreatic duct this likely represents a mixed main duct/sidebranch IPMN but no worrisome features seen  -Dilated CBD likely reflects benign postcholecystectomy biliary dilatation  Pathology notable for CEA of 76 ng/mL, amylase elevated at 60,495 units/L  Follow-up per GI with recommendations for MRCP in approximately 6 months    5. Unintentional weight loss  Unclear etiology at this time. Patient averaging 1 meal a day and does report being more active lately doing more things around his house which may be contributing  Cannot entirely rule out neoplasm especially given monitoring for pancreatic lesions  Patient reports colonoscopy 5 years ago reportedly normal  Check diagnostic PSA level in addition to autoimmune screening with MAURI and inflammatory markers  Encouraged perhaps increasing protein drink to twice a day    - MAURI, Direct, w/Reflex; Future  - Sedimentation Rate; Future  - C-Reactive Protein; Future  - PSA, Diagnostic; Future    Return in about 3 months (around 6/3/2023) for EOV, fasting labs prior . Subjective   SUBJECTIVE/OBJECTIVE:  Patient is a 70-year-old  male who presents to the office today for follow-up regarding recent pancreatic imaging as well as weight loss. Patient believes over the past 6 months or so he has lost 20 pounds unintentionally. Typically only has 1 meal a day but over the past few weeks has been eating less than normal having a small amount for lunch and having milk/protein-based drink in the morning. States he does not feel hungry. Has had recent imaging studies and an endoscopic ultrasound for pancreatic abnormalities with suspected IPMN. Plans are for MRI in 6 months. Patient does have chronic bouts of stabbing abdominal pain in his epigastric area that radiates through to his back, not improved with antacids or reflux medications. Also has some pain in his right upper quadrant. Has history of cholecystectomy.   Typically has a bowel movement every few days. Stays hydrated drinking close to a gallon of water per day. Does occasionally strain with having a bowel movement with some blood on the toilet paper and some rectal pain but denies history of hemorrhoids. Denies nausea, vomiting, dysuria, hematuria, melena, saddle anesthesia, bowel/bladder incontinence, chest pain, shortness of breath, difficulty swallowing, fevers, chills, night sweats, depression, rashes or joint swelling. Review of Systems   Constitutional:  Positive for appetite change (decreased) and unexpected weight change (weight loss). Negative for chills, diaphoresis and fever. HENT:  Negative for trouble swallowing. Respiratory:  Negative for shortness of breath. Cardiovascular:  Negative for chest pain. Gastrointestinal:  Positive for abdominal pain, anal bleeding, constipation and rectal pain. Negative for nausea and vomiting. Denies melena    Genitourinary:  Negative for difficulty urinating, dysuria and hematuria. Musculoskeletal:  Negative for joint swelling. Neurological:         Denies saddle anesthesia or bowel/bladder incontinence   Psychiatric/Behavioral:          Denies depression        Objective   Physical Exam  Vitals reviewed. Constitutional:       General: He is not in acute distress. Appearance: Normal appearance. He is not ill-appearing, toxic-appearing or diaphoretic. HENT:      Head: Normocephalic and atraumatic. Eyes:      General:         Right eye: No discharge. Left eye: No discharge. Extraocular Movements: Extraocular movements intact. Cardiovascular:      Rate and Rhythm: Normal rate and regular rhythm. Heart sounds: No murmur heard. No friction rub. No gallop. Pulmonary:      Effort: Pulmonary effort is normal. No respiratory distress. Breath sounds: No wheezing, rhonchi or rales. Abdominal:      General: Bowel sounds are normal.      Palpations: Abdomen is soft. Tenderness: There is abdominal tenderness in the right upper quadrant, right lower quadrant, epigastric area and left lower quadrant. There is rebound (Subjective). Positive signs include Stewart's sign. Genitourinary:     Comments: Declines rectal exam  Lymphadenopathy:      Cervical:      Right cervical: No superficial cervical adenopathy. Left cervical: No superficial cervical adenopathy. Upper Body:      Right upper body: No supraclavicular or axillary adenopathy. Left upper body: No supraclavicular or axillary adenopathy. Skin:     General: Skin is dry. Coloration: Skin is not jaundiced. Neurological:      Mental Status: He is alert. Mental status is at baseline. Psychiatric:         Attention and Perception: Attention normal.         Mood and Affect: Mood normal. Mood is not anxious or depressed. Affect is blunt. Affect is not tearful. Speech: Speech normal. Speech is not rapid and pressured or slurred. Behavior: Behavior normal. Behavior is not agitated, aggressive or combative. Behavior is cooperative. Thought Content: Thought content normal.                An electronic signature was used to authenticate this note.     --Pablo Delgado, DO

## 2023-03-03 ENCOUNTER — OFFICE VISIT (OUTPATIENT)
Dept: INTERNAL MEDICINE CLINIC | Facility: CLINIC | Age: 78
End: 2023-03-03
Payer: MEDICARE

## 2023-03-03 VITALS
BODY MASS INDEX: 23.55 KG/M2 | RESPIRATION RATE: 16 BRPM | HEART RATE: 50 BPM | OXYGEN SATURATION: 98 % | HEIGHT: 69 IN | SYSTOLIC BLOOD PRESSURE: 122 MMHG | TEMPERATURE: 97.1 F | DIASTOLIC BLOOD PRESSURE: 78 MMHG | WEIGHT: 159 LBS

## 2023-03-03 DIAGNOSIS — E78.5 DYSLIPIDEMIA: ICD-10-CM

## 2023-03-03 DIAGNOSIS — R63.4 UNINTENTIONAL WEIGHT LOSS: ICD-10-CM

## 2023-03-03 DIAGNOSIS — E11.9 TYPE 2 DIABETES MELLITUS WITHOUT COMPLICATION, WITHOUT LONG-TERM CURRENT USE OF INSULIN (HCC): ICD-10-CM

## 2023-03-03 DIAGNOSIS — E55.9 VITAMIN D DEFICIENCY: Primary | ICD-10-CM

## 2023-03-03 DIAGNOSIS — D49.0 IPMN (INTRADUCTAL PAPILLARY MUCINOUS NEOPLASM): ICD-10-CM

## 2023-03-03 PROBLEM — K57.30 DIVERTICULOSIS OF COLON: Status: ACTIVE | Noted: 2023-03-03

## 2023-03-03 PROBLEM — T41.45XA ADVERSE EFFECT OF ANESTHESIA: Status: ACTIVE | Noted: 2023-03-03

## 2023-03-03 PROBLEM — I26.99 PULMONARY EMBOLISM (HCC): Status: ACTIVE | Noted: 2023-03-03

## 2023-03-03 PROBLEM — D68.2 FACTOR V DEFICIENCY (HCC): Status: ACTIVE | Noted: 2023-03-03

## 2023-03-03 PROCEDURE — 3074F SYST BP LT 130 MM HG: CPT | Performed by: STUDENT IN AN ORGANIZED HEALTH CARE EDUCATION/TRAINING PROGRAM

## 2023-03-03 PROCEDURE — G8427 DOCREV CUR MEDS BY ELIG CLIN: HCPCS | Performed by: STUDENT IN AN ORGANIZED HEALTH CARE EDUCATION/TRAINING PROGRAM

## 2023-03-03 PROCEDURE — 1036F TOBACCO NON-USER: CPT | Performed by: STUDENT IN AN ORGANIZED HEALTH CARE EDUCATION/TRAINING PROGRAM

## 2023-03-03 PROCEDURE — 3078F DIAST BP <80 MM HG: CPT | Performed by: STUDENT IN AN ORGANIZED HEALTH CARE EDUCATION/TRAINING PROGRAM

## 2023-03-03 PROCEDURE — G8484 FLU IMMUNIZE NO ADMIN: HCPCS | Performed by: STUDENT IN AN ORGANIZED HEALTH CARE EDUCATION/TRAINING PROGRAM

## 2023-03-03 PROCEDURE — 99214 OFFICE O/P EST MOD 30 MIN: CPT | Performed by: STUDENT IN AN ORGANIZED HEALTH CARE EDUCATION/TRAINING PROGRAM

## 2023-03-03 PROCEDURE — 1123F ACP DISCUSS/DSCN MKR DOCD: CPT | Performed by: STUDENT IN AN ORGANIZED HEALTH CARE EDUCATION/TRAINING PROGRAM

## 2023-03-03 PROCEDURE — G8420 CALC BMI NORM PARAMETERS: HCPCS | Performed by: STUDENT IN AN ORGANIZED HEALTH CARE EDUCATION/TRAINING PROGRAM

## 2023-03-03 ASSESSMENT — PATIENT HEALTH QUESTIONNAIRE - PHQ9
2. FEELING DOWN, DEPRESSED OR HOPELESS: 0
SUM OF ALL RESPONSES TO PHQ QUESTIONS 1-9: 0
1. LITTLE INTEREST OR PLEASURE IN DOING THINGS: 0
SUM OF ALL RESPONSES TO PHQ QUESTIONS 1-9: 0
SUM OF ALL RESPONSES TO PHQ9 QUESTIONS 1 & 2: 0

## 2023-03-03 ASSESSMENT — ENCOUNTER SYMPTOMS
SHORTNESS OF BREATH: 0
NAUSEA: 0
RECTAL PAIN: 1
ANAL BLEEDING: 1
ABDOMINAL PAIN: 1
CONSTIPATION: 1
VOMITING: 0
TROUBLE SWALLOWING: 0

## 2023-03-03 NOTE — Clinical Note
Just wanted to keep you informed on everything that has been going on with this patient and see if you had any additional recommendations regarding his unintentional weight loss?

## 2023-05-01 ENCOUNTER — TRANSCRIBE ORDERS (OUTPATIENT)
Dept: SCHEDULING | Age: 78
End: 2023-05-01

## 2023-05-01 DIAGNOSIS — D49.0 INTRADUCTAL PAPILLARY MUCINOUS NEOPLASM: ICD-10-CM

## 2023-05-01 DIAGNOSIS — R10.11 RIGHT UPPER QUADRANT PAIN: Primary | ICD-10-CM

## 2023-05-04 ENCOUNTER — HOSPITAL ENCOUNTER (OUTPATIENT)
Dept: MRI IMAGING | Age: 78
Discharge: HOME | End: 2023-05-04
Payer: MEDICARE

## 2023-05-04 DIAGNOSIS — R10.11 RIGHT UPPER QUADRANT PAIN: ICD-10-CM

## 2023-05-04 DIAGNOSIS — D49.0 INTRADUCTAL PAPILLARY MUCINOUS NEOPLASM: ICD-10-CM

## 2023-05-04 PROCEDURE — 74183 MRI ABD W/O CNTR FLWD CNTR: CPT

## 2023-05-04 PROCEDURE — A9579 GAD-BASE MR CONTRAST NOS,1ML: HCPCS | Performed by: INTERNAL MEDICINE

## 2023-05-04 PROCEDURE — 6360000004 HC RX CONTRAST MEDICATION: Performed by: INTERNAL MEDICINE

## 2023-05-04 RX ADMIN — GADOTERIDOL 15 ML: 279.3 INJECTION, SOLUTION INTRAVENOUS at 07:32

## 2023-06-05 ENCOUNTER — OFFICE VISIT (OUTPATIENT)
Dept: INTERNAL MEDICINE CLINIC | Facility: CLINIC | Age: 78
End: 2023-06-05
Payer: MEDICARE

## 2023-06-05 VITALS
BODY MASS INDEX: 23.6 KG/M2 | DIASTOLIC BLOOD PRESSURE: 60 MMHG | WEIGHT: 159.38 LBS | OXYGEN SATURATION: 100 % | HEART RATE: 47 BPM | TEMPERATURE: 96.9 F | HEIGHT: 69 IN | SYSTOLIC BLOOD PRESSURE: 140 MMHG

## 2023-06-05 DIAGNOSIS — E11.9 TYPE 2 DIABETES MELLITUS WITHOUT COMPLICATION, WITHOUT LONG-TERM CURRENT USE OF INSULIN (HCC): Primary | ICD-10-CM

## 2023-06-05 LAB — HBA1C MFR BLD: 6 %

## 2023-06-05 PROCEDURE — 83036 HEMOGLOBIN GLYCOSYLATED A1C: CPT | Performed by: STUDENT IN AN ORGANIZED HEALTH CARE EDUCATION/TRAINING PROGRAM

## 2023-06-05 RX ORDER — ERGOCALCIFEROL 1.25 MG/1
1 CAPSULE ORAL DAILY
COMMUNITY
Start: 2022-11-23 | End: 2023-06-05

## 2023-06-05 RX ORDER — VALACYCLOVIR HYDROCHLORIDE 500 MG/1
500 TABLET, FILM COATED ORAL 2 TIMES DAILY
COMMUNITY
Start: 2023-05-02

## 2023-06-05 RX ORDER — OXYCODONE AND ACETAMINOPHEN 7.5; 325 MG/1; MG/1
TABLET ORAL
COMMUNITY
Start: 2023-05-16 | End: 2023-06-15

## 2023-06-05 SDOH — ECONOMIC STABILITY: FOOD INSECURITY: WITHIN THE PAST 12 MONTHS, THE FOOD YOU BOUGHT JUST DIDN'T LAST AND YOU DIDN'T HAVE MONEY TO GET MORE.: NEVER TRUE

## 2023-06-05 SDOH — ECONOMIC STABILITY: HOUSING INSECURITY
IN THE LAST 12 MONTHS, WAS THERE A TIME WHEN YOU DID NOT HAVE A STEADY PLACE TO SLEEP OR SLEPT IN A SHELTER (INCLUDING NOW)?: NO

## 2023-06-05 SDOH — ECONOMIC STABILITY: FOOD INSECURITY: WITHIN THE PAST 12 MONTHS, YOU WORRIED THAT YOUR FOOD WOULD RUN OUT BEFORE YOU GOT MONEY TO BUY MORE.: NEVER TRUE

## 2023-06-05 SDOH — ECONOMIC STABILITY: INCOME INSECURITY: HOW HARD IS IT FOR YOU TO PAY FOR THE VERY BASICS LIKE FOOD, HOUSING, MEDICAL CARE, AND HEATING?: NOT HARD AT ALL

## 2023-06-05 ASSESSMENT — PATIENT HEALTH QUESTIONNAIRE - PHQ9
1. LITTLE INTEREST OR PLEASURE IN DOING THINGS: 0
SUM OF ALL RESPONSES TO PHQ9 QUESTIONS 1 & 2: 0
SUM OF ALL RESPONSES TO PHQ QUESTIONS 1-9: 0
2. FEELING DOWN, DEPRESSED OR HOPELESS: 0
SUM OF ALL RESPONSES TO PHQ QUESTIONS 1-9: 0

## 2023-06-05 ASSESSMENT — LIFESTYLE VARIABLES
HOW OFTEN DO YOU HAVE A DRINK CONTAINING ALCOHOL: NEVER
HOW MANY STANDARD DRINKS CONTAINING ALCOHOL DO YOU HAVE ON A TYPICAL DAY: PATIENT DOES NOT DRINK

## 2023-06-06 NOTE — PROGRESS NOTES
Provider was running behind on 6/5/2023 for patient's appointment. Patient needed to leave to take his wife to her appointment across [facility]. Patient will be rescheduled.

## 2023-06-27 RX ORDER — ATORVASTATIN CALCIUM 80 MG/1
TABLET, FILM COATED ORAL
Qty: 90 TABLET | Refills: 1 | OUTPATIENT
Start: 2023-06-27

## 2023-07-18 ENCOUNTER — OFFICE VISIT (OUTPATIENT)
Dept: ORTHOPEDIC SURGERY | Age: 78
End: 2023-07-18
Payer: MEDICARE

## 2023-07-18 DIAGNOSIS — Z96.611 PRESENCE OF RIGHT ARTIFICIAL SHOULDER JOINT: Primary | ICD-10-CM

## 2023-07-18 DIAGNOSIS — Z09 FOLLOW-UP EXAMINATION AFTER ORTHOPEDIC SURGERY: ICD-10-CM

## 2023-07-18 PROCEDURE — G8427 DOCREV CUR MEDS BY ELIG CLIN: HCPCS | Performed by: ORTHOPAEDIC SURGERY

## 2023-07-18 PROCEDURE — 1036F TOBACCO NON-USER: CPT | Performed by: ORTHOPAEDIC SURGERY

## 2023-07-18 PROCEDURE — 99212 OFFICE O/P EST SF 10 MIN: CPT | Performed by: ORTHOPAEDIC SURGERY

## 2023-07-18 PROCEDURE — 1123F ACP DISCUSS/DSCN MKR DOCD: CPT | Performed by: ORTHOPAEDIC SURGERY

## 2023-07-18 PROCEDURE — G8420 CALC BMI NORM PARAMETERS: HCPCS | Performed by: ORTHOPAEDIC SURGERY

## 2023-07-18 NOTE — PROGRESS NOTES
Progress Notes by Shaw Valiente MD at 08/18/21 322 [facility]                    Author: Shaw Valiente MD  Service: --  Author Type: Physician       Filed: 08/18/21 1233  Encounter Date: 8/18/2021  Status: Signed          : Shaw Valiente MD (Physician)                            Name: Paul Contreras   YOB: 1945   Gender: male   MRN: [id number]            HPI: Paul Contreras is a  70 y.o. male right-hand-dominant male seen for right shoulder problems he is 6 years  status post hardware removal right shoulder and reverse right total shoulder arthroplasty with a delta extend prosthesis biceps tenodesis latissimus dorsi and teres major tendon transfer. He has a history of chronic pain in the right shoulder. His right shoulder is doing well. He was recently diagnosed with pancreatic cancer         ROS/Meds/PSH/PMH/FH/SH: A ten system review of systems was performed and is negative other than what is in the HPI. Tobacco:  reports that he has never smoked. He has never used smokeless tobacco.   There were no vitals taken for this visit. Physical Examination:   He is an awake alert pleasant gentleman ambulating without difficulty. He has a marked restriction in cervical spine range of motion without radicular findings today. His right shoulder has a well-healed deltopectoral incision and multiple other incisions. Active and passive forward elevation right shoulder 0-160   ER to 40 degrees   IR to T12   Biceps has good cosmetic appearance   No erythema   He is neurovascular intact         Data Reviewed:    XR: AP Y axillary views right shoulder   Clinical Indication    ICD-10-CM    1.  Presence of right artificial shoulder joint  Z96.611 XR SHOULDER RIGHT (MIN 2 VIEWS)      2. Follow-up examination after orthopedic surgery  Z09 XR SHOULDER RIGHT (MIN 2 VIEWS)         Report: AP Y axillary views right shoulder demonstrate reverse right total shoulder

## 2023-08-05 NOTE — TELEPHONE ENCOUNTER
Pt called requesting to get a refill for HYDROcodone-acetaminophen (NORCO)  MG per tablet    Stated he has enough for another day. [facility]. Pt presents with R sided abd pain radiating down to groin. Denies n/v, urinary symptoms

## 2023-08-15 ENCOUNTER — HOSPITAL ENCOUNTER (OUTPATIENT)
Dept: LAB | Age: 78
Discharge: HOME | End: 2023-08-18
Payer: MEDICARE

## 2023-08-15 ENCOUNTER — OFFICE VISIT (OUTPATIENT)
Dept: ONCOLOGY | Age: 78
End: 2023-08-15
Payer: MEDICARE

## 2023-08-15 VITALS
RESPIRATION RATE: 16 BRPM | HEIGHT: 69 IN | HEART RATE: 46 BPM | WEIGHT: 163.1 LBS | BODY MASS INDEX: 24.16 KG/M2 | OXYGEN SATURATION: 100 % | SYSTOLIC BLOOD PRESSURE: 140 MMHG | DIASTOLIC BLOOD PRESSURE: 65 MMHG | TEMPERATURE: 97.5 F

## 2023-08-15 DIAGNOSIS — D68.51 FACTOR V LEIDEN (HCC): ICD-10-CM

## 2023-08-15 DIAGNOSIS — Z79.01 LONG TERM CURRENT USE OF ANTICOAGULANT: ICD-10-CM

## 2023-08-15 DIAGNOSIS — I82.502 CHRONIC DEEP VEIN THROMBOSIS (DVT) OF LEFT LOWER EXTREMITY, UNSPECIFIED VEIN (HCC): ICD-10-CM

## 2023-08-15 DIAGNOSIS — D64.9 ANEMIA, UNSPECIFIED TYPE: ICD-10-CM

## 2023-08-15 PROBLEM — N18.30 CHRONIC RENAL DISEASE, STAGE III (HCC): Status: ACTIVE | Noted: 2023-08-15

## 2023-08-15 LAB
ALBUMIN SERPL-MCNC: 3.6 G/DL (ref 3.2–4.6)
ALBUMIN/GLOB SERPL: 1.1 (ref 0.4–1.6)
ALP SERPL-CCNC: 74 U/L (ref 50–136)
ALT SERPL-CCNC: 57 U/L (ref 12–65)
ANION GAP SERPL CALC-SCNC: 4 MMOL/L (ref 2–11)
AST SERPL-CCNC: 41 U/L (ref 15–37)
BASOPHILS # BLD: 0 K/UL (ref 0–0.2)
BASOPHILS NFR BLD: 0 % (ref 0–2)
BILIRUB SERPL-MCNC: 0.7 MG/DL (ref 0.2–1.1)
BUN SERPL-MCNC: 19 MG/DL (ref 8–23)
CALCIUM SERPL-MCNC: 8.8 MG/DL (ref 8.3–10.4)
CHLORIDE SERPL-SCNC: 108 MMOL/L (ref 101–110)
CO2 SERPL-SCNC: 27 MMOL/L (ref 21–32)
CREAT SERPL-MCNC: 1.3 MG/DL (ref 0.8–1.5)
DIFFERENTIAL METHOD BLD: ABNORMAL
EOSINOPHIL # BLD: 0.2 K/UL (ref 0–0.8)
EOSINOPHIL NFR BLD: 3 % (ref 0.5–7.8)
ERYTHROCYTE [DISTWIDTH] IN BLOOD BY AUTOMATED COUNT: 12.4 % (ref 11.9–14.6)
FERRITIN SERPL-MCNC: 106 NG/ML (ref 8–388)
GLOBULIN SER CALC-MCNC: 3.3 G/DL (ref 2.8–4.5)
GLUCOSE SERPL-MCNC: 185 MG/DL (ref 65–100)
HCT VFR BLD AUTO: 41.4 % (ref 41.1–50.3)
HGB BLD-MCNC: 13.1 G/DL (ref 13.6–17.2)
IMM GRANULOCYTES # BLD AUTO: 0 K/UL (ref 0–0.5)
IMM GRANULOCYTES NFR BLD AUTO: 0 % (ref 0–5)
IRON SATN MFR SERPL: 42 %
IRON SERPL-MCNC: 119 UG/DL (ref 35–150)
LYMPHOCYTES # BLD: 1.3 K/UL (ref 0.5–4.6)
LYMPHOCYTES NFR BLD: 22 % (ref 13–44)
MCH RBC QN AUTO: 33.2 PG (ref 26.1–32.9)
MCHC RBC AUTO-ENTMCNC: 31.6 G/DL (ref 31.4–35)
MCV RBC AUTO: 105.1 FL (ref 82–102)
MONOCYTES # BLD: 0.3 K/UL (ref 0.1–1.3)
MONOCYTES NFR BLD: 5 % (ref 4–12)
NEUTS SEG # BLD: 4 K/UL (ref 1.7–8.2)
NEUTS SEG NFR BLD: 70 % (ref 43–78)
NRBC # BLD: 0 K/UL (ref 0–0.2)
PLATELET # BLD AUTO: 171 K/UL (ref 150–450)
PMV BLD AUTO: 9.6 FL (ref 9.4–12.3)
POTASSIUM SERPL-SCNC: 4.4 MMOL/L (ref 3.5–5.1)
PROT SERPL-MCNC: 6.9 G/DL (ref 6.3–8.2)
RBC # BLD AUTO: 3.94 M/UL (ref 4.23–5.6)
SODIUM SERPL-SCNC: 139 MMOL/L (ref 133–143)
TIBC SERPL-MCNC: 282 UG/DL (ref 250–450)
WBC # BLD AUTO: 5.7 K/UL (ref 4.3–11.1)

## 2023-08-15 PROCEDURE — 36415 COLL VENOUS BLD VENIPUNCTURE: CPT

## 2023-08-15 PROCEDURE — 83550 IRON BINDING TEST: CPT

## 2023-08-15 PROCEDURE — 3078F DIAST BP <80 MM HG: CPT | Performed by: INTERNAL MEDICINE

## 2023-08-15 PROCEDURE — 85025 COMPLETE CBC W/AUTO DIFF WBC: CPT

## 2023-08-15 PROCEDURE — 80053 COMPREHEN METABOLIC PANEL: CPT

## 2023-08-15 PROCEDURE — 1036F TOBACCO NON-USER: CPT | Performed by: INTERNAL MEDICINE

## 2023-08-15 PROCEDURE — 83540 ASSAY OF IRON: CPT

## 2023-08-15 PROCEDURE — 99214 OFFICE O/P EST MOD 30 MIN: CPT | Performed by: INTERNAL MEDICINE

## 2023-08-15 PROCEDURE — 3077F SYST BP >= 140 MM HG: CPT | Performed by: INTERNAL MEDICINE

## 2023-08-15 PROCEDURE — G8420 CALC BMI NORM PARAMETERS: HCPCS | Performed by: INTERNAL MEDICINE

## 2023-08-15 PROCEDURE — 1123F ACP DISCUSS/DSCN MKR DOCD: CPT | Performed by: INTERNAL MEDICINE

## 2023-08-15 PROCEDURE — 82728 ASSAY OF FERRITIN: CPT

## 2023-08-15 PROCEDURE — G8427 DOCREV CUR MEDS BY ELIG CLIN: HCPCS | Performed by: INTERNAL MEDICINE

## 2023-08-15 NOTE — PROGRESS NOTES
[facility]: Office Visit Established Patient    Chief Complaint:    Chief Complaint   Patient presents with    Follow-up         History of Present Illness:  Mr. Kia Boyd is a 70 y.o. male who returns today for management of chronic DVT. He has a history of a Factor  V Leiden mutation with recurrent thrombosis x 4 episodes, previously under the care of Dr. Beckie Johnson then transitioning to Dr. Elijah Bloom. He continues on chronic Xarelto after failing Coumadin and Pradaxa. He returns today for 6 month follow up. Overall, he has been about the same since last seen. He continues under the care of pain management for his chronic pain that requires frequent injections. He notes more abdominal pain and thinks this may be related to his pancreatic cysts, his next imaging is in November. We were able to help with  assistance for his Xarelto, but it only lasted for about 2 months. He is taking it now but is getting assistance through the [place]. Left leg swelling continues, he is not currently using any compression as he feels it is not helpful. Review of Systems:  Constitutional: Negative. HENT: Negative. Eyes: Negative. Respiratory: Negative. Cardiovascular: Negative. Gastrointestinal: Negative. Genitourinary: Negative. Musculoskeletal: Positive for back, hip, and leg pain. Skin: Negative. Neurological: Negative. Endo/Heme/Allergies: Negative. Psychiatric/Behavioral: Negative. All other systems reviewed and are negative. Allergies   Allergen Reactions    Epinephrine Anaphylaxis     Blindness in right eye      Past Medical History:   Diagnosis Date    Acute sinusitis     Anaphylactic reaction 2/1/2016    AR (allergic rhinitis) 7/27/2016    Arrhythmia     bradycardia    Bicipital tenosynovitis 2/17/2012    Bradycardia 02/02/2016    Seen by [facility]-no follow-up required.      Carpal tunnel syndrome 2/17/2012    Chronic back pain 12/23/2014

## 2023-08-15 NOTE — PATIENT INSTRUCTIONS
Patient Instructions from Today's Visit    Reason for Visit:  Follow up Deep Vein Thrombosis    Plan:  Lab results reviewed   Continue Xarleto-refills sent     Follow Up:   Follow up 6 mo    Recent Lab Results:  Hospital Outpatient Visit on 08/15/2023   Component Date Value Ref Range Status    WBC 08/15/2023 5.7  4.3 - 11.1 K/uL Final    RBC 08/15/2023 3.94 (L)  4.23 - 5.6 M/uL Final    Hemoglobin 08/15/2023 13.1 (L)  13.6 - 17.2 g/dL Final    Hematocrit 08/15/2023 41.4  41.1 - 50.3 % Final    MCV 08/15/2023 105.1 (H)  82.0 - 102.0 FL Final    MCH 08/15/2023 33.2 (H)  26.1 - 32.9 PG Final    MCHC 08/15/2023 31.6  31.4 - 35.0 g/dL Final    RDW 08/15/2023 12.4  11.9 - 14.6 % Final    Platelets 57/74/8263 171  150 - 450 K/uL Final    MPV 08/15/2023 9.6  9.4 - 12.3 FL Final    nRBC 08/15/2023 0.00  0.0 - 0.2 K/uL Final    **Note: Absolute NRBC parameter is now reported with Hemogram**    Neutrophils % 08/15/2023 70  43 - 78 % Final    Lymphocytes % 08/15/2023 22  13 - 44 % Final    Monocytes % 08/15/2023 5  4.0 - 12.0 % Final    Eosinophils % 08/15/2023 3  0.5 - 7.8 % Final    Basophils % 08/15/2023 0  0.0 - 2.0 % Final    Immature Granulocytes 08/15/2023 0  0.0 - 5.0 % Final    Neutrophils Absolute 08/15/2023 4.0  1.7 - 8.2 K/UL Final    Lymphocytes Absolute 08/15/2023 1.3  0.5 - 4.6 K/UL Final    Monocytes Absolute 08/15/2023 0.3  0.1 - 1.3 K/UL Final    Eosinophils Absolute 08/15/2023 0.2  0.0 - 0.8 K/UL Final    Basophils Absolute 08/15/2023 0.0  0.0 - 0.2 K/UL Final    Absolute Immature Granulocyte 08/15/2023 0.0  0.0 - 0.5 K/UL Final    Differential Type 08/15/2023 AUTOMATED    Final    Sodium 08/15/2023 139  133 - 143 mmol/L Final    Potassium 08/15/2023 4.4  3.5 - 5.1 mmol/L Final    Chloride 08/15/2023 108  101 - 110 mmol/L Final    CO2 08/15/2023 27  21 - 32 mmol/L Final    Anion Gap 08/15/2023 4  2 - 11 mmol/L Final    Glucose 08/15/2023 185 (H)  65 - 100 mg/dL Final    BUN 08/15/2023 19  8 - 23 MG/DL Final

## 2023-09-21 ENCOUNTER — TELEPHONE (OUTPATIENT)
Dept: ONCOLOGY | Age: 78
End: 2023-09-21

## 2023-09-21 DIAGNOSIS — E78.5 DYSLIPIDEMIA: Primary | ICD-10-CM

## 2023-09-21 RX ORDER — ATORVASTATIN CALCIUM 80 MG/1
40 TABLET, FILM COATED ORAL DAILY
Qty: 90 TABLET | Refills: 1 | Status: SHIPPED | OUTPATIENT
Start: 2023-09-21

## 2023-09-21 NOTE — TELEPHONE ENCOUNTER
Patient mentioned at his wife's appointment today that he needs a refill of atorvastatin.   Prescription sent to pharmacy    Orders Placed This Encounter    atorvastatin (LIPITOR) 80 MG tablet     Sig: Take 0.5 tablets by mouth daily     Dispense:  90 tablet     Refill:  1

## 2023-09-21 NOTE — TELEPHONE ENCOUNTER
Physician provider: Marisela Knox MD  Reason for today's call: serve pain  Last office visit:8/15/2023    Patient notified that their information will be routed to the [facility] clinical triage team for review. Patient is advised that they will receive a phone call from the triage department. If symptoms worsen before receiving a call back, the patient has been advised to proceed to the nearest ED. Pt stated he has been having serve pain on the right side of his stomach along with lower back pains. Pt stated this has been going on for a couple weeks now. PT  also mentioned the pain is  not as bad right now.

## 2023-09-21 NOTE — TELEPHONE ENCOUNTER
Chart reviewed. Returned call to patient. Informed patient that per dictation, continue with [facility], we have no plans to assume/resume opioid prescribing. Patient instructed to contact pain MD for assistance. Verbalized understanding.

## 2023-09-24 NOTE — PROGRESS NOTES
pain.  Unable to use NSAIDs given history of Xarelto. Denies history of ulcers. Reports last colonoscopy was with Dr. Kalee Vela in 2017 reportedly normal.  Patient denies nausea, vomiting, fevers, dysuria, hematuria, frequency, jaundice, scleral icterus, chest pain, shortness of breath or night sweats. Mood symptoms overall stable without suicidal intention. On average has 1 meal a day. Review of Systems   Constitutional:  Negative for diaphoresis and fever. HENT:  Positive for trouble swallowing. Eyes:         Denies scleral icterus  Positive for watery discharge of right eye   Respiratory:  Negative for shortness of breath. Cardiovascular:  Negative for chest pain. Gastrointestinal:  Positive for abdominal pain and constipation. Negative for anal bleeding, blood in stool, nausea and vomiting. Genitourinary:  Negative for dysuria, frequency and hematuria. Skin:  Negative for color change. Objective   Physical Exam  Vitals reviewed. Constitutional:       General: He is not in acute distress. Appearance: Normal appearance. He is not ill-appearing, toxic-appearing or diaphoretic. Interventions: He is not intubated. HENT:      Head: Normocephalic and atraumatic. Mouth/Throat:      Comments: No yellow discoloration on ventral surface of tongue  Eyes:      General: No scleral icterus. Right eye: No discharge. Left eye: No discharge. Extraocular Movements: Extraocular movements intact. Cardiovascular:      Rate and Rhythm: Normal rate and regular rhythm. Heart sounds: No murmur heard. No friction rub. No gallop. Pulmonary:      Effort: Pulmonary effort is normal. No tachypnea, accessory muscle usage, respiratory distress or retractions. He is not intubated. Breath sounds: No wheezing, rhonchi or rales. Abdominal:      General: Abdomen is flat. Bowel sounds are normal. There is no distension. Palpations: Abdomen is soft.

## 2023-09-26 ENCOUNTER — OFFICE VISIT (OUTPATIENT)
Dept: INTERNAL MEDICINE CLINIC | Facility: CLINIC | Age: 78
End: 2023-09-26
Payer: MEDICARE

## 2023-09-26 VITALS
WEIGHT: 165 LBS | BODY MASS INDEX: 24.44 KG/M2 | SYSTOLIC BLOOD PRESSURE: 122 MMHG | DIASTOLIC BLOOD PRESSURE: 56 MMHG | HEIGHT: 69 IN | HEART RATE: 48 BPM | TEMPERATURE: 98 F | RESPIRATION RATE: 16 BRPM

## 2023-09-26 DIAGNOSIS — R10.9 CHRONIC ABDOMINAL PAIN: Primary | ICD-10-CM

## 2023-09-26 DIAGNOSIS — G89.29 CHRONIC ABDOMINAL PAIN: Primary | ICD-10-CM

## 2023-09-26 PROBLEM — R73.03 PREDIABETES: Status: RESOLVED | Noted: 2020-09-30 | Resolved: 2023-09-26

## 2023-09-26 PROCEDURE — G8427 DOCREV CUR MEDS BY ELIG CLIN: HCPCS | Performed by: STUDENT IN AN ORGANIZED HEALTH CARE EDUCATION/TRAINING PROGRAM

## 2023-09-26 PROCEDURE — 3074F SYST BP LT 130 MM HG: CPT | Performed by: STUDENT IN AN ORGANIZED HEALTH CARE EDUCATION/TRAINING PROGRAM

## 2023-09-26 PROCEDURE — 1123F ACP DISCUSS/DSCN MKR DOCD: CPT | Performed by: STUDENT IN AN ORGANIZED HEALTH CARE EDUCATION/TRAINING PROGRAM

## 2023-09-26 PROCEDURE — 3078F DIAST BP <80 MM HG: CPT | Performed by: STUDENT IN AN ORGANIZED HEALTH CARE EDUCATION/TRAINING PROGRAM

## 2023-09-26 PROCEDURE — 1036F TOBACCO NON-USER: CPT | Performed by: STUDENT IN AN ORGANIZED HEALTH CARE EDUCATION/TRAINING PROGRAM

## 2023-09-26 PROCEDURE — 99214 OFFICE O/P EST MOD 30 MIN: CPT | Performed by: STUDENT IN AN ORGANIZED HEALTH CARE EDUCATION/TRAINING PROGRAM

## 2023-09-26 PROCEDURE — G8420 CALC BMI NORM PARAMETERS: HCPCS | Performed by: STUDENT IN AN ORGANIZED HEALTH CARE EDUCATION/TRAINING PROGRAM

## 2023-09-26 RX ORDER — OXYCODONE AND ACETAMINOPHEN 10; 325 MG/1; MG/1
TABLET ORAL
COMMUNITY
Start: 2023-09-20

## 2023-09-26 ASSESSMENT — ENCOUNTER SYMPTOMS
NAUSEA: 0
VOMITING: 0
TROUBLE SWALLOWING: 1
CONSTIPATION: 1
BLOOD IN STOOL: 0
COLOR CHANGE: 0
ANAL BLEEDING: 0
ABDOMINAL PAIN: 1
SHORTNESS OF BREATH: 0

## 2023-09-28 ENCOUNTER — TELEPHONE (OUTPATIENT)
Dept: INTERNAL MEDICINE CLINIC | Facility: CLINIC | Age: 78
End: 2023-09-28

## 2023-09-28 NOTE — TELEPHONE ENCOUNTER
----- Message from [address] [place] [address] sent at 9/28/2023 10:09 AM EDT -----  Subject: Message to Provider    QUESTIONS  Information for Provider? The patient is needing an approval for his MRI   stating that it is ok to do the test even though he has a Boring   filter placed in 2001. The patient is not able to schedule the MRI until   the PCP office verifies the device is safe enough for him to do the MRI. Please call the patient back to advise.   ---------------------------------------------------------------------------  --------------  Lillian WYMAN  [phone]; OK to leave message on voicemail  ---------------------------------------------------------------------------  --------------  SCRIPT ANSWERS  Relationship to Patient?  Self

## 2023-10-16 ENCOUNTER — HOSPITAL ENCOUNTER (OUTPATIENT)
Dept: MRI IMAGING | Age: 78
Discharge: HOME | End: 2023-10-19
Attending: STUDENT IN AN ORGANIZED HEALTH CARE EDUCATION/TRAINING PROGRAM
Payer: MEDICARE

## 2023-10-16 DIAGNOSIS — G89.29 CHRONIC ABDOMINAL PAIN: ICD-10-CM

## 2023-10-16 DIAGNOSIS — R10.9 CHRONIC ABDOMINAL PAIN: ICD-10-CM

## 2023-10-16 PROCEDURE — 2580000003 HC RX 258: Performed by: STUDENT IN AN ORGANIZED HEALTH CARE EDUCATION/TRAINING PROGRAM

## 2023-10-16 PROCEDURE — 6360000004 HC RX CONTRAST MEDICATION: Performed by: STUDENT IN AN ORGANIZED HEALTH CARE EDUCATION/TRAINING PROGRAM

## 2023-10-16 PROCEDURE — 74183 MRI ABD W/O CNTR FLWD CNTR: CPT

## 2023-10-16 PROCEDURE — A9579 GAD-BASE MR CONTRAST NOS,1ML: HCPCS | Performed by: STUDENT IN AN ORGANIZED HEALTH CARE EDUCATION/TRAINING PROGRAM

## 2023-10-16 RX ORDER — SODIUM CHLORIDE 0.9 % (FLUSH) 0.9 %
30 SYRINGE (ML) INJECTION AS NEEDED
Status: DISCONTINUED | OUTPATIENT
Start: 2023-10-16 | End: 2023-10-20 | Stop reason: HOSPADM

## 2023-10-16 RX ADMIN — GADOTERIDOL 15 ML: 279.3 INJECTION, SOLUTION INTRAVENOUS at 06:27

## 2023-10-16 RX ADMIN — SODIUM CHLORIDE, PRESERVATIVE FREE 30 ML: 5 INJECTION INTRAVENOUS at 06:29

## 2023-12-30 PROBLEM — N18.30 CHRONIC RENAL DISEASE, STAGE III (HCC): Status: RESOLVED | Noted: 2023-08-15 | Resolved: 2023-12-30

## 2023-12-30 PROBLEM — I26.99 PULMONARY EMBOLISM (HCC): Status: RESOLVED | Noted: 2023-03-03 | Resolved: 2023-12-30

## 2023-12-31 NOTE — PROGRESS NOTES
to urinate.  However not bothersome enough where he would desire treatment.  Does have some chills and cold intolerance but no fevers.  Reports easy bruising from use of Xarelto.  No cough, chest pain, shortness of breath, tinnitus, sinus pain or gingival bleeding.  Has noticed for the past several weeks or more that he will have episodes of lightheadedness sometimes to the point of almost passing out.  Does not always occur with position changes.  Drinks close to half gallon of water if not more daily.  No caffeinated beverages.        Review of Systems   Constitutional:  Positive for chills. Negative for fever.   HENT:  Negative for sinus pressure and tinnitus.         Denies gingival bleeding   Respiratory:  Negative for cough and shortness of breath.    Cardiovascular:  Negative for chest pain and palpitations.   Gastrointestinal:  Positive for anal bleeding. Negative for nausea and vomiting.        Denies melena   Endocrine: Positive for cold intolerance.   Genitourinary:  Positive for difficulty urinating. Negative for dysuria and hematuria.        Denies perineal pain   Neurological:  Positive for light-headedness. Negative for syncope.   Hematological:  Bruises/bleeds easily.          Objective   Physical Exam  Vitals reviewed.   Constitutional:       General: He is not in acute distress.     Appearance: Normal appearance. He is not ill-appearing, toxic-appearing or diaphoretic.   HENT:      Head: Normocephalic and atraumatic.   Eyes:      Extraocular Movements: Extraocular movements intact.   Cardiovascular:      Rate and Rhythm: Normal rate and regular rhythm.      Heart sounds: No murmur heard.     No friction rub. No gallop.   Pulmonary:      Effort: Pulmonary effort is normal. No respiratory distress.      Breath sounds: No wheezing, rhonchi or rales.   Abdominal:      Palpations: Abdomen is soft.      Tenderness: There is abdominal tenderness. There is no guarding.      Comments: Tenderness to

## 2024-01-03 ENCOUNTER — OFFICE VISIT (OUTPATIENT)
Dept: INTERNAL MEDICINE CLINIC | Facility: CLINIC | Age: 79
End: 2024-01-03
Payer: MEDICARE

## 2024-01-03 VITALS
SYSTOLIC BLOOD PRESSURE: 106 MMHG | TEMPERATURE: 97.3 F | HEIGHT: 69 IN | OXYGEN SATURATION: 99 % | BODY MASS INDEX: 24.59 KG/M2 | HEART RATE: 56 BPM | WEIGHT: 166 LBS | DIASTOLIC BLOOD PRESSURE: 68 MMHG

## 2024-01-03 DIAGNOSIS — G89.29 CHRONIC ABDOMINAL PAIN: Primary | ICD-10-CM

## 2024-01-03 DIAGNOSIS — E11.9 TYPE 2 DIABETES MELLITUS WITHOUT COMPLICATION, WITHOUT LONG-TERM CURRENT USE OF INSULIN (HCC): ICD-10-CM

## 2024-01-03 DIAGNOSIS — Z79.01 CURRENT USE OF LONG TERM ANTICOAGULATION: ICD-10-CM

## 2024-01-03 DIAGNOSIS — D49.0 IPMN (INTRADUCTAL PAPILLARY MUCINOUS NEOPLASM): ICD-10-CM

## 2024-01-03 DIAGNOSIS — D68.51 FACTOR V LEIDEN (HCC): ICD-10-CM

## 2024-01-03 DIAGNOSIS — R10.9 CHRONIC ABDOMINAL PAIN: Primary | ICD-10-CM

## 2024-01-03 DIAGNOSIS — R39.9 LOWER URINARY TRACT SYMPTOMS: ICD-10-CM

## 2024-01-03 DIAGNOSIS — R42 LIGHTHEADEDNESS: ICD-10-CM

## 2024-01-03 LAB — HBA1C MFR BLD: 5.8 %

## 2024-01-03 PROCEDURE — G8420 CALC BMI NORM PARAMETERS: HCPCS | Performed by: STUDENT IN AN ORGANIZED HEALTH CARE EDUCATION/TRAINING PROGRAM

## 2024-01-03 PROCEDURE — 3074F SYST BP LT 130 MM HG: CPT | Performed by: STUDENT IN AN ORGANIZED HEALTH CARE EDUCATION/TRAINING PROGRAM

## 2024-01-03 PROCEDURE — 83036 HEMOGLOBIN GLYCOSYLATED A1C: CPT | Performed by: STUDENT IN AN ORGANIZED HEALTH CARE EDUCATION/TRAINING PROGRAM

## 2024-01-03 PROCEDURE — 93000 ELECTROCARDIOGRAM COMPLETE: CPT | Performed by: STUDENT IN AN ORGANIZED HEALTH CARE EDUCATION/TRAINING PROGRAM

## 2024-01-03 PROCEDURE — 99214 OFFICE O/P EST MOD 30 MIN: CPT | Performed by: STUDENT IN AN ORGANIZED HEALTH CARE EDUCATION/TRAINING PROGRAM

## 2024-01-03 PROCEDURE — 3078F DIAST BP <80 MM HG: CPT | Performed by: STUDENT IN AN ORGANIZED HEALTH CARE EDUCATION/TRAINING PROGRAM

## 2024-01-03 PROCEDURE — G8427 DOCREV CUR MEDS BY ELIG CLIN: HCPCS | Performed by: STUDENT IN AN ORGANIZED HEALTH CARE EDUCATION/TRAINING PROGRAM

## 2024-01-03 PROCEDURE — G8484 FLU IMMUNIZE NO ADMIN: HCPCS | Performed by: STUDENT IN AN ORGANIZED HEALTH CARE EDUCATION/TRAINING PROGRAM

## 2024-01-03 PROCEDURE — 1123F ACP DISCUSS/DSCN MKR DOCD: CPT | Performed by: STUDENT IN AN ORGANIZED HEALTH CARE EDUCATION/TRAINING PROGRAM

## 2024-01-03 PROCEDURE — 1036F TOBACCO NON-USER: CPT | Performed by: STUDENT IN AN ORGANIZED HEALTH CARE EDUCATION/TRAINING PROGRAM

## 2024-01-03 RX ORDER — DICYCLOMINE HYDROCHLORIDE 10 MG/1
10 CAPSULE ORAL
COMMUNITY
Start: 2023-12-11

## 2024-01-03 RX ORDER — DORZOLAMIDE HYDROCHLORIDE AND TIMOLOL MALEATE 20; 5 MG/ML; MG/ML
SOLUTION/ DROPS OPHTHALMIC
COMMUNITY
Start: 2023-12-09

## 2024-01-03 RX ORDER — LOTEPREDNOL ETABONATE 3.8 MG/G
GEL OPHTHALMIC
COMMUNITY
Start: 2023-12-11

## 2024-01-03 RX ORDER — NETARSUDIL AND LATANOPROST OPHTHALMIC SOLUTION, 0.02%/0.005% .2; .05 MG/ML; MG/ML
1 SOLUTION/ DROPS OPHTHALMIC; TOPICAL EVERY EVENING
COMMUNITY

## 2024-01-03 ASSESSMENT — ENCOUNTER SYMPTOMS
SHORTNESS OF BREATH: 0
SINUS PRESSURE: 0
VOMITING: 0
NAUSEA: 0
ANAL BLEEDING: 1
COUGH: 0

## 2024-01-03 ASSESSMENT — PATIENT HEALTH QUESTIONNAIRE - PHQ9
SUM OF ALL RESPONSES TO PHQ9 QUESTIONS 1 & 2: 0
SUM OF ALL RESPONSES TO PHQ QUESTIONS 1-9: 0
SUM OF ALL RESPONSES TO PHQ QUESTIONS 1-9: 0
1. LITTLE INTEREST OR PLEASURE IN DOING THINGS: 0
2. FEELING DOWN, DEPRESSED OR HOPELESS: 0
SUM OF ALL RESPONSES TO PHQ QUESTIONS 1-9: 0
SUM OF ALL RESPONSES TO PHQ QUESTIONS 1-9: 0

## 2024-01-05 ENCOUNTER — TELEPHONE (OUTPATIENT)
Dept: INTERNAL MEDICINE CLINIC | Facility: CLINIC | Age: 79
End: 2024-01-05

## 2024-01-05 NOTE — TELEPHONE ENCOUNTER
FYI:    Pt called the office to inform Dr. Meza that he was contacted by [facility] to install a halter heart monitor. Pt states he declined their recommendation and states he does not want and/or need one.

## 2024-02-26 DIAGNOSIS — D64.9 ANEMIA, UNSPECIFIED TYPE: ICD-10-CM

## 2024-02-26 DIAGNOSIS — I82.502 CHRONIC DEEP VEIN THROMBOSIS (DVT) OF LEFT LOWER EXTREMITY, UNSPECIFIED VEIN (HCC): Primary | ICD-10-CM

## 2024-02-27 ENCOUNTER — HOSPITAL ENCOUNTER (OUTPATIENT)
Dept: LAB | Age: 79
Discharge: HOME | End: 2024-03-01
Payer: MEDICARE

## 2024-02-27 ENCOUNTER — OFFICE VISIT (OUTPATIENT)
Dept: ONCOLOGY | Age: 79
End: 2024-02-27
Payer: COMMERCIAL

## 2024-02-27 VITALS
OXYGEN SATURATION: 98 % | HEIGHT: 69 IN | BODY MASS INDEX: 24.73 KG/M2 | HEART RATE: 58 BPM | WEIGHT: 167 LBS | RESPIRATION RATE: 16 BRPM | SYSTOLIC BLOOD PRESSURE: 139 MMHG | TEMPERATURE: 97.5 F | DIASTOLIC BLOOD PRESSURE: 72 MMHG

## 2024-02-27 DIAGNOSIS — N18.31 STAGE 3A CHRONIC KIDNEY DISEASE (HCC): ICD-10-CM

## 2024-02-27 DIAGNOSIS — D64.9 ANEMIA, UNSPECIFIED TYPE: ICD-10-CM

## 2024-02-27 DIAGNOSIS — I82.502 CHRONIC DEEP VEIN THROMBOSIS (DVT) OF LEFT LOWER EXTREMITY, UNSPECIFIED VEIN (HCC): ICD-10-CM

## 2024-02-27 DIAGNOSIS — I82.502 CHRONIC DEEP VEIN THROMBOSIS (DVT) OF LEFT LOWER EXTREMITY, UNSPECIFIED VEIN (HCC): Primary | ICD-10-CM

## 2024-02-27 DIAGNOSIS — D68.51 FACTOR V LEIDEN (HCC): ICD-10-CM

## 2024-02-27 PROBLEM — N18.30 CHRONIC RENAL DISEASE, STAGE III (HCC): Status: ACTIVE | Noted: 2024-02-27

## 2024-02-27 PROBLEM — T41.45XA ADVERSE EFFECT OF ANESTHESIA: Status: RESOLVED | Noted: 2023-03-03 | Resolved: 2024-02-27

## 2024-02-27 LAB
ALBUMIN SERPL-MCNC: 3.8 G/DL (ref 3.2–4.6)
ALBUMIN/GLOB SERPL: 1.2 (ref 0.4–1.6)
ALP SERPL-CCNC: 75 U/L (ref 50–136)
ALT SERPL-CCNC: 52 U/L (ref 12–65)
ANION GAP SERPL CALC-SCNC: 5 MMOL/L (ref 2–11)
AST SERPL-CCNC: 31 U/L (ref 15–37)
BASOPHILS # BLD: 0 K/UL (ref 0–0.2)
BASOPHILS NFR BLD: 0 % (ref 0–2)
BILIRUB SERPL-MCNC: 0.7 MG/DL (ref 0.2–1.1)
BUN SERPL-MCNC: 19 MG/DL (ref 8–23)
CALCIUM SERPL-MCNC: 9.8 MG/DL (ref 8.3–10.4)
CHLORIDE SERPL-SCNC: 106 MMOL/L (ref 103–113)
CO2 SERPL-SCNC: 30 MMOL/L (ref 21–32)
CREAT SERPL-MCNC: 1.3 MG/DL (ref 0.8–1.5)
DIFFERENTIAL METHOD BLD: NORMAL
EOSINOPHIL # BLD: 0.2 K/UL (ref 0–0.8)
EOSINOPHIL NFR BLD: 3 % (ref 0.5–7.8)
ERYTHROCYTE [DISTWIDTH] IN BLOOD BY AUTOMATED COUNT: 12.6 % (ref 11.9–14.6)
FERRITIN SERPL-MCNC: 102 NG/ML (ref 8–388)
GLOBULIN SER CALC-MCNC: 3.3 G/DL (ref 2.8–4.5)
GLUCOSE SERPL-MCNC: 117 MG/DL (ref 65–100)
HCT VFR BLD AUTO: 46.5 % (ref 41.1–50.3)
HGB BLD-MCNC: 15.1 G/DL (ref 13.6–17.2)
IMM GRANULOCYTES # BLD AUTO: 0 K/UL (ref 0–0.5)
IMM GRANULOCYTES NFR BLD AUTO: 0 % (ref 0–5)
IRON SATN MFR SERPL: 48 %
IRON SERPL-MCNC: 130 UG/DL (ref 35–150)
LYMPHOCYTES # BLD: 1.3 K/UL (ref 0.5–4.6)
LYMPHOCYTES NFR BLD: 19 % (ref 13–44)
MCH RBC QN AUTO: 32.7 PG (ref 26.1–32.9)
MCHC RBC AUTO-ENTMCNC: 32.5 G/DL (ref 31.4–35)
MCV RBC AUTO: 100.6 FL (ref 82–102)
MONOCYTES # BLD: 0.4 K/UL (ref 0.1–1.3)
MONOCYTES NFR BLD: 5 % (ref 4–12)
NEUTS SEG # BLD: 5 K/UL (ref 1.7–8.2)
NEUTS SEG NFR BLD: 73 % (ref 43–78)
NRBC # BLD: 0 K/UL (ref 0–0.2)
PLATELET # BLD AUTO: 183 K/UL (ref 150–450)
PMV BLD AUTO: 10 FL (ref 9.4–12.3)
POTASSIUM SERPL-SCNC: 4.1 MMOL/L (ref 3.5–5.1)
PROT SERPL-MCNC: 7.1 G/DL (ref 6.3–8.2)
RBC # BLD AUTO: 4.62 M/UL (ref 4.23–5.6)
SODIUM SERPL-SCNC: 141 MMOL/L (ref 136–146)
TIBC SERPL-MCNC: 273 UG/DL (ref 250–450)
WBC # BLD AUTO: 6.9 K/UL (ref 4.3–11.1)

## 2024-02-27 PROCEDURE — 1123F ACP DISCUSS/DSCN MKR DOCD: CPT | Performed by: INTERNAL MEDICINE

## 2024-02-27 PROCEDURE — 36415 COLL VENOUS BLD VENIPUNCTURE: CPT

## 2024-02-27 PROCEDURE — 80053 COMPREHEN METABOLIC PANEL: CPT

## 2024-02-27 PROCEDURE — 99213 OFFICE O/P EST LOW 20 MIN: CPT | Performed by: INTERNAL MEDICINE

## 2024-02-27 PROCEDURE — 83540 ASSAY OF IRON: CPT

## 2024-02-27 PROCEDURE — 83550 IRON BINDING TEST: CPT

## 2024-02-27 PROCEDURE — 3078F DIAST BP <80 MM HG: CPT | Performed by: INTERNAL MEDICINE

## 2024-02-27 PROCEDURE — 3075F SYST BP GE 130 - 139MM HG: CPT | Performed by: INTERNAL MEDICINE

## 2024-02-27 PROCEDURE — 85025 COMPLETE CBC W/AUTO DIFF WBC: CPT

## 2024-02-27 PROCEDURE — 82728 ASSAY OF FERRITIN: CPT

## 2024-02-27 RX ORDER — TIMOLOL MALEATE 5 MG/ML
SOLUTION/ DROPS OPHTHALMIC
COMMUNITY
Start: 2024-01-10

## 2024-02-27 NOTE — PATIENT INSTRUCTIONS
Patient Instructions from Today's Visit    Reason for Visit:  Follow up-DVT    Diagnosis Information:  https://www.cancer.net/about-us/asco-answers-patient-education-materials/lmsg-eslzrzr-dhie-sheets      Plan:  You can try taking Miralax daily to help soften your bowels.  Your MRI looked good.  The labs that we do have back at this time are stable.  Continue the Xarelto. If you need anything prior to your next appointment please call our office.  Follow Up:  As scheduled    Recent Lab Results:  Hospital Outpatient Visit on 02/27/2024   Component Date Value Ref Range Status    WBC 02/27/2024 6.9  4.3 - 11.1 K/uL Final    RBC 02/27/2024 4.62  4.23 - 5.6 M/uL Final    Hemoglobin 02/27/2024 15.1  13.6 - 17.2 g/dL Final    Hematocrit 02/27/2024 46.5  41.1 - 50.3 % Final    MCV 02/27/2024 100.6  82.0 - 102.0 FL Final    MCH 02/27/2024 32.7  26.1 - 32.9 PG Final    MCHC 02/27/2024 32.5  31.4 - 35.0 g/dL Final    RDW 02/27/2024 12.6  11.9 - 14.6 % Final    Platelets 02/27/2024 183  150 - 450 K/uL Final    MPV 02/27/2024 10.0  9.4 - 12.3 FL Final    nRBC 02/27/2024 0.00  0.0 - 0.2 K/uL Final    **Note: Absolute NRBC parameter is now reported with Hemogram**    Differential Type 02/27/2024 AUTOMATED    Final    Neutrophils % 02/27/2024 73  43 - 78 % Final    Lymphocytes % 02/27/2024 19  13 - 44 % Final    Monocytes % 02/27/2024 5  4.0 - 12.0 % Final    Eosinophils % 02/27/2024 3  0.5 - 7.8 % Final    Basophils % 02/27/2024 0  0.0 - 2.0 % Final    Immature Granulocytes 02/27/2024 0  0.0 - 5.0 % Final    Neutrophils Absolute 02/27/2024 5.0  1.7 - 8.2 K/UL Final    Lymphocytes Absolute 02/27/2024 1.3  0.5 - 4.6 K/UL Final    Monocytes Absolute 02/27/2024 0.4  0.1 - 1.3 K/UL Final    Eosinophils Absolute 02/27/2024 0.2  0.0 - 0.8 K/UL Final    Basophils Absolute 02/27/2024 0.0  0.0 - 0.2 K/UL Final    Absolute Immature Granulocyte 02/27/2024 0.0  0.0 - 0.5 K/UL Final         Treatment Summary has been discussed and given to

## 2024-02-27 NOTE — PROGRESS NOTES
Allergen Reactions    Epinephrine Anaphylaxis     Blindness in right eye      Past Medical History:   Diagnosis Date    Acute sinusitis     Adverse effect of anesthesia 03/03/2023    Anaphylactic reaction 2/1/2016    AR (allergic rhinitis) 7/27/2016    Arrhythmia     bradycardia    Bicipital tenosynovitis 2/17/2012    Bradycardia 02/02/2016    Seen by [facility]-no follow-up required.     Carpal tunnel syndrome 2/17/2012    Chronic back pain 12/23/2014    Chronic deep vein thrombosis of lower extremity (HCC) 3/21/2016    Chronic pain     right shoulder    Chronic pansinusitis 7/27/2016    Chronic renal disease, stage III ([facility]) [233970] 08/15/2023    Chronic sinusitis 7/27/2016    DNS (deviated nasal septum)     EARLY (dyspnea on exertion) 12/23/2014    Elevated serum creatinine 12/23/2014    Epistaxis     Factor V Leiden ([facility])     managed with medication     Glaucoma     bilateral     H/O echocardiogram 02/02/2016    EF 70-75%    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 Benavides. Takes Xarelto daily.     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

## 2024-04-01 DIAGNOSIS — E11.9 TYPE 2 DIABETES MELLITUS WITHOUT COMPLICATION, WITHOUT LONG-TERM CURRENT USE OF INSULIN (HCC): Primary | ICD-10-CM

## 2024-04-02 ENCOUNTER — NURSE ONLY (OUTPATIENT)
Dept: INTERNAL MEDICINE CLINIC | Facility: CLINIC | Age: 79
End: 2024-04-02

## 2024-04-02 DIAGNOSIS — E11.9 TYPE 2 DIABETES MELLITUS WITHOUT COMPLICATION, WITHOUT LONG-TERM CURRENT USE OF INSULIN (HCC): ICD-10-CM

## 2024-04-02 DIAGNOSIS — R39.9 LOWER URINARY TRACT SYMPTOMS: ICD-10-CM

## 2024-04-02 LAB
ALBUMIN SERPL-MCNC: 4 G/DL (ref 3.2–4.6)
ALBUMIN/GLOB SERPL: 1.3 (ref 0.4–1.6)
ALP SERPL-CCNC: 64 U/L (ref 50–136)
ALT SERPL-CCNC: 54 U/L (ref 12–65)
ANION GAP SERPL CALC-SCNC: 4 MMOL/L (ref 2–11)
APPEARANCE UR: CLEAR
AST SERPL-CCNC: 28 U/L (ref 15–37)
BASOPHILS # BLD: 0 K/UL (ref 0–0.2)
BASOPHILS NFR BLD: 1 % (ref 0–2)
BILIRUB SERPL-MCNC: 0.7 MG/DL (ref 0.2–1.1)
BILIRUB UR QL: NEGATIVE
BUN SERPL-MCNC: 25 MG/DL (ref 8–23)
CALCIUM SERPL-MCNC: 9.8 MG/DL (ref 8.3–10.4)
CHLORIDE SERPL-SCNC: 109 MMOL/L (ref 103–113)
CHOLEST SERPL-MCNC: 189 MG/DL
CO2 SERPL-SCNC: 28 MMOL/L (ref 21–32)
COLOR UR: NORMAL
CREAT SERPL-MCNC: 1.4 MG/DL (ref 0.8–1.5)
CREAT UR-MCNC: 81 MG/DL
DIFFERENTIAL METHOD BLD: ABNORMAL
EOSINOPHIL # BLD: 0.2 K/UL (ref 0–0.8)
EOSINOPHIL NFR BLD: 3 % (ref 0.5–7.8)
ERYTHROCYTE [DISTWIDTH] IN BLOOD BY AUTOMATED COUNT: 12.5 % (ref 11.9–14.6)
GLOBULIN SER CALC-MCNC: 3 G/DL (ref 2.8–4.5)
GLUCOSE SERPL-MCNC: 124 MG/DL (ref 65–100)
GLUCOSE UR STRIP.AUTO-MCNC: NEGATIVE MG/DL
HCT VFR BLD AUTO: 44.8 % (ref 41.1–50.3)
HDLC SERPL-MCNC: 60 MG/DL (ref 40–60)
HDLC SERPL: 3.2
HGB BLD-MCNC: 14.6 G/DL (ref 13.6–17.2)
HGB UR QL STRIP: NEGATIVE
IMM GRANULOCYTES # BLD AUTO: 0 K/UL (ref 0–0.5)
IMM GRANULOCYTES NFR BLD AUTO: 0 % (ref 0–5)
KETONES UR QL STRIP.AUTO: NEGATIVE MG/DL
LDLC SERPL CALC-MCNC: 94.6 MG/DL
LEUKOCYTE ESTERASE UR QL STRIP.AUTO: NEGATIVE
LYMPHOCYTES # BLD: 1.7 K/UL (ref 0.5–4.6)
LYMPHOCYTES NFR BLD: 28 % (ref 13–44)
MCH RBC QN AUTO: 33.2 PG (ref 26.1–32.9)
MCHC RBC AUTO-ENTMCNC: 32.6 G/DL (ref 31.4–35)
MCV RBC AUTO: 101.8 FL (ref 82–102)
MICROALBUMIN UR-MCNC: 0.81 MG/DL
MICROALBUMIN/CREAT UR-RTO: 10 MG/G (ref 0–30)
MONOCYTES # BLD: 0.5 K/UL (ref 0.1–1.3)
MONOCYTES NFR BLD: 8 % (ref 4–12)
NEUTS SEG # BLD: 3.8 K/UL (ref 1.7–8.2)
NEUTS SEG NFR BLD: 60 % (ref 43–78)
NITRITE UR QL STRIP.AUTO: NEGATIVE
NRBC # BLD: 0 K/UL (ref 0–0.2)
PH UR STRIP: 5 (ref 5–9)
PLATELET # BLD AUTO: 192 K/UL (ref 150–450)
PMV BLD AUTO: 10.5 FL (ref 9.4–12.3)
POTASSIUM SERPL-SCNC: 4 MMOL/L (ref 3.5–5.1)
PROT SERPL-MCNC: 7 G/DL (ref 6.3–8.2)
PROT UR STRIP-MCNC: NEGATIVE MG/DL
RBC # BLD AUTO: 4.4 M/UL (ref 4.23–5.6)
SODIUM SERPL-SCNC: 141 MMOL/L (ref 136–146)
SP GR UR REFRACTOMETRY: 1.01 (ref 1–1.02)
T4 FREE SERPL-MCNC: 0.8 NG/DL (ref 0.78–1.46)
TRIGL SERPL-MCNC: 172 MG/DL (ref 35–150)
TSH, 3RD GENERATION: 3.96 UIU/ML (ref 0.36–3.74)
UROBILINOGEN UR QL STRIP.AUTO: 0.2 EU/DL (ref 0.2–1)
VLDLC SERPL CALC-MCNC: 34.4 MG/DL (ref 6–23)
WBC # BLD AUTO: 6.3 K/UL (ref 4.3–11.1)

## 2024-04-03 LAB
EST. AVERAGE GLUCOSE BLD GHB EST-MCNC: 120 MG/DL
HBA1C MFR BLD: 5.8 % (ref 4.8–5.6)

## 2024-04-06 NOTE — PROGRESS NOTES
Cruz Allred (:  1945) is a 79 y.o. male,Established patient, here for evaluation of the following chief complaint(s):  3 Month Follow-Up         ASSESSMENT/PLAN:  1. Chronic abdominal pain  2. IPMN (intraductal papillary mucinous neoplasm)  Known history of IPMN  CT abdomen/pelvis with IV contrast on 11/15/2022 as follows:  -Pancreatic duct dilation measuring up to 10 mm within the head, diffuse parenchymal atrophy  CT abdomen with contrast on 2022 as follows:  -Diffuse atrophy of the pancreas, suggested pancreatic duct dilation suboptimally assessed by CT  -Focal defined low attenuation in the pancreatic head measuring 2.2 x 1 cm (could represent cystic lesion with neoplasm not excluded or focal dilation of pancreatic duct which could be a secondary indicator of neoplasm)  -Suggested small bowel lesion off the second portion of the duodenum with a component extending into the duodenal lumen, neoplasm not excluded  -Additional low-attenuation hepatic and splenic lesions which are incompletely characterized  EUS on 2023 as follows:  -Pancreatic cysts: Based on communication with a mildly dilated main pancreatic duct this likely represents a mixed main duct/sidebranch IPMN but no worrisome features seen  -Dilated CBD likely reflects benign postcholecystectomy biliary dilatation  Pathology notable for CEA of 76 ng/mL, amylase elevated at 60,495 units/L  MRI of the abdomen with and without contrast on 2023 as follows:  -Mildly prominent appearance of ampulla with diminished soft tissue between the head of the pancreas and duodenum when compared to CT  -Stable main pancreatic duct and pancreatic duct sidebranches; could be related to distal obstruction versus mixed main duct/sidebranch IPMN  MRI of the abdomen with and without contrast on 10/16/2023 as follows:  -Innumerable pancreatic cystic lesions and mild main pancreatic duct dilatation consistent with IPMN  -Simple cyst within the

## 2024-04-09 ENCOUNTER — OFFICE VISIT (OUTPATIENT)
Dept: INTERNAL MEDICINE CLINIC | Facility: CLINIC | Age: 79
End: 2024-04-09
Payer: MEDICARE

## 2024-04-09 VITALS
DIASTOLIC BLOOD PRESSURE: 80 MMHG | BODY MASS INDEX: 25.18 KG/M2 | TEMPERATURE: 97.9 F | WEIGHT: 170 LBS | SYSTOLIC BLOOD PRESSURE: 118 MMHG | OXYGEN SATURATION: 100 % | HEIGHT: 69 IN | RESPIRATION RATE: 16 BRPM | HEART RATE: 72 BPM

## 2024-04-09 DIAGNOSIS — E78.5 DYSLIPIDEMIA: ICD-10-CM

## 2024-04-09 DIAGNOSIS — G89.29 CHRONIC ABDOMINAL PAIN: Primary | ICD-10-CM

## 2024-04-09 DIAGNOSIS — R07.9 RECURRENT CHEST PAIN: ICD-10-CM
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
## 2024-04-09 DIAGNOSIS — Z79.01 CURRENT USE OF LONG TERM ANTICOAGULATION: ICD-10-CM

## 2024-04-09 DIAGNOSIS — D49.0 IPMN (INTRADUCTAL PAPILLARY MUCINOUS NEOPLASM): ICD-10-CM

## 2024-04-09 DIAGNOSIS — N18.31 STAGE 3A CHRONIC KIDNEY DISEASE (HCC): ICD-10-CM

## 2024-04-09 DIAGNOSIS — E11.9 TYPE 2 DIABETES MELLITUS WITHOUT COMPLICATION, WITHOUT LONG-TERM CURRENT USE OF INSULIN (HCC): ICD-10-CM

## 2024-04-09 DIAGNOSIS — G89.4 CHRONIC PAIN SYNDROME: ICD-10-CM

## 2024-04-09 DIAGNOSIS — R10.9 CHRONIC ABDOMINAL PAIN: Primary | ICD-10-CM

## 2024-04-09 DIAGNOSIS — I10 ESSENTIAL HYPERTENSION: ICD-10-CM

## 2024-04-09 DIAGNOSIS — D68.51 FACTOR V LEIDEN (HCC): ICD-10-CM

## 2024-04-09 PROCEDURE — 99214 OFFICE O/P EST MOD 30 MIN: CPT | Performed by: STUDENT IN AN ORGANIZED HEALTH CARE EDUCATION/TRAINING PROGRAM

## 2024-04-09 PROCEDURE — G8427 DOCREV CUR MEDS BY ELIG CLIN: HCPCS | Performed by: STUDENT IN AN ORGANIZED HEALTH CARE EDUCATION/TRAINING PROGRAM

## 2024-04-09 PROCEDURE — 93000 ELECTROCARDIOGRAM COMPLETE: CPT | Performed by: STUDENT IN AN ORGANIZED HEALTH CARE EDUCATION/TRAINING PROGRAM

## 2024-04-09 PROCEDURE — 3074F SYST BP LT 130 MM HG: CPT | Performed by: STUDENT IN AN ORGANIZED HEALTH CARE EDUCATION/TRAINING PROGRAM

## 2024-04-09 PROCEDURE — 3079F DIAST BP 80-89 MM HG: CPT | Performed by: STUDENT IN AN ORGANIZED HEALTH CARE EDUCATION/TRAINING PROGRAM

## 2024-04-09 PROCEDURE — 1036F TOBACCO NON-USER: CPT | Performed by: STUDENT IN AN ORGANIZED HEALTH CARE EDUCATION/TRAINING PROGRAM

## 2024-04-09 PROCEDURE — 3044F HG A1C LEVEL LT 7.0%: CPT | Performed by: STUDENT IN AN ORGANIZED HEALTH CARE EDUCATION/TRAINING PROGRAM

## 2024-04-09 PROCEDURE — 1123F ACP DISCUSS/DSCN MKR DOCD: CPT | Performed by: STUDENT IN AN ORGANIZED HEALTH CARE EDUCATION/TRAINING PROGRAM

## 2024-04-09 PROCEDURE — G8419 CALC BMI OUT NRM PARAM NOF/U: HCPCS | Performed by: STUDENT IN AN ORGANIZED HEALTH CARE EDUCATION/TRAINING PROGRAM

## 2024-04-09 ASSESSMENT — ENCOUNTER SYMPTOMS
TROUBLE SWALLOWING: 0
BLOOD IN STOOL: 0
NAUSEA: 0
VOMITING: 0
ANAL BLEEDING: 0
CONSTIPATION: 1
ABDOMINAL PAIN: 1
BACK PAIN: 1
SHORTNESS OF BREATH: 0

## 2024-04-15 NOTE — PROGRESS NOTES
Problem: Mobility Impaired (Adult and Pediatric)  Goal: *Acute Goals and Plan of Care (Insert Text)  GOALS (1-4 days):  (1.) Patient will move from supine to sit and sit to supine in bed with MODIFIED INDEPENDENCE. (2.) Patient will transfer from bed to chair and chair to bed with SUPERVISION using the least restrictive device. Met 6/3/17  (3.) Patient will ambulate with SUPERVISION for 300 feet with the least restrictive device. (4.) Patient will be safe (with spouses help)with shoulder HEP to increase range of motion per MD orders. ________________________________________________________________________________________________      PHYSICAL THERAPY: Treatment Day: 2nd and PM 6/3/2017  INPATIENT: Hospital Day: 3  [place] / [place] / Product Type: Medicare /      NAME/AGE/GENDER: Kristy Page is a 81 y.o. male      PRIMARY DIAGNOSIS: Rotator cuff arthropathy, right [M12.811]  Biceps tendinitis on right [M75.21] Rotator cuff tear arthropathy of right shoulder Rotator cuff tear arthropathy of right shoulder  Procedure(s) (LRB):  RIGHT SHOULDER HARDWARE REMOVAL ( HOOK PLATE) (Right)  RIGHT SHOULDER ARTHROPLASTY TOTAL REVERSE / BICEPS TENODESIS AND  LATISSIMUS DORSI AND TERES MAJOR TRANSFER / DELTA (Right)  2 Days Post-Op  ICD-10: Treatment Diagnosis:       · Pain in Right Shoulder (M25.511)  · Stiffness of Right Shoulder, Not elsewhere classified (M25.611)  · Difficulty in walking, Not elsewhere classified (R26.2)   Precaution/Allergies:  Epinephrine and Aspirin       ASSESSMENT:      Mr. Manuel Campos 's pain was less than this morning. He said he just had a shot for pain and it seem to be helpful. He was agreeable to exercises but not walking this afternoon. He performed better with supination/pronation and elbow flexion (AAROM). This section established at most recent assessment   PROBLEM LIST (Impairments causing functional limitations):  1.  Decreased [place] with Bed Mobility  2. Decreased [place] with Transfers  3. Decreased [place] with Ambulation  4. Decreased [place] with shoulder HEP    INTERVENTIONS PLANNED: (Benefits and precautions of physical therapy have been discussed with the patient.)  1. Bed Mobility Training  2. Transfer Training  3. Gait Training  4. Therapeutic Exercises per MD orders  5. Modalities for Pain      TREATMENT PLAN: Frequency/Duration: twice daily for duration of hospital stay  Rehabilitation Potential For Stated Goals: GOOD      RECOMMENDED REHABILITATION/EQUIPMENT: (at time of discharge pending progress): Continue Skilled Therapy and Outpatient: Physical Therapy per MD's request.                   HISTORY:   History of Present Injury/Illness (Reason for Referral):  Painful right shoulder  Past Medical History/Comorbidities:   Mr. Nadya Villalpando  has a past medical history of Acute sinusitis; Anaphylactic reaction (2/1/2016); AR (allergic rhinitis) (7/27/2016); Arrhythmia; Bicipital tenosynovitis (2/17/2012); Bradycardia (02/02/2016); Carpal tunnel syndrome (2/17/2012); Chronic back pain (12/23/2014); Chronic deep vein thrombosis of lower extremity ([address] (3/21/2016); Chronic pain; Chronic pansinusitis (7/27/2016); Chronic sinusitis (7/27/2016); DNS (deviated nasal septum); ARCE (dyspnea on exertion) (12/23/2014); Elevated serum creatinine (12/23/2014); Epistaxis; Essential hypertension, benign (8/16/2011); Factor V Leiden ([address] Glaucoma; H/O echocardiogram (02/02/2016); 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); Hypercholesteremia; Hyperkalemia (12/23/2014); Hypertension; Hypertrophy of nasal turbinates (7/27/2016); Left leg DVT ([address] (12/23/2014); Leukocytosis (12/23/2014); Lumbar spinal stenosis; Nasal obstruction; Nasal polyp (7/27/2016); Nausea & vomiting; Osteoarthritis; Osteoarthritis of left hip (12/12/2012); Primary localized osteoarthrosis, shoulder region (2/17/2012); Pulmonary embolism ([address] (1981);  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 He also has no past medical history of Aneurysm ([address] Asthma; Autoimmune disease ([address] CAD (coronary artery disease); Cancer ([address] Chronic kidney disease; Chronic obstructive pulmonary disease ([address] Diabetes ([address] Difficult intubation; Endocarditis; GERD (gastroesophageal reflux disease); Heart failure ([address] Liver disease; Malignant hyperthermia due to anesthesia; Morbid obesity ([address] Nicotine vapor product user; Non-nicotine vapor product user; Pseudocholinesterase deficiency; Psychiatric disorder; PUD (peptic ulcer disease); Rheumatic fever; Seizures ([address] Sleep apnea; Stroke [facility]); or Thyroid disease. Mr. Andreina Osorio  has a past surgical history that includes colonoscopy (00,05,10); carpal tunnel release (2012); carpal tunnel release (1980); blepharoplasty (Bilateral, 2009); vascular access (2008); vascular access (2012); lap cholecystectomy (2001); appendectomy (1980); hernia repair (2010); hernia repair (Left, 2007); other surgical (2000); lumbar laminectomy; hip replacement (Left, 12/12/2012); vitrectomy (Left, 6/15/15); orthopaedic (Bilateral); orthopaedic (Right, 2006); orthopaedic (Bilateral); knee arthroscopy (Right, 2006); shoulder arthroscopy (Left, 2/17/2012); orthopaedic (Right); back surgery; heent (Right, 11/08); tonsillectomy (1950); cataract removal (Bilateral); corneal transplant (Right, 2004); sinus surgery proc unlisted; heent; heent; and corneal transplant (Right, 06/14/2016).   Social History/Living Environment:   Home Environment: Private residence  # Steps to Enter: 0  One/Two Story Residence: One story  Living Alone: Yes  Support Systems: Spouse/Significant Other/Partner  Patient Expects to be Discharged to[de-identified] Private residence  Current DME Used/Available at Home: None  Prior Level of Function/Work/Activity:  Pt was independent without an assistive device prior to this admission   Number of Personal Factors/Comorbidities that affect the Plan of Care: 1-2: MODERATE COMPLEXITY   EXAMINATION:   Most Recent Physical Functioning:   Gross Assessment:                  Posture:     Balance:  Sitting: Intact  Standing: Intact Bed Mobility:  Supine to Sit: Supervision  Sit to Supine: Supervision  Wheelchair Mobility:     Transfers:  Sit to Stand: Supervision;Stand-by asssistance  Stand to Sit: Supervision;Stand-by asssistance  Gait:     Speed/Malina: Delayed  Gait Abnormalities: Decreased step clearance  Distance (ft): 600 Feet (ft)  Assistive Device:  (no AD)  Ambulation - Level of Assistance: Stand-by asssistance  Duration: 10 Minutes   Functional Mobility:         Gait/Ambulation:  SBA        Transfers:  SBA        Bed Mobility:  SUP   Body Structures Involved:  1. Bones  2. Joints  3. Muscles Body Functions Affected:  1. Sensory/Pain  2. Movement Related Activities and Participation Affected:  1. Learning and Applying Knowledge  2. Mobility   Number of elements that affect the Plan of Care: 4+: HIGH COMPLEXITY   CLINICAL PRESENTATION:   Presentation: Stable and uncomplicated: LOW COMPLEXITY   CLINICAL DECISION MAKIN [facility] [address] [id number] AM-PAC 6 Clicks   Basic Mobility Inpatient Short Form  How much difficulty does the patient currently have. .. Unable A Lot A Little None   1. Turning over in bed (including adjusting bedclothes, sheets and blankets)? [ ] 1   [ ] 2   [X] 3   [ ] 4   2. Sitting down on and standing up from a chair with arms ( e.g., wheelchair, bedside commode, etc.)   [ ] 1   [ ] 2   [X] 3   [ ] 4   3. Moving from lying on back to sitting on the side of the bed?    [ ] 1   [ ] 2   [X] 3   [ ] 4   How much help from another person does the patient currently need. .. Total A Lot A Little None   4. Moving to and from a bed to a chair (including a wheelchair)? [ ] 1   [ ] 2   [X] 3   [ ] 4   5. Need to walk in hospital room? [ ] 1   [ ] 2   [X] 3   [ ] 4   6. Climbing 3-5 steps with a railing? [ ] 1   [ ] 2   [ ] 3   [ ] 4   © 2007, Trustees of [address] [place] [address] [address] [address], under license to [employer]. All rights reserved    Score:  Initial: 18 Most Recent: X (Date: -- )     Interpretation of Tool:  Represents activities that are increasingly more difficult (i.e. Bed mobility, Transfers, Gait). Score 24 23 22-20 19-15 14-10 9-7 6       Modifier CH CI CJ CK CL CM CN         · Mobility - Walking and Moving Around:               - CURRENT STATUS:    CK - 40%-59% impaired, limited or restricted               - GOAL STATUS:           CJ - 20%-39% impaired, limited or restricted               - D/C STATUS:                       ---------------To be determined---------------  [place] / [place] / Product Type: Medicare /       Medical Necessity:     · Patient is expected to demonstrate progress in strength, range of motion, balance, coordination and functional technique to decrease assistance required with functioanl mobility & HEP. Reason for Services/Other Comments:  · Patient continues to require skilled intervention due to pt not safe with functional mobility & HEP.    Use of outcome tool(s) and clinical judgement create a POC that gives a: Clear prediction of patient's progress: LOW COMPLEXITY                 TREATMENT:   (In addition to Assessment/Re-Assessment sessions the following treatments were rendered)   Pre-treatment Symptoms/Complaints:  soreness  Pain: Initial: visual scale  Pain Intensity 1: 7  Pain Location 1: Shoulder  Pain Orientation 1: Right  Pain Intervention(s) 1: Cold pack  Post Session:  No changes stated      Therapeutic Exercise: (15 Minutes):  Exercises per grid below to improve mobility and dynamic movement of arm - right to improve functional endurance. Required minimal verbal cues to promote proper body alignment and promote proper body mechanics. Progressed repetitions as indicated. Gait Training (10 Minutes):  Gait training to improve and/or restore physical functioning as related to mobility. Ambulated 600 Feet (ft) with Stand-by asssistance using a  (no AD)               Date:  6/2 6/3 Date:      ACTIVITY/EXERCISE AM PM AM PM AM PM   Gripping 15  15  20  20       Wrist Flexion/Extension 15  15  20  20       Wrist Ulnar/Radial Deviation               Pronation/Supination 15  15  20 20        Elbow Flexion/Extension 15aa  15  aa  15aa  20aa       Shoulder Flexion/Extension 15aa flex to tolerance  15 aa flex to tolerance  12aa to tolerance 10aa        Shoulder AB/ADduction               Shoulder IR/ER               Pulleys               Pendulums 15aa clock & counter clockwise  15 cw/ccw 20 cw/ccw  20 cw/ccw        Shrugs               Isometric:                 Flexion               Extension               ABduction               ADduction               Biceps/Triceps                               B = bilateral; AA = active assistive; A = active; P = passive  Education:  [X]  Home Exercises      [X]  Sling Application       [X]  Movement Precautions        [ ]  Pulleys          [ ]  Use of Ice    [ ]  Other:   Treatment/Session Assessment:    · Response to Treatment:  tolerated well  · Interdisciplinary Collaboration:  · Physical Therapist and Registered Nurse  · After treatment position/precautions:  · Supine in bed, Bed/Chair-wheels locked, Call light within reach and Family at bedside  · Compliance with Program/Exercises: Will assess as treatment progresses. · Recommendations/Intent for next treatment session: \"Next visit will focus on reduction in assistance provided\".   Total Treatment Duration:  PT Patient Time In/Time Out  Time In: 1500  Time Out: [address] General: well developed, well nourished, no distress  Eye: bilateral: PERRL, EOMI  Ears, Nose, Throat: normal pharynx, TMs normal, membranes moist.  Neck: non-tender, full range of motion, supple.  Negative For: lymphadenopathy (R), lymphadenopathy (L)  Respiratory: CTAB.  Cardiovascular: S1-S2 normal, regular rate, regular rhythm.  Abdomen: normal bowel sounds, non tender, soft.    Genitourinary: Negative For: CVA tenderness  Musculoskeletal: normal gait.  Negative For: back pain, upper, back pain  Extremities: normal range of motion, non-tender.  Negative For: edema (R), edema (L), calf tenderness (R), calf tenderness (L), swelling  Extremity Strength: upper extremities equal bilateral: 5/5, lower extremities equal bilateral: 5/5  Neurologic: alert, oriented to person, oriented to place, oriented to time.    Skin: normal color.  Negative For: rash  Psychiatric: normal affect, normal insight, normal concentration

## 2024-08-04 NOTE — PROGRESS NOTES
Cruz Allred (:  1945) is a 79 y.o. male,Established patient, here for evaluation of the following chief complaint(s):  Medicare AWV (Pt is here for his yearly AWV and lab review. )      Assessment & Plan   1. Medicare annual wellness visit, subsequent  Medicare wellness responses reviewed in the office today  No longer undergoes preventative cancer screening.  Per prior conversations I had with patient he has expressed that even if he does have a malignancy he would not desire treatment at this point in his life.  Confirmed again during today's visit  Up-to-date on most immunizations    2. IPMN (intraductal papillary mucinous neoplasm)  Known history of IPMN  CT abdomen/pelvis with IV contrast on 11/15/2022 as follows:  -Pancreatic duct dilation measuring up to 10 mm within the head, diffuse parenchymal atrophy  CT abdomen with contrast on 2022 as follows:  -Diffuse atrophy of the pancreas, suggested pancreatic duct dilation suboptimally assessed by CT  -Focal defined low attenuation in the pancreatic head measuring 2.2 x 1 cm (could represent cystic lesion with neoplasm not excluded or focal dilation of pancreatic duct which could be a secondary indicator of neoplasm)  -Suggested small bowel lesion off the second portion of the duodenum with a component extending into the duodenal lumen, neoplasm not excluded  -Additional low-attenuation hepatic and splenic lesions which are incompletely characterized  EUS on 2023 as follows:  -Pancreatic cysts: Based on communication with a mildly dilated main pancreatic duct this likely represents a mixed main duct/sidebranch IPMN but no worrisome features seen  -Dilated CBD likely reflects benign postcholecystectomy biliary dilatation  Pathology notable for CEA of 76 ng/mL, amylase elevated at 60,495 units/L  MRI of the abdomen with and without contrast on 2023 as follows:  -Mildly prominent appearance of ampulla with diminished soft tissue

## 2024-08-08 DIAGNOSIS — I82.502 CHRONIC DEEP VEIN THROMBOSIS (DVT) OF LEFT LOWER EXTREMITY, UNSPECIFIED VEIN (HCC): Primary | ICD-10-CM

## 2024-08-08 DIAGNOSIS — N18.31 STAGE 3A CHRONIC KIDNEY DISEASE (HCC): ICD-10-CM

## 2024-08-08 DIAGNOSIS — D64.9 ANEMIA, UNSPECIFIED TYPE: ICD-10-CM

## 2024-08-09 ENCOUNTER — OFFICE VISIT (OUTPATIENT)
Dept: INTERNAL MEDICINE CLINIC | Facility: CLINIC | Age: 79
End: 2024-08-09

## 2024-08-09 VITALS
OXYGEN SATURATION: 97 % | WEIGHT: 161 LBS | HEART RATE: 61 BPM | BODY MASS INDEX: 23.85 KG/M2 | TEMPERATURE: 97.4 F | HEIGHT: 69 IN | SYSTOLIC BLOOD PRESSURE: 112 MMHG | DIASTOLIC BLOOD PRESSURE: 62 MMHG

## 2024-08-09 DIAGNOSIS — G89.4 CHRONIC PAIN SYNDROME: ICD-10-CM

## 2024-08-09 DIAGNOSIS — R00.2 PALPITATIONS: ICD-10-CM

## 2024-08-09 DIAGNOSIS — Z00.00 MEDICARE ANNUAL WELLNESS VISIT, SUBSEQUENT: Primary | ICD-10-CM

## 2024-08-09 DIAGNOSIS — D49.0 IPMN (INTRADUCTAL PAPILLARY MUCINOUS NEOPLASM): ICD-10-CM

## 2024-08-09 DIAGNOSIS — E11.9 TYPE 2 DIABETES MELLITUS WITHOUT COMPLICATION, WITHOUT LONG-TERM CURRENT USE OF INSULIN (HCC): ICD-10-CM

## 2024-08-09 DIAGNOSIS — K59.09 CHRONIC CONSTIPATION: ICD-10-CM

## 2024-08-09 DIAGNOSIS — E78.5 DYSLIPIDEMIA: ICD-10-CM

## 2024-08-09 DIAGNOSIS — D68.51 FACTOR V LEIDEN (HCC): ICD-10-CM

## 2024-08-09 DIAGNOSIS — Z79.01 CURRENT USE OF LONG TERM ANTICOAGULATION: ICD-10-CM

## 2024-08-09 LAB — HBA1C MFR BLD: 5.8 %

## 2024-08-09 RX ORDER — ATORVASTATIN CALCIUM 80 MG/1
40 TABLET, FILM COATED ORAL DAILY
Qty: 90 TABLET | Refills: 1 | Status: SHIPPED | OUTPATIENT
Start: 2024-08-09

## 2024-08-09 SDOH — ECONOMIC STABILITY: FOOD INSECURITY: WITHIN THE PAST 12 MONTHS, YOU WORRIED THAT YOUR FOOD WOULD RUN OUT BEFORE YOU GOT MONEY TO BUY MORE.: PATIENT DECLINED

## 2024-08-09 SDOH — ECONOMIC STABILITY: HOUSING INSECURITY
IN THE LAST 12 MONTHS, WAS THERE A TIME WHEN YOU DID NOT HAVE A STEADY PLACE TO SLEEP OR SLEPT IN A SHELTER (INCLUDING NOW)?: PATIENT DECLINED

## 2024-08-09 SDOH — ECONOMIC STABILITY: FOOD INSECURITY: WITHIN THE PAST 12 MONTHS, THE FOOD YOU BOUGHT JUST DIDN'T LAST AND YOU DIDN'T HAVE MONEY TO GET MORE.: PATIENT DECLINED

## 2024-08-09 SDOH — ECONOMIC STABILITY: INCOME INSECURITY: HOW HARD IS IT FOR YOU TO PAY FOR THE VERY BASICS LIKE FOOD, HOUSING, MEDICAL CARE, AND HEATING?: PATIENT DECLINED

## 2024-08-09 ASSESSMENT — ENCOUNTER SYMPTOMS
VOMITING: 0
CONSTIPATION: 1
NAUSEA: 0
ANAL BLEEDING: 1
ABDOMINAL PAIN: 1
BLOOD IN STOOL: 1
SHORTNESS OF BREATH: 0
BACK PAIN: 1

## 2024-08-09 ASSESSMENT — PATIENT HEALTH QUESTIONNAIRE - PHQ9
5. POOR APPETITE OR OVEREATING: NEARLY EVERY DAY
SUM OF ALL RESPONSES TO PHQ QUESTIONS 1-9: 12
6. FEELING BAD ABOUT YOURSELF - OR THAT YOU ARE A FAILURE OR HAVE LET YOURSELF OR YOUR FAMILY DOWN: NOT AT ALL
10. IF YOU CHECKED OFF ANY PROBLEMS, HOW DIFFICULT HAVE THESE PROBLEMS MADE IT FOR YOU TO DO YOUR WORK, TAKE CARE OF THINGS AT HOME, OR GET ALONG WITH OTHER PEOPLE: SOMEWHAT DIFFICULT
SUM OF ALL RESPONSES TO PHQ QUESTIONS 1-9: 12
2. FEELING DOWN, DEPRESSED OR HOPELESS: NOT AT ALL
SUM OF ALL RESPONSES TO PHQ QUESTIONS 1-9: 12
1. LITTLE INTEREST OR PLEASURE IN DOING THINGS: NEARLY EVERY DAY
3. TROUBLE FALLING OR STAYING ASLEEP: NEARLY EVERY DAY
9. THOUGHTS THAT YOU WOULD BE BETTER OFF DEAD, OR OF HURTING YOURSELF: NOT AT ALL
8. MOVING OR SPEAKING SO SLOWLY THAT OTHER PEOPLE COULD HAVE NOTICED. OR THE OPPOSITE, BEING SO FIGETY OR RESTLESS THAT YOU HAVE BEEN MOVING AROUND A LOT MORE THAN USUAL: NOT AT ALL
SUM OF ALL RESPONSES TO PHQ9 QUESTIONS 1 & 2: 3
7. TROUBLE CONCENTRATING ON THINGS, SUCH AS READING THE NEWSPAPER OR WATCHING TELEVISION: NOT AT ALL
4. FEELING TIRED OR HAVING LITTLE ENERGY: NEARLY EVERY DAY
SUM OF ALL RESPONSES TO PHQ QUESTIONS 1-9: 12

## 2024-08-09 NOTE — PATIENT INSTRUCTIONS
1-2 years to screen for glaucoma; cataracts, macular degeneration, and other eye disorders.  A preventive dental visit is recommended every 6 months.  Try to get at least 150 minutes of exercise per week or 10,000 steps per day on a pedometer .  Order or download the FREE \"Exercise & Physical Activity: Your Everyday Guide\" from [employer] [place] [employer]. Call 1-288.563.8813 or search [employer] [place] [employer] online.  You need 7667-9546 mg of calcium and 1406-1862 IU of vitamin D per day. It is possible to meet your calcium requirement with diet alone, but a vitamin D supplement is usually necessary to meet this goal.  When exposed to the sun, use a sunscreen that protects against both UVA and UVB radiation with an SPF of 30 or greater. Reapply every 2 to 3 hours or after sweating, drying off with a towel, or swimming.  Always wear a seat belt when traveling in a car. Always wear a helmet when riding a bicycle or motorcycle.

## 2024-08-16 ENCOUNTER — HOSPITAL ENCOUNTER (OUTPATIENT)
Dept: LAB | Age: 79
Discharge: HOME | End: 2024-08-16
Payer: OTHER GOVERNMENT

## 2024-08-16 ENCOUNTER — OFFICE VISIT (OUTPATIENT)
Dept: ONCOLOGY | Age: 79
End: 2024-08-16
Payer: OTHER GOVERNMENT

## 2024-08-16 VITALS
SYSTOLIC BLOOD PRESSURE: 144 MMHG | DIASTOLIC BLOOD PRESSURE: 66 MMHG | BODY MASS INDEX: 24.59 KG/M2 | RESPIRATION RATE: 12 BRPM | OXYGEN SATURATION: 98 % | WEIGHT: 166 LBS | HEIGHT: 69 IN | TEMPERATURE: 98.1 F | HEART RATE: 49 BPM

## 2024-08-16 DIAGNOSIS — D64.9 ANEMIA, UNSPECIFIED TYPE: ICD-10-CM

## 2024-08-16 DIAGNOSIS — I82.502 CHRONIC DEEP VEIN THROMBOSIS (DVT) OF LEFT LOWER EXTREMITY, UNSPECIFIED VEIN (HCC): ICD-10-CM

## 2024-08-16 DIAGNOSIS — I82.502 CHRONIC DEEP VEIN THROMBOSIS (DVT) OF LEFT LOWER EXTREMITY, UNSPECIFIED VEIN (HCC): Primary | ICD-10-CM

## 2024-08-16 LAB
ALBUMIN SERPL-MCNC: 3.9 G/DL (ref 3.2–4.6)
ALBUMIN/GLOB SERPL: 1.3 (ref 1–1.9)
ALP SERPL-CCNC: 66 U/L (ref 40–129)
ALT SERPL-CCNC: 41 U/L (ref 12–65)
ANION GAP SERPL CALC-SCNC: 7 MMOL/L (ref 9–18)
AST SERPL-CCNC: 39 U/L (ref 15–37)
BASOPHILS # BLD: 0 K/UL (ref 0–0.2)
BASOPHILS NFR BLD: 1 % (ref 0–2)
BILIRUB SERPL-MCNC: 0.6 MG/DL (ref 0–1.2)
BUN SERPL-MCNC: 21 MG/DL (ref 8–23)
CALCIUM SERPL-MCNC: 9.4 MG/DL (ref 8.8–10.2)
CHLORIDE SERPL-SCNC: 109 MMOL/L (ref 98–107)
CO2 SERPL-SCNC: 27 MMOL/L (ref 20–28)
CREAT SERPL-MCNC: 1.44 MG/DL (ref 0.8–1.3)
DIFFERENTIAL METHOD BLD: ABNORMAL
EOSINOPHIL # BLD: 0.2 K/UL (ref 0–0.8)
EOSINOPHIL NFR BLD: 3 % (ref 0.5–7.8)
ERYTHROCYTE [DISTWIDTH] IN BLOOD BY AUTOMATED COUNT: 12.6 % (ref 11.9–14.6)
FERRITIN SERPL-MCNC: 136 NG/ML (ref 8–388)
GLOBULIN SER CALC-MCNC: 2.9 G/DL (ref 2.3–3.5)
GLUCOSE SERPL-MCNC: 118 MG/DL (ref 70–99)
HCT VFR BLD AUTO: 42 % (ref 41.1–50.3)
HGB BLD-MCNC: 13.8 G/DL (ref 13.6–17.2)
IMM GRANULOCYTES # BLD AUTO: 0 K/UL (ref 0–0.5)
IMM GRANULOCYTES NFR BLD AUTO: 0 % (ref 0–5)
IRON SATN MFR SERPL: 38 % (ref 20–50)
IRON SERPL-MCNC: 100 UG/DL (ref 35–100)
LYMPHOCYTES # BLD: 1.1 K/UL (ref 0.5–4.6)
LYMPHOCYTES NFR BLD: 19 % (ref 13–44)
MCH RBC QN AUTO: 33.4 PG (ref 26.1–32.9)
MCHC RBC AUTO-ENTMCNC: 32.9 G/DL (ref 31.4–35)
MCV RBC AUTO: 101.7 FL (ref 82–102)
MONOCYTES # BLD: 0.3 K/UL (ref 0.1–1.3)
MONOCYTES NFR BLD: 6 % (ref 4–12)
NEUTS SEG # BLD: 4.2 K/UL (ref 1.7–8.2)
NEUTS SEG NFR BLD: 71 % (ref 43–78)
NRBC # BLD: 0 K/UL (ref 0–0.2)
PLATELET # BLD AUTO: 176 K/UL (ref 150–450)
PMV BLD AUTO: 10.1 FL (ref 9.4–12.3)
POTASSIUM SERPL-SCNC: 4.9 MMOL/L (ref 3.5–5.1)
PROT SERPL-MCNC: 6.8 G/DL (ref 6.3–8.2)
RBC # BLD AUTO: 4.13 M/UL (ref 4.23–5.6)
SODIUM SERPL-SCNC: 143 MMOL/L (ref 136–145)
TIBC SERPL-MCNC: 262 UG/DL (ref 240–450)
UIBC SERPL-MCNC: 162 UG/DL (ref 112–347)
WBC # BLD AUTO: 5.8 K/UL (ref 4.3–11.1)

## 2024-08-16 PROCEDURE — 83550 IRON BINDING TEST: CPT

## 2024-08-16 PROCEDURE — G8420 CALC BMI NORM PARAMETERS: HCPCS | Performed by: INTERNAL MEDICINE

## 2024-08-16 PROCEDURE — 85025 COMPLETE CBC W/AUTO DIFF WBC: CPT

## 2024-08-16 PROCEDURE — 83540 ASSAY OF IRON: CPT

## 2024-08-16 PROCEDURE — 80053 COMPREHEN METABOLIC PANEL: CPT

## 2024-08-16 PROCEDURE — 82728 ASSAY OF FERRITIN: CPT

## 2024-08-16 PROCEDURE — 1036F TOBACCO NON-USER: CPT | Performed by: INTERNAL MEDICINE

## 2024-08-16 PROCEDURE — 1123F ACP DISCUSS/DSCN MKR DOCD: CPT | Performed by: INTERNAL MEDICINE

## 2024-08-16 PROCEDURE — 36415 COLL VENOUS BLD VENIPUNCTURE: CPT

## 2024-08-16 PROCEDURE — 3078F DIAST BP <80 MM HG: CPT | Performed by: INTERNAL MEDICINE

## 2024-08-16 PROCEDURE — 3077F SYST BP >= 140 MM HG: CPT | Performed by: INTERNAL MEDICINE

## 2024-08-16 PROCEDURE — 99214 OFFICE O/P EST MOD 30 MIN: CPT | Performed by: INTERNAL MEDICINE

## 2024-08-16 PROCEDURE — G8428 CUR MEDS NOT DOCUMENT: HCPCS | Performed by: INTERNAL MEDICINE

## 2024-08-16 ASSESSMENT — PATIENT HEALTH QUESTIONNAIRE - PHQ9
1. LITTLE INTEREST OR PLEASURE IN DOING THINGS: NOT AT ALL
2. FEELING DOWN, DEPRESSED OR HOPELESS: NOT AT ALL
SUM OF ALL RESPONSES TO PHQ9 QUESTIONS 1 & 2: 0
SUM OF ALL RESPONSES TO PHQ QUESTIONS 1-9: 0

## 2024-08-16 NOTE — PROGRESS NOTES
[facility]: Office Visit Established Patient    Chief Complaint:    Chief Complaint   Patient presents with    Follow-up         History of Present Illness:  Mr. Allred is a 79 y.o. male who returns today for management of chronic DVT.  He has a history of a Factor  V Leiden mutation with recurrent thrombosis x 4 episodes, previously under the care of Dr. Stein then transitioning to Dr. Benavides.  He continues on chronic Xarelto after failing Coumadin and Pradaxa.  He also has multiple cysts in his pancreas felt to be IPMNs.  He has undergone an endoscopic ultrasound by Dr. Cifuentes in February 2024 which suggest a combination of main duct and branch PMN.  There were no endoscopically worrisome features noted.  He is on annual imaging surveillance for these.        He returns today for 6 month follow up.  He is doing OK.  No new clinical changes.  He continues to have some chronic mild swelling of his left leg but this is no worse than it had been.  [facility]  He is having some vague abdominal pain, he is due for repeat abdominal imaging this fall, this has also been [facility].       Review of Systems:  Constitutional: Negative.   HENT: Negative.   Eyes: Negative.   Respiratory: Negative.   Cardiovascular: Negative.   Gastrointestinal: Positive for abdominal pain.   Genitourinary: Negative.   Musculoskeletal: Positive for back, hip, and leg pain.   Skin: Negative.   Neurological: Negative.   Endo/Heme/Allergies: Negative.   Psychiatric/Behavioral: Negative.   All other systems reviewed and are negative.       Allergies   Allergen Reactions    Epinephrine Anaphylaxis     Blindness in right eye      Past Medical History:   Diagnosis Date    Acute sinusitis     Adverse effect of anesthesia 03/03/2023    Anaphylactic reaction 2/1/2016    AR (allergic rhinitis) 7/27/2016    Arrhythmia     bradycardia    Bicipital tenosynovitis 2/17/2012

## 2024-08-16 NOTE — PATIENT INSTRUCTIONS
Patient Information from Today's Visit    Diagnosis: DVT      Follow Up Instructions: 6 months      Treatment Summary has been discussed and given to patient: N/A      Current Labs:   Hospital Outpatient Visit on 08/16/2024   Component Date Value Ref Range Status    Ferritin 08/16/2024 136  8 - 388 NG/ML Final    Iron 08/16/2024 100  35 - 100 ug/dL Final    TIBC 08/16/2024 262  240 - 450 ug/dL Final    Iron % Saturation 08/16/2024 38  20 - 50 % Final    UIBC 08/16/2024 162.0  112.0 - 347.0 ug/dL Final    Sodium 08/16/2024 143  136 - 145 mmol/L Final    Potassium 08/16/2024 4.9  3.5 - 5.1 mmol/L Final    Chloride 08/16/2024 109 (H)  98 - 107 mmol/L Final    CO2 08/16/2024 27  20 - 28 mmol/L Final    Anion Gap 08/16/2024 7 (L)  9 - 18 mmol/L Final    Glucose 08/16/2024 118 (H)  70 - 99 mg/dL Final    Comment: <70 mg/dL Consistent with, but not fully diagnostic of hypoglycemia.  100 - 125 mg/dL Impaired fasting glucose/consistent with pre-diabetes mellitus.  > 126 mg/dl Fasting glucose consistent with overt diabetes mellitus      BUN 08/16/2024 21  8 - 23 MG/DL Final    Creatinine 08/16/2024 1.44 (H)  0.80 - 1.30 MG/DL Final    Est, Glom Filt Rate 08/16/2024 50 (L)  >60 ml/min/1.73m2 Final    Comment:    Pediatric calculator link: https://www.kidney.org/professionals/kdoqi/gfr_calculatorped     These results are not intended for use in patients <18 years of age.     eGFR results are calculated without a race factor using  the 2021 CKD-EPI equation. Careful clinical correlation is recommended, particularly when comparing to results calculated using previous equations.  The CKD-EPI equation is less accurate in patients with extremes of muscle mass, extra-renal metabolism of creatinine, excessive creatine ingestion, or following therapy that affects renal tubular secretion.      Calcium 08/16/2024 9.4  8.8 - 10.2 MG/DL Final    Total Bilirubin 08/16/2024 0.6  0.0 - 1.2 MG/DL Final    ALT 08/16/2024 41  12 - 65 U/L Final

## 2024-10-04 ENCOUNTER — HOSPITAL ENCOUNTER (OUTPATIENT)
Dept: MRI IMAGING | Age: 79
Discharge: HOME | End: 2024-10-07
Attending: FAMILY MEDICINE
Payer: OTHER GOVERNMENT

## 2024-10-04 DIAGNOSIS — K86.89 PANCREATIC MASS: ICD-10-CM

## 2024-10-04 PROCEDURE — 6360000004 HC RX CONTRAST MEDICATION: Performed by: FAMILY MEDICINE

## 2024-10-04 PROCEDURE — 74183 MRI ABD W/O CNTR FLWD CNTR: CPT

## 2024-10-04 PROCEDURE — A9579 GAD-BASE MR CONTRAST NOS,1ML: HCPCS | Performed by: FAMILY MEDICINE

## 2024-10-04 RX ADMIN — GADOTERIDOL 15 ML: 279.3 INJECTION, SOLUTION INTRAVENOUS at 07:15

## 2025-01-06 ENCOUNTER — TRANSCRIBE ORDERS (OUTPATIENT)
Dept: SCHEDULING | Age: 80
End: 2025-01-06

## 2025-01-06 DIAGNOSIS — K86.89 DILATED PANCREATIC DUCT: ICD-10-CM

## 2025-01-06 DIAGNOSIS — K86.2 PANCREATIC CYST: ICD-10-CM

## 2025-01-06 DIAGNOSIS — K59.00 COLONIC CONSTIPATION: ICD-10-CM

## 2025-01-06 DIAGNOSIS — R10.11 ABDOMINAL PAIN, RUQ: Primary | ICD-10-CM

## 2025-01-06 DIAGNOSIS — D49.0 IPMN (INTRADUCTAL PAPILLARY MUCINOUS NEOPLASM): ICD-10-CM

## 2025-01-24 ENCOUNTER — TELEPHONE (OUTPATIENT)
Dept: ONCOLOGY | Age: 80
End: 2025-01-24

## 2025-01-27 ENCOUNTER — TELEPHONE (OUTPATIENT)
Dept: INTERNAL MEDICINE CLINIC | Facility: CLINIC | Age: 80
End: 2025-01-27

## 2025-01-27 DIAGNOSIS — E78.5 DYSLIPIDEMIA: ICD-10-CM

## 2025-01-27 DIAGNOSIS — E11.9 TYPE 2 DIABETES MELLITUS WITHOUT COMPLICATION, WITHOUT LONG-TERM CURRENT USE OF INSULIN (HCC): Primary | ICD-10-CM

## 2025-01-27 NOTE — TELEPHONE ENCOUNTER
----- Message from LEONARDA CABRERA LPN sent at 1/27/2025  9:42 AM EST -----  This pt is scheduled for labs on Friday, but has no orders in his chart. Can you please place orders? Thanks!

## 2025-01-27 NOTE — TELEPHONE ENCOUNTER
Orders Placed This Encounter    CBC with Auto Differential     Standing Status:   Future     Standing Expiration Date:   1/27/2026    Comprehensive Metabolic Panel     Standing Status:   Future     Standing Expiration Date:   7/27/2025    Hemoglobin A1C     Standing Status:   Future     Standing Expiration Date:   7/27/2025    Albumin/Creatinine Ratio, Urine     Standing Status:   Future     Standing Expiration Date:   1/27/2026    Lipid Panel     Standing Status:   Future     Standing Expiration Date:   7/27/2025    TSH     Standing Status:   Future     Standing Expiration Date:   1/27/2026

## 2025-01-30 ENCOUNTER — TELEPHONE (OUTPATIENT)
Dept: ONCOLOGY | Age: 80
End: 2025-01-30

## 2025-01-31 ENCOUNTER — LAB (OUTPATIENT)
Dept: INTERNAL MEDICINE CLINIC | Facility: CLINIC | Age: 80
End: 2025-01-31

## 2025-01-31 DIAGNOSIS — E78.5 DYSLIPIDEMIA: ICD-10-CM

## 2025-01-31 DIAGNOSIS — E11.9 TYPE 2 DIABETES MELLITUS WITHOUT COMPLICATION, WITHOUT LONG-TERM CURRENT USE OF INSULIN (HCC): ICD-10-CM

## 2025-01-31 LAB
ALBUMIN SERPL-MCNC: 4 G/DL (ref 3.2–4.6)
ALBUMIN/GLOB SERPL: 1.3 (ref 1–1.9)
ALP SERPL-CCNC: 68 U/L (ref 40–129)
ALT SERPL-CCNC: 23 U/L (ref 8–55)
ANION GAP SERPL CALC-SCNC: 11 MMOL/L (ref 7–16)
AST SERPL-CCNC: 23 U/L (ref 15–37)
BASOPHILS # BLD: 0.04 K/UL (ref 0–0.2)
BASOPHILS NFR BLD: 0.6 % (ref 0–2)
BILIRUB SERPL-MCNC: 0.6 MG/DL (ref 0–1.2)
BUN SERPL-MCNC: 21 MG/DL (ref 8–23)
CALCIUM SERPL-MCNC: 9.7 MG/DL (ref 8.8–10.2)
CHLORIDE SERPL-SCNC: 107 MMOL/L (ref 98–107)
CHOLEST SERPL-MCNC: 139 MG/DL (ref 0–200)
CO2 SERPL-SCNC: 25 MMOL/L (ref 20–29)
CREAT SERPL-MCNC: 1.21 MG/DL (ref 0.8–1.3)
DIFFERENTIAL METHOD BLD: ABNORMAL
EOSINOPHIL # BLD: 0.19 K/UL (ref 0–0.8)
EOSINOPHIL NFR BLD: 2.6 % (ref 0.5–7.8)
ERYTHROCYTE [DISTWIDTH] IN BLOOD BY AUTOMATED COUNT: 12.3 % (ref 11.9–14.6)
EST. AVERAGE GLUCOSE BLD GHB EST-MCNC: 129 MG/DL
GLOBULIN SER CALC-MCNC: 3.1 G/DL (ref 2.3–3.5)
GLUCOSE SERPL-MCNC: 126 MG/DL (ref 70–99)
HBA1C MFR BLD: 6.1 % (ref 0–5.6)
HCT VFR BLD AUTO: 43.9 % (ref 41.1–50.3)
HDLC SERPL-MCNC: 52 MG/DL (ref 40–60)
HDLC SERPL: 2.7 (ref 0–5)
HGB BLD-MCNC: 13.8 G/DL (ref 13.6–17.2)
IMM GRANULOCYTES # BLD AUTO: 0.01 K/UL (ref 0–0.5)
IMM GRANULOCYTES NFR BLD AUTO: 0.1 % (ref 0–5)
LDLC SERPL CALC-MCNC: 66 MG/DL (ref 0–100)
LYMPHOCYTES # BLD: 0.96 K/UL (ref 0.5–4.6)
LYMPHOCYTES NFR BLD: 13.4 % (ref 13–44)
MCH RBC QN AUTO: 33 PG (ref 26.1–32.9)
MCHC RBC AUTO-ENTMCNC: 31.4 G/DL (ref 31.4–35)
MCV RBC AUTO: 105 FL (ref 82–102)
MONOCYTES # BLD: 0.38 K/UL (ref 0.1–1.3)
MONOCYTES NFR BLD: 5.3 % (ref 4–12)
NEUTS SEG # BLD: 5.59 K/UL (ref 1.7–8.2)
NEUTS SEG NFR BLD: 78 % (ref 43–78)
NRBC # BLD: 0 K/UL (ref 0–0.2)
PLATELET # BLD AUTO: 185 K/UL (ref 150–450)
PMV BLD AUTO: 10.4 FL (ref 9.4–12.3)
POTASSIUM SERPL-SCNC: 4.5 MMOL/L (ref 3.5–5.1)
PROT SERPL-MCNC: 7.1 G/DL (ref 6.3–8.2)
RBC # BLD AUTO: 4.18 M/UL (ref 4.23–5.6)
SODIUM SERPL-SCNC: 143 MMOL/L (ref 136–145)
TRIGL SERPL-MCNC: 106 MG/DL (ref 0–150)
TSH, 3RD GENERATION: 2.09 UIU/ML (ref 0.27–4.2)
VLDLC SERPL CALC-MCNC: 21 MG/DL (ref 6–23)
WBC # BLD AUTO: 7.2 K/UL (ref 4.3–11.1)

## 2025-02-03 ENCOUNTER — TELEPHONE (OUTPATIENT)
Dept: INTERNAL MEDICINE CLINIC | Facility: CLINIC | Age: 80
End: 2025-02-03

## 2025-02-03 DIAGNOSIS — E11.9 TYPE 2 DIABETES MELLITUS WITHOUT COMPLICATION, WITHOUT LONG-TERM CURRENT USE OF INSULIN (HCC): Primary | ICD-10-CM

## 2025-02-03 NOTE — TELEPHONE ENCOUNTER
----- Message from Christa FERNANDEZ sent at 2/3/2025 11:57 AM EST -----  Regarding: Lab Reorder  The following lab test(s) were not completed.  Lab tests:  urine microalbumin/creatinine ratio  Recollect reason: Patient unable to void    Please consult with the ordering physician/APC if the tests are needed.    If this test requires recollection, please re-enter order in Epic within 24 hours of this notice and respond to this message as Client Services will contact the patient.     If this test DOES NOT require recollection, document this in Epic documentation only encounter.    If you need additional information, respond to this message and/or call [phone].    [facility] Ambulatory Lab Client Services

## 2025-02-04 ENCOUNTER — HOSPITAL ENCOUNTER (OUTPATIENT)
Dept: MRI IMAGING | Age: 80
Discharge: HOME | End: 2025-02-07
Payer: OTHER GOVERNMENT

## 2025-02-04 DIAGNOSIS — K86.89 DILATED PANCREATIC DUCT: ICD-10-CM

## 2025-02-04 DIAGNOSIS — R10.11 ABDOMINAL PAIN, RUQ: ICD-10-CM

## 2025-02-04 DIAGNOSIS — D49.0 IPMN (INTRADUCTAL PAPILLARY MUCINOUS NEOPLASM): ICD-10-CM

## 2025-02-04 DIAGNOSIS — K86.2 PANCREATIC CYST: ICD-10-CM

## 2025-02-04 DIAGNOSIS — K59.00 COLONIC CONSTIPATION: ICD-10-CM

## 2025-02-04 PROCEDURE — 6360000004 HC RX CONTRAST MEDICATION

## 2025-02-04 PROCEDURE — A9579 GAD-BASE MR CONTRAST NOS,1ML: HCPCS

## 2025-02-04 PROCEDURE — 74183 MRI ABD W/O CNTR FLWD CNTR: CPT

## 2025-02-04 RX ADMIN — GADOTERIDOL 15 ML: 279.3 INJECTION, SOLUTION INTRAVENOUS at 10:56

## 2025-02-09 ASSESSMENT — PATIENT HEALTH QUESTIONNAIRE - PHQ9
5. POOR APPETITE OR OVEREATING: NEARLY EVERY DAY
SUM OF ALL RESPONSES TO PHQ QUESTIONS 1-9: 11
SUM OF ALL RESPONSES TO PHQ9 QUESTIONS 1 & 2: 3
6. FEELING BAD ABOUT YOURSELF - OR THAT YOU ARE A FAILURE OR HAVE LET YOURSELF OR YOUR FAMILY DOWN: NOT AT ALL
9. THOUGHTS THAT YOU WOULD BE BETTER OFF DEAD, OR OF HURTING YOURSELF: NOT AT ALL
9. THOUGHTS THAT YOU WOULD BE BETTER OFF DEAD, OR OF HURTING YOURSELF: NOT AT ALL
10. IF YOU CHECKED OFF ANY PROBLEMS, HOW DIFFICULT HAVE THESE PROBLEMS MADE IT FOR YOU TO DO YOUR WORK, TAKE CARE OF THINGS AT HOME, OR GET ALONG WITH OTHER PEOPLE: VERY DIFFICULT
SUM OF ALL RESPONSES TO PHQ QUESTIONS 1-9: 11
8. MOVING OR SPEAKING SO SLOWLY THAT OTHER PEOPLE COULD HAVE NOTICED. OR THE OPPOSITE - BEING SO FIDGETY OR RESTLESS THAT YOU HAVE BEEN MOVING AROUND A LOT MORE THAN USUAL: NOT AT ALL
1. LITTLE INTEREST OR PLEASURE IN DOING THINGS: NEARLY EVERY DAY
7. TROUBLE CONCENTRATING ON THINGS, SUCH AS READING THE NEWSPAPER OR WATCHING TELEVISION: NOT AT ALL
SUM OF ALL RESPONSES TO PHQ QUESTIONS 1-9: 11
3. TROUBLE FALLING OR STAYING ASLEEP: MORE THAN HALF THE DAYS
4. FEELING TIRED OR HAVING LITTLE ENERGY: NEARLY EVERY DAY
7. TROUBLE CONCENTRATING ON THINGS, SUCH AS READING THE NEWSPAPER OR WATCHING TELEVISION: NOT AT ALL
5. POOR APPETITE OR OVEREATING: NEARLY EVERY DAY
4. FEELING TIRED OR HAVING LITTLE ENERGY: NEARLY EVERY DAY
2. FEELING DOWN, DEPRESSED OR HOPELESS: NOT AT ALL
6. FEELING BAD ABOUT YOURSELF - OR THAT YOU ARE A FAILURE OR HAVE LET YOURSELF OR YOUR FAMILY DOWN: NOT AT ALL
1. LITTLE INTEREST OR PLEASURE IN DOING THINGS: NEARLY EVERY DAY
2. FEELING DOWN, DEPRESSED OR HOPELESS: NOT AT ALL
10. IF YOU CHECKED OFF ANY PROBLEMS, HOW DIFFICULT HAVE THESE PROBLEMS MADE IT FOR YOU TO DO YOUR WORK, TAKE CARE OF THINGS AT HOME, OR GET ALONG WITH OTHER PEOPLE: VERY DIFFICULT
3. TROUBLE FALLING OR STAYING ASLEEP: MORE THAN HALF THE DAYS
SUM OF ALL RESPONSES TO PHQ9 QUESTIONS 1 & 2: 3
8. MOVING OR SPEAKING SO SLOWLY THAT OTHER PEOPLE COULD HAVE NOTICED. OR THE OPPOSITE, BEING SO FIGETY OR RESTLESS THAT YOU HAVE BEEN MOVING AROUND A LOT MORE THAN USUAL: NOT AT ALL
SUM OF ALL RESPONSES TO PHQ QUESTIONS 1-9: 11
SUM OF ALL RESPONSES TO PHQ QUESTIONS 1-9: 11

## 2025-02-09 NOTE — PROGRESS NOTES
Cruz Allred (:  1945) is a 80 y.o. male,Established patient, here for evaluation of the following chief complaint(s):  6 Month Follow-Up         Assessment & Plan  IPMN (intraductal papillary mucinous neoplasm)  Known history of IPMN  CT abdomen/pelvis with IV contrast on 11/15/2022 as follows:  -Pancreatic duct dilation measuring up to 10 mm within the head, diffuse parenchymal atrophy  CT abdomen with contrast on 2022 as follows:  -Diffuse atrophy of the pancreas, suggested pancreatic duct dilation suboptimally assessed by CT  -Focal defined low attenuation in the pancreatic head measuring 2.2 x 1 cm (could represent cystic lesion with neoplasm not excluded or focal dilation of pancreatic duct which could be a secondary indicator of neoplasm)  -Suggested small bowel lesion off the second portion of the duodenum with a component extending into the duodenal lumen, neoplasm not excluded  -Additional low-attenuation hepatic and splenic lesions which are incompletely characterized  EUS on 2023 as follows:  -Pancreatic cysts: Based on communication with a mildly dilated main pancreatic duct this likely represents a mixed main duct/sidebranch IPMN but no worrisome features seen  -Dilated CBD likely reflects benign postcholecystectomy biliary dilatation  Pathology notable for CEA of 76 ng/mL, amylase elevated at 60,495 units/L  MRI of the abdomen with and without contrast on 2023 as follows:  -Mildly prominent appearance of ampulla with diminished soft tissue between the head of the pancreas and duodenum when compared to CT  -Stable main pancreatic duct and pancreatic duct sidebranches; could be related to distal obstruction versus mixed main duct/sidebranch IPMN  MRI of the abdomen with and without contrast on 10/16/2023 as follows:  -Innumerable pancreatic cystic lesions and mild main pancreatic duct dilatation consistent with IPMN  -Simple cyst within the spleen and kidneys  EUS on

## 2025-02-09 NOTE — ASSESSMENT & PLAN NOTE
A1c 6.1% on 1/31/2025  Urine microalbumin to creatinine ratio within normal limits on 4/2/2024   Does not currently monitor home blood sugar readings  Continue lifestyle changes, denies hypoglycemic symptoms

## 2025-02-11 ENCOUNTER — OFFICE VISIT (OUTPATIENT)
Dept: INTERNAL MEDICINE CLINIC | Facility: CLINIC | Age: 80
End: 2025-02-11
Payer: MEDICARE

## 2025-02-11 VITALS
DIASTOLIC BLOOD PRESSURE: 68 MMHG | WEIGHT: 160 LBS | SYSTOLIC BLOOD PRESSURE: 140 MMHG | RESPIRATION RATE: 18 BRPM | HEART RATE: 55 BPM | HEIGHT: 69 IN | OXYGEN SATURATION: 99 % | BODY MASS INDEX: 23.7 KG/M2

## 2025-02-11 DIAGNOSIS — E78.5 DYSLIPIDEMIA: ICD-10-CM

## 2025-02-11 DIAGNOSIS — G89.4 CHRONIC PAIN SYNDROME: ICD-10-CM

## 2025-02-11 DIAGNOSIS — K59.09 CHRONIC CONSTIPATION: ICD-10-CM

## 2025-02-11 DIAGNOSIS — D49.0 IPMN (INTRADUCTAL PAPILLARY MUCINOUS NEOPLASM): Primary | ICD-10-CM

## 2025-02-11 DIAGNOSIS — E11.9 TYPE 2 DIABETES MELLITUS WITHOUT COMPLICATION, WITHOUT LONG-TERM CURRENT USE OF INSULIN (HCC): ICD-10-CM

## 2025-02-11 DIAGNOSIS — Z79.01 CURRENT USE OF LONG TERM ANTICOAGULATION: ICD-10-CM

## 2025-02-11 DIAGNOSIS — D68.51 FACTOR V LEIDEN (HCC): ICD-10-CM

## 2025-02-11 PROCEDURE — 1159F MED LIST DOCD IN RCRD: CPT | Performed by: STUDENT IN AN ORGANIZED HEALTH CARE EDUCATION/TRAINING PROGRAM

## 2025-02-11 PROCEDURE — 1123F ACP DISCUSS/DSCN MKR DOCD: CPT | Performed by: STUDENT IN AN ORGANIZED HEALTH CARE EDUCATION/TRAINING PROGRAM

## 2025-02-11 PROCEDURE — 3077F SYST BP >= 140 MM HG: CPT | Performed by: STUDENT IN AN ORGANIZED HEALTH CARE EDUCATION/TRAINING PROGRAM

## 2025-02-11 PROCEDURE — 1036F TOBACCO NON-USER: CPT | Performed by: STUDENT IN AN ORGANIZED HEALTH CARE EDUCATION/TRAINING PROGRAM

## 2025-02-11 PROCEDURE — 1160F RVW MEDS BY RX/DR IN RCRD: CPT | Performed by: STUDENT IN AN ORGANIZED HEALTH CARE EDUCATION/TRAINING PROGRAM

## 2025-02-11 PROCEDURE — G8420 CALC BMI NORM PARAMETERS: HCPCS | Performed by: STUDENT IN AN ORGANIZED HEALTH CARE EDUCATION/TRAINING PROGRAM

## 2025-02-11 PROCEDURE — 3044F HG A1C LEVEL LT 7.0%: CPT | Performed by: STUDENT IN AN ORGANIZED HEALTH CARE EDUCATION/TRAINING PROGRAM

## 2025-02-11 PROCEDURE — G8427 DOCREV CUR MEDS BY ELIG CLIN: HCPCS | Performed by: STUDENT IN AN ORGANIZED HEALTH CARE EDUCATION/TRAINING PROGRAM

## 2025-02-11 PROCEDURE — 3078F DIAST BP <80 MM HG: CPT | Performed by: STUDENT IN AN ORGANIZED HEALTH CARE EDUCATION/TRAINING PROGRAM

## 2025-02-11 PROCEDURE — 99214 OFFICE O/P EST MOD 30 MIN: CPT | Performed by: STUDENT IN AN ORGANIZED HEALTH CARE EDUCATION/TRAINING PROGRAM

## 2025-02-11 PROCEDURE — 1126F AMNT PAIN NOTED NONE PRSNT: CPT | Performed by: STUDENT IN AN ORGANIZED HEALTH CARE EDUCATION/TRAINING PROGRAM

## 2025-02-11 RX ORDER — ATORVASTATIN CALCIUM 80 MG/1
40 TABLET, FILM COATED ORAL DAILY
Qty: 90 TABLET | Refills: 2 | Status: SHIPPED | OUTPATIENT
Start: 2025-02-11

## 2025-02-11 RX ORDER — LUBIPROSTONE 24 UG/1
24 CAPSULE ORAL
COMMUNITY
Start: 2024-09-10

## 2025-02-11 SDOH — ECONOMIC STABILITY: FOOD INSECURITY: WITHIN THE PAST 12 MONTHS, THE FOOD YOU BOUGHT JUST DIDN'T LAST AND YOU DIDN'T HAVE MONEY TO GET MORE.: PATIENT DECLINED

## 2025-02-11 SDOH — ECONOMIC STABILITY: FOOD INSECURITY: WITHIN THE PAST 12 MONTHS, YOU WORRIED THAT YOUR FOOD WOULD RUN OUT BEFORE YOU GOT MONEY TO BUY MORE.: PATIENT DECLINED

## 2025-02-11 ASSESSMENT — ENCOUNTER SYMPTOMS
BLOOD IN STOOL: 1
COLOR CHANGE: 0
SHORTNESS OF BREATH: 0
ABDOMINAL PAIN: 1
CONSTIPATION: 1
ANAL BLEEDING: 1
VOMITING: 0
NAUSEA: 0

## 2025-03-04 ENCOUNTER — TELEPHONE (OUTPATIENT)
Dept: ONCOLOGY | Age: 80
End: 2025-03-04

## 2025-03-04 NOTE — TELEPHONE ENCOUNTER
Pt would like to r/s appt. Pt stated he originally had an appt on 03/05 and he was not informed about the new appt and he cannot make it on 03/10/2025

## 2025-03-11 ENCOUNTER — OFFICE VISIT (OUTPATIENT)
Age: 80
End: 2025-03-11
Payer: MEDICARE

## 2025-03-11 VITALS
DIASTOLIC BLOOD PRESSURE: 75 MMHG | RESPIRATION RATE: 16 BRPM | HEIGHT: 69 IN | OXYGEN SATURATION: 97 % | SYSTOLIC BLOOD PRESSURE: 177 MMHG | HEART RATE: 50 BPM | WEIGHT: 169 LBS | BODY MASS INDEX: 25.03 KG/M2

## 2025-03-11 DIAGNOSIS — K59.00 CONSTIPATION, UNSPECIFIED CONSTIPATION TYPE: Primary | ICD-10-CM

## 2025-03-11 PROCEDURE — 99204 OFFICE O/P NEW MOD 45 MIN: CPT | Performed by: INTERNAL MEDICINE

## 2025-03-11 PROCEDURE — 1159F MED LIST DOCD IN RCRD: CPT | Performed by: INTERNAL MEDICINE

## 2025-03-11 PROCEDURE — 3078F DIAST BP <80 MM HG: CPT | Performed by: INTERNAL MEDICINE

## 2025-03-11 PROCEDURE — G8420 CALC BMI NORM PARAMETERS: HCPCS | Performed by: INTERNAL MEDICINE

## 2025-03-11 PROCEDURE — 1036F TOBACCO NON-USER: CPT | Performed by: INTERNAL MEDICINE

## 2025-03-11 PROCEDURE — 1160F RVW MEDS BY RX/DR IN RCRD: CPT | Performed by: INTERNAL MEDICINE

## 2025-03-11 PROCEDURE — 3077F SYST BP >= 140 MM HG: CPT | Performed by: INTERNAL MEDICINE

## 2025-03-11 PROCEDURE — 1123F ACP DISCUSS/DSCN MKR DOCD: CPT | Performed by: INTERNAL MEDICINE

## 2025-03-11 PROCEDURE — G8427 DOCREV CUR MEDS BY ELIG CLIN: HCPCS | Performed by: INTERNAL MEDICINE

## 2025-03-11 NOTE — PATIENT INSTRUCTIONS
Repeat MRCP in 1-2 years to assess status of IPMN, if any new alarming symptoms develop then will plan for earlier exam or even endoscopic ultrasound (EUS) especially given family history     Trial of Linzess 145mcg PO daily, can increase to 290 mcg if needed     Benefiber daily, low FODMAP diet, drink plenty of water daily (ideally 64 oz)    Dulcolax 10mg suppository as needed over the counter

## 2025-03-11 NOTE — PROGRESS NOTES
Cruz Allred (:  1945) is a 80 y.o. male new patient referred to our office for evaluation of the following chief complaint(s):  New Patient      Assessment & Plan   ASSESSMENT/PLAN:  1) IPMN- EUS in  showed low CEA and elevated amylase, ?pseudocyst, possible IPMN, no alarming symptoms, stable findings on recent imaging without any high risk features and patient asymptomatic.     2) Constipation - currently on Amiriza 24mcg PO daily, appears to be having issues despite this and Miralax as well as stool softeners didn't offer much relieve. No alarming symptoms reported.       Plan    Repeat MRCP in 1-2 years to assess status of IPMN, if any new alarming symptoms develop then will plan for earlier exam or even endoscopic ultrasound (EUS) especially given family history     Trial of Linzess 145mcg PO daily, can increase to 290 mcg if needed     Benefiber daily, low FODMAP diet, drink plenty of water daily (ideally 64 oz)    Dulcolax 10mg suppository as needed over the counter     Probiotic daily        Studies   EUS   Impression:  1. Pancreatic cysts. 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  No worrisome features are seen and there appears to be interval decrease in size since last EUS.  2. Dilated common bile duct. In the absence of biliary obstruction, this likely reflects benign post-cholecystectomy biliary dilatation.    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 Only minimal change  from  (the larger lesions only increased by 1-2 mm). No high-risk stigmata  to suggest malignant transformation. No involvement of the main pancreatic duct.  Consider another surveillance MRI/MRCP in 1-2 years.    Subjective   SUBJECTIVE/OBJECTIVE  Cruz Allred is a 80 y.o. year old male referred for

## 2025-03-24 ENCOUNTER — HOSPITAL ENCOUNTER (OUTPATIENT)
Dept: LAB | Age: 80
Discharge: HOME | End: 2025-03-24
Payer: OTHER GOVERNMENT

## 2025-03-24 DIAGNOSIS — D64.9 ANEMIA, UNSPECIFIED TYPE: ICD-10-CM

## 2025-03-24 DIAGNOSIS — I82.502 CHRONIC DEEP VEIN THROMBOSIS (DVT) OF LEFT LOWER EXTREMITY, UNSPECIFIED VEIN: ICD-10-CM

## 2025-03-24 LAB
ALBUMIN SERPL-MCNC: 3.6 G/DL (ref 3.2–4.6)
ALBUMIN/GLOB SERPL: 1.2 (ref 1–1.9)
ALP SERPL-CCNC: 63 U/L (ref 40–129)
ALT SERPL-CCNC: 20 U/L (ref 8–55)
ANION GAP SERPL CALC-SCNC: 10 MMOL/L (ref 7–16)
AST SERPL-CCNC: 26 U/L (ref 15–37)
BASOPHILS # BLD: 0.02 K/UL (ref 0–0.2)
BASOPHILS NFR BLD: 0.4 % (ref 0–2)
BILIRUB SERPL-MCNC: 0.4 MG/DL (ref 0–1.2)
BUN SERPL-MCNC: 22 MG/DL (ref 8–23)
CALCIUM SERPL-MCNC: 9.5 MG/DL (ref 8.8–10.2)
CHLORIDE SERPL-SCNC: 107 MMOL/L (ref 98–107)
CO2 SERPL-SCNC: 26 MMOL/L (ref 20–29)
CREAT SERPL-MCNC: 1.22 MG/DL (ref 0.8–1.3)
DIFFERENTIAL METHOD BLD: ABNORMAL
EOSINOPHIL # BLD: 0.2 K/UL (ref 0–0.8)
EOSINOPHIL NFR BLD: 4 % (ref 0.5–7.8)
ERYTHROCYTE [DISTWIDTH] IN BLOOD BY AUTOMATED COUNT: 12.4 % (ref 11.9–14.6)
FERRITIN SERPL-MCNC: 127 NG/ML (ref 8–388)
GLOBULIN SER CALC-MCNC: 3.1 G/DL (ref 2.3–3.5)
GLUCOSE SERPL-MCNC: 118 MG/DL (ref 70–99)
HCT VFR BLD AUTO: 39.3 % (ref 41.1–50.3)
HGB BLD-MCNC: 12.8 G/DL (ref 13.6–17.2)
IMM GRANULOCYTES # BLD AUTO: 0.01 K/UL (ref 0–0.5)
IMM GRANULOCYTES NFR BLD AUTO: 0.2 % (ref 0–5)
IRON SATN MFR SERPL: 35 % (ref 20–50)
IRON SERPL-MCNC: 82 UG/DL (ref 35–100)
LYMPHOCYTES # BLD: 1 K/UL (ref 0.5–4.6)
LYMPHOCYTES NFR BLD: 20 % (ref 13–44)
MCH RBC QN AUTO: 33.7 PG (ref 26.1–32.9)
MCHC RBC AUTO-ENTMCNC: 32.6 G/DL (ref 31.4–35)
MCV RBC AUTO: 103.4 FL (ref 82–102)
MONOCYTES # BLD: 0.38 K/UL (ref 0.1–1.3)
MONOCYTES NFR BLD: 7.6 % (ref 4–12)
NEUTS SEG # BLD: 3.4 K/UL (ref 1.7–8.2)
NEUTS SEG NFR BLD: 67.8 % (ref 43–78)
NRBC # BLD: 0 K/UL (ref 0–0.2)
PLATELET # BLD AUTO: 176 K/UL (ref 150–450)
PMV BLD AUTO: 9.9 FL (ref 9.4–12.3)
POTASSIUM SERPL-SCNC: 4.8 MMOL/L (ref 3.5–5.1)
PROT SERPL-MCNC: 6.6 G/DL (ref 6.3–8.2)
RBC # BLD AUTO: 3.8 M/UL (ref 4.23–5.6)
SODIUM SERPL-SCNC: 143 MMOL/L (ref 136–145)
TIBC SERPL-MCNC: 234 UG/DL (ref 240–450)
UIBC SERPL-MCNC: 152 UG/DL (ref 112–347)
WBC # BLD AUTO: 5 K/UL (ref 4.3–11.1)

## 2025-03-24 PROCEDURE — 83540 ASSAY OF IRON: CPT

## 2025-03-24 PROCEDURE — 83550 IRON BINDING TEST: CPT

## 2025-03-24 PROCEDURE — 36415 COLL VENOUS BLD VENIPUNCTURE: CPT

## 2025-03-24 PROCEDURE — 82728 ASSAY OF FERRITIN: CPT

## 2025-03-24 PROCEDURE — 85025 COMPLETE CBC W/AUTO DIFF WBC: CPT

## 2025-03-24 PROCEDURE — 80053 COMPREHEN METABOLIC PANEL: CPT

## 2025-03-25 ENCOUNTER — OFFICE VISIT (OUTPATIENT)
Dept: ONCOLOGY | Age: 80
End: 2025-03-25
Payer: OTHER GOVERNMENT

## 2025-03-25 VITALS
DIASTOLIC BLOOD PRESSURE: 64 MMHG | BODY MASS INDEX: 24.73 KG/M2 | HEIGHT: 69 IN | WEIGHT: 167 LBS | RESPIRATION RATE: 12 BRPM | SYSTOLIC BLOOD PRESSURE: 142 MMHG | HEART RATE: 64 BPM | OXYGEN SATURATION: 96 % | TEMPERATURE: 97.5 F

## 2025-03-25 DIAGNOSIS — D68.2 FACTOR V DEFICIENCY (HCC): ICD-10-CM

## 2025-03-25 DIAGNOSIS — I82.502 CHRONIC DEEP VEIN THROMBOSIS (DVT) OF LEFT LOWER EXTREMITY, UNSPECIFIED VEIN: ICD-10-CM

## 2025-03-25 PROCEDURE — G8427 DOCREV CUR MEDS BY ELIG CLIN: HCPCS | Performed by: INTERNAL MEDICINE

## 2025-03-25 PROCEDURE — 1036F TOBACCO NON-USER: CPT | Performed by: INTERNAL MEDICINE

## 2025-03-25 PROCEDURE — 99214 OFFICE O/P EST MOD 30 MIN: CPT | Performed by: INTERNAL MEDICINE

## 2025-03-25 PROCEDURE — 3078F DIAST BP <80 MM HG: CPT | Performed by: INTERNAL MEDICINE

## 2025-03-25 PROCEDURE — 1160F RVW MEDS BY RX/DR IN RCRD: CPT | Performed by: INTERNAL MEDICINE

## 2025-03-25 PROCEDURE — 1159F MED LIST DOCD IN RCRD: CPT | Performed by: INTERNAL MEDICINE

## 2025-03-25 PROCEDURE — G8420 CALC BMI NORM PARAMETERS: HCPCS | Performed by: INTERNAL MEDICINE

## 2025-03-25 PROCEDURE — 1125F AMNT PAIN NOTED PAIN PRSNT: CPT | Performed by: INTERNAL MEDICINE

## 2025-03-25 PROCEDURE — 1123F ACP DISCUSS/DSCN MKR DOCD: CPT | Performed by: INTERNAL MEDICINE

## 2025-03-25 PROCEDURE — 3077F SYST BP >= 140 MM HG: CPT | Performed by: INTERNAL MEDICINE

## 2025-03-25 RX ORDER — OXYCODONE HYDROCHLORIDE 15 MG/1
15 TABLET ORAL EVERY 4 HOURS PRN
COMMUNITY

## 2025-03-25 ASSESSMENT — PATIENT HEALTH QUESTIONNAIRE - PHQ9
SUM OF ALL RESPONSES TO PHQ QUESTIONS 1-9: 0
2. FEELING DOWN, DEPRESSED OR HOPELESS: NOT AT ALL
SUM OF ALL RESPONSES TO PHQ QUESTIONS 1-9: 0
1. LITTLE INTEREST OR PLEASURE IN DOING THINGS: NOT AT ALL

## 2025-03-25 NOTE — PROGRESS NOTES
[facility]: Office Visit Established Patient    Chief Complaint:    Chief Complaint   Patient presents with    Follow-up         History of Present Illness:  Mr. Allred is a 80 y.o. male who returns today for management of chronic DVT.  He has a history of a Factor  V Leiden mutation with recurrent thrombosis x 4 episodes, previously under the care of Dr. Stein then transitioning to Dr. Benavides.  He continues on chronic Xarelto after failing Coumadin and Pradaxa.  He also has multiple cysts in his pancreas felt to be IPMNs.  He has undergone an endoscopic ultrasound by Dr. Cifuentes in February 2024 which suggest a combination of main duct and branch PMN.  There were no endoscopically worrisome features noted.  He is on annual imaging surveillance for these.        History of Present Illness  The patient, a 80-year-old male, presents for a 6-month follow-up regarding the administration of Xarelto for recurrent venous thromboembolism (VTE) and monitoring of an intraductal papillary mucinous neoplasm (IPMN).    The patient reports no significant changes in his condition. He continues to take Xarelto once daily. There are no alterations in the status of his lower extremities, and pain management is overseen by specialists at [facility].    The patient has observed a progressive increase in the size of pancreatic cysts. He experiences anorexia, with episodes of 4 to 5 days of fasting. His weight fluctuates between 158 and 162 pounds. He reports irregular bowel movements, with intervals of one to two weeks without defecation, leading to discomfort. When bowel movements do occur, they are substantial, resulting in a weight loss of approximately 10 pounds. He continues to follow with Dr. Cifuentes for management.       Review of Systems:  Constitutional: Negative.   HENT: Negative.   Eyes: Negative.   Respiratory: Negative.   Cardiovascular: Negative.   Gastrointestinal:

## 2025-03-25 NOTE — PATIENT INSTRUCTIONS
Patient Information from Today's Visit    Diagnosis: VTE      Follow Up Instructions: 6 months      Treatment Summary has been discussed and given to patient: N/A      Current Labs:   Hospital Outpatient Visit on 03/24/2025   Component Date Value Ref Range Status    Ferritin 03/24/2025 127  8 - 388 NG/ML Final    Iron 03/24/2025 82  35 - 100 ug/dL Final    TIBC 03/24/2025 234 (L)  240 - 450 ug/dL Final    Iron % Saturation 03/24/2025 35  20 - 50 % Final    UIBC 03/24/2025 152.0  112.0 - 347.0 ug/dL Final    Sodium 03/24/2025 143  136 - 145 mmol/L Final    Potassium 03/24/2025 4.8  3.5 - 5.1 mmol/L Final    Chloride 03/24/2025 107  98 - 107 mmol/L Final    CO2 03/24/2025 26  20 - 29 mmol/L Final    Anion Gap 03/24/2025 10  7 - 16 mmol/L Final    Glucose 03/24/2025 118 (H)  70 - 99 mg/dL Final    Comment: <70 mg/dL Consistent with, but not fully diagnostic of hypoglycemia.  100 - 125 mg/dL Impaired fasting glucose/consistent with pre-diabetes mellitus.  > 126 mg/dl Fasting glucose consistent with overt diabetes mellitus      BUN 03/24/2025 22  8 - 23 MG/DL Final    REPORT DELAYED,INSTRUMENT MAINTENANCE IN PROGRESS    Creatinine 03/24/2025 1.22  0.80 - 1.30 MG/DL Final    Est, Glom Filt Rate 03/24/2025 61  >60 ml/min/1.73m2 Final    Comment:    Pediatric calculator link: https://www.kidney.org/professionals/kdoqi/gfr_calculatorped     These results are not intended for use in patients <18 years of age.     eGFR results are calculated without a race factor using  the 2021 CKD-EPI equation. Careful clinical correlation is recommended, particularly when comparing to results calculated using previous equations.  The CKD-EPI equation is less accurate in patients with extremes of muscle mass, extra-renal metabolism of creatinine, excessive creatine ingestion, or following therapy that affects renal tubular secretion.      Calcium 03/24/2025 9.5  8.8 - 10.2 MG/DL Final    Total Bilirubin 03/24/2025 0.4  0.0 - 1.2 MG/DL Final

## 2025-04-09 DIAGNOSIS — D68.2 FACTOR V DEFICIENCY (HCC): ICD-10-CM

## 2025-04-09 DIAGNOSIS — I82.502 CHRONIC DEEP VEIN THROMBOSIS (DVT) OF LEFT LOWER EXTREMITY, UNSPECIFIED VEIN: Primary | ICD-10-CM

## 2025-04-09 NOTE — PROGRESS NOTES
Patient is already established with [facility] [address].  This referral is being entered in order to attach the [facility] Authorization that has been approved for hematology and oncology services.

## 2025-04-21 ENCOUNTER — HOSPITAL ENCOUNTER (EMERGENCY)
Age: 80
Discharge: HOME | End: 2025-04-21
Payer: OTHER GOVERNMENT

## 2025-04-21 VITALS
TEMPERATURE: 97.6 F | DIASTOLIC BLOOD PRESSURE: 78 MMHG | HEIGHT: 69 IN | HEART RATE: 57 BPM | SYSTOLIC BLOOD PRESSURE: 147 MMHG | OXYGEN SATURATION: 99 % | WEIGHT: 165 LBS | BODY MASS INDEX: 24.44 KG/M2 | RESPIRATION RATE: 15 BRPM

## 2025-04-21 DIAGNOSIS — S61.310A LACERATION OF RIGHT INDEX FINGER WITHOUT FOREIGN BODY WITH DAMAGE TO NAIL, INITIAL ENCOUNTER: Primary | ICD-10-CM

## 2025-04-21 PROCEDURE — 99283 EMERGENCY DEPT VISIT LOW MDM: CPT

## 2025-04-21 RX ORDER — CEPHALEXIN 500 MG/1
500 CAPSULE ORAL 2 TIMES DAILY
Qty: 14 CAPSULE | Refills: 0 | Status: SHIPPED | OUTPATIENT
Start: 2025-04-21 | End: 2025-04-28

## 2025-04-21 ASSESSMENT — PAIN SCALES - GENERAL: PAINLEVEL_OUTOF10: 0

## 2025-04-21 ASSESSMENT — LIFESTYLE VARIABLES
HOW MANY STANDARD DRINKS CONTAINING ALCOHOL DO YOU HAVE ON A TYPICAL DAY: PATIENT DOES NOT DRINK
HOW OFTEN DO YOU HAVE A DRINK CONTAINING ALCOHOL: NEVER

## 2025-04-21 NOTE — DISCHARGE INSTRUCTIONS
As discussed, please keep the wound clean and dry with daily gauze changes.  Please monitor for any signs of infection including redness, swelling, warmth, purulent drainage.  If you notice any change in the wound itself or you spike a fever, please return to the ER for further evaluation and treatment.    Given the fact that this injury occurred in a contaminated environment, I have sent in a week of antibiotics for you just to make sure we cover that wound for any potential infection.  Please take them as directed.    Please follow-up with your primary care provider in 2 to 3 days for continued monitoring of wound healing.

## 2025-04-21 NOTE — ED TRIAGE NOTES
Pt reports R second finger laceration with tablesaw 45 min prior ED arrival. States table saw kicked back on him. Skin tear to L posterior hand present as well. Thinks his tetanus is UTD.

## 2025-04-21 NOTE — ED PROVIDER NOTES
Emergency Department Provider Note       PCP: Oscar Meza DO   Age: 80 y.o.   Sex: male     DISPOSITION Decision To Discharge 04/21/2025 02:17:38 PM    ICD-10-CM    1. Laceration of right index finger without foreign body with damage to nail, initial encounter  S61.310A           Medical Decision Making     Pleasant 80-year-old male who initially presented with chief complaint of right distal index finger laceration while woodworking.  Patient not in distress.  Upon initial evaluation, hemostasis had been achieved despite patient being on a blood thinner.  Some damage to the distal nail matrix present but cuticle intact.  I discussed with patient initial management of nailbed injuries and advised that a hand x-ray would be an appropriate starting point.  Patient denied any desire for imaging at this time, noted he would like to move forward with a simple bandage and discharge.  I advised patient that we can provide AlumaFoam splint to offer additional protection and support at this time.  I again advised patient that we cannot rule out any phalanx fractures without imaging at this time.  He acknowledged understanding but still wished to proceed with wound management and discharge.  Patient was provided with empiric antibiotic coverage to ensure appropriate coverage given the contaminated environment in which the wound was acquired.  Patient given strict return precautions and acknowledged understanding.  Patient advised to follow-up with PCP for close wound care monitoring.     1 acute, uncomplicated illness or injury.  Over the counter drug management performed.  Prescription drug management performed.  Patient was discharged risks and benefits of hospitalization were considered.  Shared medical decision making was utilized in creating the patients health plan today.  I independently ordered and reviewed each unique test.                         History     Ambulatory 80-year-old male who presents with chief

## 2025-04-22 ENCOUNTER — CARE COORDINATION (OUTPATIENT)
Dept: CARE COORDINATION | Facility: CLINIC | Age: 80
End: 2025-04-22

## 2025-04-22 NOTE — CARE COORDINATION
Ambulatory Care Coordination Note     2025 1:00 PM     Patient Current Location:  [place]     This patient was received as a referral from Ambulatory Care Manager .    ACM contacted the patient by telephone. Verified name and  with patient as identifiers. Provided introduction to self, and explanation of the ACM role.   Patient declined care management services at this time.          ACM: Caitlin Butler RN     Challenges to be reviewed by the provider   Additional needs identified to be addressed with provider No  none               Method of communication with provider: phone.    Utilization: Initial Call - N/A    Care Summary Note: Pt has respectfully declined services at this time, my contact information has been shared with pt in the event there circumstances change. They are free to reach out at any time. ACM signing off.      PCP/Specialist follow up:   Future Appointments         Provider Specialty Dept Phone    2025 9:30 AM [facility] Internal Medicine [phone]    2025 9:20 AM Oscar Meza DO Internal Medicine [phone]    2025 9:30 AM Myranda Knapp, APRN - CNP Oncology [phone]    3/25/2026 9:30 AM Armando Fraga MD Oncology [phone]

## 2025-07-10 ENCOUNTER — TELEPHONE (OUTPATIENT)
Age: 80
End: 2025-07-10

## 2025-07-10 NOTE — TELEPHONE ENCOUNTER
Pt is calling saying  the insurance needs a PA  for the [employer] , The  is very upset ,he has been on the phone all day  with [employer] ,He is calling for help , Please call [employer] and  try to get the PA ,also please  also ask Dr Lucas  if he will call in   MEDICATION REFILL REQUEST      Name of Medicine: Nurtec  Dose:75 mg  Frequency:  1 every other day  Quantity:  45 days  Days' supply:  90      [phone]    Pt is very very upset , Please call  pt to give him a update .

## 2025-07-22 ENCOUNTER — TELEPHONE (OUTPATIENT)
Dept: ONCOLOGY | Age: 80
End: 2025-07-22

## 2025-08-28 ENCOUNTER — APPOINTMENT (OUTPATIENT)
Dept: URBAN - METROPOLITAN AREA CLINIC 25 | Facility: CLINIC | Age: 80
Setting detail: DERMATOLOGY
End: 2025-08-28

## 2025-08-28 DIAGNOSIS — L57.0 ACTINIC KERATOSIS: ICD-10-CM

## 2025-08-28 DIAGNOSIS — D22 MELANOCYTIC NEVI: ICD-10-CM

## 2025-08-28 DIAGNOSIS — L57.8 OTHER SKIN CHANGES DUE TO CHRONIC EXPOSURE TO NONIONIZING RADIATION: ICD-10-CM

## 2025-08-28 DIAGNOSIS — L82.1 OTHER SEBORRHEIC KERATOSIS: ICD-10-CM

## 2025-08-28 PROBLEM — D22.5 MELANOCYTIC NEVI OF TRUNK: Status: ACTIVE | Noted: 2025-08-28

## 2025-08-28 PROCEDURE — ? LIQUID NITROGEN

## 2025-08-28 PROCEDURE — ? COUNSELING

## 2025-08-28 PROCEDURE — ? SUNSCREEN RECOMMENDATIONS

## 2025-08-28 ASSESSMENT — LOCATION DETAILED DESCRIPTION DERM
LOCATION DETAILED: RIGHT PROXIMAL DORSAL FOREARM
LOCATION DETAILED: SUPERIOR MID FOREHEAD
LOCATION DETAILED: RIGHT SUPERIOR MEDIAL MIDBACK
LOCATION DETAILED: LEFT PROXIMAL DORSAL FOREARM
LOCATION DETAILED: RIGHT LATERAL MALAR CHEEK
LOCATION DETAILED: LEFT CENTRAL FRONTAL SCALP
LOCATION DETAILED: LEFT INFERIOR UPPER BACK
LOCATION DETAILED: EPIGASTRIC SKIN
LOCATION DETAILED: LEFT INFERIOR TEMPLE
LOCATION DETAILED: LEFT POSTERIOR SHOULDER
LOCATION DETAILED: LEFT SUPERIOR PARIETAL SCALP

## 2025-08-28 ASSESSMENT — LOCATION SIMPLE DESCRIPTION DERM
LOCATION SIMPLE: LEFT SCALP
LOCATION SIMPLE: ABDOMEN
LOCATION SIMPLE: LEFT TEMPLE
LOCATION SIMPLE: LEFT UPPER BACK
LOCATION SIMPLE: LEFT SHOULDER
LOCATION SIMPLE: RIGHT LOWER BACK
LOCATION SIMPLE: RIGHT CHEEK
LOCATION SIMPLE: SUPERIOR FOREHEAD
LOCATION SIMPLE: RIGHT FOREARM
LOCATION SIMPLE: LEFT FOREARM
LOCATION SIMPLE: SCALP

## 2025-08-28 ASSESSMENT — LOCATION ZONE DERM
LOCATION ZONE: FACE
LOCATION ZONE: SCALP
LOCATION ZONE: ARM
LOCATION ZONE: TRUNK

## (undated) DEVICE — SUTURE ETHLN SZ 2-0 L18IN NONABSORBABLE BLK L26MM PS 3/8 585H

## (undated) DEVICE — [RESECTOR CUTTER, ARTHROSCOPIC SHAVER BLADE,  DO NOT RESTERILIZE,  DO NOT USE IF PACKAGE IS DAMAGED,  KEEP DRY,  KEEP AWAY FROM SUNLIGHT]: Brand: FORMULA

## (undated) DEVICE — PACKING WND W1INXL6FT VAG WVN COT GZ RADPQ

## (undated) DEVICE — FAN SPRAY KIT: Brand: PULSAVAC®

## (undated) DEVICE — SHEET, DRAPE, SPLIT, STERILE: Brand: MEDLINE

## (undated) DEVICE — CARDINAL HEALTH FLEXIBLE LIGHT HANDLE COVER: Brand: CARDINAL HEALTH

## (undated) DEVICE — STOCKINETTE TUBE 6X48 -- MEDICHOICE

## (undated) DEVICE — PUDDLEVAC FLOOR SUCTION DEVICE: Brand: PUDDLEVAC

## (undated) DEVICE — ABDOMINAL PAD: Brand: DERMACEA

## (undated) DEVICE — SOLUTION IRRIG 3000ML 0.9% SOD CHL FLX CONT 0797208] ICU MEDICAL INC]

## (undated) DEVICE — SUTURE PROL SZ 0 L30IN NONABSORBABLE BLU FSLX L36MM 3/8 CIR 8690H

## (undated) DEVICE — SPONGE LAP 18X18IN STRL -- 5/PK

## (undated) DEVICE — SUTURE ETHBND 2 L30IN NONABSORBABLE GRN WHT LT GRN MO-7 D8793

## (undated) DEVICE — 3M™ COBAN™ NL STERILE NON-LATEX SELF-ADHERENT WRAP, 2084S, 4 IN X 5 YD (10 CM X 4,5 M), 18 ROLLS/CASE: Brand: 3M™ COBAN™

## (undated) DEVICE — ELECTRODE NDL 2.8IN COAT VALLEYLAB

## (undated) DEVICE — DRAPE,TOP,102X53,STERILE: Brand: MEDLINE

## (undated) DEVICE — 4.0MM ROUND FAST CUTTING BUR

## (undated) DEVICE — (D)STRIP SKN CLSR 0.5X4IN WHT --

## (undated) DEVICE — SUTURE 5 MERS GRN 30 TO 40 IN D9211

## (undated) DEVICE — SUTURE PROL SZ 2-0 L18IN NONABSORBABLE BLU FS L26MM 3/8 CIR 8685H

## (undated) DEVICE — RESTRICTOR CEM DIA10MM UNIV FEM CNL UHMWPE BIOSTP G

## (undated) DEVICE — SOLUTION IRRIG 1000ML H2O STRL BLT

## (undated) DEVICE — PAD,ABDOMINAL,5"X9",STERILE,LF,1/PK: Brand: MEDLINE INDUSTRIES, INC.

## (undated) DEVICE — SURGICAL PROCEDURE PACK BASIC ST FRANCIS

## (undated) DEVICE — BALLOON US E LIN RNG O KT FOR FG-32UA

## (undated) DEVICE — 3M™ STERI-DRAPE™ INCISE DRAPE 1050 (60CM X 45CM): Brand: STERI-DRAPE™

## (undated) DEVICE — SUTURE VCRL SZ 0 L27IN ABSRB UD L36MM CP-1 1/2 CIR REV CUT J267H

## (undated) DEVICE — DRAPE,U/SHT,SPLIT,FILM,60X84,STERILE: Brand: MEDLINE

## (undated) DEVICE — SUTURE ETHBND EXCEL SZ 2 L27IN NONABSORBABLE GRN WHT MO-7 D7485

## (undated) DEVICE — CONNECTOR TBNG OD5-7MM O2 END DISP

## (undated) DEVICE — BIT DRL L165MM DIA2.8MM QUIK CPL W/O STP REUSE

## (undated) DEVICE — ARGYLE SIGMOID SURGICAL SUCTION INSTRUMENT 23 FR/CH (7.7 MM): Brand: ARGYLE

## (undated) DEVICE — IMPLANTABLE DEVICE
Type: IMPLANTABLE DEVICE | Site: SHOULDER | Status: NON-FUNCTIONAL
Removed: 2017-03-23

## (undated) DEVICE — AMD ANTIMICROBIAL GAUZE SPONGES,12 PLY USP TYPE VII, 0.2% POLYHEXAMETHYLENE BIGUANIDE HCI (PHMB): Brand: CURITY

## (undated) DEVICE — SYRINGE CATH TIP 50ML

## (undated) DEVICE — COVER,MAYO STAND,STERILE: Brand: MEDLINE

## (undated) DEVICE — NDL SPNE QNCKE 18GX3.5IN LF --

## (undated) DEVICE — AIRLIFE™ OXYGEN TUBING 7 FEET (2.1 M) CRUSH RESISTANT OXYGEN TUBING, VINYL TIPPED: Brand: AIRLIFE™

## (undated) DEVICE — SOLUTION IV 1000ML 0.9% SOD CHL

## (undated) DEVICE — GOWN,REINFORCED,POLY,AURORA,XXLARGE,STR: Brand: MEDLINE

## (undated) DEVICE — CANNULA NSL ORAL AD FOR CAPNOFLEX CO2 O2 AIRLFE

## (undated) DEVICE — REM POLYHESIVE ADULT PATIENT RETURN ELECTRODE: Brand: VALLEYLAB

## (undated) DEVICE — MEDI-VAC NON-CONDUCTIVE SUCTION TUBING: Brand: CARDINAL HEALTH

## (undated) DEVICE — OSCILLATING TIP SAW CARTRIDGE: Brand: STRYKER PRECISION

## (undated) DEVICE — 2000CC GUARDIAN II: Brand: GUARDIAN

## (undated) DEVICE — KENDALL SCD EXPRESS SLEEVES, KNEE LENGTH, MEDIUM: Brand: KENDALL SCD

## (undated) DEVICE — (D)PREP SKN CHLRAPRP APPL 26ML -- CONVERT TO ITEM 371833

## (undated) DEVICE — OUTFLOW CASSETTE TUBING, DO NOT USE IF PACKAGE IS DAMAGED: Brand: CROSSFLOW

## (undated) DEVICE — BLADE SHV CUT MENIS AGG + 4MM --

## (undated) DEVICE — Z DISCONTINUED PER MEDLINE GOWN ISOLATN 2XL BLU POLYPR CLS BK FLD PROTCT NK WAIST TIE

## (undated) DEVICE — ENDOSCOPIC ULTRASOUND FINE NEEDLE BIOPSY (FNB) DEVICE: Brand: ACQUIRE

## (undated) DEVICE — MOUTHPIECE ENDOSCP L CTRL OPN AND SIDE PORTS DISP

## (undated) DEVICE — DRAPE TWL SURG 16X26IN BLU ORB04] ALLCARE INC]

## (undated) DEVICE — GUARDIAN LVC: Brand: GUARDIAN

## (undated) DEVICE — PACK,SHOULDER,DRAPE,POUCH: Brand: MEDLINE

## (undated) DEVICE — SINGLE BASIN: Brand: CARDINAL HEALTH

## (undated) DEVICE — AMD ANTIMICROBIAL BANDAGE ROLL,6 PLY: Brand: KERLIX

## (undated) DEVICE — PRECISION THIN (9.0 X 0.38 X 31.0MM)

## (undated) DEVICE — INFLOW CASSETTE TUBING, DO NOT USE IF PACKAGE IS DAMAGED: Brand: CROSSFLOW

## (undated) DEVICE — (D)STRAP SWATHE ONLY -- DISC BY MFR NO SUB

## (undated) DEVICE — IMMOBILIZER SHOULDER SLING SWATHE DLX

## (undated) DEVICE — 3000CC GUARDIAN II: Brand: GUARDIAN

## (undated) DEVICE — BUR SHV CUT HLLW 6 FLUT 5.5MM --

## (undated) DEVICE — MEDI-VAC YANKAUER SUCTION HANDLE W/BULBOUS TIP: Brand: CARDINAL HEALTH